# Patient Record
Sex: FEMALE | Race: BLACK OR AFRICAN AMERICAN | Employment: OTHER | URBAN - METROPOLITAN AREA
[De-identification: names, ages, dates, MRNs, and addresses within clinical notes are randomized per-mention and may not be internally consistent; named-entity substitution may affect disease eponyms.]

---

## 2017-01-12 ENCOUNTER — OFFICE VISIT (OUTPATIENT)
Dept: ORTHOPEDIC SURGERY | Age: 72
End: 2017-01-12

## 2017-01-12 VITALS
HEIGHT: 68 IN | RESPIRATION RATE: 16 BRPM | DIASTOLIC BLOOD PRESSURE: 71 MMHG | HEART RATE: 65 BPM | WEIGHT: 203 LBS | SYSTOLIC BLOOD PRESSURE: 121 MMHG | BODY MASS INDEX: 30.77 KG/M2

## 2017-01-12 DIAGNOSIS — M65.352 TRIGGER LITTLE FINGER OF LEFT HAND: ICD-10-CM

## 2017-01-12 DIAGNOSIS — Z01.818 PREOP EXAMINATION: Primary | ICD-10-CM

## 2017-01-12 DIAGNOSIS — M65.332 TRIGGER MIDDLE FINGER OF LEFT HAND: ICD-10-CM

## 2017-01-12 PROCEDURE — 99213 OFFICE O/P EST LOW 20 MIN: CPT | Performed by: ORTHOPAEDIC SURGERY

## 2017-01-12 RX ORDER — FERROUS SULFATE 325(65) MG
TABLET ORAL
Refills: 0 | COMMUNITY
Start: 2016-11-23 | End: 2021-01-20 | Stop reason: SDUPTHER

## 2017-01-12 RX ORDER — HYDROCODONE BITARTRATE AND ACETAMINOPHEN 5; 325 MG/1; MG/1
1 TABLET ORAL EVERY 6 HOURS PRN
Qty: 15 TABLET | Refills: 0 | Status: SHIPPED | OUTPATIENT
Start: 2017-01-12 | End: 2017-05-16 | Stop reason: ALTCHOICE

## 2017-01-18 ENCOUNTER — HOSPITAL ENCOUNTER (OUTPATIENT)
Dept: PREADMISSION TESTING | Age: 72
Discharge: HOME | End: 2017-01-18
Attending: ORTHOPAEDIC SURGERY | Admitting: ORTHOPAEDIC SURGERY

## 2017-01-18 VITALS
WEIGHT: 203 LBS | DIASTOLIC BLOOD PRESSURE: 62 MMHG | SYSTOLIC BLOOD PRESSURE: 129 MMHG | HEIGHT: 68 IN | TEMPERATURE: 98.4 F | BODY MASS INDEX: 30.77 KG/M2 | OXYGEN SATURATION: 97 % | HEART RATE: 64 BPM | RESPIRATION RATE: 16 BRPM

## 2017-01-18 LAB
ANION GAP SERPL CALCULATED.3IONS-SCNC: 12 MMOL/L (ref 4–16)
BUN BLDV-MCNC: 31 MG/DL (ref 6–23)
CALCIUM SERPL-MCNC: 9.5 MG/DL (ref 8.3–10.6)
CHLORIDE BLD-SCNC: 103 MMOL/L (ref 99–110)
CO2: 25 MMOL/L (ref 21–32)
CREAT SERPL-MCNC: 1.4 MG/DL (ref 0.6–1.1)
GFR AFRICAN AMERICAN: 45 ML/MIN/1.73M2
GFR NON-AFRICAN AMERICAN: 37 ML/MIN/1.73M2
GLUCOSE BLD-MCNC: 91 MG/DL (ref 70–140)
HCT VFR BLD CALC: 39 % (ref 37–47)
HEMOGLOBIN: 11.6 GM/DL (ref 12.5–16)
MCH RBC QN AUTO: 25.1 PG (ref 27–31)
MCHC RBC AUTO-ENTMCNC: 29.7 % (ref 32–36)
MCV RBC AUTO: 84.2 FL (ref 78–100)
PDW BLD-RTO: 15.5 % (ref 11.7–14.9)
PLATELET # BLD: 296 K/CU MM (ref 140–440)
PMV BLD AUTO: 10.2 FL (ref 7.5–11.1)
POTASSIUM SERPL-SCNC: 5.2 MMOL/L (ref 3.5–5.1)
RBC # BLD: 4.63 M/CU MM (ref 4.2–5.4)
SODIUM BLD-SCNC: 140 MMOL/L (ref 135–145)
WBC # BLD: 8.6 K/CU MM (ref 4–10.5)

## 2017-01-19 ENCOUNTER — TELEPHONE (OUTPATIENT)
Dept: CARDIOLOGY CLINIC | Age: 72
End: 2017-01-19

## 2017-01-19 LAB
EKG ATRIAL RATE: 59 BPM
EKG DIAGNOSIS: NORMAL
EKG P AXIS: 47 DEGREES
EKG P-R INTERVAL: 186 MS
EKG Q-T INTERVAL: 434 MS
EKG QRS DURATION: 90 MS
EKG QTC CALCULATION (BAZETT): 429 MS
EKG R AXIS: 1 DEGREES
EKG T AXIS: 7 DEGREES
EKG VENTRICULAR RATE: 59 BPM

## 2017-01-23 ENCOUNTER — HOSPITAL ENCOUNTER (OUTPATIENT)
Dept: SURGERY | Age: 72
Discharge: HOME | End: 2017-01-23
Attending: ORTHOPAEDIC SURGERY | Admitting: ORTHOPAEDIC SURGERY

## 2017-01-23 VITALS
DIASTOLIC BLOOD PRESSURE: 75 MMHG | OXYGEN SATURATION: 96 % | TEMPERATURE: 97.6 F | RESPIRATION RATE: 16 BRPM | HEIGHT: 68 IN | WEIGHT: 203 LBS | BODY MASS INDEX: 30.77 KG/M2 | SYSTOLIC BLOOD PRESSURE: 119 MMHG | HEART RATE: 79 BPM

## 2017-01-23 LAB
GLUCOSE BLD-MCNC: 104 MG/DL (ref 70–99)
GLUCOSE BLD-MCNC: 105 MG/DL (ref 70–99)
HCT VFR BLD CALC: 34 % (ref 37–47)
HEMOGLOBIN: 11.6 GM/DL (ref 12.5–16)
POC CALCIUM: 1.1 MMOL/L (ref 1.12–1.32)
POC CHLORIDE: 109 MMOL/L (ref 98–109)
POC CREATININE: 1.6 MG/DL (ref 0.6–1.1)
POTASSIUM SERPL-SCNC: 5.9 MMOL/L (ref 3.5–4.5)
SODIUM BLD-SCNC: 142 MMOL/L (ref 138–146)
SOURCE, BLOOD GAS: ABNORMAL

## 2017-01-23 RX ORDER — SODIUM CHLORIDE 0.9 % (FLUSH) 0.9 %
10 SYRINGE (ML) INJECTION PRN
Status: DISCONTINUED | OUTPATIENT
Start: 2017-01-23 | End: 2017-01-24 | Stop reason: HOSPADM

## 2017-01-23 RX ORDER — SODIUM CHLORIDE, SODIUM LACTATE, POTASSIUM CHLORIDE, CALCIUM CHLORIDE 600; 310; 30; 20 MG/100ML; MG/100ML; MG/100ML; MG/100ML
INJECTION, SOLUTION INTRAVENOUS CONTINUOUS
Status: DISCONTINUED | OUTPATIENT
Start: 2017-01-23 | End: 2017-01-24 | Stop reason: HOSPADM

## 2017-01-23 RX ORDER — SODIUM CHLORIDE 0.9 % (FLUSH) 0.9 %
10 SYRINGE (ML) INJECTION EVERY 12 HOURS SCHEDULED
Status: DISCONTINUED | OUTPATIENT
Start: 2017-01-23 | End: 2017-01-24 | Stop reason: HOSPADM

## 2017-01-23 RX ADMIN — SODIUM CHLORIDE, SODIUM LACTATE, POTASSIUM CHLORIDE, CALCIUM CHLORIDE: 600; 310; 30; 20 INJECTION, SOLUTION INTRAVENOUS at 07:52

## 2017-01-23 ASSESSMENT — PAIN - FUNCTIONAL ASSESSMENT: PAIN_FUNCTIONAL_ASSESSMENT: 0-10

## 2017-01-25 ENCOUNTER — OFFICE VISIT (OUTPATIENT)
Dept: INTERNAL MEDICINE CLINIC | Age: 72
End: 2017-01-25

## 2017-01-25 VITALS
SYSTOLIC BLOOD PRESSURE: 142 MMHG | RESPIRATION RATE: 16 BRPM | HEIGHT: 68 IN | BODY MASS INDEX: 31.07 KG/M2 | DIASTOLIC BLOOD PRESSURE: 70 MMHG | HEART RATE: 76 BPM | WEIGHT: 205 LBS

## 2017-01-25 DIAGNOSIS — E87.5 HYPERKALEMIA: Primary | ICD-10-CM

## 2017-01-25 LAB
ANION GAP SERPL CALCULATED.3IONS-SCNC: 17 MMOL/L (ref 3–16)
BUN BLDV-MCNC: 23 MG/DL (ref 7–20)
CALCIUM SERPL-MCNC: 9.2 MG/DL (ref 8.3–10.6)
CHLORIDE BLD-SCNC: 108 MMOL/L (ref 99–110)
CO2: 22 MMOL/L (ref 21–32)
CREAT SERPL-MCNC: 1.1 MG/DL (ref 0.6–1.2)
GFR AFRICAN AMERICAN: 59
GFR NON-AFRICAN AMERICAN: 49
GLUCOSE BLD-MCNC: 105 MG/DL (ref 70–99)
POTASSIUM SERPL-SCNC: 4.5 MMOL/L (ref 3.5–5.1)
SODIUM BLD-SCNC: 147 MMOL/L (ref 136–145)

## 2017-01-25 PROCEDURE — 3288F FALL RISK ASSESSMENT DOCD: CPT | Performed by: INTERNAL MEDICINE

## 2017-01-25 PROCEDURE — 99213 OFFICE O/P EST LOW 20 MIN: CPT | Performed by: INTERNAL MEDICINE

## 2017-01-25 PROCEDURE — G8510 SCR DEP NEG, NO PLAN REQD: HCPCS | Performed by: INTERNAL MEDICINE

## 2017-01-25 ASSESSMENT — PATIENT HEALTH QUESTIONNAIRE - PHQ9
1. LITTLE INTEREST OR PLEASURE IN DOING THINGS: 0
SUM OF ALL RESPONSES TO PHQ9 QUESTIONS 1 & 2: 0
2. FEELING DOWN, DEPRESSED OR HOPELESS: 0
SUM OF ALL RESPONSES TO PHQ QUESTIONS 1-9: 0

## 2017-01-26 ENCOUNTER — TELEPHONE (OUTPATIENT)
Dept: ORTHOPEDIC SURGERY | Age: 72
End: 2017-01-26

## 2017-02-03 RX ORDER — SODIUM CHLORIDE, SODIUM LACTATE, POTASSIUM CHLORIDE, CALCIUM CHLORIDE 600; 310; 30; 20 MG/100ML; MG/100ML; MG/100ML; MG/100ML
INJECTION, SOLUTION INTRAVENOUS CONTINUOUS
Status: CANCELLED | OUTPATIENT
Start: 2017-02-03

## 2017-02-03 RX ORDER — SODIUM CHLORIDE 0.9 % (FLUSH) 0.9 %
10 SYRINGE (ML) INJECTION EVERY 12 HOURS SCHEDULED
Status: CANCELLED | OUTPATIENT
Start: 2017-02-03

## 2017-02-03 RX ORDER — SODIUM CHLORIDE 0.9 % (FLUSH) 0.9 %
10 SYRINGE (ML) INJECTION PRN
Status: CANCELLED | OUTPATIENT
Start: 2017-02-03

## 2017-02-06 ENCOUNTER — TELEPHONE (OUTPATIENT)
Dept: ORTHOPEDIC SURGERY | Age: 72
End: 2017-02-06

## 2017-02-06 ENCOUNTER — HOSPITAL ENCOUNTER (OUTPATIENT)
Dept: SURGERY | Age: 72
Discharge: HOME | End: 2017-03-07
Attending: ORTHOPAEDIC SURGERY | Admitting: ORTHOPAEDIC SURGERY

## 2017-02-10 RX ORDER — PROPAFENONE HYDROCHLORIDE 150 MG/1
150 TABLET, FILM COATED ORAL EVERY 8 HOURS
Qty: 90 TABLET | Refills: 6 | Status: SHIPPED | OUTPATIENT
Start: 2017-02-10 | End: 2018-01-09 | Stop reason: SDUPTHER

## 2017-02-14 ENCOUNTER — OFFICE VISIT (OUTPATIENT)
Dept: INTERNAL MEDICINE CLINIC | Age: 72
End: 2017-02-14

## 2017-02-14 ENCOUNTER — OFFICE VISIT (OUTPATIENT)
Dept: PHYSICAL MEDICINE AND REHAB | Age: 72
End: 2017-02-14

## 2017-02-14 VITALS — RESPIRATION RATE: 14 BRPM | HEART RATE: 76 BPM | SYSTOLIC BLOOD PRESSURE: 140 MMHG | DIASTOLIC BLOOD PRESSURE: 72 MMHG

## 2017-02-14 DIAGNOSIS — G56.03 BILATERAL CARPAL TUNNEL SYNDROME: Primary | ICD-10-CM

## 2017-02-14 DIAGNOSIS — E11.21 TYPE 2 DIABETES MELLITUS WITH NEPHROPATHY (HCC): ICD-10-CM

## 2017-02-14 DIAGNOSIS — G56.21 ULNAR NEUROPATHY AT WRIST, RIGHT: ICD-10-CM

## 2017-02-14 DIAGNOSIS — M79.602 PARESTHESIA AND PAIN OF BOTH UPPER EXTREMITIES: ICD-10-CM

## 2017-02-14 DIAGNOSIS — I48.0 PAROXYSMAL ATRIAL FIBRILLATION (HCC): Primary | ICD-10-CM

## 2017-02-14 DIAGNOSIS — H61.22 IMPACTED CERUMEN OF LEFT EAR: ICD-10-CM

## 2017-02-14 DIAGNOSIS — R20.2 PARESTHESIA AND PAIN OF BOTH UPPER EXTREMITIES: ICD-10-CM

## 2017-02-14 DIAGNOSIS — M79.601 PARESTHESIA AND PAIN OF BOTH UPPER EXTREMITIES: ICD-10-CM

## 2017-02-14 DIAGNOSIS — C50.919 MALIGNANT NEOPLASM OF FEMALE BREAST, UNSPECIFIED LATERALITY, UNSPECIFIED SITE OF BREAST: ICD-10-CM

## 2017-02-14 LAB
A/G RATIO: 1.6 (ref 1.1–2.2)
ALBUMIN SERPL-MCNC: 4.5 G/DL (ref 3.4–5)
ALP BLD-CCNC: 93 U/L (ref 40–129)
ALT SERPL-CCNC: 28 U/L (ref 10–40)
ANION GAP SERPL CALCULATED.3IONS-SCNC: 18 MMOL/L (ref 3–16)
AST SERPL-CCNC: 16 U/L (ref 15–37)
BILIRUB SERPL-MCNC: 0.3 MG/DL (ref 0–1)
BUN BLDV-MCNC: 31 MG/DL (ref 7–20)
CALCIUM SERPL-MCNC: 9.8 MG/DL (ref 8.3–10.6)
CHLORIDE BLD-SCNC: 106 MMOL/L (ref 99–110)
CO2: 23 MMOL/L (ref 21–32)
CREAT SERPL-MCNC: 1.3 MG/DL (ref 0.6–1.2)
GFR AFRICAN AMERICAN: 49
GFR NON-AFRICAN AMERICAN: 40
GLOBULIN: 2.9 G/DL
GLUCOSE BLD-MCNC: 112 MG/DL (ref 70–99)
POTASSIUM SERPL-SCNC: 4.6 MMOL/L (ref 3.5–5.1)
SODIUM BLD-SCNC: 147 MMOL/L (ref 136–145)
TOTAL PROTEIN: 7.4 G/DL (ref 6.4–8.2)

## 2017-02-14 PROCEDURE — G0008 ADMIN INFLUENZA VIRUS VAC: HCPCS | Performed by: INTERNAL MEDICINE

## 2017-02-14 PROCEDURE — 90662 IIV NO PRSV INCREASED AG IM: CPT | Performed by: INTERNAL MEDICINE

## 2017-02-14 PROCEDURE — 95886 MUSC TEST DONE W/N TEST COMP: CPT | Performed by: PHYSICAL MEDICINE & REHABILITATION

## 2017-02-14 PROCEDURE — 95911 NRV CNDJ TEST 9-10 STUDIES: CPT | Performed by: PHYSICAL MEDICINE & REHABILITATION

## 2017-02-14 PROCEDURE — 99214 OFFICE O/P EST MOD 30 MIN: CPT | Performed by: INTERNAL MEDICINE

## 2017-02-15 LAB
ESTIMATED AVERAGE GLUCOSE: 137 MG/DL
HBA1C MFR BLD: 6.4 %

## 2017-02-22 ENCOUNTER — TELEPHONE (OUTPATIENT)
Dept: INTERNAL MEDICINE CLINIC | Age: 72
End: 2017-02-22

## 2017-02-22 DIAGNOSIS — G56.03 CARPAL TUNNEL SYNDROME ON BOTH SIDES: Primary | ICD-10-CM

## 2017-02-24 ENCOUNTER — TELEPHONE (OUTPATIENT)
Dept: INTERNAL MEDICINE CLINIC | Age: 72
End: 2017-02-24

## 2017-02-28 ENCOUNTER — OFFICE VISIT (OUTPATIENT)
Dept: CARDIOLOGY CLINIC | Age: 72
End: 2017-02-28

## 2017-02-28 VITALS
WEIGHT: 204 LBS | BODY MASS INDEX: 30.92 KG/M2 | DIASTOLIC BLOOD PRESSURE: 70 MMHG | HEIGHT: 68 IN | SYSTOLIC BLOOD PRESSURE: 128 MMHG

## 2017-02-28 DIAGNOSIS — I10 ESSENTIAL HYPERTENSION: ICD-10-CM

## 2017-02-28 DIAGNOSIS — G47.33 OSA (OBSTRUCTIVE SLEEP APNEA): ICD-10-CM

## 2017-02-28 DIAGNOSIS — E78.2 MIXED HYPERLIPIDEMIA: ICD-10-CM

## 2017-02-28 DIAGNOSIS — I48.0 PAROXYSMAL ATRIAL FIBRILLATION (HCC): Primary | ICD-10-CM

## 2017-02-28 DIAGNOSIS — E11.21 TYPE 2 DIABETES MELLITUS WITH NEPHROPATHY (HCC): ICD-10-CM

## 2017-02-28 PROCEDURE — 99214 OFFICE O/P EST MOD 30 MIN: CPT | Performed by: INTERNAL MEDICINE

## 2017-03-03 RX ORDER — ATORVASTATIN CALCIUM 80 MG/1
80 TABLET, FILM COATED ORAL DAILY
Qty: 90 TABLET | Refills: 3 | Status: SHIPPED | OUTPATIENT
Start: 2017-03-03 | End: 2018-02-03 | Stop reason: SDUPTHER

## 2017-03-16 DIAGNOSIS — I10 ESSENTIAL HYPERTENSION: ICD-10-CM

## 2017-03-16 RX ORDER — METOPROLOL TARTRATE 50 MG/1
50 TABLET, FILM COATED ORAL 2 TIMES DAILY
Qty: 180 TABLET | Refills: 3 | Status: SHIPPED | OUTPATIENT
Start: 2017-03-16 | End: 2018-03-24 | Stop reason: SDUPTHER

## 2017-03-28 ENCOUNTER — TELEPHONE (OUTPATIENT)
Dept: CARDIOLOGY CLINIC | Age: 72
End: 2017-03-28

## 2017-03-28 DIAGNOSIS — I48.0 PAROXYSMAL ATRIAL FIBRILLATION (HCC): Primary | ICD-10-CM

## 2017-03-28 DIAGNOSIS — N39.41 URGE INCONTINENCE: ICD-10-CM

## 2017-03-28 RX ORDER — OXYBUTYNIN CHLORIDE 5 MG/1
5 TABLET ORAL 2 TIMES DAILY
Qty: 180 TABLET | Refills: 3 | Status: SHIPPED | OUTPATIENT
Start: 2017-03-28 | End: 2018-04-07 | Stop reason: SDUPTHER

## 2017-05-16 ENCOUNTER — OFFICE VISIT (OUTPATIENT)
Dept: INTERNAL MEDICINE CLINIC | Age: 72
End: 2017-05-16

## 2017-05-16 ENCOUNTER — OFFICE VISIT (OUTPATIENT)
Dept: ORTHOPEDIC SURGERY | Age: 72
End: 2017-05-16

## 2017-05-16 VITALS
BODY MASS INDEX: 31.02 KG/M2 | DIASTOLIC BLOOD PRESSURE: 70 MMHG | RESPIRATION RATE: 16 BRPM | WEIGHT: 204 LBS | SYSTOLIC BLOOD PRESSURE: 150 MMHG | HEART RATE: 64 BPM

## 2017-05-16 VITALS — BODY MASS INDEX: 30.92 KG/M2 | WEIGHT: 204 LBS | RESPIRATION RATE: 16 BRPM | HEIGHT: 68 IN

## 2017-05-16 DIAGNOSIS — K21.9 GASTROESOPHAGEAL REFLUX DISEASE WITHOUT ESOPHAGITIS: Primary | ICD-10-CM

## 2017-05-16 DIAGNOSIS — I48.0 PAROXYSMAL ATRIAL FIBRILLATION (HCC): ICD-10-CM

## 2017-05-16 DIAGNOSIS — I10 ESSENTIAL HYPERTENSION: ICD-10-CM

## 2017-05-16 DIAGNOSIS — M65.332 TRIGGER MIDDLE FINGER OF LEFT HAND: Primary | ICD-10-CM

## 2017-05-16 DIAGNOSIS — E11.21 TYPE 2 DIABETES MELLITUS WITH NEPHROPATHY (HCC): ICD-10-CM

## 2017-05-16 DIAGNOSIS — N39.41 URGE INCONTINENCE: ICD-10-CM

## 2017-05-16 DIAGNOSIS — E78.2 MIXED HYPERLIPIDEMIA: ICD-10-CM

## 2017-05-16 LAB
A/G RATIO: 1.6 (ref 1.1–2.2)
ALBUMIN SERPL-MCNC: 4.4 G/DL (ref 3.4–5)
ALP BLD-CCNC: 81 U/L (ref 40–129)
ALT SERPL-CCNC: 26 U/L (ref 10–40)
ANION GAP SERPL CALCULATED.3IONS-SCNC: 25 MMOL/L (ref 3–16)
AST SERPL-CCNC: 26 U/L (ref 15–37)
BASOPHILS ABSOLUTE: 0.1 K/UL (ref 0–0.2)
BASOPHILS RELATIVE PERCENT: 1.1 %
BILIRUB SERPL-MCNC: 0.3 MG/DL (ref 0–1)
BUN BLDV-MCNC: 29 MG/DL (ref 7–20)
CALCIUM SERPL-MCNC: 9.2 MG/DL (ref 8.3–10.6)
CHLORIDE BLD-SCNC: 102 MMOL/L (ref 99–110)
CHOLESTEROL, TOTAL: 204 MG/DL (ref 0–199)
CO2: 15 MMOL/L (ref 21–32)
CREAT SERPL-MCNC: 1.3 MG/DL (ref 0.6–1.2)
EOSINOPHILS ABSOLUTE: 0.2 K/UL (ref 0–0.6)
EOSINOPHILS RELATIVE PERCENT: 2.9 %
GFR AFRICAN AMERICAN: 49
GFR NON-AFRICAN AMERICAN: 40
GLOBULIN: 2.8 G/DL
GLUCOSE BLD-MCNC: 52 MG/DL (ref 70–99)
HBA1C MFR BLD: 6.1 %
HCT VFR BLD CALC: 36.6 % (ref 36–48)
HDLC SERPL-MCNC: 56 MG/DL (ref 40–60)
HEMOGLOBIN: 11.4 G/DL (ref 12–16)
LDL CHOLESTEROL CALCULATED: 120 MG/DL
LYMPHOCYTES ABSOLUTE: 2 K/UL (ref 1–5.1)
LYMPHOCYTES RELATIVE PERCENT: 28 %
MCH RBC QN AUTO: 24.6 PG (ref 26–34)
MCHC RBC AUTO-ENTMCNC: 31 G/DL (ref 31–36)
MCV RBC AUTO: 79.1 FL (ref 80–100)
MONOCYTES ABSOLUTE: 0.5 K/UL (ref 0–1.3)
MONOCYTES RELATIVE PERCENT: 6.6 %
NEUTROPHILS ABSOLUTE: 4.3 K/UL (ref 1.7–7.7)
NEUTROPHILS RELATIVE PERCENT: 61.4 %
PDW BLD-RTO: 17.1 % (ref 12.4–15.4)
PLATELET # BLD: 258 K/UL (ref 135–450)
PMV BLD AUTO: 9.3 FL (ref 5–10.5)
RBC # BLD: 4.62 M/UL (ref 4–5.2)
SODIUM BLD-SCNC: 142 MMOL/L (ref 136–145)
TOTAL PROTEIN: 7.2 G/DL (ref 6.4–8.2)
TRIGL SERPL-MCNC: 141 MG/DL (ref 0–150)
VLDLC SERPL CALC-MCNC: 28 MG/DL
WBC # BLD: 7.1 K/UL (ref 4–11)

## 2017-05-16 PROCEDURE — 99213 OFFICE O/P EST LOW 20 MIN: CPT | Performed by: ORTHOPAEDIC SURGERY

## 2017-05-16 PROCEDURE — 99214 OFFICE O/P EST MOD 30 MIN: CPT | Performed by: INTERNAL MEDICINE

## 2017-05-16 PROCEDURE — 83036 HEMOGLOBIN GLYCOSYLATED A1C: CPT | Performed by: INTERNAL MEDICINE

## 2017-05-16 PROCEDURE — 20600 DRAIN/INJ JOINT/BURSA W/O US: CPT | Performed by: ORTHOPAEDIC SURGERY

## 2017-05-16 RX ORDER — APIXABAN 5 MG/1
TABLET, FILM COATED ORAL
COMMUNITY
Start: 2017-03-28 | End: 2017-08-16 | Stop reason: CLARIF

## 2017-05-17 DIAGNOSIS — R60.9 EDEMA, UNSPECIFIED TYPE: ICD-10-CM

## 2017-05-17 RX ORDER — FUROSEMIDE 20 MG/1
20 TABLET ORAL DAILY
Qty: 90 TABLET | Refills: 3 | Status: SHIPPED | OUTPATIENT
Start: 2017-05-17 | End: 2018-05-24 | Stop reason: SDUPTHER

## 2017-05-22 DIAGNOSIS — I10 ESSENTIAL HYPERTENSION: ICD-10-CM

## 2017-05-22 DIAGNOSIS — E11.21 TYPE 2 DIABETES MELLITUS WITH NEPHROPATHY (HCC): ICD-10-CM

## 2017-05-22 LAB
A/G RATIO: 1.7 (ref 1.1–2.2)
ALBUMIN SERPL-MCNC: 4.2 G/DL (ref 3.4–5)
ALP BLD-CCNC: 79 U/L (ref 40–129)
ALT SERPL-CCNC: 36 U/L (ref 10–40)
ANION GAP SERPL CALCULATED.3IONS-SCNC: 18 MMOL/L (ref 3–16)
AST SERPL-CCNC: 22 U/L (ref 15–37)
BASOPHILS ABSOLUTE: 0.1 K/UL (ref 0–0.2)
BASOPHILS RELATIVE PERCENT: 1 %
BILIRUB SERPL-MCNC: <0.2 MG/DL (ref 0–1)
BUN BLDV-MCNC: 27 MG/DL (ref 7–20)
CALCIUM SERPL-MCNC: 9.2 MG/DL (ref 8.3–10.6)
CHLORIDE BLD-SCNC: 100 MMOL/L (ref 99–110)
CHOLESTEROL, TOTAL: 191 MG/DL (ref 0–199)
CO2: 21 MMOL/L (ref 21–32)
CREAT SERPL-MCNC: 1.1 MG/DL (ref 0.6–1.2)
EOSINOPHILS ABSOLUTE: 0.2 K/UL (ref 0–0.6)
EOSINOPHILS RELATIVE PERCENT: 2.8 %
GFR AFRICAN AMERICAN: 59
GFR NON-AFRICAN AMERICAN: 49
GLOBULIN: 2.5 G/DL
GLUCOSE BLD-MCNC: 95 MG/DL (ref 70–99)
HCT VFR BLD CALC: 36.5 % (ref 36–48)
HDLC SERPL-MCNC: 63 MG/DL (ref 40–60)
HEMOGLOBIN: 11.5 G/DL (ref 12–16)
LDL CHOLESTEROL CALCULATED: 108 MG/DL
LYMPHOCYTES ABSOLUTE: 2.1 K/UL (ref 1–5.1)
LYMPHOCYTES RELATIVE PERCENT: 26.1 %
MCH RBC QN AUTO: 24.7 PG (ref 26–34)
MCHC RBC AUTO-ENTMCNC: 31.5 G/DL (ref 31–36)
MCV RBC AUTO: 78.3 FL (ref 80–100)
MONOCYTES ABSOLUTE: 0.5 K/UL (ref 0–1.3)
MONOCYTES RELATIVE PERCENT: 6.3 %
NEUTROPHILS ABSOLUTE: 5.1 K/UL (ref 1.7–7.7)
NEUTROPHILS RELATIVE PERCENT: 63.8 %
PDW BLD-RTO: 16.9 % (ref 12.4–15.4)
PLATELET # BLD: 290 K/UL (ref 135–450)
PMV BLD AUTO: 8.5 FL (ref 5–10.5)
POTASSIUM SERPL-SCNC: 4.4 MMOL/L (ref 3.5–5.1)
RBC # BLD: 4.66 M/UL (ref 4–5.2)
SODIUM BLD-SCNC: 139 MMOL/L (ref 136–145)
TOTAL PROTEIN: 6.7 G/DL (ref 6.4–8.2)
TRIGL SERPL-MCNC: 99 MG/DL (ref 0–150)
VLDLC SERPL CALC-MCNC: 20 MG/DL
WBC # BLD: 7.9 K/UL (ref 4–11)

## 2017-05-25 ENCOUNTER — TELEPHONE (OUTPATIENT)
Dept: CARDIOLOGY CLINIC | Age: 72
End: 2017-05-25

## 2017-05-25 NOTE — TELEPHONE ENCOUNTER
Pt needs Eliquis samples. The insurance company will not pay for them.    Told pt to check  tomorrow afternoon

## 2017-06-06 ENCOUNTER — HOSPITAL ENCOUNTER (OUTPATIENT)
Dept: OTHER | Age: 72
Discharge: HOME | End: 2017-06-06
Attending: INTERNAL MEDICINE | Admitting: INTERNAL MEDICINE

## 2017-06-06 LAB
ALBUMIN SERPL-MCNC: 4.1 GM/DL (ref 3.4–5)
ALP BLD-CCNC: 73 IU/L (ref 40–128)
ALT SERPL-CCNC: 40 U/L (ref 10–40)
ANION GAP SERPL CALCULATED.3IONS-SCNC: 14 MMOL/L (ref 4–16)
AST SERPL-CCNC: 25 IU/L (ref 15–37)
BILIRUB SERPL-MCNC: 0.2 MG/DL (ref 0–1)
BUN BLDV-MCNC: 26 MG/DL (ref 6–23)
CALCIUM SERPL-MCNC: 9.5 MG/DL (ref 8.3–10.6)
CHLORIDE BLD-SCNC: 102 MMOL/L (ref 99–110)
CO2: 25 MMOL/L (ref 21–32)
CREAT SERPL-MCNC: 1.4 MG/DL (ref 0.6–1.1)
GFR AFRICAN AMERICAN: 45 ML/MIN/1.73M2
GFR NON-AFRICAN AMERICAN: 37 ML/MIN/1.73M2
GLUCOSE BLD-MCNC: 103 MG/DL (ref 70–140)
POTASSIUM SERPL-SCNC: 4.7 MMOL/L (ref 3.5–5.1)
SODIUM BLD-SCNC: 141 MMOL/L (ref 135–145)
TOTAL PROTEIN: 6.6 GM/DL (ref 6.4–8.2)

## 2017-06-08 LAB — CA 27.29: 43 U/ML

## 2017-08-16 ENCOUNTER — OFFICE VISIT (OUTPATIENT)
Dept: INTERNAL MEDICINE CLINIC | Age: 72
End: 2017-08-16

## 2017-08-16 VITALS
WEIGHT: 205 LBS | RESPIRATION RATE: 16 BRPM | SYSTOLIC BLOOD PRESSURE: 132 MMHG | BODY MASS INDEX: 31.17 KG/M2 | HEART RATE: 76 BPM | DIASTOLIC BLOOD PRESSURE: 86 MMHG

## 2017-08-16 DIAGNOSIS — C50.919 MALIGNANT NEOPLASM OF FEMALE BREAST, UNSPECIFIED LATERALITY, UNSPECIFIED SITE OF BREAST: ICD-10-CM

## 2017-08-16 DIAGNOSIS — E11.21 TYPE 2 DIABETES MELLITUS WITH NEPHROPATHY (HCC): ICD-10-CM

## 2017-08-16 DIAGNOSIS — I10 ESSENTIAL HYPERTENSION: ICD-10-CM

## 2017-08-16 DIAGNOSIS — R60.0 BILATERAL LEG EDEMA: Primary | ICD-10-CM

## 2017-08-16 DIAGNOSIS — I48.0 PAROXYSMAL ATRIAL FIBRILLATION (HCC): ICD-10-CM

## 2017-08-16 LAB
A/G RATIO: 1.6 (ref 1.1–2.2)
ALBUMIN SERPL-MCNC: 4.1 G/DL (ref 3.4–5)
ALP BLD-CCNC: 79 U/L (ref 40–129)
ALT SERPL-CCNC: 39 U/L (ref 10–40)
ANION GAP SERPL CALCULATED.3IONS-SCNC: 14 MMOL/L (ref 3–16)
AST SERPL-CCNC: 26 U/L (ref 15–37)
BILIRUB SERPL-MCNC: <0.2 MG/DL (ref 0–1)
BUN BLDV-MCNC: 23 MG/DL (ref 7–20)
CALCIUM SERPL-MCNC: 9.4 MG/DL (ref 8.3–10.6)
CHLORIDE BLD-SCNC: 102 MMOL/L (ref 99–110)
CO2: 24 MMOL/L (ref 21–32)
CREAT SERPL-MCNC: 1.2 MG/DL (ref 0.6–1.2)
GFR AFRICAN AMERICAN: 53
GFR NON-AFRICAN AMERICAN: 44
GLOBULIN: 2.6 G/DL
GLUCOSE BLD-MCNC: 113 MG/DL (ref 70–99)
POTASSIUM SERPL-SCNC: 4.9 MMOL/L (ref 3.5–5.1)
SODIUM BLD-SCNC: 140 MMOL/L (ref 136–145)
TOTAL PROTEIN: 6.7 G/DL (ref 6.4–8.2)

## 2017-08-16 PROCEDURE — 99214 OFFICE O/P EST MOD 30 MIN: CPT | Performed by: INTERNAL MEDICINE

## 2017-08-16 RX ORDER — HYDROCHLOROTHIAZIDE 25 MG/1
12.5 TABLET ORAL DAILY
Qty: 45 TABLET | Refills: 3 | Status: SHIPPED | OUTPATIENT
Start: 2017-08-16 | End: 2018-08-07 | Stop reason: SDUPTHER

## 2017-08-16 RX ORDER — AMLODIPINE BESYLATE 5 MG/1
5 TABLET ORAL DAILY
Qty: 90 TABLET | Refills: 3 | Status: SHIPPED | OUTPATIENT
Start: 2017-08-16 | End: 2018-08-07 | Stop reason: SDUPTHER

## 2017-08-17 LAB
ESTIMATED AVERAGE GLUCOSE: 142.7 MG/DL
HBA1C MFR BLD: 6.6 %

## 2017-08-28 ENCOUNTER — OFFICE VISIT (OUTPATIENT)
Dept: CARDIOLOGY CLINIC | Age: 72
End: 2017-08-28

## 2017-08-28 VITALS
DIASTOLIC BLOOD PRESSURE: 76 MMHG | WEIGHT: 201 LBS | BODY MASS INDEX: 29.77 KG/M2 | HEIGHT: 69 IN | SYSTOLIC BLOOD PRESSURE: 132 MMHG | HEART RATE: 61 BPM

## 2017-08-28 DIAGNOSIS — I10 ESSENTIAL HYPERTENSION: ICD-10-CM

## 2017-08-28 DIAGNOSIS — R53.82 CHRONIC FATIGUE: ICD-10-CM

## 2017-08-28 DIAGNOSIS — I48.0 PAROXYSMAL ATRIAL FIBRILLATION (HCC): Primary | ICD-10-CM

## 2017-08-28 DIAGNOSIS — E78.2 MIXED HYPERLIPIDEMIA: ICD-10-CM

## 2017-08-28 DIAGNOSIS — G47.33 OSA (OBSTRUCTIVE SLEEP APNEA): ICD-10-CM

## 2017-08-28 DIAGNOSIS — E11.21 TYPE 2 DIABETES MELLITUS WITH NEPHROPATHY (HCC): ICD-10-CM

## 2017-08-28 PROCEDURE — 93000 ELECTROCARDIOGRAM COMPLETE: CPT | Performed by: INTERNAL MEDICINE

## 2017-08-28 PROCEDURE — 99214 OFFICE O/P EST MOD 30 MIN: CPT | Performed by: INTERNAL MEDICINE

## 2017-11-16 ENCOUNTER — OFFICE VISIT (OUTPATIENT)
Dept: INTERNAL MEDICINE CLINIC | Age: 72
End: 2017-11-16

## 2017-11-16 VITALS
HEART RATE: 76 BPM | BODY MASS INDEX: 29.24 KG/M2 | DIASTOLIC BLOOD PRESSURE: 70 MMHG | WEIGHT: 198 LBS | SYSTOLIC BLOOD PRESSURE: 140 MMHG | RESPIRATION RATE: 16 BRPM | OXYGEN SATURATION: 98 %

## 2017-11-16 DIAGNOSIS — E11.21 TYPE 2 DIABETES MELLITUS WITH NEPHROPATHY (HCC): ICD-10-CM

## 2017-11-16 DIAGNOSIS — I10 ESSENTIAL HYPERTENSION: ICD-10-CM

## 2017-11-16 DIAGNOSIS — E78.2 MIXED HYPERLIPIDEMIA: ICD-10-CM

## 2017-11-16 DIAGNOSIS — R80.9 PROTEINURIA, UNSPECIFIED TYPE: ICD-10-CM

## 2017-11-16 DIAGNOSIS — R20.2 PARESTHESIAS: Primary | ICD-10-CM

## 2017-11-16 DIAGNOSIS — I48.0 PAROXYSMAL ATRIAL FIBRILLATION (HCC): ICD-10-CM

## 2017-11-16 PROCEDURE — 99214 OFFICE O/P EST MOD 30 MIN: CPT | Performed by: INTERNAL MEDICINE

## 2017-11-16 RX ORDER — LISINOPRIL 20 MG/1
20 TABLET ORAL DAILY
Qty: 90 TABLET | Refills: 3 | Status: SHIPPED | OUTPATIENT
Start: 2017-11-16 | End: 2018-08-07 | Stop reason: SDUPTHER

## 2017-11-29 DIAGNOSIS — E11.21 TYPE 2 DIABETES MELLITUS WITH NEPHROPATHY (HCC): ICD-10-CM

## 2017-11-30 ENCOUNTER — OFFICE VISIT (OUTPATIENT)
Dept: INTERNAL MEDICINE CLINIC | Age: 72
End: 2017-11-30

## 2017-11-30 VITALS — SYSTOLIC BLOOD PRESSURE: 134 MMHG | RESPIRATION RATE: 16 BRPM | DIASTOLIC BLOOD PRESSURE: 62 MMHG | HEART RATE: 84 BPM

## 2017-11-30 DIAGNOSIS — E11.21 TYPE 2 DIABETES MELLITUS WITH NEPHROPATHY (HCC): Primary | ICD-10-CM

## 2017-11-30 LAB
ALBUMIN SERPL-MCNC: 4.3 G/DL
ALP BLD-CCNC: 77 U/L
ALT SERPL-CCNC: 30 U/L
ANA TITER: NEGATIVE
ANION GAP SERPL CALCULATED.3IONS-SCNC: 1.4 MMOL/L
AST SERPL-CCNC: 20 U/L
AVERAGE GLUCOSE: NORMAL
BASOPHILS ABSOLUTE: 0 /ΜL
BASOPHILS RELATIVE PERCENT: 0 %
BILIRUB SERPL-MCNC: 0.3 MG/DL (ref 0.1–1.4)
BUN BLDV-MCNC: 26 MG/DL
C-REACTIVE PROTEIN: 197
CALCIUM SERPL-MCNC: 9.7 MG/DL
CHLORIDE BLD-SCNC: 99 MMOL/L
CHOLESTEROL, TOTAL: 185 MG/DL
CHOLESTEROL/HDL RATIO: NORMAL
CO2: 23 MMOL/L
CREAT SERPL-MCNC: 1.25 MG/DL
EOSINOPHILS ABSOLUTE: 0.1 /ΜL
EOSINOPHILS RELATIVE PERCENT: 1 %
GFR CALCULATED: 50
GLUCOSE BLD-MCNC: 101 MG/DL
GLUCOSE BLD-MCNC: 104 MG/DL
HBA1C MFR BLD: 6.4 %
HCT VFR BLD CALC: 36.9 % (ref 36–46)
HDLC SERPL-MCNC: 53 MG/DL (ref 35–70)
HEMOGLOBIN: 12.1 G/DL (ref 12–16)
LDL CHOLESTEROL CALCULATED: 103 MG/DL (ref 0–160)
LYMPHOCYTES ABSOLUTE: 1.6 /ΜL
LYMPHOCYTES RELATIVE PERCENT: 15 %
MCH RBC QN AUTO: 25.1 PG
MCHC RBC AUTO-ENTMCNC: 32.8 G/DL
MCV RBC AUTO: 76 FL
MONOCYTES ABSOLUTE: 0.7 /ΜL
MONOCYTES RELATIVE PERCENT: 7 %
NEUTROPHILS ABSOLUTE: 7.8 /ΜL
NEUTROPHILS RELATIVE PERCENT: 77 %
PDW BLD-RTO: 15.7 %
PLATELET # BLD: 277 K/ΜL
PMV BLD AUTO: NORMAL FL
POTASSIUM SERPL-SCNC: 4.4 MMOL/L
RBC # BLD: 4.83 10^6/ΜL
RHEUMATOID FACTOR: <10
SODIUM BLD-SCNC: 140 MMOL/L
TOTAL PROTEIN: 7.3
TRIGL SERPL-MCNC: 144 MG/DL
TSH SERPL DL<=0.05 MIU/L-ACNC: 5.71 UIU/ML
VITAMIN B-12: 574
VLDLC SERPL CALC-MCNC: 29 MG/DL
WBC # BLD: 10.2 10^3/ML

## 2017-11-30 PROCEDURE — 82962 GLUCOSE BLOOD TEST: CPT | Performed by: INTERNAL MEDICINE

## 2017-11-30 PROCEDURE — 99213 OFFICE O/P EST LOW 20 MIN: CPT | Performed by: INTERNAL MEDICINE

## 2017-12-01 RX ORDER — LANCETS 30 GAUGE
1 EACH MISCELLANEOUS 2 TIMES DAILY
Qty: 100 EACH | Refills: 3 | Status: SHIPPED | OUTPATIENT
Start: 2017-12-01

## 2017-12-01 RX ORDER — GLUCOSAMINE HCL/CHONDROITIN SU 500-400 MG
1 CAPSULE ORAL 2 TIMES DAILY
Qty: 100 STRIP | Refills: 3 | Status: SHIPPED | OUTPATIENT
Start: 2017-12-01

## 2017-12-04 DIAGNOSIS — E78.2 MIXED HYPERLIPIDEMIA: ICD-10-CM

## 2017-12-04 DIAGNOSIS — E11.21 TYPE 2 DIABETES MELLITUS WITH NEPHROPATHY (HCC): ICD-10-CM

## 2017-12-04 DIAGNOSIS — R20.2 PARESTHESIAS: ICD-10-CM

## 2017-12-04 DIAGNOSIS — I10 ESSENTIAL HYPERTENSION: ICD-10-CM

## 2017-12-06 ENCOUNTER — HOSPITAL ENCOUNTER (OUTPATIENT)
Dept: OTHER | Age: 72
Discharge: HOME | End: 2017-12-06
Attending: INTERNAL MEDICINE | Admitting: INTERNAL MEDICINE

## 2017-12-06 LAB
ALBUMIN SERPL-MCNC: 3.9 GM/DL (ref 3.4–5)
ALP BLD-CCNC: 88 IU/L (ref 40–128)
ALT SERPL-CCNC: 68 U/L (ref 10–40)
ANION GAP SERPL CALCULATED.3IONS-SCNC: 14 MMOL/L (ref 4–16)
AST SERPL-CCNC: 36 IU/L (ref 15–37)
BILIRUB SERPL-MCNC: 0.2 MG/DL (ref 0–1)
BUN BLDV-MCNC: 38 MG/DL (ref 6–23)
CALCIUM SERPL-MCNC: 9.2 MG/DL (ref 8.3–10.6)
CHLORIDE BLD-SCNC: 102 MMOL/L (ref 99–110)
CO2: 27 MMOL/L (ref 21–32)
CREAT SERPL-MCNC: 1.4 MG/DL (ref 0.6–1.1)
GFR AFRICAN AMERICAN: 45 ML/MIN/1.73M2
GFR NON-AFRICAN AMERICAN: 37 ML/MIN/1.73M2
GLUCOSE BLD-MCNC: 98 MG/DL (ref 70–99)
POTASSIUM SERPL-SCNC: 4.7 MMOL/L (ref 3.5–5.1)
SODIUM BLD-SCNC: 143 MMOL/L (ref 135–145)
TOTAL PROTEIN: 6.6 GM/DL (ref 6.4–8.2)

## 2017-12-08 LAB — CA 27.29: 37 U/ML

## 2018-01-02 ENCOUNTER — HOSPITAL ENCOUNTER (OUTPATIENT)
Dept: WOMENS IMAGING | Age: 73
Discharge: HOME | End: 2018-01-02
Attending: INTERNAL MEDICINE | Admitting: INTERNAL MEDICINE

## 2018-01-02 DIAGNOSIS — C50.411 MALIGNANT NEOPLASM OF UPPER-OUTER QUADRANT OF RIGHT FEMALE BREAST (HCC): ICD-10-CM

## 2018-01-02 DIAGNOSIS — C50.919 MALIGNANT NEOPLASM OF FEMALE BREAST, UNSPECIFIED ESTROGEN RECEPTOR STATUS, UNSPECIFIED LATERALITY, UNSPECIFIED SITE OF BREAST (HCC): ICD-10-CM

## 2018-01-10 RX ORDER — PROPAFENONE HYDROCHLORIDE 150 MG/1
150 TABLET, FILM COATED ORAL EVERY 8 HOURS
Qty: 90 TABLET | Refills: 3 | Status: SHIPPED | OUTPATIENT
Start: 2018-01-10 | End: 2018-05-13 | Stop reason: SDUPTHER

## 2018-01-30 ENCOUNTER — NURSE ONLY (OUTPATIENT)
Dept: CARDIOLOGY CLINIC | Age: 73
End: 2018-01-30

## 2018-01-30 ENCOUNTER — TELEPHONE (OUTPATIENT)
Dept: INTERNAL MEDICINE CLINIC | Age: 73
End: 2018-01-30

## 2018-01-30 ENCOUNTER — OFFICE VISIT (OUTPATIENT)
Dept: CARDIOLOGY CLINIC | Age: 73
End: 2018-01-30

## 2018-01-30 VITALS
HEIGHT: 69 IN | HEART RATE: 80 BPM | BODY MASS INDEX: 29.33 KG/M2 | SYSTOLIC BLOOD PRESSURE: 102 MMHG | WEIGHT: 198 LBS | DIASTOLIC BLOOD PRESSURE: 62 MMHG

## 2018-01-30 DIAGNOSIS — I48.0 PAROXYSMAL ATRIAL FIBRILLATION (HCC): ICD-10-CM

## 2018-01-30 DIAGNOSIS — E78.2 MIXED HYPERLIPIDEMIA: ICD-10-CM

## 2018-01-30 DIAGNOSIS — E11.21 TYPE 2 DIABETES MELLITUS WITH NEPHROPATHY (HCC): ICD-10-CM

## 2018-01-30 DIAGNOSIS — G47.33 OSA (OBSTRUCTIVE SLEEP APNEA): ICD-10-CM

## 2018-01-30 DIAGNOSIS — N18.2 CHRONIC RENAL FAILURE, STAGE 2 (MILD): ICD-10-CM

## 2018-01-30 DIAGNOSIS — R42 DIZZY SPELLS: Primary | ICD-10-CM

## 2018-01-30 DIAGNOSIS — I10 ESSENTIAL HYPERTENSION: ICD-10-CM

## 2018-01-30 DIAGNOSIS — R00.2 PALPITATIONS: Primary | ICD-10-CM

## 2018-01-30 PROCEDURE — 93000 ELECTROCARDIOGRAM COMPLETE: CPT | Performed by: INTERNAL MEDICINE

## 2018-01-30 PROCEDURE — 99214 OFFICE O/P EST MOD 30 MIN: CPT | Performed by: INTERNAL MEDICINE

## 2018-01-30 RX ORDER — OMEPRAZOLE 40 MG/1
40 CAPSULE, DELAYED RELEASE ORAL DAILY
COMMUNITY
End: 2019-10-24

## 2018-01-30 NOTE — PROGRESS NOTES
echocardiogram     4/23/12-[facility]- Norm chamber sizes. LVH with norm LV systolic but abn diastolic fxn, mild MR and TR, minimal pulm HTN. 11/10 -EF>55%; 3/05, 9/23/03    History of cardiac catheterization     11/15/2013-EF 55%, No signif CAD -Dr Tran Neighbor;;    History of cardiovascular stress test     11/14/2013-EF 70%. Normal Lexiscan Cardiolite, Uniform wall motion;    History of echocardiogram     60/60/1571-[id number] global systolic  function. No wall motion abnormalities. EF 55%. Mild MR/TR;    History of Holter monitoring 11/17/14 11/14-48 hour Holter: Predominant rhythm was sinus, no ventricular ectopy noted, supraventricular ectopics were noted in single beat forms.  Hx of 24 hour EKG monitoring     12/17/13 48 hr holter monitor. Sinus rhythm with intermittent sinus arrhythmia.  Hyperlipidemia     Hypertension     11/13 Cath WNL, EF 55%; 11/13 Stress WNL : 11/13 TTE mild MR and TR, EF 55%; 4/12 Stress WNL; 8/9 - Cath WNL    Iron deficiency anemia 7/9/2013 9/13 EGD WNL    Lumbar radiculopathy 11/25/2013    Menopause     ADOLFO (obstructive sleep apnea)     sleep study 10/3 CPAP 6cm     Osteoarthritis     both knees    Osteopenia     9/12 DEXA T-score -1.5    Other activity(E029.9)     48 hr holter, the 48 hr holter monitor reveals the patient in the sinus rhythm with occasional supraventricular ectopic beats. There is a rare short atrial run.     Paroxysmal atrial fibrillation (HCC)     4/12 Holter [facility]    Prolonged emergence from general anesthesia     Proteinuria     Right Breast Cancer Dx 10-12    Supraventricular tachycardia (HCC)     Type 2 diabetes mellitus (HCC) Dx 2000's    Urge incontinence     Wears partial dentures     upper partial       MEDICATIONS    Current Outpatient Rx   Medication Sig Dispense Refill    omeprazole (PRILOSEC) 40 MG delayed release capsule Take 40 mg by mouth daily      Cholecalciferol (VITAMIN D3 PO) Take 630 mg by mouth daily      rivaroxaban (XARELTO) 15 MG TABS tablet Take 1 tablet by mouth every 24 hours 30 tablet 6    propafenone (RYTHMOL) 150 MG tablet take 1 tablet by mouth every 8 hours 90 tablet 3    Lancets MISC 1 each by In Vitro route 2 times daily 100 each 3    Glucose Blood (BLOOD GLUCOSE TEST STRIPS) STRP 1 each by In Vitro route 2 times daily 100 strip 3    Blood Glucose Monitoring Suppl AV 1 kit by Does not apply route daily 1 Device 0    metFORMIN (GLUCOPHAGE) 500 MG tablet Take 1 tablet by mouth 2 times daily (with meals) (Patient taking differently: Take 1,000 mg by mouth 2 times daily (with meals) ) 180 tablet 3    lisinopril (PRINIVIL;ZESTRIL) 20 MG tablet Take 1 tablet by mouth daily 90 tablet 3    amLODIPine (NORVASC) 5 MG tablet Take 1 tablet by mouth daily 90 tablet 3    hydrochlorothiazide (HYDRODIURIL) 25 MG tablet Take 0.5 tablets by mouth daily 45 tablet 3    furosemide (LASIX) 20 MG tablet Take 1 tablet by mouth daily 90 tablet 3    oxybutynin (DITROPAN) 5 MG tablet Take 1 tablet by mouth 2 times daily 180 tablet 3    metoprolol tartrate (LOPRESSOR) 50 MG tablet Take 1 tablet by mouth 2 times daily 180 tablet 3    atorvastatin (LIPITOR) 80 MG tablet Take 1 tablet by mouth daily (Patient taking differently: Take 40 mg by mouth daily ) 90 tablet 3    ferrous sulfate 325 (65 FE) MG tablet take 1 tablet by mouth three times a day  0    Ascorbic Acid (VITAMIN C) 100 MG tablet Take 100 mg by mouth daily.  letrozole (FEMARA) 2.5 MG tablet Take 2.5 mg by mouth nightly       aspirin 81 MG tablet Take 81 mg by mouth every morning Over The Counter      Calcium Carbonate (CALTRATE 600 PO) Take  by mouth 2 times daily.          ALLERGIES    Allergies   Allergen Reactions    Latex      \"Blisters\"    Tape Soheila Love Tape]      \"Turn Red\"       FAMILY HISTORY    Family History   Problem Relation Age of Onset    Diabetes Mother     Early Death Mother 61    Cancer Father      \"Lung Cancer\"    Heart Disease Father tenderness to palpation or major deformities noted. Back- No tenderness. Neurologic:  Alert & oriented x 3, normal motor function, normal sensory function, no focal deficits noted. Psychiatric:  Affect normal, judgment normal, mood normal.     AssessmenT    1. Dizzy spells    2. Paroxysmal atrial fibrillation (HCC)    3. Mixed hyperlipidemia    4. ADOLFO (obstructive sleep apnea)    5. Essential hypertension    6. Type 2 diabetes mellitus with nephropathy ([facility] [address]    7.  Chronic renal failure, stage 2 (mild)        Plan  2 Week event monitor  Office visit for results    Electronically signed by: La Chowdhury, 1/30/2018 9:59 AM

## 2018-01-31 ENCOUNTER — TELEPHONE (OUTPATIENT)
Dept: CARDIOLOGY CLINIC | Age: 73
End: 2018-01-31

## 2018-02-01 DIAGNOSIS — I48.0 PAROXYSMAL ATRIAL FIBRILLATION (HCC): ICD-10-CM

## 2018-02-05 ENCOUNTER — OFFICE VISIT (OUTPATIENT)
Dept: INTERNAL MEDICINE CLINIC | Age: 73
End: 2018-02-05

## 2018-02-05 VITALS
WEIGHT: 195.2 LBS | OXYGEN SATURATION: 93 % | BODY MASS INDEX: 28.83 KG/M2 | HEART RATE: 64 BPM | RESPIRATION RATE: 16 BRPM | DIASTOLIC BLOOD PRESSURE: 64 MMHG | SYSTOLIC BLOOD PRESSURE: 118 MMHG

## 2018-02-05 DIAGNOSIS — N18.2 CHRONIC RENAL FAILURE, STAGE 2 (MILD): ICD-10-CM

## 2018-02-05 DIAGNOSIS — E11.21 TYPE 2 DIABETES MELLITUS WITH NEPHROPATHY (HCC): ICD-10-CM

## 2018-02-05 DIAGNOSIS — I10 ESSENTIAL HYPERTENSION: ICD-10-CM

## 2018-02-05 DIAGNOSIS — Z17.0 MALIGNANT NEOPLASM OF BREAST IN FEMALE, ESTROGEN RECEPTOR POSITIVE, UNSPECIFIED LATERALITY, UNSPECIFIED SITE OF BREAST (HCC): ICD-10-CM

## 2018-02-05 DIAGNOSIS — C50.919 MALIGNANT NEOPLASM OF BREAST IN FEMALE, ESTROGEN RECEPTOR POSITIVE, UNSPECIFIED LATERALITY, UNSPECIFIED SITE OF BREAST (HCC): ICD-10-CM

## 2018-02-05 DIAGNOSIS — R42 DIZZY SPELLS: Primary | ICD-10-CM

## 2018-02-05 DIAGNOSIS — I48.0 PAROXYSMAL ATRIAL FIBRILLATION (HCC): ICD-10-CM

## 2018-02-05 PROCEDURE — 3288F FALL RISK ASSESSMENT DOCD: CPT | Performed by: INTERNAL MEDICINE

## 2018-02-05 PROCEDURE — 99214 OFFICE O/P EST MOD 30 MIN: CPT | Performed by: INTERNAL MEDICINE

## 2018-02-05 PROCEDURE — G8510 SCR DEP NEG, NO PLAN REQD: HCPCS | Performed by: INTERNAL MEDICINE

## 2018-02-05 RX ORDER — ATORVASTATIN CALCIUM 80 MG/1
TABLET, FILM COATED ORAL
Qty: 90 TABLET | Refills: 3 | Status: SHIPPED | OUTPATIENT
Start: 2018-02-05 | End: 2019-02-06 | Stop reason: SDUPTHER

## 2018-02-05 ASSESSMENT — PATIENT HEALTH QUESTIONNAIRE - PHQ9
1. LITTLE INTEREST OR PLEASURE IN DOING THINGS: 0
SUM OF ALL RESPONSES TO PHQ QUESTIONS 1-9: 0
SUM OF ALL RESPONSES TO PHQ9 QUESTIONS 1 & 2: 0
2. FEELING DOWN, DEPRESSED OR HOPELESS: 0

## 2018-02-05 NOTE — PROGRESS NOTES
with nephropathy ([address]    4. Essential hypertension    5. Malignant neoplasm of breast in female, estrogen receptor positive, unspecified laterality, unspecified site of breast ([address]    6. Paroxysmal atrial fibrillation ([address]        PLAN:    Georganne Aase was seen today for 3 month follow-up and discuss medications. Diagnoses and all orders for this visit:    Dizzy spells - I suspect from the ditropan. Symptoms mild, so ryne watch for now. Will check labs, and she does have a monitor on now    Chronic renal failure, stage 2 (mild) - follow renal fxn    Type 2 diabetes mellitus with nephropathy (HCC) - checkl abs, well controled  -     Hemoglobin A1C; Future  -     Comprehensive Metabolic Panel;  Future    Essential hypertension - check labs,no change   -     CBC Auto Differential; Future    Malignant neoplasm of breast in female, estrogen receptor positive, unspecified laterality, unspecified site of breast ([facility] [address] - femara for one more month    Paroxysmal atrial fibrillation ([facility] [address] - on xarelto, rhythmol

## 2018-02-12 ENCOUNTER — OFFICE VISIT (OUTPATIENT)
Dept: CARDIOLOGY CLINIC | Age: 73
End: 2018-02-12

## 2018-02-12 VITALS
WEIGHT: 198.2 LBS | HEART RATE: 66 BPM | SYSTOLIC BLOOD PRESSURE: 122 MMHG | BODY MASS INDEX: 29.36 KG/M2 | DIASTOLIC BLOOD PRESSURE: 80 MMHG | HEIGHT: 69 IN

## 2018-02-12 DIAGNOSIS — E78.2 MIXED HYPERLIPIDEMIA: ICD-10-CM

## 2018-02-12 DIAGNOSIS — I10 ESSENTIAL HYPERTENSION: ICD-10-CM

## 2018-02-12 DIAGNOSIS — R53.82 CHRONIC FATIGUE: ICD-10-CM

## 2018-02-12 DIAGNOSIS — I48.0 PAROXYSMAL ATRIAL FIBRILLATION (HCC): ICD-10-CM

## 2018-02-12 DIAGNOSIS — R42 DIZZY SPELLS: Primary | ICD-10-CM

## 2018-02-12 DIAGNOSIS — R94.31 ABNORMAL EKG: ICD-10-CM

## 2018-02-12 PROCEDURE — 99214 OFFICE O/P EST MOD 30 MIN: CPT | Performed by: INTERNAL MEDICINE

## 2018-02-12 NOTE — ASSESSMENT & PLAN NOTE
Continue to wear event monitor. She feels that the troponin is making her feel like she is having dizzy spells. Describes them as lightheadedness. Denies work angle. Advised to hold the troponin for a few days to see if symptoms improve. She is not orthostatic in office to.   Blood pressure was 452 systolic on sitting  and standing, revaluate after event monitor and holding her meds

## 2018-02-12 NOTE — PROGRESS NOTES
 metoprolol tartrate (LOPRESSOR) 50 MG tablet Take 1 tablet by mouth 2 times daily 180 tablet 3    ferrous sulfate 325 (65 FE) MG tablet take 1 tablet by mouth three times a day  0    Ascorbic Acid (VITAMIN C) 100 MG tablet Take 100 mg by mouth daily.  letrozole (FEMARA) 2.5 MG tablet Take 2.5 mg by mouth nightly       aspirin 81 MG tablet Take 81 mg by mouth every morning Over The Counter      Calcium Carbonate (CALTRATE 600 PO) Take  by mouth 2 times daily. No current facility-administered medications for this visit. Allergies:   Latex and Tape [adhesive tape]    Patient History:  Past Medical History:   Diagnosis Date    Atrial fibrillation with RVR ([address] 11/25/2013    Edema     2/98 TTE diastolic dysfxn, EF 16%; 92/77 - TTE diastolic dysfxn, EF 57%; Stress myoview  WNl, EF 70%    Family history of cardiovascular disease     GERD (gastroesophageal reflux disease)     H/O 24 hour EKG monitoring 4/23/12    [facility]    H/O cardiovascular stress test     4/23/12-[facility], Probably norm perfusion Lexiscan cardiolite study except for diaphramatic attenuation artifact, otherwise perfusion is normal, norm LV fxn by gated scan. 11/10-EF70%, 9/15/08-EF70%, 1/15/07-EF70%, 9/03    H/O echocardiogram     4/23/12-[facility]- Norm chamber sizes. LVH with norm LV systolic but abn diastolic fxn, mild MR and TR, minimal pulm HTN. 11/10 -EF>55%; 3/05, 9/23/03    History of cardiac catheterization     11/15/2013-EF 55%, No signif CAD -Dr Coello Lot;;    History of cardiovascular stress test     11/14/2013-EF 70%. Normal Lexiscan Cardiolite, Uniform wall motion;    [facility]. No [facility] motion abnormalities. EF 55%. Mild MR/TR;    History of Holter monitoring 11/17/14 11/14-48 hour Holter: Predominant rhythm was sinus, no ventricular ectopy noted, supraventricular ectopics were noted in single beat forms.      Hx of 24 hour EKG monitoring orthostatic in office to. Blood pressure was 942 systolic on sitting  and standing, revaluate after event monitor and holding her meds       Essential hypertension  Continue current meds with out any changes , she is not orthostatic    Paroxysmal atrial fibrillation (HCC)  Denies any palpitations, she is on xarelto 15 mg , calculated creatinine clearance is 50 mg/dl     Dyslipidemia :  Bee Stallworth had lab work recently,  Lipid profile was reviewed with patient. Counseled extensively and medication compliance urged. We discussed that for the  prevention of ASCVD our  goal is aggressive risk modification. Patient is encouraged to exercise even a brisk walk for 30 minutes  at least 3 to 4 times a week   Various goals were discussed and questions answered. Continue current medications. Appropriate prescriptions are addressed and refills ordered. Questions answered and patient verbalizes understanding. Call for any problems, questions, or concerns. Continue all other medications of all above medical condition listed as is. Return in about 1 month (around 3/12/2018). Please note this report has been partially produced using speech recognition software and may contain errors related to that system including errors in grammar, punctuation, and spelling, as well as words and phrases that may be inappropriate.  If there are any questions or concerns please feel free to contact the dictating provider for clarification.

## 2018-02-20 PROCEDURE — 93268 ECG RECORD/REVIEW: CPT | Performed by: INTERNAL MEDICINE

## 2018-02-26 DIAGNOSIS — I10 ESSENTIAL HYPERTENSION: ICD-10-CM

## 2018-02-26 DIAGNOSIS — E11.21 TYPE 2 DIABETES MELLITUS WITH NEPHROPATHY (HCC): ICD-10-CM

## 2018-02-26 LAB
A/G RATIO: 1.3 (ref 1.1–2.2)
ALBUMIN SERPL-MCNC: 4.3 G/DL (ref 3.4–5)
ALP BLD-CCNC: 64 U/L (ref 40–129)
ALT SERPL-CCNC: 29 U/L (ref 10–40)
ANION GAP SERPL CALCULATED.3IONS-SCNC: 17 MMOL/L (ref 3–16)
AST SERPL-CCNC: 29 U/L (ref 15–37)
BASOPHILS ABSOLUTE: 0.1 K/UL (ref 0–0.2)
BASOPHILS RELATIVE PERCENT: 0.9 %
BILIRUB SERPL-MCNC: <0.2 MG/DL (ref 0–1)
BUN BLDV-MCNC: 21 MG/DL (ref 7–20)
CALCIUM SERPL-MCNC: 9.3 MG/DL (ref 8.3–10.6)
CHLORIDE BLD-SCNC: 101 MMOL/L (ref 99–110)
CO2: 24 MMOL/L (ref 21–32)
CREAT SERPL-MCNC: 1.2 MG/DL (ref 0.6–1.2)
EOSINOPHILS ABSOLUTE: 0.2 K/UL (ref 0–0.6)
EOSINOPHILS RELATIVE PERCENT: 3.2 %
GFR AFRICAN AMERICAN: 53
GFR NON-AFRICAN AMERICAN: 44
GLOBULIN: 3.2 G/DL
GLUCOSE BLD-MCNC: 81 MG/DL (ref 70–99)
HCT VFR BLD CALC: 36.4 % (ref 36–48)
HEMOGLOBIN: 11.9 G/DL (ref 12–16)
LYMPHOCYTES ABSOLUTE: 1.4 K/UL (ref 1–5.1)
LYMPHOCYTES RELATIVE PERCENT: 24.9 %
MCH RBC QN AUTO: 25.4 PG (ref 26–34)
MCHC RBC AUTO-ENTMCNC: 32.6 G/DL (ref 31–36)
MCV RBC AUTO: 77.8 FL (ref 80–100)
MONOCYTES ABSOLUTE: 0.4 K/UL (ref 0–1.3)
MONOCYTES RELATIVE PERCENT: 6.9 %
NEUTROPHILS ABSOLUTE: 3.6 K/UL (ref 1.7–7.7)
NEUTROPHILS RELATIVE PERCENT: 64.1 %
PDW BLD-RTO: 16.5 % (ref 12.4–15.4)
PLATELET # BLD: 295 K/UL (ref 135–450)
PMV BLD AUTO: 8.6 FL (ref 5–10.5)
POTASSIUM SERPL-SCNC: 4.7 MMOL/L (ref 3.5–5.1)
RBC # BLD: 4.67 M/UL (ref 4–5.2)
SODIUM BLD-SCNC: 142 MMOL/L (ref 136–145)
TOTAL PROTEIN: 7.5 G/DL (ref 6.4–8.2)
WBC # BLD: 5.5 K/UL (ref 4–11)

## 2018-02-27 LAB
ESTIMATED AVERAGE GLUCOSE: 131.2 MG/DL
HBA1C MFR BLD: 6.2 %

## 2018-03-24 DIAGNOSIS — I10 ESSENTIAL HYPERTENSION: ICD-10-CM

## 2018-03-26 RX ORDER — METOPROLOL TARTRATE 50 MG/1
TABLET, FILM COATED ORAL
Qty: 180 TABLET | Refills: 3 | Status: SHIPPED | OUTPATIENT
Start: 2018-03-26 | End: 2018-10-05 | Stop reason: SDUPTHER

## 2018-04-07 DIAGNOSIS — N39.41 URGE INCONTINENCE: ICD-10-CM

## 2018-04-09 RX ORDER — OXYBUTYNIN CHLORIDE 5 MG/1
TABLET ORAL
Qty: 180 TABLET | Refills: 3 | Status: SHIPPED | OUTPATIENT
Start: 2018-04-09 | End: 2018-05-01 | Stop reason: SDUPTHER

## 2018-04-10 ENCOUNTER — OFFICE VISIT (OUTPATIENT)
Dept: CARDIOLOGY CLINIC | Age: 73
End: 2018-04-10

## 2018-04-10 VITALS
SYSTOLIC BLOOD PRESSURE: 120 MMHG | WEIGHT: 198 LBS | BODY MASS INDEX: 30.01 KG/M2 | RESPIRATION RATE: 18 BRPM | HEIGHT: 68 IN | DIASTOLIC BLOOD PRESSURE: 64 MMHG

## 2018-04-10 DIAGNOSIS — E78.2 MIXED HYPERLIPIDEMIA: ICD-10-CM

## 2018-04-10 DIAGNOSIS — G47.33 OSA (OBSTRUCTIVE SLEEP APNEA): ICD-10-CM

## 2018-04-10 DIAGNOSIS — E11.21 TYPE 2 DIABETES MELLITUS WITH NEPHROPATHY (HCC): ICD-10-CM

## 2018-04-10 DIAGNOSIS — I48.0 PAROXYSMAL ATRIAL FIBRILLATION (HCC): Primary | ICD-10-CM

## 2018-04-10 DIAGNOSIS — I10 ESSENTIAL HYPERTENSION: ICD-10-CM

## 2018-04-10 DIAGNOSIS — I48.0 PAROXYSMAL ATRIAL FIBRILLATION (HCC): ICD-10-CM

## 2018-04-10 PROCEDURE — 99214 OFFICE O/P EST MOD 30 MIN: CPT | Performed by: INTERNAL MEDICINE

## 2018-05-01 DIAGNOSIS — N39.41 URGE INCONTINENCE: ICD-10-CM

## 2018-05-01 RX ORDER — OXYBUTYNIN CHLORIDE 5 MG/1
TABLET ORAL
Qty: 180 TABLET | Refills: 3 | Status: SHIPPED | OUTPATIENT
Start: 2018-05-01 | End: 2019-04-22 | Stop reason: SDUPTHER

## 2018-05-07 ENCOUNTER — OFFICE VISIT (OUTPATIENT)
Dept: INTERNAL MEDICINE CLINIC | Age: 73
End: 2018-05-07

## 2018-05-07 VITALS
WEIGHT: 194 LBS | RESPIRATION RATE: 18 BRPM | HEART RATE: 70 BPM | OXYGEN SATURATION: 98 % | BODY MASS INDEX: 29.5 KG/M2 | DIASTOLIC BLOOD PRESSURE: 70 MMHG | SYSTOLIC BLOOD PRESSURE: 126 MMHG

## 2018-05-07 DIAGNOSIS — I10 ESSENTIAL HYPERTENSION: ICD-10-CM

## 2018-05-07 DIAGNOSIS — E78.2 MIXED HYPERLIPIDEMIA: ICD-10-CM

## 2018-05-07 DIAGNOSIS — L98.9 SKIN LESION OF LEFT LEG: ICD-10-CM

## 2018-05-07 DIAGNOSIS — E11.21 TYPE 2 DIABETES MELLITUS WITH NEPHROPATHY (HCC): ICD-10-CM

## 2018-05-07 DIAGNOSIS — G56.03 BILATERAL CARPAL TUNNEL SYNDROME: Primary | ICD-10-CM

## 2018-05-07 LAB
A/G RATIO: 1.8 (ref 1.1–2.2)
ALBUMIN SERPL-MCNC: 4.6 G/DL (ref 3.4–5)
ALP BLD-CCNC: 71 U/L (ref 40–129)
ALT SERPL-CCNC: 24 U/L (ref 10–40)
ANION GAP SERPL CALCULATED.3IONS-SCNC: 18 MMOL/L (ref 3–16)
AST SERPL-CCNC: 18 U/L (ref 15–37)
BASOPHILS ABSOLUTE: 0.1 K/UL (ref 0–0.2)
BASOPHILS RELATIVE PERCENT: 1.1 %
BILIRUB SERPL-MCNC: <0.2 MG/DL (ref 0–1)
BUN BLDV-MCNC: 30 MG/DL (ref 7–20)
CALCIUM SERPL-MCNC: 9.6 MG/DL (ref 8.3–10.6)
CHLORIDE BLD-SCNC: 100 MMOL/L (ref 99–110)
CHOLESTEROL, TOTAL: 198 MG/DL (ref 0–199)
CO2: 26 MMOL/L (ref 21–32)
CREAT SERPL-MCNC: 1.4 MG/DL (ref 0.6–1.2)
EOSINOPHILS ABSOLUTE: 0.2 K/UL (ref 0–0.6)
EOSINOPHILS RELATIVE PERCENT: 2.5 %
GFR AFRICAN AMERICAN: 45
GFR NON-AFRICAN AMERICAN: 37
GLOBULIN: 2.6 G/DL
GLUCOSE BLD-MCNC: 115 MG/DL (ref 70–99)
HCT VFR BLD CALC: 36.5 % (ref 36–48)
HDLC SERPL-MCNC: 59 MG/DL (ref 40–60)
HEMOGLOBIN: 12.2 G/DL (ref 12–16)
LDL CHOLESTEROL CALCULATED: 106 MG/DL
LYMPHOCYTES ABSOLUTE: 1.8 K/UL (ref 1–5.1)
LYMPHOCYTES RELATIVE PERCENT: 29.3 %
MCH RBC QN AUTO: 25.5 PG (ref 26–34)
MCHC RBC AUTO-ENTMCNC: 33.3 G/DL (ref 31–36)
MCV RBC AUTO: 76.6 FL (ref 80–100)
MONOCYTES ABSOLUTE: 0.4 K/UL (ref 0–1.3)
MONOCYTES RELATIVE PERCENT: 6.1 %
NEUTROPHILS ABSOLUTE: 3.8 K/UL (ref 1.7–7.7)
NEUTROPHILS RELATIVE PERCENT: 61 %
PDW BLD-RTO: 15.7 % (ref 12.4–15.4)
PLATELET # BLD: 273 K/UL (ref 135–450)
PMV BLD AUTO: 9 FL (ref 5–10.5)
POTASSIUM SERPL-SCNC: 3.9 MMOL/L (ref 3.5–5.1)
RBC # BLD: 4.76 M/UL (ref 4–5.2)
SODIUM BLD-SCNC: 144 MMOL/L (ref 136–145)
TOTAL PROTEIN: 7.2 G/DL (ref 6.4–8.2)
TRIGL SERPL-MCNC: 167 MG/DL (ref 0–150)
VLDLC SERPL CALC-MCNC: 33 MG/DL
WBC # BLD: 6.3 K/UL (ref 4–11)

## 2018-05-07 PROCEDURE — 99214 OFFICE O/P EST MOD 30 MIN: CPT | Performed by: INTERNAL MEDICINE

## 2018-05-08 LAB
ESTIMATED AVERAGE GLUCOSE: 134.1 MG/DL
HBA1C MFR BLD: 6.3 %

## 2018-05-14 RX ORDER — PROPAFENONE HYDROCHLORIDE 150 MG/1
150 TABLET, FILM COATED ORAL EVERY 8 HOURS
Qty: 90 TABLET | Refills: 3 | Status: SHIPPED | OUTPATIENT
Start: 2018-05-14 | End: 2018-09-01 | Stop reason: SDUPTHER

## 2018-05-22 ENCOUNTER — OFFICE VISIT (OUTPATIENT)
Dept: ORTHOPEDIC SURGERY | Age: 73
End: 2018-05-22

## 2018-05-22 ENCOUNTER — TELEPHONE (OUTPATIENT)
Dept: ORTHOPEDIC SURGERY | Age: 73
End: 2018-05-22

## 2018-05-22 VITALS — RESPIRATION RATE: 16 BRPM | HEIGHT: 68 IN | WEIGHT: 198 LBS | BODY MASS INDEX: 30.01 KG/M2

## 2018-05-22 DIAGNOSIS — G56.02 CARPAL TUNNEL SYNDROME ON LEFT: Primary | ICD-10-CM

## 2018-05-22 DIAGNOSIS — M67.442 DIGITAL MUCINOUS CYST OF FINGER OF LEFT HAND: ICD-10-CM

## 2018-05-22 DIAGNOSIS — R52 PAIN: ICD-10-CM

## 2018-05-22 DIAGNOSIS — M65.351 TRIGGER LITTLE FINGER OF RIGHT HAND: ICD-10-CM

## 2018-05-22 DIAGNOSIS — M65.332 TRIGGER MIDDLE FINGER OF LEFT HAND: ICD-10-CM

## 2018-05-22 DIAGNOSIS — M65.352 TRIGGER LITTLE FINGER OF LEFT HAND: ICD-10-CM

## 2018-05-22 DIAGNOSIS — M65.331 TRIGGER FINGER, RIGHT MIDDLE FINGER: ICD-10-CM

## 2018-05-22 DIAGNOSIS — G56.01 CARPAL TUNNEL SYNDROME ON RIGHT: ICD-10-CM

## 2018-05-22 PROCEDURE — 99214 OFFICE O/P EST MOD 30 MIN: CPT | Performed by: ORTHOPAEDIC SURGERY

## 2018-05-22 ASSESSMENT — ENCOUNTER SYMPTOMS
EYES NEGATIVE: 1
RESPIRATORY NEGATIVE: 1
GASTROINTESTINAL NEGATIVE: 1

## 2018-05-24 DIAGNOSIS — R60.9 EDEMA, UNSPECIFIED TYPE: ICD-10-CM

## 2018-05-24 RX ORDER — FUROSEMIDE 20 MG/1
TABLET ORAL
Qty: 90 TABLET | Refills: 3 | Status: SHIPPED | OUTPATIENT
Start: 2018-05-24 | End: 2019-05-13 | Stop reason: SDUPTHER

## 2018-06-12 DIAGNOSIS — I48.0 PAROXYSMAL ATRIAL FIBRILLATION (HCC): ICD-10-CM

## 2018-07-09 ENCOUNTER — OFFICE VISIT (OUTPATIENT)
Dept: INTERNAL MEDICINE CLINIC | Age: 73
End: 2018-07-09

## 2018-07-09 VITALS
OXYGEN SATURATION: 96 % | SYSTOLIC BLOOD PRESSURE: 138 MMHG | RESPIRATION RATE: 16 BRPM | WEIGHT: 197.6 LBS | BODY MASS INDEX: 30.04 KG/M2 | DIASTOLIC BLOOD PRESSURE: 70 MMHG | HEART RATE: 72 BPM

## 2018-07-09 DIAGNOSIS — J40 BRONCHITIS: Primary | ICD-10-CM

## 2018-07-09 PROCEDURE — 99213 OFFICE O/P EST LOW 20 MIN: CPT | Performed by: INTERNAL MEDICINE

## 2018-07-09 RX ORDER — AZITHROMYCIN 250 MG/1
TABLET, FILM COATED ORAL
Qty: 1 PACKET | Refills: 0 | Status: SHIPPED | OUTPATIENT
Start: 2018-07-09 | End: 2018-08-07

## 2018-07-09 RX ORDER — BENZONATATE 100 MG/1
100 CAPSULE ORAL 3 TIMES DAILY PRN
Qty: 30 CAPSULE | Refills: 0 | Status: SHIPPED | OUTPATIENT
Start: 2018-07-09 | End: 2018-07-13 | Stop reason: SDUPTHER

## 2018-07-09 RX ORDER — FLUTICASONE PROPIONATE 50 MCG
2 SPRAY, SUSPENSION (ML) NASAL DAILY
Qty: 1 BOTTLE | Refills: 3 | Status: SHIPPED | OUTPATIENT
Start: 2018-07-09 | End: 2019-05-13 | Stop reason: SDUPTHER

## 2018-07-09 RX ORDER — PREDNISONE 10 MG/1
TABLET ORAL
Qty: 20 TABLET | Refills: 0 | Status: SHIPPED | OUTPATIENT
Start: 2018-07-09 | End: 2018-08-07

## 2018-07-09 RX ORDER — ERYTHROMYCIN 5 MG/G
OINTMENT OPHTHALMIC 3 TIMES DAILY
Qty: 5 G | Refills: 0 | Status: SHIPPED | OUTPATIENT
Start: 2018-07-09 | End: 2018-07-19

## 2018-07-09 NOTE — PROGRESS NOTES
Avani Garcia  1945 07/09/18    SUBJECTIVE:    Pt with cough productive of white phlegm, matting eyes, tearing, rhinorrhea, nasal congestion, right ear pain, SOB, wheezing. She denies F/C. This started 1 week ago. She has used cough syrup with short term benefit. OBJECTIVE:    /70   Pulse 72   Resp 16   Wt 197 lb 9.6 oz (89.6 kg)   LMP  (LMP Unknown)   SpO2 96%   Breastfeeding? No   BMI 30.04 kg/m²     Physical Exam   Constitutional: She appears well-developed. HENT:   Right Ear: External ear normal.   Left Ear: External ear normal.   Nose: Nose normal.   Mouth/Throat: No oropharyngeal exudate. Eyes: Conjunctivae are normal.   Cardiovascular: Normal rate, regular rhythm and normal heart sounds. Pulmonary/Chest: Effort normal and breath sounds normal.   Lymphadenopathy:     She has no cervical adenopathy. Neurological: She is alert. ASSESSMENT:    1. Bronchitis        PLAN:    [facility] was seen today for cough and congestion. Diagnoses and all orders for this visit:    Bronchitis - could be viral, but pt scheduled for surgery so will Tx more aggressively with sinus rinse, azith, prednisone (pt will watch sugars), flonase, and tessalon  -     fluticasone (FLONASE) 50 MCG/ACT nasal spray; 2 sprays by Nasal route daily  -     benzonatate (TESSALON PERLES) 100 MG capsule; Take 1 capsule by mouth 3 times daily as needed for Cough  -     azithromycin (ZITHROMAX Z-YADIEL) 250 MG tablet; Take 2 tablets (500 mg) on Day 1, and then take 1 tablet (250 mg) on days 2 through 5.  -     predniSONE (DELTASONE) 10 MG tablet; Take 4 tablets daily for 2 days, then 3 tablets daily for 2 days, then two tablets daily for 2 days, then one tablet daily for 2 days    Other orders  -     erythromycin (ROMYCIN) 5 MG/GM ophthalmic ointment; Place into both eyes 3 times daily for 10 days Nightly.

## 2018-07-10 ENCOUNTER — TELEPHONE (OUTPATIENT)
Dept: ORTHOPEDIC SURGERY | Age: 73
End: 2018-07-10

## 2018-07-10 ENCOUNTER — OFFICE VISIT (OUTPATIENT)
Dept: ORTHOPEDIC SURGERY | Age: 73
End: 2018-07-10

## 2018-07-10 VITALS
HEART RATE: 68 BPM | RESPIRATION RATE: 16 BRPM | SYSTOLIC BLOOD PRESSURE: 137 MMHG | BODY MASS INDEX: 30.04 KG/M2 | HEIGHT: 68 IN | DIASTOLIC BLOOD PRESSURE: 77 MMHG | TEMPERATURE: 98.1 F

## 2018-07-10 DIAGNOSIS — M65.342 TRIGGER FINGER, LEFT RING FINGER: ICD-10-CM

## 2018-07-10 DIAGNOSIS — G56.02 CARPAL TUNNEL SYNDROME ON LEFT: Primary | ICD-10-CM

## 2018-07-10 DIAGNOSIS — M65.352 TRIGGER LITTLE FINGER OF LEFT HAND: ICD-10-CM

## 2018-07-10 DIAGNOSIS — M65.332 TRIGGER MIDDLE FINGER OF LEFT HAND: ICD-10-CM

## 2018-07-10 PROCEDURE — 99213 OFFICE O/P EST LOW 20 MIN: CPT | Performed by: ORTHOPAEDIC SURGERY

## 2018-07-10 ASSESSMENT — ENCOUNTER SYMPTOMS
EYES NEGATIVE: 1
GASTROINTESTINAL NEGATIVE: 1
RESPIRATORY NEGATIVE: 1

## 2018-07-10 NOTE — PROGRESS NOTES
had to cancel due to a family emergency. She does not want to schedule surgery and would like a injection in her left middle finger today, she is still having triggering that she can actively reduce.      HPI:  Nehal Anderson is a 70y.o. year old female who is here for a follow up on left small finger and left middle finger trigger release pre op exam. We have obtained medical clearance from Guido Day MD, reviewed, see media.     Review of history:  HPI:  Nehal Anderson is a 70y.o. year old female who is here for a follow up on left small finger and for consult per Guido Day MD   for eval of left middle finger pain and locking. She was treated with an injection on 2/12/16 in the small finger which helped moderately until recently.   Over the last 2 months or so has had triggering in both the [facility] and now with middle finger.     HPI:  Nehal Anderson is a 70y.o. year old female who complains of left hand pain, locking of small finger and a small lump on peacock side at base of 4th digit.     The Bilateral hand pain assessment is:              Intensity:          0/10              Location:        Small finger              Description:    locking     The symptoms started 1 month ago.     Cause of injury was  No specific cause.       Previous treatment for this episode has included none.     The progression of symptoms, overall course: well controlled.      Prior to this episode, the history is: negative for prior surgery, trauma, arthritis or disorders       Past Medical History:   Diagnosis Date    Atrial fibrillation with RVR ([address] 11/25/2013    Edema     9/60 TTE diastolic dysfxn, EF 96%; 52/78 - TTE diastolic dysfxn, EF 04%; Stress myoview  WNl, EF 70%    Family history of cardiovascular disease     GERD (gastroesophageal reflux disease)     H/O 24 hour [facility]12    [facility]    H/O cardiovascular stress test     4/23/12-[facility], Probably norm perfusion Lexiscan cardiolite study except mouth daily.  aspirin 81 MG tablet Take 81 mg by mouth every morning Over The Counter      Calcium Carbonate (CALTRATE 600 PO) Take  by mouth 2 times daily. No current facility-administered medications for this visit. Allergies   Allergen Reactions    Latex      \"Blisters\"    Tape [Adhesive Tape]      \"Turn Red\"       Review of Systems:  See above      Physical Exam:   LMP  (LMP Unknown)        Gait is Normal.   Gen/Psych: Examination reveals a pleasant individual in no acute distress. The patient is oriented to time, place and person. The patient's mood and affect are appropriate.     Lymph: The lymphatic examination bilaterally reveals all areas to be without enlargement or induration.      Skin intact without lymphadenopathy, discoloration, or abnormal temperature.      Vascular: There is intact, symmetric circulation in both upper extremities. left Arm exam:  Sensation is subjectively and objectively decreased in the extremity due to tingling in radial fingers  Muscular strength is clinically appropriate bilaterally. wrist exam:  -effusion is absent   -mild tenderness to palpation of  Her A1 pulley of small, ring and middle fingers  -mild TTP over ring finger A1 pulley area where there is a well circumscribed, mobile, tender, cystic lesion, iglesia sized with no overlying skin changes  -ROM is full range of motion with active triggering of small and middle fingers and also ring finger (change from previous)    Outside record review: office notes    Imaging studies:  reviewed  2 views of the bilateral wrist show no fracture, dislocation, swelling or degenerative changes noted      EMG 02/14/18  IMPRESSION:                  1. Abnormal EMG. 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                2. 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  No convincing evidence of a generalized neuropathy.                 3. No evidence of any spinal nerve root injury (radiculopathy), plexopathy, generalized peripheral neuropathy or primary muscle disease.          Impression:  left carpal tunnel syndrome  Trigger finger left small, ring and middle fingers  Right carpal tunnel syndrome  Trigger finger right small and middle fingers      Plan:    Postpone surgery until infection cleared  Cardiac clearance (need permission to stop xarelto and aspirin preop)  Call next week to check on resolution of recent sickness and to schedule surgery  (left carpal tunnel release, trigger releases of left small, ring and middle fingers)  Return to the office for pre op once clearance received  Will cc Dr. Karthik Neves so he is aware

## 2018-07-12 ENCOUNTER — HOSPITAL ENCOUNTER (OUTPATIENT)
Dept: PREADMISSION TESTING | Age: 73
Discharge: HOME | End: 2018-08-10
Attending: ORTHOPAEDIC SURGERY | Admitting: ORTHOPAEDIC SURGERY

## 2018-07-12 ENCOUNTER — TELEPHONE (OUTPATIENT)
Dept: CARDIOLOGY CLINIC | Age: 73
End: 2018-07-12

## 2018-07-12 DIAGNOSIS — I48.0 PAROXYSMAL ATRIAL FIBRILLATION (HCC): ICD-10-CM

## 2018-07-12 NOTE — TELEPHONE ENCOUNTER
Former [facility] pt, changing to Dr. Warren Durand w/future appt on 10/11/18. Pt called requesting refill and samples of Xarelto 15 mg. Pt advised samples will be available for  this afternoon. Pt voiced understanding.

## 2018-07-13 DIAGNOSIS — J40 BRONCHITIS: ICD-10-CM

## 2018-07-13 RX ORDER — BENZONATATE 100 MG/1
100 CAPSULE ORAL 3 TIMES DAILY PRN
Qty: 30 CAPSULE | Refills: 0 | Status: SHIPPED | OUTPATIENT
Start: 2018-07-13 | End: 2018-07-20

## 2018-07-17 ENCOUNTER — OFFICE VISIT (OUTPATIENT)
Dept: CARDIOLOGY CLINIC | Age: 73
End: 2018-07-17

## 2018-07-17 VITALS
HEIGHT: 69 IN | DIASTOLIC BLOOD PRESSURE: 64 MMHG | HEART RATE: 64 BPM | BODY MASS INDEX: 29.18 KG/M2 | SYSTOLIC BLOOD PRESSURE: 120 MMHG | WEIGHT: 197 LBS

## 2018-07-17 DIAGNOSIS — I10 ESSENTIAL HYPERTENSION: ICD-10-CM

## 2018-07-17 DIAGNOSIS — Z01.818 PRE-OP EXAM: Primary | ICD-10-CM

## 2018-07-17 DIAGNOSIS — I48.0 PAROXYSMAL ATRIAL FIBRILLATION (HCC): ICD-10-CM

## 2018-07-17 PROCEDURE — 93000 ELECTROCARDIOGRAM COMPLETE: CPT | Performed by: NURSE PRACTITIONER

## 2018-07-17 PROCEDURE — 99213 OFFICE O/P EST LOW 20 MIN: CPT | Performed by: NURSE PRACTITIONER

## 2018-07-17 NOTE — PROGRESS NOTES
Office visit for surgical clearance       Gianna Sanchez is a 68 y.o. female with PAF HTN  hyperlipidemia  and  being seen today for cardiac clearance for carpel tunnel release . HPI : Patient does not have history of CAD   Last cardiac cath/ placement was 2013. The patient's functional status is good. She reports they can walk up a flight if stairs/walk at least a block. There is no history of Systolic / Diastolic CHF, last known EF is  55%. There is not a history of VHD. There is a history of a-fib and the patient is  on anticoagulation. The patient is not on antiplatelet therapy. Joanne Borges has the following history:     Patient Active Problem List    Diagnosis Date Noted    Dizzy spells 01/30/2018    Chronic renal failure, stage 2 (mild) 01/30/2018    Chronic fatigue 08/28/2017    Type 2 diabetes mellitus with nephropathy (HCC) 08/10/2016    Essential hypertension 12/17/2015    Abnormal EKG 12/17/2015    Mixed hyperlipidemia 11/13/2015    Iron deficiency anemia 11/13/2015    Gastroesophageal reflux disease without esophagitis 11/13/2015    Edema of both legs 06/15/2015    Breast cancer (HCC) 04/22/2015    Urge incontinence     Bilateral leg edema 10/21/2013    Colon polyps 08/22/2013    Osteopenia     Osteoarthritis     ADOLFO (obstructive sleep apnea)     Menopause     Paroxysmal atrial fibrillation (HCC)        Current Outpatient Prescriptions   Medication Sig Dispense Refill    benzonatate (TESSALON PERLES) 100 MG capsule Take 1 capsule by mouth 3 times daily as needed for Cough 30 capsule 0    rivaroxaban (XARELTO) 15 MG TABS tablet Take 1 tablet by mouth every 24 hours 35 tablet 0    fluticasone (FLONASE) 50 MCG/ACT nasal spray 2 sprays by Nasal route daily 1 Bottle 3    erythromycin (ROMYCIN) 5 MG/GM ophthalmic ointment Place into both eyes 3 times daily for 10 days Nightly.  5 g 0    furosemide (LASIX) 20 MG tablet take 1 tablet by mouth once daily 90 tablet 3    propafenone (RYTHMOL) 150 MG tablet take 1 tablet by mouth every 8 hours 90 tablet 3    oxybutynin (DITROPAN) 5 MG tablet take 1 tablet by mouth twice a day 180 tablet 3    metoprolol tartrate (LOPRESSOR) 50 MG tablet take 1 tablet by mouth twice a day 180 tablet 3    atorvastatin (LIPITOR) 80 MG tablet take 1 tablet by mouth once daily 90 tablet 3    omeprazole (PRILOSEC) 40 MG delayed release capsule Take 40 mg by mouth daily      metFORMIN (GLUCOPHAGE) 500 MG tablet Take 1 tablet by mouth 2 times daily (with meals) (Patient taking differently: Take 1,000 mg by mouth 2 times daily (with meals) ) 180 tablet 3    lisinopril (PRINIVIL;ZESTRIL) 20 MG tablet Take 1 tablet by mouth daily 90 tablet 3    amLODIPine (NORVASC) 5 MG tablet Take 1 tablet by mouth daily 90 tablet 3    hydrochlorothiazide (HYDRODIURIL) 25 MG tablet Take 0.5 tablets by mouth daily 45 tablet 3    ferrous sulfate 325 (65 FE) MG tablet take 1 tablet by mouth three times a day  0    Ascorbic Acid (VITAMIN C) 100 MG tablet Take 100 mg by mouth daily.  aspirin 81 MG tablet Take 81 mg by mouth every morning Over The Counter      Calcium Carbonate (CALTRATE 600 PO) Take  by mouth 2 times daily.       rivaroxaban (XARELTO) 15 MG TABS tablet Take 1 tablet by mouth every 24 hours 30 tablet 3    azithromycin (ZITHROMAX Z-YADIEL) 250 MG tablet Take 2 tablets (500 mg) on Day 1, and then take 1 tablet (250 mg) on days 2 through 5. 1 packet 0    predniSONE (DELTASONE) 10 MG tablet Take 4 tablets daily for 2 days, then 3 tablets daily for 2 days, then two tablets daily for 2 days, then one tablet daily for 2 days 20 tablet 0    UNABLE TO FIND Please administer two SHINGRIX vaccines 2-6 months apart 2 Units 0    Lancets MISC 1 each by In Vitro route 2 times daily 100 each 3    Glucose Blood (BLOOD GLUCOSE TEST STRIPS) STRP 1 each by In Vitro route 2 times daily 100 strip 3    Blood Glucose Monitoring Suppl AV 1 kit by Does not apply route daily 1 Device 0     No current facility-administered medications for this visit. Allergies   Allergen Reactions    Latex      \"Blisters\"    Tape José Luis Galas Tape]      \"Turn Red\"       Past Medical History:   Diagnosis Date    Atrial fibrillation with RVR ([address] 11/25/2013    Edema     8/27 TTE diastolic dysfxn, EF 97%; 49/66 - TTE diastolic dysfxn, EF 12%; Stress myoview  WNl, EF 70%    Family history of cardiovascular disease     GERD (gastroesophageal reflux disease)     H/O 24 hour EKG monitoring 4/23/12    [facility]    H/O cardiovascular stress test     4/23/12-[facility], Probably norm perfusion Lexiscan cardiolite study except for diaphramatic attenuation artifact, otherwise perfusion is normal, norm LV fxn by gated scan. 11/10-EF70%, 9/15/08-EF70%, 1/15/07-EF70%, 9/03    H/O echocardiogram     4/23/12-[facility]- Norm chamber sizes. LVH with norm LV systolic but abn diastolic fxn, mild MR and TR, minimal pulm HTN. 11/10 -EF>55%; 3/05, 9/23/03    History of cardiac catheterization     11/15/2013-EF 55%, No signif CAD -Dr Kristina Edmond;;    History of cardiovascular stress test     11/14/2013-EF 70%. Normal Lexiscan Cardiolite, Uniform wall motion;    History of echocardiogram     97/23/3579-[id number] global systolic  function. No wall motion abnormalities. EF 55%. Mild MR/TR;    History of Holter monitoring 11/17/14 11/14-48 hour Holter: Predominant rhythm was sinus, no ventricular ectopy noted, supraventricular ectopics were noted in single beat forms.  Hx of 24 hour EKG monitoring     12/17/13 48 hr holter monitor. Sinus rhythm with intermittent sinus arrhythmia.     Hyperlipidemia     Hypertension     11/13 Cath WNL, EF 55%; 11/13 Stress WNL : 11/13 TTE mild MR and TR, EF 55%; 4/12 Stress WNL; 8/9 - Cath WNL    Iron deficiency anemia 7/9/2013 9/13 EGD WNL    Lumbar radiculopathy 11/25/2013    Menopause     ADOLFO (obstructive sleep apnea)     sleep study 10/3 CPAP 6cm shortness of breath  Cardiovascular: Negative for chest pain, dyspnea on exertion, claudication, edema, irregular heartbeat, murmur, palpitations or shortness of breath  Gastrointestinal: Negative for abdominal pain or heartburn  Genito-Urinary: Negative for urinary frequency/urgency  Musculoskeletal: Negative for muscle pain, muscular weakness, negative for pain in arm and leg or swelling in foot and leg  Neurological: Negative for dizziness, headaches, memory loss, numbness/tingling, visual changes, syncope  Dermatological: Negative for rash    Objective:  /64   Pulse 64   Ht 5' 9\" (1.753 m)   Wt 197 lb (89.4 kg)   LMP  (LMP Unknown)   BMI 29.09 kg/m²      Wt Readings from Last 3 Encounters:   07/17/18 197 lb (89.4 kg)   07/09/18 197 lb 9.6 oz (89.6 kg)   05/22/18 198 lb (89.8 kg)       GENERAL - Alert, oriented, pleasant, in no apparent distress. EYES: No jaundice, no conjunctival pallor. SKIN: It is warm & dry. No rashes. No Echhymosis    HEENT  No clinically significant abnormalities seen. Neck - Supple. No jugular venous distention noted. No carotid bruits. Cardiovascular  Normal S1 and S2 without obvious murmur or gallop. Extremities - No cyanosis, clubbing, or significant edema. Pulmonary  No respiratory distress. No wheezes or rales. Abdomen  No masses, tenderness, or organomegaly. Musculoskeletal  No significant edema. No joint deformities. No muscle wasting. Neurologic  Cranial nerves II through XII are grossly intact. There were no gross focal neurologic abnormalities. Impression:    1. Pre-op exam    2. Paroxysmal atrial fibrillation (HCC)    3.  Essential hypertension         Assessment & Plan:    CAD: no known CAD- continue with betablocker - continue with ASA   HTN : Controlled continue with antihypertensives   CHF:  No history   VHD  N/A   Echo 55%  Atrial fib yes  Rate controlled yes    Anticoagulation: Bridging needed no Xarelto should be  48 hours before

## 2018-08-07 ENCOUNTER — OFFICE VISIT (OUTPATIENT)
Dept: INTERNAL MEDICINE CLINIC | Age: 73
End: 2018-08-07

## 2018-08-07 VITALS
DIASTOLIC BLOOD PRESSURE: 78 MMHG | BODY MASS INDEX: 29.21 KG/M2 | RESPIRATION RATE: 16 BRPM | HEART RATE: 77 BPM | OXYGEN SATURATION: 96 % | WEIGHT: 197.8 LBS | SYSTOLIC BLOOD PRESSURE: 128 MMHG

## 2018-08-07 DIAGNOSIS — E11.21 TYPE 2 DIABETES MELLITUS WITH NEPHROPATHY (HCC): Primary | ICD-10-CM

## 2018-08-07 DIAGNOSIS — E11.21 TYPE 2 DIABETES MELLITUS WITH NEPHROPATHY (HCC): ICD-10-CM

## 2018-08-07 DIAGNOSIS — E78.2 MIXED HYPERLIPIDEMIA: ICD-10-CM

## 2018-08-07 DIAGNOSIS — I10 ESSENTIAL HYPERTENSION: ICD-10-CM

## 2018-08-07 DIAGNOSIS — R80.9 PROTEINURIA, UNSPECIFIED TYPE: ICD-10-CM

## 2018-08-07 LAB
A/G RATIO: 1.7 (ref 1.1–2.2)
ALBUMIN SERPL-MCNC: 4.6 G/DL (ref 3.4–5)
ALP BLD-CCNC: 73 U/L (ref 40–129)
ALT SERPL-CCNC: 25 U/L (ref 10–40)
ANION GAP SERPL CALCULATED.3IONS-SCNC: 18 MMOL/L (ref 3–16)
AST SERPL-CCNC: 21 U/L (ref 15–37)
BILIRUB SERPL-MCNC: <0.2 MG/DL (ref 0–1)
BUN BLDV-MCNC: 25 MG/DL (ref 7–20)
CALCIUM SERPL-MCNC: 10 MG/DL (ref 8.3–10.6)
CHLORIDE BLD-SCNC: 104 MMOL/L (ref 99–110)
CHOLESTEROL, TOTAL: 198 MG/DL (ref 0–199)
CO2: 23 MMOL/L (ref 21–32)
CREAT SERPL-MCNC: 1.6 MG/DL (ref 0.6–1.2)
GFR AFRICAN AMERICAN: 38
GFR NON-AFRICAN AMERICAN: 32
GLOBULIN: 2.7 G/DL
GLUCOSE BLD-MCNC: 100 MG/DL (ref 70–99)
HDLC SERPL-MCNC: 55 MG/DL (ref 40–60)
LDL CHOLESTEROL CALCULATED: 105 MG/DL
POTASSIUM SERPL-SCNC: 4.2 MMOL/L (ref 3.5–5.1)
SODIUM BLD-SCNC: 145 MMOL/L (ref 136–145)
TOTAL PROTEIN: 7.3 G/DL (ref 6.4–8.2)
TRIGL SERPL-MCNC: 190 MG/DL (ref 0–150)
VLDLC SERPL CALC-MCNC: 38 MG/DL

## 2018-08-07 PROCEDURE — 99214 OFFICE O/P EST MOD 30 MIN: CPT | Performed by: INTERNAL MEDICINE

## 2018-08-07 RX ORDER — HYDROCHLOROTHIAZIDE 25 MG/1
12.5 TABLET ORAL DAILY
Qty: 45 TABLET | Refills: 3 | Status: ON HOLD | OUTPATIENT
Start: 2018-08-07 | End: 2018-09-07 | Stop reason: HOSPADM

## 2018-08-07 RX ORDER — AMLODIPINE BESYLATE 5 MG/1
5 TABLET ORAL DAILY
Qty: 90 TABLET | Refills: 3 | Status: ON HOLD | OUTPATIENT
Start: 2018-08-07 | End: 2018-09-07 | Stop reason: HOSPADM

## 2018-08-07 RX ORDER — LISINOPRIL 20 MG/1
20 TABLET ORAL DAILY
Qty: 90 TABLET | Refills: 3 | Status: SHIPPED | OUTPATIENT
Start: 2018-08-07 | End: 2019-05-13 | Stop reason: SDUPTHER

## 2018-08-07 NOTE — PROGRESS NOTES
Domitila Johnson  1945 08/07/18    SUBJECTIVE:    Patient compliant with medications for diabetes. Patient has had no episodes of significant hypoglycemia. The patient is taking hypertensive medications compliantly without side effects. Denies chest pain, dyspnea, edema, or TIA's. Blood pressure has been 124/76. Pt continues to follow with Dr Devora Borjas for the breast cancer - next appt in December    She is not getting any exercise. OBJECTIVE:    /78   Pulse 77   Resp 16   Wt 197 lb 12.8 oz (89.7 kg)   LMP  (LMP Unknown)   SpO2 96%   Breastfeeding? No   BMI 29.21 kg/m²     Physical Exam   Constitutional: She is oriented to person, place, and time. She appears well-developed and well-nourished. Eyes: Conjunctivae are normal. No scleral icterus. Neck: Neck supple. No tracheal deviation present. No thyromegaly present. Cardiovascular: Normal rate, regular rhythm and intact distal pulses. Exam reveals no gallop and no friction rub. No murmur heard. Pulmonary/Chest: No respiratory distress. She has no wheezes. She has no rales. Abdominal: Soft. Bowel sounds are normal. She exhibits no distension and no mass. There is no hepatomegaly. There is no tenderness. There is no rebound and no guarding. Lymphadenopathy:     She has no cervical adenopathy. Neurological: She is alert and oriented to person, place, and time. Skin: No cyanosis. Nails show no clubbing. Psychiatric: She has a normal mood and affect. Her behavior is normal. Judgment normal.       ASSESSMENT:    1. Type 2 diabetes mellitus with nephropathy ([address]    2. Mixed hyperlipidemia    3. Essential hypertension    4. Proteinuria, unspecified type        PLAN:    Judith Ennis was seen today for hypertension. Diagnoses and all orders for this visit:    Type 2 diabetes mellitus with nephropathy ([address] - check labs; well controleed  -     COMPREHENSIVE METABOLIC PANEL;  Future  -     HEMOGLOBIN A1C; Future  -     LIPID PANEL;

## 2018-08-08 LAB
ESTIMATED AVERAGE GLUCOSE: 139.9 MG/DL
HBA1C MFR BLD: 6.5 %

## 2018-08-09 DIAGNOSIS — R79.89 ELEVATED SERUM CREATININE: Primary | ICD-10-CM

## 2018-08-13 ENCOUNTER — OFFICE VISIT (OUTPATIENT)
Dept: INTERNAL MEDICINE CLINIC | Age: 73
End: 2018-08-13

## 2018-08-13 VITALS
OXYGEN SATURATION: 95 % | DIASTOLIC BLOOD PRESSURE: 72 MMHG | HEART RATE: 85 BPM | RESPIRATION RATE: 18 BRPM | SYSTOLIC BLOOD PRESSURE: 122 MMHG

## 2018-08-13 DIAGNOSIS — E11.21 TYPE 2 DIABETES MELLITUS WITH NEPHROPATHY (HCC): ICD-10-CM

## 2018-08-13 DIAGNOSIS — E11.21 TYPE 2 DIABETES MELLITUS WITH NEPHROPATHY (HCC): Primary | ICD-10-CM

## 2018-08-13 PROCEDURE — 99213 OFFICE O/P EST LOW 20 MIN: CPT | Performed by: INTERNAL MEDICINE

## 2018-08-13 RX ORDER — HYDROCHLOROTHIAZIDE 12.5 MG/1
TABLET ORAL
Qty: 90 TABLET | Refills: 2 | Status: ON HOLD | OUTPATIENT
Start: 2018-08-13 | End: 2018-09-07 | Stop reason: HOSPADM

## 2018-08-14 ENCOUNTER — OFFICE VISIT (OUTPATIENT)
Dept: ORTHOPEDIC SURGERY | Age: 73
End: 2018-08-14

## 2018-08-14 ENCOUNTER — TELEPHONE (OUTPATIENT)
Dept: ORTHOPEDIC SURGERY | Age: 73
End: 2018-08-14

## 2018-08-14 VITALS — WEIGHT: 197 LBS | HEIGHT: 68 IN | RESPIRATION RATE: 16 BRPM | BODY MASS INDEX: 29.86 KG/M2

## 2018-08-14 DIAGNOSIS — M65.342 TRIGGER FINGER, LEFT RING FINGER: ICD-10-CM

## 2018-08-14 DIAGNOSIS — G56.02 CARPAL TUNNEL SYNDROME ON LEFT: Primary | ICD-10-CM

## 2018-08-14 DIAGNOSIS — M65.332 TRIGGER MIDDLE FINGER OF LEFT HAND: ICD-10-CM

## 2018-08-14 LAB
ANION GAP SERPL CALCULATED.3IONS-SCNC: 17 MMOL/L (ref 3–16)
BUN BLDV-MCNC: 41 MG/DL (ref 7–20)
CALCIUM SERPL-MCNC: 9.8 MG/DL (ref 8.3–10.6)
CHLORIDE BLD-SCNC: 99 MMOL/L (ref 99–110)
CO2: 25 MMOL/L (ref 21–32)
CREAT SERPL-MCNC: 1.7 MG/DL (ref 0.6–1.2)
CREATININE URINE: 39 MG/DL (ref 28–259)
GFR AFRICAN AMERICAN: 36
GFR NON-AFRICAN AMERICAN: 29
GLUCOSE BLD-MCNC: 102 MG/DL (ref 70–99)
MICROALBUMIN UR-MCNC: <1.2 MG/DL
MICROALBUMIN/CREAT UR-RTO: NORMAL MG/G (ref 0–30)
POTASSIUM SERPL-SCNC: 4.2 MMOL/L (ref 3.5–5.1)
SODIUM BLD-SCNC: 141 MMOL/L (ref 136–145)

## 2018-08-14 PROCEDURE — 99213 OFFICE O/P EST LOW 20 MIN: CPT | Performed by: ORTHOPAEDIC SURGERY

## 2018-08-14 ASSESSMENT — ENCOUNTER SYMPTOMS
EYES NEGATIVE: 1
GASTROINTESTINAL NEGATIVE: 1
RESPIRATORY NEGATIVE: 1

## 2018-08-14 NOTE — PROGRESS NOTES
tablet Take 1 tablet by mouth every 24 hours 30 tablet 3    fluticasone (FLONASE) 50 MCG/ACT nasal spray 2 sprays by Nasal route daily 1 Bottle 3    furosemide (LASIX) 20 MG tablet take 1 tablet by mouth once daily 90 tablet 3    propafenone (RYTHMOL) 150 MG tablet take 1 tablet by mouth every 8 hours 90 tablet 3    UNABLE TO FIND Please administer two SHINGRIX vaccines 2-6 months apart 2 Units 0    oxybutynin (DITROPAN) 5 MG tablet take 1 tablet by mouth twice a day 180 tablet 3    metoprolol tartrate (LOPRESSOR) 50 MG tablet take 1 tablet by mouth twice a day 180 tablet 3    atorvastatin (LIPITOR) 80 MG tablet take 1 tablet by mouth once daily 90 tablet 3    omeprazole (PRILOSEC) 40 MG delayed release capsule Take 40 mg by mouth daily      Lancets MISC 1 each by In Vitro route 2 times daily 100 each 3    Glucose Blood (BLOOD GLUCOSE TEST STRIPS) STRP 1 each by In Vitro route 2 times daily 100 strip 3    Blood Glucose Monitoring Suppl AV 1 kit by Does not apply route daily 1 Device 0    metFORMIN (GLUCOPHAGE) 500 MG tablet Take 1 tablet by mouth 2 times daily (with meals) (Patient taking differently: Take 1,000 mg by mouth 2 times daily (with meals) ) 180 tablet 3    ferrous sulfate 325 (65 FE) MG tablet take 1 tablet by mouth three times a day  0    Ascorbic Acid (VITAMIN C) 100 MG tablet Take 100 mg by mouth daily.  aspirin 81 MG tablet Take 81 mg by mouth every morning Over The Counter      Calcium Carbonate (CALTRATE 600 PO) Take  by mouth 2 times daily. No current facility-administered medications for this visit. Allergies   Allergen Reactions    Latex      \"Blisters\"    Tape [Adhesive Tape]      \"Turn Red\"       Review of Systems:  See above      Physical Exam:   LMP  (LMP Unknown)        Gait is Normal.   Gen/Psych: Examination reveals a pleasant individual in no acute distress. The patient is oriented to time, place and person.   The patient's mood and affect are

## 2018-08-14 NOTE — TELEPHONE ENCOUNTER
Will proceed with left CTR and left middle and ring finger trigger releases   Plan for surgery 19 September   Last dose of aspirin 14 September    Last dose of xarelto 16 September   Full PAT   Follow up week of 10 September for pre op exam

## 2018-08-15 DIAGNOSIS — R79.89 CREATININE ELEVATION: Primary | ICD-10-CM

## 2018-08-16 DIAGNOSIS — R79.89 CREATININE ELEVATION: ICD-10-CM

## 2018-08-16 LAB
CHLORIDE URINE RANDOM: 38 MMOL/L
POTASSIUM, UR: 27.9 MMOL/L
SODIUM URINE: 49 MMOL/L

## 2018-08-17 ENCOUNTER — HOSPITAL ENCOUNTER (OUTPATIENT)
Dept: ULTRASOUND IMAGING | Age: 73
Discharge: HOME | End: 2018-08-17
Attending: INTERNAL MEDICINE | Admitting: INTERNAL MEDICINE

## 2018-08-17 DIAGNOSIS — R79.89 ELEVATED SERUM CREATININE: ICD-10-CM

## 2018-08-17 DIAGNOSIS — R79.89 OTHER SPECIFIED ABNORMAL FINDINGS OF BLOOD CHEMISTRY: ICD-10-CM

## 2018-08-30 ENCOUNTER — TELEPHONE (OUTPATIENT)
Dept: INTERNAL MEDICINE CLINIC | Age: 73
End: 2018-08-30

## 2018-09-04 RX ORDER — PROPAFENONE HYDROCHLORIDE 150 MG/1
150 TABLET, FILM COATED ORAL EVERY 8 HOURS
Qty: 90 TABLET | Refills: 3 | Status: SHIPPED | OUTPATIENT
Start: 2018-09-04 | End: 2019-05-13 | Stop reason: SDUPTHER

## 2018-09-06 PROBLEM — R07.9 CHEST PAIN: Status: ACTIVE | Noted: 2018-09-06

## 2018-09-10 ENCOUNTER — TELEPHONE (OUTPATIENT)
Dept: ORTHOPEDIC SURGERY | Age: 73
End: 2018-09-10

## 2018-09-10 NOTE — TELEPHONE ENCOUNTER
Pt contacted the office inquiring if she needs to have a new cardiac   clearance and blood work prior to surgery on her left hand 9/18/18  Due to being in the ED on 9/6/18 for   Hospital Diagnoses:    Active Problems:    Atrial fibrillation with RVR (HCC)    Chest pain  Pt was discharged on 9/7/18

## 2018-09-10 NOTE — TELEPHONE ENCOUNTER
Need to postpone surgery for at least 3-4 weeks    Patient needs to f/u with Dr. Sherman Cain, once cleared by him call back to reschedule surgery

## 2018-09-11 ENCOUNTER — TELEPHONE (OUTPATIENT)
Dept: INTERNAL MEDICINE CLINIC | Age: 73
End: 2018-09-11

## 2018-09-11 ENCOUNTER — HOSPITAL ENCOUNTER (OUTPATIENT)
Dept: PREADMISSION TESTING | Age: 73
Discharge: HOME | End: 2018-09-11
Attending: ORTHOPAEDIC SURGERY | Admitting: ORTHOPAEDIC SURGERY

## 2018-09-11 NOTE — TELEPHONE ENCOUNTER
----- Message from Eloisa Phillips MD sent at 9/10/2018  7:42 AM EDT -----  Transition appt please  ----- Message -----  From: Eloisa Phillips MD  Sent: 9/7/2018   8:03 PM  To: Eloisa Phillips MD

## 2018-09-18 ENCOUNTER — HOSPITAL ENCOUNTER (OUTPATIENT)
Dept: ULTRASOUND IMAGING | Age: 73
Discharge: HOME | End: 2018-09-18
Attending: INTERNAL MEDICINE | Admitting: INTERNAL MEDICINE

## 2018-09-18 ENCOUNTER — OFFICE VISIT (OUTPATIENT)
Dept: INTERNAL MEDICINE CLINIC | Age: 73
End: 2018-09-18

## 2018-09-18 VITALS
HEART RATE: 61 BPM | WEIGHT: 197 LBS | OXYGEN SATURATION: 97 % | SYSTOLIC BLOOD PRESSURE: 134 MMHG | BODY MASS INDEX: 29.52 KG/M2 | DIASTOLIC BLOOD PRESSURE: 80 MMHG | RESPIRATION RATE: 18 BRPM

## 2018-09-18 DIAGNOSIS — I48.91 ATRIAL FIBRILLATION WITH RVR (HCC): Primary | ICD-10-CM

## 2018-09-18 DIAGNOSIS — I15.9 SECONDARY HYPERTENSION: ICD-10-CM

## 2018-09-18 DIAGNOSIS — N18.30 CHRONIC KIDNEY DISEASE, STAGE III (MODERATE) (HCC): ICD-10-CM

## 2018-09-18 DIAGNOSIS — G56.02 CARPAL TUNNEL SYNDROME ON LEFT: ICD-10-CM

## 2018-09-18 DIAGNOSIS — R80.8 NEPHROGENOUS PROTEINURIA: ICD-10-CM

## 2018-09-18 LAB
ALBUMIN SERPL-MCNC: 4.2 GM/DL (ref 3.4–5)
ALP BLD-CCNC: 68 IU/L (ref 40–129)
ALT SERPL-CCNC: 16 U/L (ref 10–40)
ANION GAP SERPL CALCULATED.3IONS-SCNC: 14 MMOL/L (ref 4–16)
AST SERPL-CCNC: 16 IU/L (ref 15–37)
BACTERIA: NEGATIVE /HPF
BILIRUB SERPL-MCNC: 0.3 MG/DL (ref 0–1)
BILIRUBIN URINE: NEGATIVE MG/DL
BLOOD, URINE: NEGATIVE
BUN BLDV-MCNC: 27 MG/DL (ref 6–23)
CALCIUM SERPL-MCNC: 9.9 MG/DL (ref 8.3–10.6)
CHLORIDE BLD-SCNC: 106 MMOL/L (ref 99–110)
CLARITY: CLEAR
CO2: 23 MMOL/L (ref 21–32)
COLOR: ABNORMAL
CREAT SERPL-MCNC: 1.6 MG/DL (ref 0.6–1.1)
GFR AFRICAN AMERICAN: 38 ML/MIN/1.73M2
GFR NON-AFRICAN AMERICAN: 32 ML/MIN/1.73M2
GLUCOSE BLD-MCNC: 122 MG/DL (ref 70–99)
GLUCOSE, URINE: NEGATIVE MG/DL
KETONES, URINE: NEGATIVE MG/DL
LEUKOCYTE ESTERASE, URINE: ABNORMAL
MUCUS: ABNORMAL HPF
NITRITE URINE, QUANTITATIVE: NEGATIVE
PH, URINE: 5 (ref 5–8)
POTASSIUM SERPL-SCNC: 4.6 MMOL/L (ref 3.5–5.1)
PROTEIN UA: NEGATIVE MG/DL
RBC URINE: 1 /HPF (ref 0–6)
SODIUM BLD-SCNC: 143 MMOL/L (ref 135–145)
SPECIFIC GRAVITY UA: 1.01 (ref 1–1.03)
SQUAMOUS EPITHELIAL: <1 /HPF
TOTAL PROTEIN: 6.6 GM/DL (ref 6.4–8.2)
TRANSITIONAL EPITHELIAL: <1 /HPF
TRICHOMONAS: ABNORMAL /HPF
UROBILINOGEN, URINE: NORMAL MG/DL (ref 0.2–1)
WBC UA: 5 /HPF (ref 0–5)

## 2018-09-18 PROCEDURE — 99213 OFFICE O/P EST LOW 20 MIN: CPT | Performed by: INTERNAL MEDICINE

## 2018-10-05 DIAGNOSIS — I10 ESSENTIAL HYPERTENSION: ICD-10-CM

## 2018-10-05 RX ORDER — METOPROLOL TARTRATE 50 MG/1
TABLET, FILM COATED ORAL
Qty: 180 TABLET | Refills: 3 | Status: SHIPPED | OUTPATIENT
Start: 2018-10-05 | End: 2019-05-13 | Stop reason: SDUPTHER

## 2018-10-05 RX ORDER — METOPROLOL TARTRATE 50 MG/1
TABLET, FILM COATED ORAL
Qty: 180 TABLET | Refills: 3 | Status: CANCELLED | OUTPATIENT
Start: 2018-10-05

## 2018-10-11 ENCOUNTER — OFFICE VISIT (OUTPATIENT)
Dept: CARDIOLOGY CLINIC | Age: 73
End: 2018-10-11
Payer: COMMERCIAL

## 2018-10-11 VITALS
DIASTOLIC BLOOD PRESSURE: 76 MMHG | HEART RATE: 80 BPM | WEIGHT: 195.8 LBS | BODY MASS INDEX: 29 KG/M2 | SYSTOLIC BLOOD PRESSURE: 140 MMHG | HEIGHT: 69 IN

## 2018-10-11 DIAGNOSIS — E78.2 MIXED HYPERLIPIDEMIA: Primary | ICD-10-CM

## 2018-10-11 DIAGNOSIS — I48.0 PAROXYSMAL ATRIAL FIBRILLATION (HCC): ICD-10-CM

## 2018-10-11 DIAGNOSIS — Z01.818 PRE-OP EXAM: ICD-10-CM

## 2018-10-11 DIAGNOSIS — I10 ESSENTIAL HYPERTENSION: ICD-10-CM

## 2018-10-11 PROCEDURE — 99214 OFFICE O/P EST MOD 30 MIN: CPT | Performed by: NURSE PRACTITIONER

## 2018-10-11 PROCEDURE — 93000 ELECTROCARDIOGRAM COMPLETE: CPT | Performed by: NURSE PRACTITIONER

## 2018-10-30 ENCOUNTER — OFFICE VISIT (OUTPATIENT)
Dept: ORTHOPEDIC SURGERY | Age: 73
End: 2018-10-30
Payer: COMMERCIAL

## 2018-10-30 VITALS
HEIGHT: 68 IN | HEART RATE: 80 BPM | BODY MASS INDEX: 29.55 KG/M2 | OXYGEN SATURATION: 95 % | WEIGHT: 195 LBS | RESPIRATION RATE: 14 BRPM

## 2018-10-30 DIAGNOSIS — M65.352 TRIGGER LITTLE FINGER OF LEFT HAND: ICD-10-CM

## 2018-10-30 DIAGNOSIS — G56.02 CARPAL TUNNEL SYNDROME ON LEFT: ICD-10-CM

## 2018-10-30 DIAGNOSIS — M65.332 TRIGGER MIDDLE FINGER OF LEFT HAND: ICD-10-CM

## 2018-10-30 DIAGNOSIS — M72.0 DUPUYTREN'S CONTRACTURE OF LEFT HAND: Primary | ICD-10-CM

## 2018-10-30 DIAGNOSIS — M65.342 TRIGGER FINGER, LEFT RING FINGER: ICD-10-CM

## 2018-10-30 PROCEDURE — 99213 OFFICE O/P EST LOW 20 MIN: CPT | Performed by: ORTHOPAEDIC SURGERY

## 2018-10-30 ASSESSMENT — ENCOUNTER SYMPTOMS
ORTHOPNEA: 1
SHORTNESS OF BREATH: 1
EYES NEGATIVE: 1
GASTROINTESTINAL NEGATIVE: 1

## 2018-10-30 NOTE — PROGRESS NOTES
deficiency anemia 7/9/2013 9/13 EGD WNL    Lumbar radiculopathy 11/25/2013    Menopause     ADOLFO (obstructive sleep apnea)     sleep study 10/3 CPAP 6cm     Osteoarthritis     both knees    Osteopenia     9/12 DEXA T-score -1.5    Other activity(E029.9)     48 hr holter, the 48 hr holter monitor reveals the patient in the sinus rhythm with occasional supraventricular ectopic beats. There is a rare short atrial run.  Paroxysmal atrial fibrillation (HCC)     4/12 Holter [facility]    Prolonged emergence from general anesthesia     Proteinuria     Right Breast Cancer Dx 10-12    Supraventricular tachycardia (HCC)     Type 2 diabetes mellitus (HCC) Dx 2000's    Urge incontinence     Wears partial dentures     upper partial       Past Surgical History:   Procedure Laterality Date    BREAST BIOPSY  10-12    Right Breast Biopsy, Cancer    BREAST BIOPSY  1970's    Right Breast Biopsy, Benign    BREAST LUMPECTOMY  11/6/12    Breast cancer - Right with sentinal node    BUNIONECTOMY  1990's    Right Kristopher Michelle    8/10/09- The patient has no significant CAD. The elevated troponin is probably secondary to SVT. , 10/03- no significant CAD    COLONOSCOPY  \"X 2 Last One 2008 \"    Polpy Removed During Last Colonoscopy    DENTAL SURGERY      Teeth Extracted In Past    ENDOSCOPY, COLON, DIAGNOSTIC  07/18/2016    savary dilatation to 17 mm    HYSTERECTOMY, TOTAL ABDOMINAL  1980's    JOINT REPLACEMENT  2009    Total Left Knee    JOINT REPLACEMENT  2010    Total Right Knee       Family History   Problem Relation Age of Onset    Diabetes Mother     Early Death Mother 61    Cancer Father         \"Lung Cancer\"    Heart Disease Father     Diabetes Father     Stroke Sister     Diabetes Sister     Diabetes Brother     Cancer Brother         \"Prostate Cancer\"    Cancer Brother         \"Lung Cancer\"    Thyroid Disease Daughter        Social History     Social History    Marital status:       Spouse name: N/A    Number of children: N/A    Years of education: N/A     Social History Main Topics    Smoking status: Former Smoker     Packs/day: 0.50     Years: 10.00     Quit date: 1/5/1987    Smokeless tobacco: Never Used      Comment: reviewed     Alcohol use No      Comment: 3 cups decaf coffee daily    Drug use: No    Sexual activity: No     Other Topics Concern    None     Social History Narrative    Wears glasses       Current Outpatient Prescriptions   Medication Sig Dispense Refill    Cholecalciferol (VITAMIN D3 PO) Take 630 mg by mouth daily      rivaroxaban (XARELTO) 15 MG TABS tablet Take 1 tablet by mouth every 24 hours 56 tablet 3    metoprolol tartrate (LOPRESSOR) 50 MG tablet take 1 tablet by mouth twice a day 180 tablet 3    diltiazem (CARDIZEM CD) 120 MG extended release capsule Take 1 capsule by mouth daily 30 capsule 3    propafenone (RYTHMOL) 150 MG tablet take 1 tablet by mouth every 8 hours 90 tablet 3    lisinopril (PRINIVIL;ZESTRIL) 20 MG tablet Take 1 tablet by mouth daily 90 tablet 3    fluticasone (FLONASE) 50 MCG/ACT nasal spray 2 sprays by Nasal route daily 1 Bottle 3    furosemide (LASIX) 20 MG tablet take 1 tablet by mouth once daily 90 tablet 3    UNABLE TO FIND Please administer two SHINGRIX vaccines 2-6 months apart 2 Units 0    oxybutynin (DITROPAN) 5 MG tablet take 1 tablet by mouth twice a day 180 tablet 3    atorvastatin (LIPITOR) 80 MG tablet take 1 tablet by mouth once daily 90 tablet 3    omeprazole (PRILOSEC) 40 MG delayed release capsule Take 40 mg by mouth daily      Lancets MISC 1 each by In Vitro route 2 times daily 100 each 3    Glucose Blood (BLOOD GLUCOSE TEST STRIPS) STRP 1 each by In Vitro route 2 times daily 100 strip 3    Blood Glucose Monitoring Suppl AV 1 kit by Does not apply route daily 1 Device 0    metFORMIN (GLUCOPHAGE) 500 MG tablet Take 1 tablet by mouth 2 times daily (with meals) (Patient taking differently: Take 1,000 mg by mouth 2 times daily (with meals) ) 180 tablet 3    ferrous sulfate 325 (65 FE) MG tablet take 1 tablet by mouth three times a day  0    Ascorbic Acid (VITAMIN C) 100 MG tablet Take 100 mg by mouth daily.  aspirin 81 MG tablet Take 81 mg by mouth every morning Over The Counter      Calcium Carbonate (CALTRATE 600 PO) Take  by mouth 2 times daily. No current facility-administered medications for this visit. Allergies   Allergen Reactions    Latex      \"Blisters\"    Tape Ozie Jason Tape]      \"Turn Red\"       Review of Systems:  See above      Physical Exam:   Pulse 80   Resp 14   Ht 5' 8\" (1.727 m)   Wt 195 lb (88.5 kg)   LMP  (LMP Unknown)   SpO2 95%   BMI 29.65 kg/m²    Wt: 195lb  Ht: 5' 8\"  Resp 14  SpO2 95%  Pulse 80    Gait is trace unsteady. Gen/Psych: Examination reveals a pleasant individual in no acute distress. The patient is oriented to time, place and person. The patient's mood and affect are appropriate.     Lymph: The lymphatic examination bilaterally reveals all areas to be without enlargement or induration.      Skin intact without lymphadenopathy, discoloration, or abnormal temperature.      Vascular: There is intact, symmetric circulation in both upper extremities. left Arm exam:  Sensation is subjectively and objectively decreased in the extremity due to tingling in radial fingers  Muscular strength is clinically appropriate bilaterally. wrist exam:  -effusion is absent   -mild tenderness to palpation of  Her A1 pulley of ring and middle and small fingers  -there is a small nodule in her palm  -ROM is full range of motion with active triggering of ring and middle and small fingers      Imaging studies:  reviewed  2 views of the bilateral wrist show no fracture, dislocation, swelling or degenerative changes noted      EMG 02/14/18  IMPRESSION:                  1. Abnormal EMG.  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                2. 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  No convincing evidence of a generalized neuropathy.                 3. No evidence of any spinal nerve root injury (radiculopathy), plexopathy, generalized peripheral neuropathy or primary muscle disease.          Impression:  left carpal tunnel syndrome  Trigger finger left  ring and middle  And small fingers  Dupuytren's contracture of left hand       Plan:    Due to possible Dupuytren's will refer to Dr Carisa Ham, will fax this note with the referral   As above, she has been cleared by her cardiologist to stop Xarelto for hand surgery  Call our office in 1-2 weeks if not heard from specialist office

## 2018-11-05 ENCOUNTER — OFFICE VISIT (OUTPATIENT)
Dept: CARDIOLOGY CLINIC | Age: 73
End: 2018-11-05
Payer: COMMERCIAL

## 2018-11-05 ENCOUNTER — NURSE ONLY (OUTPATIENT)
Dept: CARDIOLOGY CLINIC | Age: 73
End: 2018-11-05
Payer: COMMERCIAL

## 2018-11-05 VITALS
WEIGHT: 193.2 LBS | HEART RATE: 74 BPM | SYSTOLIC BLOOD PRESSURE: 104 MMHG | BODY MASS INDEX: 29.28 KG/M2 | DIASTOLIC BLOOD PRESSURE: 60 MMHG | HEIGHT: 68 IN

## 2018-11-05 DIAGNOSIS — I48.0 PAROXYSMAL ATRIAL FIBRILLATION (HCC): Primary | ICD-10-CM

## 2018-11-05 DIAGNOSIS — I10 ESSENTIAL HYPERTENSION: ICD-10-CM

## 2018-11-05 DIAGNOSIS — I48.0 PAROXYSMAL ATRIAL FIBRILLATION (HCC): ICD-10-CM

## 2018-11-05 DIAGNOSIS — Z01.818 PRE-OP EXAM: ICD-10-CM

## 2018-11-05 PROCEDURE — 93000 ELECTROCARDIOGRAM COMPLETE: CPT | Performed by: INTERNAL MEDICINE

## 2018-11-05 PROCEDURE — 99214 OFFICE O/P EST MOD 30 MIN: CPT | Performed by: INTERNAL MEDICINE

## 2018-11-05 NOTE — PROGRESS NOTES
14 days e-cardio monitor placed.  # [id number]. Instructed patient on how to record the event and to call monitoring center at [phone] if any problems arise. Instructed patient to disconnect the lead wires from the electrodes before bathing or showering and reattach them afterwards. Instructed patient that the electrodes should be changed every 3 days or if they no longer adhere to the skin. Patient to mail package after the monitor has ended. Patient verbalized understanding.

## 2018-11-05 NOTE — PROGRESS NOTES
Calcium Carbonate (CALTRATE 600 PO) Take  by mouth 2 times daily. No current facility-administered medications for this visit. Allergies: Latex and Tape [adhesive tape]  Past Medical History:   Diagnosis Date    Atrial fibrillation with RVR ([address] 11/25/2013    Edema     1/39 TTE diastolic dysfxn, EF 74%; 63/37 - TTE diastolic dysfxn, EF 51%; Stress myoview  WNl, EF 70%    Family history of cardiovascular disease     GERD (gastroesophageal reflux disease)     H/O 24 hour [facility] monitoring [facility]23/12    [facility]    H/O cardiovascular stress test     4/23/12-[facility], Probably norm perfusion Lexiscan cardiolite study except for diaphramatic attenuation artifact, otherwise perfusion is normal, norm LV fxn by gated scan. 11/10-EF70%, 9/15/08-EF70%, 1/15/07-EF70%, 9/03    H/O echocardiogram     4/23/12-[facility]- Norm chamber sizes. LVH with norm LV systolic but abn diastolic fxn, mild MR and TR, minimal pulm HTN. 11/10 -EF>55%; 3/05, 9/23/03    History of cardiac catheterization     11/15/2013-EF 55%, No signif CAD -Dr Berna Simon;;    History of cardiovascular stress test     11/14/2013-EF 70%. Normal Lexiscan Cardiolite, Uniform wall motion;    History of echocardiogram     78/85/1565-[id number] global systolic  function. No wall motion abnormalities. EF 55%. Mild MR/TR;    History of Holter monitoring 11/17/14 11/14-48 hour Holter: Predominant rhythm was sinus, no ventricular ectopy noted, supraventricular ectopics were noted in single beat forms.  Hx of 24 hour EKG monitoring     12/17/13 48 hr holter monitor. Sinus rhythm with intermittent sinus arrhythmia.     Hyperlipidemia     Hypertension     11/13 Cath WNL, EF 55%; 11/13 Stress WNL : 11/13 TTE mild MR and TR, EF 55%; 4/12 Stress WNL; 8/9 - Cath WNL    Iron deficiency anemia 7/9/2013 9/13 EGD WNL    Lumbar radiculopathy 11/25/2013    Menopause     ADOLFO (obstructive sleep apnea)     sleep study 10/3 CPAP 6cm     Osteoarthritis     both knees   

## 2018-11-06 ENCOUNTER — OFFICE VISIT (OUTPATIENT)
Dept: INTERNAL MEDICINE CLINIC | Age: 73
End: 2018-11-06
Payer: COMMERCIAL

## 2018-11-06 VITALS
HEART RATE: 96 BPM | SYSTOLIC BLOOD PRESSURE: 116 MMHG | BODY MASS INDEX: 29.32 KG/M2 | DIASTOLIC BLOOD PRESSURE: 68 MMHG | WEIGHT: 192.8 LBS | RESPIRATION RATE: 18 BRPM | OXYGEN SATURATION: 95 %

## 2018-11-06 DIAGNOSIS — N18.30 CHRONIC RENAL INSUFFICIENCY, STAGE 3 (MODERATE) (HCC): Primary | ICD-10-CM

## 2018-11-06 DIAGNOSIS — R53.83 FATIGUE, UNSPECIFIED TYPE: ICD-10-CM

## 2018-11-06 DIAGNOSIS — G47.33 OSA (OBSTRUCTIVE SLEEP APNEA): ICD-10-CM

## 2018-11-06 DIAGNOSIS — I10 ESSENTIAL HYPERTENSION: ICD-10-CM

## 2018-11-06 DIAGNOSIS — E11.21 TYPE 2 DIABETES MELLITUS WITH NEPHROPATHY (HCC): ICD-10-CM

## 2018-11-06 DIAGNOSIS — N18.30 CHRONIC RENAL INSUFFICIENCY, STAGE 3 (MODERATE) (HCC): ICD-10-CM

## 2018-11-06 PROCEDURE — 99214 OFFICE O/P EST MOD 30 MIN: CPT | Performed by: INTERNAL MEDICINE

## 2018-11-07 ENCOUNTER — HOSPITAL ENCOUNTER (INPATIENT)
Age: 73
LOS: 2 days | Discharge: HOME | DRG: 378 | End: 2018-11-09
Attending: EMERGENCY MEDICINE | Admitting: INTERNAL MEDICINE
Payer: COMMERCIAL

## 2018-11-07 ENCOUNTER — APPOINTMENT (OUTPATIENT)
Dept: GENERAL RADIOLOGY | Age: 73
DRG: 378 | End: 2018-11-07
Payer: COMMERCIAL

## 2018-11-07 DIAGNOSIS — D64.9 ANEMIA, UNSPECIFIED TYPE: Primary | ICD-10-CM

## 2018-11-07 DIAGNOSIS — K92.2 GASTROINTESTINAL HEMORRHAGE, UNSPECIFIED GASTROINTESTINAL HEMORRHAGE TYPE: ICD-10-CM

## 2018-11-07 DIAGNOSIS — R42 DIZZINESS: ICD-10-CM

## 2018-11-07 DIAGNOSIS — N17.9 AKI (ACUTE KIDNEY INJURY) (HCC): ICD-10-CM

## 2018-11-07 DIAGNOSIS — R07.9 CHEST PAIN, UNSPECIFIED TYPE: ICD-10-CM

## 2018-11-07 LAB
ALBUMIN SERPL-MCNC: 3.7 GM/DL (ref 3.4–5)
ALP BLD-CCNC: 58 IU/L (ref 40–129)
ALT SERPL-CCNC: 16 U/L (ref 10–40)
ANION GAP SERPL CALCULATED.3IONS-SCNC: 10 MMOL/L (ref 4–16)
APTT: 52.6 SECONDS (ref 21.2–33)
AST SERPL-CCNC: 18 IU/L (ref 15–37)
BASOPHILS ABSOLUTE: 0 K/CU MM
BASOPHILS RELATIVE PERCENT: 0.3 % (ref 0–1)
BILIRUB SERPL-MCNC: 0.1 MG/DL (ref 0–1)
BUN BLDV-MCNC: 60 MG/DL (ref 6–23)
CALCIUM SERPL-MCNC: 8.9 MG/DL (ref 8.3–10.6)
CHLORIDE BLD-SCNC: 104 MMOL/L (ref 99–110)
CO2: 23 MMOL/L (ref 21–32)
CREAT SERPL-MCNC: 2.4 MG/DL (ref 0.6–1.1)
DIFFERENTIAL TYPE: ABNORMAL
EOSINOPHILS ABSOLUTE: 0.1 K/CU MM
EOSINOPHILS RELATIVE PERCENT: 0.6 % (ref 0–3)
GFR AFRICAN AMERICAN: 24 ML/MIN/1.73M2
GFR NON-AFRICAN AMERICAN: 20 ML/MIN/1.73M2
GLUCOSE BLD-MCNC: 120 MG/DL (ref 70–99)
GLUCOSE BLD-MCNC: 129 MG/DL (ref 70–99)
HCT VFR BLD CALC: 19.1 % (ref 37–47)
HEMOGLOBIN: 5.7 GM/DL (ref 12.5–16)
IMMATURE NEUTROPHIL %: 0.5 % (ref 0–0.43)
INR BLD: 1.93 INDEX
LYMPHOCYTES ABSOLUTE: 1.6 K/CU MM
LYMPHOCYTES RELATIVE PERCENT: 13.8 % (ref 24–44)
MCH RBC QN AUTO: 25.2 PG (ref 27–31)
MCHC RBC AUTO-ENTMCNC: 29.8 % (ref 32–36)
MCV RBC AUTO: 84.5 FL (ref 78–100)
MONOCYTES ABSOLUTE: 0.7 K/CU MM
MONOCYTES RELATIVE PERCENT: 6.1 % (ref 0–4)
NUCLEATED RBC %: 0.3 %
PDW BLD-RTO: 17.4 % (ref 11.7–14.9)
PLATELET # BLD: 337 K/CU MM (ref 140–440)
PMV BLD AUTO: 9.8 FL (ref 7.5–11.1)
POTASSIUM SERPL-SCNC: 4.2 MMOL/L (ref 3.5–5.1)
PRO-BNP: 338.8 PG/ML
PROTHROMBIN TIME: 21.9 SECONDS (ref 9.12–12.5)
RBC # BLD: 2.26 M/CU MM (ref 4.2–5.4)
SEGMENTED NEUTROPHILS ABSOLUTE COUNT: 9.4 K/CU MM
SEGMENTED NEUTROPHILS RELATIVE PERCENT: 78.7 % (ref 36–66)
SODIUM BLD-SCNC: 137 MMOL/L (ref 135–145)
TOTAL IMMATURE NEUTOROPHIL: 0.06 K/CU MM
TOTAL NUCLEATED RBC: 0 K/CU MM
TOTAL PROTEIN: 6.5 GM/DL (ref 6.4–8.2)
TROPONIN T: 0.01 NG/ML
WBC # BLD: 11.9 K/CU MM (ref 4–10.5)

## 2018-11-07 PROCEDURE — 6370000000 HC RX 637 (ALT 250 FOR IP): Performed by: INTERNAL MEDICINE

## 2018-11-07 PROCEDURE — 93010 ELECTROCARDIOGRAM REPORT: CPT | Performed by: INTERNAL MEDICINE

## 2018-11-07 PROCEDURE — P9016 RBC LEUKOCYTES REDUCED: HCPCS

## 2018-11-07 PROCEDURE — 80053 COMPREHEN METABOLIC PANEL: CPT

## 2018-11-07 PROCEDURE — C9113 INJ PANTOPRAZOLE SODIUM, VIA: HCPCS | Performed by: PHYSICIAN ASSISTANT

## 2018-11-07 PROCEDURE — 82962 GLUCOSE BLOOD TEST: CPT

## 2018-11-07 PROCEDURE — 86901 BLOOD TYPING SEROLOGIC RH(D): CPT

## 2018-11-07 PROCEDURE — 85730 THROMBOPLASTIN TIME PARTIAL: CPT

## 2018-11-07 PROCEDURE — 86900 BLOOD TYPING SEROLOGIC ABO: CPT

## 2018-11-07 PROCEDURE — 6360000002 HC RX W HCPCS: Performed by: PHYSICIAN ASSISTANT

## 2018-11-07 PROCEDURE — 71045 X-RAY EXAM CHEST 1 VIEW: CPT

## 2018-11-07 PROCEDURE — 84484 ASSAY OF TROPONIN QUANT: CPT

## 2018-11-07 PROCEDURE — 2580000003 HC RX 258: Performed by: PHYSICIAN ASSISTANT

## 2018-11-07 PROCEDURE — 2140000000 HC CCU INTERMEDIATE R&B

## 2018-11-07 PROCEDURE — 2580000003 HC RX 258: Performed by: INTERNAL MEDICINE

## 2018-11-07 PROCEDURE — 86922 COMPATIBILITY TEST ANTIGLOB: CPT

## 2018-11-07 PROCEDURE — 36430 TRANSFUSION BLD/BLD COMPNT: CPT

## 2018-11-07 PROCEDURE — 86850 RBC ANTIBODY SCREEN: CPT

## 2018-11-07 PROCEDURE — 85610 PROTHROMBIN TIME: CPT

## 2018-11-07 PROCEDURE — 96361 HYDRATE IV INFUSION ADD-ON: CPT

## 2018-11-07 PROCEDURE — 83880 ASSAY OF NATRIURETIC PEPTIDE: CPT

## 2018-11-07 PROCEDURE — 6360000002 HC RX W HCPCS: Performed by: INTERNAL MEDICINE

## 2018-11-07 PROCEDURE — 96374 THER/PROPH/DIAG INJ IV PUSH: CPT

## 2018-11-07 PROCEDURE — 85025 COMPLETE CBC W/AUTO DIFF WBC: CPT

## 2018-11-07 PROCEDURE — 6370000000 HC RX 637 (ALT 250 FOR IP): Performed by: PHYSICIAN ASSISTANT

## 2018-11-07 PROCEDURE — 93005 ELECTROCARDIOGRAM TRACING: CPT | Performed by: EMERGENCY MEDICINE

## 2018-11-07 PROCEDURE — 99285 EMERGENCY DEPT VISIT HI MDM: CPT

## 2018-11-07 PROCEDURE — C9113 INJ PANTOPRAZOLE SODIUM, VIA: HCPCS | Performed by: INTERNAL MEDICINE

## 2018-11-07 RX ORDER — SODIUM CHLORIDE 0.9 % (FLUSH) 0.9 %
10 SYRINGE (ML) INJECTION PRN
Status: DISCONTINUED | OUTPATIENT
Start: 2018-11-07 | End: 2018-11-09 | Stop reason: HOSPADM

## 2018-11-07 RX ORDER — 0.9 % SODIUM CHLORIDE 0.9 %
250 INTRAVENOUS SOLUTION INTRAVENOUS ONCE
Status: DISCONTINUED | OUTPATIENT
Start: 2018-11-07 | End: 2018-11-09 | Stop reason: HOSPADM

## 2018-11-07 RX ORDER — ATORVASTATIN CALCIUM 40 MG/1
80 TABLET, FILM COATED ORAL NIGHTLY
Status: DISCONTINUED | OUTPATIENT
Start: 2018-11-07 | End: 2018-11-09 | Stop reason: HOSPADM

## 2018-11-07 RX ORDER — OXYBUTYNIN CHLORIDE 5 MG/1
5 TABLET ORAL 2 TIMES DAILY
Status: DISCONTINUED | OUTPATIENT
Start: 2018-11-07 | End: 2018-11-09 | Stop reason: HOSPADM

## 2018-11-07 RX ORDER — ACETAMINOPHEN 325 MG/1
650 TABLET ORAL EVERY 4 HOURS PRN
Status: DISCONTINUED | OUTPATIENT
Start: 2018-11-07 | End: 2018-11-09 | Stop reason: HOSPADM

## 2018-11-07 RX ORDER — DEXTROSE MONOHYDRATE 50 MG/ML
100 INJECTION, SOLUTION INTRAVENOUS PRN
Status: DISCONTINUED | OUTPATIENT
Start: 2018-11-07 | End: 2018-11-09 | Stop reason: HOSPADM

## 2018-11-07 RX ORDER — 0.9 % SODIUM CHLORIDE 0.9 %
10 VIAL (ML) INJECTION 2 TIMES DAILY
Status: DISCONTINUED | OUTPATIENT
Start: 2018-11-07 | End: 2018-11-09 | Stop reason: HOSPADM

## 2018-11-07 RX ORDER — NICOTINE POLACRILEX 4 MG
15 LOZENGE BUCCAL PRN
Status: DISCONTINUED | OUTPATIENT
Start: 2018-11-07 | End: 2018-11-09 | Stop reason: HOSPADM

## 2018-11-07 RX ORDER — SODIUM CHLORIDE 0.9 % (FLUSH) 0.9 %
10 SYRINGE (ML) INJECTION EVERY 12 HOURS SCHEDULED
Status: DISCONTINUED | OUTPATIENT
Start: 2018-11-07 | End: 2018-11-09 | Stop reason: HOSPADM

## 2018-11-07 RX ORDER — PANTOPRAZOLE SODIUM 40 MG/10ML
40 INJECTION, POWDER, LYOPHILIZED, FOR SOLUTION INTRAVENOUS ONCE
Status: COMPLETED | OUTPATIENT
Start: 2018-11-07 | End: 2018-11-07

## 2018-11-07 RX ORDER — SODIUM CHLORIDE 9 MG/ML
INJECTION, SOLUTION INTRAVENOUS
Status: DISPENSED
Start: 2018-11-07 | End: 2018-11-08

## 2018-11-07 RX ORDER — DEXTROSE MONOHYDRATE 25 G/50ML
12.5 INJECTION, SOLUTION INTRAVENOUS PRN
Status: DISCONTINUED | OUTPATIENT
Start: 2018-11-07 | End: 2018-11-09 | Stop reason: HOSPADM

## 2018-11-07 RX ORDER — ONDANSETRON 4 MG/1
4 TABLET, ORALLY DISINTEGRATING ORAL ONCE
Status: COMPLETED | OUTPATIENT
Start: 2018-11-07 | End: 2018-11-07

## 2018-11-07 RX ORDER — ASPIRIN 325 MG
325 TABLET ORAL ONCE
Status: COMPLETED | OUTPATIENT
Start: 2018-11-07 | End: 2018-11-07

## 2018-11-07 RX ORDER — SODIUM CHLORIDE 9 MG/ML
INJECTION, SOLUTION INTRAVENOUS CONTINUOUS
Status: DISCONTINUED | OUTPATIENT
Start: 2018-11-07 | End: 2018-11-09 | Stop reason: HOSPADM

## 2018-11-07 RX ORDER — ONDANSETRON 2 MG/ML
4 INJECTION INTRAMUSCULAR; INTRAVENOUS EVERY 6 HOURS PRN
Status: DISCONTINUED | OUTPATIENT
Start: 2018-11-07 | End: 2018-11-09 | Stop reason: HOSPADM

## 2018-11-07 RX ORDER — PANTOPRAZOLE SODIUM 40 MG/10ML
40 INJECTION, POWDER, LYOPHILIZED, FOR SOLUTION INTRAVENOUS 2 TIMES DAILY
Status: DISCONTINUED | OUTPATIENT
Start: 2018-11-07 | End: 2018-11-09 | Stop reason: HOSPADM

## 2018-11-07 RX ORDER — DILTIAZEM HYDROCHLORIDE 120 MG/1
120 CAPSULE, COATED, EXTENDED RELEASE ORAL DAILY
Status: DISCONTINUED | OUTPATIENT
Start: 2018-11-08 | End: 2018-11-09 | Stop reason: HOSPADM

## 2018-11-07 RX ORDER — 0.9 % SODIUM CHLORIDE 0.9 %
500 INTRAVENOUS SOLUTION INTRAVENOUS ONCE
Status: COMPLETED | OUTPATIENT
Start: 2018-11-07 | End: 2018-11-07

## 2018-11-07 RX ORDER — PROPAFENONE HYDROCHLORIDE 150 MG/1
150 TABLET, FILM COATED ORAL EVERY 8 HOURS
Status: DISCONTINUED | OUTPATIENT
Start: 2018-11-07 | End: 2018-11-09 | Stop reason: HOSPADM

## 2018-11-07 RX ADMIN — ASPIRIN 325 MG ORAL TABLET 325 MG: 325 PILL ORAL at 17:14

## 2018-11-07 RX ADMIN — OXYBUTYNIN CHLORIDE 5 MG: 5 TABLET ORAL at 23:17

## 2018-11-07 RX ADMIN — SODIUM CHLORIDE, PRESERVATIVE FREE 10 ML: 5 INJECTION INTRAVENOUS at 23:19

## 2018-11-07 RX ADMIN — SODIUM CHLORIDE, PRESERVATIVE FREE 10 ML: 5 INJECTION INTRAVENOUS at 23:18

## 2018-11-07 RX ADMIN — SODIUM CHLORIDE: 9 INJECTION, SOLUTION INTRAVENOUS at 23:18

## 2018-11-07 RX ADMIN — PANTOPRAZOLE SODIUM 40 MG: 40 INJECTION, POWDER, FOR SOLUTION INTRAVENOUS at 23:16

## 2018-11-07 RX ADMIN — ONDANSETRON 4 MG: 4 TABLET, ORALLY DISINTEGRATING ORAL at 17:14

## 2018-11-07 RX ADMIN — PANTOPRAZOLE SODIUM 40 MG: 40 INJECTION, POWDER, FOR SOLUTION INTRAVENOUS at 19:05

## 2018-11-07 RX ADMIN — SODIUM CHLORIDE 500 ML: 9 INJECTION, SOLUTION INTRAVENOUS at 19:05

## 2018-11-07 RX ADMIN — PROPAFENONE HYDROCHLORIDE 150 MG: 150 TABLET, FILM COATED ORAL at 23:17

## 2018-11-07 RX ADMIN — ATORVASTATIN CALCIUM 80 MG: 40 TABLET, FILM COATED ORAL at 23:17

## 2018-11-07 ASSESSMENT — PAIN DESCRIPTION - LOCATION: LOCATION: CHEST

## 2018-11-07 ASSESSMENT — PAIN DESCRIPTION - ORIENTATION: ORIENTATION: LEFT

## 2018-11-07 ASSESSMENT — PAIN SCALES - GENERAL: PAINLEVEL_OUTOF10: 8

## 2018-11-07 ASSESSMENT — PAIN DESCRIPTION - PAIN TYPE: TYPE: ACUTE PAIN

## 2018-11-07 NOTE — ED PROVIDER NOTES
MD Oma   furosemide (LASIX) 20 MG tablet take 1 tablet by mouth once daily 5/24/18   Ermelinda Jiménez MD   UNABLE TO FIND Please administer two [address] vaccines 2-6 months apart 5/7/18   Ermelinda Jiménez MD   oxybutynin [facility]) 5 MG tablet take 1 tablet by mouth twice a day 5/1/18   Ermelinda Jiménez MD   atorvastatin (LIPITOR) 80 MG tablet take 1 tablet by mouth once daily 2/5/18   Ermelinda Jiménez MD   omeprazole (PRILOSEC) 40 MG delayed release capsule Take 40 mg by mouth daily    Historical Provider, MD   Lancets MISC 1 each by In Vitro route 2 times daily 12/1/17   Ermelinda Jiménez MD   Glucose Blood (BLOOD GLUCOSE TEST STRIPS) STRP 1 each by In Vitro route 2 times daily 12/1/17   Ermelinda Jiménez MD   Blood Glucose Monitoring Suppl AV 1 kit by Does not apply route daily 12/1/17   Ermelinda Jiménez MD   ferrous sulfate 325 (65 FE) MG tablet take 1 tablet by mouth three times a day 11/23/16   Historical Provider, MD   Ascorbic Acid (VITAMIN C) 100 MG tablet Take 100 mg by mouth daily. Historical Provider, MD   oxybutynin (DITROPAN) 5 MG tablet Take 1 tablet by mouth 2 times daily. 1/7/13 1/21/14  Ermelinda Jiménez MD   aspirin 81 MG tablet Take 81 mg by mouth every morning Over The Counter    Historical Provider, MD   Calcium Carbonate (CALTRATE 600 PO) Take  by mouth 2 times daily. Historical Provider, MD       Social history:  reports that she quit smoking about 31 years ago. She has a 5.00 pack-year smoking history. She has never used smokeless tobacco. She reports that she does not drink alcohol or use drugs.     Family history:    Family History   Problem Relation Age of Onset    Diabetes Mother     Early Death Mother 61    Cancer Father         \"Lung Cancer\"   Wendi Moll Heart Disease Father     Diabetes Father     Stroke Sister     Diabetes Sister     Diabetes Brother     Cancer Brother         \"Prostate Cancer\"    Cancer Brother abnormality  Abnormal ECG  When compared with ECG of 07-SEP-2018 07:00,  Vent. rate has increased BY  45 BPM  Nonspecific T wave abnormality no longer evident in Inferior leads  Nonspecific T wave abnormality now evident in Lateral leads           MDM  Patient presents for chest pain and lightheadedness. Found to have a new onset of anemia with hemoglobin of 5.7, Rectal exam + for occult blood, on xarelto. Patient also has elevation of BUN and creat. NS bolus and RBCs ordered. Given protonix. Plan is to admit, discussed with Dr. Ke Mariscal who will place orders. Dr. Leslie Lockwood spoke with Dr. Sariah Arora who will consult. This patient was also seen and evaluated by Dr. Leslie Lockwood. Final Impression  1. Anemia, unspecified type    2. Gastrointestinal hemorrhage, unspecified gastrointestinal hemorrhage type    3. Chest pain, unspecified type    4. Dizziness    5. JESSI (acute kidney injury) ([facility] [address]        Blood pressure (!) 150/72, pulse 102, temperature 97.9 °F (36.6 °C), temperature source Oral, resp. rate 21, height 5' 8\" (1.727 m), weight 190 lb (86.2 kg), SpO2 100 %, not currently breastfeeding. Disposition:  Admit to telemetry in stable condition. Patient was given scripts for the following medications. I counseled patient how to take these medications. New Prescriptions    No medications on file       This chart was generated using the [address] [place] [address] [employer] system. I created this record but it may contain dictation errors given the limitations of this technology.         [employer], BELLA  11/07/18 4365

## 2018-11-08 ENCOUNTER — ANESTHESIA (OUTPATIENT)
Dept: ENDOSCOPY | Age: 73
DRG: 378 | End: 2018-11-08
Payer: COMMERCIAL

## 2018-11-08 ENCOUNTER — ANESTHESIA EVENT (OUTPATIENT)
Dept: ENDOSCOPY | Age: 73
DRG: 378 | End: 2018-11-08
Payer: COMMERCIAL

## 2018-11-08 VITALS
OXYGEN SATURATION: 98 % | SYSTOLIC BLOOD PRESSURE: 125 MMHG | DIASTOLIC BLOOD PRESSURE: 59 MMHG | RESPIRATION RATE: 16 BRPM

## 2018-11-08 PROBLEM — D62 ACUTE BLOOD LOSS ANEMIA: Status: ACTIVE | Noted: 2018-11-08

## 2018-11-08 PROBLEM — N17.9 ACUTE KIDNEY INJURY (HCC): Status: ACTIVE | Noted: 2018-11-08

## 2018-11-08 LAB
ALBUMIN SERPL-MCNC: 3.6 GM/DL (ref 3.4–5)
ALP BLD-CCNC: 52 IU/L (ref 40–128)
ALT SERPL-CCNC: 13 U/L (ref 10–40)
ANION GAP SERPL CALCULATED.3IONS-SCNC: 13 MMOL/L (ref 4–16)
APTT: 35.1 SECONDS (ref 21.2–33)
AST SERPL-CCNC: 14 IU/L (ref 15–37)
BASOPHILS ABSOLUTE: 0 K/CU MM
BASOPHILS RELATIVE PERCENT: 0.4 % (ref 0–1)
BILIRUB SERPL-MCNC: 0.4 MG/DL (ref 0–1)
BUN BLDV-MCNC: 51 MG/DL (ref 6–23)
CALCIUM SERPL-MCNC: 8.4 MG/DL (ref 8.3–10.6)
CHLORIDE BLD-SCNC: 103 MMOL/L (ref 99–110)
CO2: 22 MMOL/L (ref 21–32)
CREAT SERPL-MCNC: 2.1 MG/DL (ref 0.6–1.1)
DIFFERENTIAL TYPE: ABNORMAL
EOSINOPHILS ABSOLUTE: 0.2 K/CU MM
EOSINOPHILS RELATIVE PERCENT: 2 % (ref 0–3)
ESTIMATED AVERAGE GLUCOSE: 117 MG/DL
GFR AFRICAN AMERICAN: 28 ML/MIN/1.73M2
GFR NON-AFRICAN AMERICAN: 23 ML/MIN/1.73M2
GLUCOSE BLD-MCNC: 106 MG/DL (ref 70–99)
GLUCOSE BLD-MCNC: 113 MG/DL (ref 70–99)
GLUCOSE BLD-MCNC: 115 MG/DL (ref 70–99)
GLUCOSE BLD-MCNC: 124 MG/DL (ref 70–99)
GLUCOSE BLD-MCNC: 125 MG/DL (ref 70–99)
HBA1C MFR BLD: 5.7 % (ref 4.2–6.3)
HCT VFR BLD CALC: 25.8 % (ref 37–47)
HEMOGLOBIN: 7.8 GM/DL (ref 12.5–16)
HEMOGLOBIN: 7.8 GM/DL (ref 12.5–16)
HEMOGLOBIN: 8.2 GM/DL (ref 12.5–16)
IMMATURE NEUTROPHIL %: 0.4 % (ref 0–0.43)
INR BLD: 1.17 INDEX
LYMPHOCYTES ABSOLUTE: 1.9 K/CU MM
LYMPHOCYTES RELATIVE PERCENT: 20 % (ref 24–44)
MCH RBC QN AUTO: 26.5 PG (ref 27–31)
MCHC RBC AUTO-ENTMCNC: 30.2 % (ref 32–36)
MCV RBC AUTO: 87.8 FL (ref 78–100)
MONOCYTES ABSOLUTE: 0.7 K/CU MM
MONOCYTES RELATIVE PERCENT: 6.9 % (ref 0–4)
NUCLEATED RBC %: 0 %
PDW BLD-RTO: 16.7 % (ref 11.7–14.9)
PLATELET # BLD: 279 K/CU MM (ref 140–440)
PMV BLD AUTO: 10.1 FL (ref 7.5–11.1)
POTASSIUM SERPL-SCNC: 4.3 MMOL/L (ref 3.5–5.1)
PROTHROMBIN TIME: 13.6 SECONDS (ref 9.12–12.5)
RBC # BLD: 2.94 M/CU MM (ref 4.2–5.4)
SEGMENTED NEUTROPHILS ABSOLUTE COUNT: 6.8 K/CU MM
SEGMENTED NEUTROPHILS RELATIVE PERCENT: 70.3 % (ref 36–66)
SODIUM BLD-SCNC: 138 MMOL/L (ref 135–145)
TOTAL IMMATURE NEUTOROPHIL: 0.04 K/CU MM
TOTAL NUCLEATED RBC: 0 K/CU MM
TOTAL PROTEIN: 5.4 GM/DL (ref 6.4–8.2)
TROPONIN T: <0.01 NG/ML
TROPONIN T: <0.01 NG/ML
WBC # BLD: 9.7 K/CU MM (ref 4–10.5)

## 2018-11-08 PROCEDURE — 2140000000 HC CCU INTERMEDIATE R&B

## 2018-11-08 PROCEDURE — 82962 GLUCOSE BLOOD TEST: CPT

## 2018-11-08 PROCEDURE — 99223 1ST HOSP IP/OBS HIGH 75: CPT | Performed by: INTERNAL MEDICINE

## 2018-11-08 PROCEDURE — 84484 ASSAY OF TROPONIN QUANT: CPT

## 2018-11-08 PROCEDURE — 3700000001 HC ADD 15 MINUTES (ANESTHESIA): Performed by: SPECIALIST

## 2018-11-08 PROCEDURE — 36415 COLL VENOUS BLD VENIPUNCTURE: CPT

## 2018-11-08 PROCEDURE — 3700000000 HC ANESTHESIA ATTENDED CARE: Performed by: SPECIALIST

## 2018-11-08 PROCEDURE — C9113 INJ PANTOPRAZOLE SODIUM, VIA: HCPCS | Performed by: INTERNAL MEDICINE

## 2018-11-08 PROCEDURE — 85025 COMPLETE CBC W/AUTO DIFF WBC: CPT

## 2018-11-08 PROCEDURE — 85610 PROTHROMBIN TIME: CPT

## 2018-11-08 PROCEDURE — 2500000003 HC RX 250 WO HCPCS: Performed by: NURSE ANESTHETIST, CERTIFIED REGISTERED

## 2018-11-08 PROCEDURE — 83036 HEMOGLOBIN GLYCOSYLATED A1C: CPT

## 2018-11-08 PROCEDURE — 2580000003 HC RX 258: Performed by: INTERNAL MEDICINE

## 2018-11-08 PROCEDURE — 85018 HEMOGLOBIN: CPT

## 2018-11-08 PROCEDURE — 94761 N-INVAS EAR/PLS OXIMETRY MLT: CPT

## 2018-11-08 PROCEDURE — 6370000000 HC RX 637 (ALT 250 FOR IP): Performed by: INTERNAL MEDICINE

## 2018-11-08 PROCEDURE — 85730 THROMBOPLASTIN TIME PARTIAL: CPT

## 2018-11-08 PROCEDURE — 3609012800 HC EGD DIAGNOSTIC ONLY: Performed by: SPECIALIST

## 2018-11-08 PROCEDURE — 0DJ08ZZ INSPECTION OF UPPER INTESTINAL TRACT, VIA NATURAL OR ARTIFICIAL OPENING ENDOSCOPIC: ICD-10-PCS | Performed by: SPECIALIST

## 2018-11-08 PROCEDURE — 80053 COMPREHEN METABOLIC PANEL: CPT

## 2018-11-08 PROCEDURE — 2709999900 HC NON-CHARGEABLE SUPPLY: Performed by: SPECIALIST

## 2018-11-08 PROCEDURE — 99221 1ST HOSP IP/OBS SF/LOW 40: CPT | Performed by: INTERNAL MEDICINE

## 2018-11-08 PROCEDURE — 6360000002 HC RX W HCPCS: Performed by: NURSE ANESTHETIST, CERTIFIED REGISTERED

## 2018-11-08 PROCEDURE — 6360000002 HC RX W HCPCS: Performed by: INTERNAL MEDICINE

## 2018-11-08 RX ORDER — PROPOFOL 10 MG/ML
INJECTION, EMULSION INTRAVENOUS PRN
Status: DISCONTINUED | OUTPATIENT
Start: 2018-11-08 | End: 2018-11-08 | Stop reason: SDUPTHER

## 2018-11-08 RX ORDER — LIDOCAINE HYDROCHLORIDE 20 MG/ML
INJECTION, SOLUTION INFILTRATION; PERINEURAL PRN
Status: DISCONTINUED | OUTPATIENT
Start: 2018-11-08 | End: 2018-11-08 | Stop reason: SDUPTHER

## 2018-11-08 RX ADMIN — PANTOPRAZOLE SODIUM 40 MG: 40 INJECTION, POWDER, FOR SOLUTION INTRAVENOUS at 14:56

## 2018-11-08 RX ADMIN — PROPOFOL 50 MG: 10 INJECTION, EMULSION INTRAVENOUS at 13:05

## 2018-11-08 RX ADMIN — OXYBUTYNIN CHLORIDE 5 MG: 5 TABLET ORAL at 22:21

## 2018-11-08 RX ADMIN — ATORVASTATIN CALCIUM 80 MG: 40 TABLET, FILM COATED ORAL at 22:23

## 2018-11-08 RX ADMIN — DILTIAZEM HYDROCHLORIDE 120 MG: 120 CAPSULE, COATED, EXTENDED RELEASE ORAL at 15:09

## 2018-11-08 RX ADMIN — PROPOFOL 50 MG: 10 INJECTION, EMULSION INTRAVENOUS at 13:06

## 2018-11-08 RX ADMIN — SODIUM CHLORIDE, PRESERVATIVE FREE 10 ML: 5 INJECTION INTRAVENOUS at 22:24

## 2018-11-08 RX ADMIN — PANTOPRAZOLE SODIUM 40 MG: 40 INJECTION, POWDER, FOR SOLUTION INTRAVENOUS at 22:24

## 2018-11-08 RX ADMIN — ACETAMINOPHEN 650 MG: 325 TABLET ORAL at 13:47

## 2018-11-08 RX ADMIN — PROPOFOL 50 MG: 10 INJECTION, EMULSION INTRAVENOUS at 13:11

## 2018-11-08 RX ADMIN — SODIUM CHLORIDE, PRESERVATIVE FREE 10 ML: 5 INJECTION INTRAVENOUS at 14:56

## 2018-11-08 RX ADMIN — PROPOFOL 50 MG: 10 INJECTION, EMULSION INTRAVENOUS at 13:08

## 2018-11-08 RX ADMIN — OXYBUTYNIN CHLORIDE 5 MG: 5 TABLET ORAL at 15:09

## 2018-11-08 RX ADMIN — PROPOFOL 50 MG: 10 INJECTION, EMULSION INTRAVENOUS at 13:15

## 2018-11-08 RX ADMIN — PROPAFENONE HYDROCHLORIDE 150 MG: 150 TABLET, FILM COATED ORAL at 22:23

## 2018-11-08 RX ADMIN — LIDOCAINE HYDROCHLORIDE 100 MG: 20 INJECTION, SOLUTION INFILTRATION; PERINEURAL at 13:05

## 2018-11-08 RX ADMIN — PROPAFENONE HYDROCHLORIDE 150 MG: 150 TABLET, FILM COATED ORAL at 15:09

## 2018-11-08 RX ADMIN — PROPAFENONE HYDROCHLORIDE 150 MG: 150 TABLET, FILM COATED ORAL at 06:37

## 2018-11-08 ASSESSMENT — PAIN DESCRIPTION - ONSET: ONSET: ON-GOING

## 2018-11-08 ASSESSMENT — PAIN DESCRIPTION - LOCATION: LOCATION: HEAD

## 2018-11-08 ASSESSMENT — LIFESTYLE VARIABLES: SMOKING_STATUS: 0

## 2018-11-08 ASSESSMENT — PAIN DESCRIPTION - FREQUENCY: FREQUENCY: CONTINUOUS

## 2018-11-08 ASSESSMENT — PAIN DESCRIPTION - PAIN TYPE: TYPE: ACUTE PAIN

## 2018-11-08 ASSESSMENT — PAIN SCALES - GENERAL
PAINLEVEL_OUTOF10: 4
PAINLEVEL_OUTOF10: 0

## 2018-11-08 ASSESSMENT — PAIN DESCRIPTION - DESCRIPTORS: DESCRIPTORS: ACHING

## 2018-11-08 NOTE — CONSULTS
exertion, palpitations or loss of consciousness  · Respiratory: No cough or wheezing    · Gastrointestinal: No abdominal pain, appetite loss, blood in stools, constipation, diarrhea or heartburn  · Genitourinary: No dysuria, trouble voiding, or hematuria  · Musculoskeletal:  No gait disturbance, weakness or joint complaints  · Integumentary: No rash or pruritis  · Neurological: No TIA or stroke symptoms  · Psychiatric: No anxiety or depression  · Endocrine: No malaise, fatigue or temperature intolerance  · Hematologic/Lymphatic: No bleeding problems, blood clots or swollen lymph nodes  · Allergic/Immunologic: No nasal congestion or hives    Physical Examination:    /65   Pulse 85   Temp 97.8 °F (36.6 °C) (Oral)   Resp 18   Ht 5' 8\" (1.727 m)   Wt 196 lb 4.8 oz (89 kg)   LMP  (LMP Unknown)   SpO2 100%   BMI 29.85 kg/m²      General Appearance:  obese/Well Nourished  1. Skin: It is warm & dry. No rashes noted. 2. Eyes: No conjunctival Pallor seen. No jaundice noted. 3. HEENT: atraumatic / normocephalic. EOMI. Neck is supple there is no elevation of JVD. No thyromegaly  4. Respiratory:  · Resp Assessment: Normal  · Resp Auscultation: Normal breath sounds without dullness  5. Cardiovascular:  · Auscultation: Normal  · Carotid Arteries: Normal  · Pedal Pulses: 2+ and equal   6. Abdomen:  · No masses or tenderness  · Liver/Spleen: No Abnormalities Noted, no organomegaly. 7. Musculoskeletal: No joint deformities. No muscle wasting  8. Extremities:  ·  No Cyanosis or Clubbing. No significant edema   9. Rectal / genital: deferred.   10.  Neurological/Psychiatric:  · Oriented to time, place, and person  · Non-anxious      Lab Review   Recent Labs      11/08/18   0543  11/08/18   1153   WBC  9.7   --    HGB  7.8*  7.8*   HCT  25.8*   --    PLT  279   --       Recent Labs      11/08/18   0543   NA  138   K  4.3   CL  103   CO2  22   BUN  51*   CREATININE  2.1*     Recent Labs      11/08/18   0543   AST  14*

## 2018-11-08 NOTE — PROGRESS NOTES
Preprocedrue checklist questions verified with pt including procedure, blood thinner xarelto last dose, BB and npo status. Dr Tanya Wright at bedside and questions answered. States daughter is in waiting room.

## 2018-11-08 NOTE — ED NOTES
Denny Shankar on [address] [facility] notified patient has 2 units of prbc ready     Shoaib Patterson RN  11/07/18 [id number]

## 2018-11-08 NOTE — CONSULTS
Deferred. CENTRAL NERVOUS SYSTEM:  Shows the patient to be awake, alert and  oriented. There are no focal sensory motor signs. MUSCULOSKELETAL SYSTEM:  Shows evidence of degenerative joint disease  changes. LABORATORY DATA:  The labs drawn during the present hospitalization  comprised a chem profile, which was remarkable for a BUN of 60,  creatinine 2.4. LFTs were within normal limits. CBC showed a WBC count  of 11.9, hemoglobin 5.7, platelet count 615,389 and after 2 units of  packed RBCs, the patient's hemoglobin is up to 7.8 and the patient's INR  was 1.93. IMPRESSION:  A 35-year-old [facility] American female with multiple  comorbidities, admitted with constitutional symptoms of chest pain,  generalized weakness, fatigue and is noted to be severely anemic with a  hemoglobin of 5.7 with dark-color stools and elevated BUN of 60 and  creatinine 2.4; rule out GI bleeding, source most likely upper  gastrointestinal tract; however, lower GI bleeding lesion cannot  entirely be ruled out. RECOMMENDATIONS:  1. Agree with present management with Protonix. 2.  We will monitor the patient's H and H and transfuse on a p.r.n.  basis to keep hemoglobin above 7 g%. 3.  We will proceed with EGD today. 4.  The patient has been instructed to call the office to have an  outpatient followup colonoscopy also since her last colonoscopy was five  years ago. 5.  We will hold Xarelto for now. 6.  The patient also has been instructed to avoid taking NSAIDs.         Sarai Haider MD    D: 11/08/2018 13:27:03       T: 11/08/2018 17:04:08     [email or web address]  Job#: [id number]     Doc#: [id number]    CC:  Michelle Bui MD

## 2018-11-08 NOTE — ANESTHESIA PRE PROCEDURE
hr holter monitor reveals the patient in the sinus rhythm with occasional supraventricular ectopic beats. There is a rare short atrial run.  Paroxysmal atrial fibrillation (HCC)     4/12 Holter [facility]    Prolonged emergence from general anesthesia     Proteinuria     Right Breast Cancer Dx 10-12    Supraventricular tachycardia (HCC)     Type 2 diabetes mellitus (HCC) Dx 2000's    Urge incontinence     Wears partial dentures     upper partial       Past Surgical History:        Procedure Laterality Date    BREAST BIOPSY  10-12    Right Breast Biopsy, Cancer    BREAST BIOPSY  1970's    Right Breast Biopsy, Benign    BREAST LUMPECTOMY  11/6/12    Breast cancer - Right with sentinal node    BUNIONECTOMY  1990's    Right Erwin Pacheco    8/10/09- The patient has no significant CAD. The elevated troponin is probably secondary to SVT. , 10/03- no significant CAD    COLONOSCOPY  \"X 2 Last One 2008 \"    Polpy Removed During Last Colonoscopy    DENTAL SURGERY      Teeth Extracted In Past    ENDOSCOPY, COLON, DIAGNOSTIC  07/18/2016    savary dilatation to 17 mm    HYSTERECTOMY, TOTAL ABDOMINAL  1980's    JOINT REPLACEMENT  2009    Total Left Knee    JOINT REPLACEMENT  2010    Total Right Knee       Social History:    Social History   Substance Use Topics    Smoking status: Former Smoker     Packs/day: 0.50     Years: 10.00     Quit date: 1/5/1987    Smokeless tobacco: Never Used    Alcohol use No      Comment: 3 cups decaf coffee daily                                Counseling given: Not Answered      Vital Signs (Current):   Vitals:    11/08/18 0024 11/08/18 0047 11/08/18 0200 11/08/18 0405   BP: (!) 110/54 (!) 104/54  113/61   Pulse: 82 89  93   Resp: 13 13  16   Temp: 98.5 °F (36.9 °C) 98.5 °F (36.9 °C) 98.3 °F (36.8 °C) 98.1 °F (36.7 °C)   TempSrc: Oral Oral Oral Oral   SpO2:  98%  100%   Weight:       Height:                                                  BP Readings from Last 3 Encounters:   11/08/18 113/61   11/06/18 116/68   11/05/18 104/60       NPO Status:                                                                                 BMI:   Wt Readings from Last 3 Encounters:   11/07/18 196 lb 4.8 oz (89 kg)   11/06/18 192 lb 12.8 oz (87.5 kg)   11/05/18 193 lb 3.2 oz (87.6 kg)     Body mass index is 29.85 kg/m².     CBC:   Lab Results   Component Value Date    WBC 9.7 11/08/2018    RBC 2.94 11/08/2018    HGB 7.8 11/08/2018    HCT 25.8 11/08/2018    MCV 87.8 11/08/2018    RDW 16.7 11/08/2018     11/08/2018       CMP:   Lab Results   Component Value Date     11/08/2018    K 4.3 11/08/2018     11/08/2018    CO2 22 11/08/2018    BUN 51 11/08/2018    CREATININE 2.1 11/08/2018    GFRAA 28 11/08/2018    AGRATIO 1.7 08/07/2018    LABGLOM 23 11/08/2018    LABGLOM 52 07/09/2013    GLUCOSE 113 11/08/2018    PROT 5.4 11/08/2018    PROT 7.0 10/15/2012    CALCIUM 8.4 11/08/2018    BILITOT 0.4 11/08/2018    ALKPHOS 52 11/08/2018    AST 14 11/08/2018    ALT 13 11/08/2018       POC Tests:   Recent Labs      11/07/18   2153   POCGLU  129*       Coags:   Lab Results   Component Value Date    PROTIME 21.9 11/07/2018    PROTIME 10.8 04/22/2012    INR 1.93 11/07/2018    APTT 52.6 11/07/2018       HCG (If Applicable): No results found for: PREGTESTUR, PREGSERUM, HCG, HCGQUANT     ABGs: No results found for: PHART, PO2ART, YOG1HOK, HJD5ABI, BEART, A3COVWRA     Type & Screen (If Applicable):  No results found for: LABABO, [address]    Anesthesia Evaluation  Patient summary reviewed and Nursing notes reviewed  Airway: Mallampati: III  TM distance: >3 FB   Neck ROM: full  Mouth opening: > = 3 FB Dental: normal exam         Pulmonary:normal exam  breath sounds clear to auscultation  (+) sleep apnea:      (-) not a current smoker                           Cardiovascular:  Exercise tolerance: poor (<4 METS),   (+) hypertension:, dysrhythmias: atrial fibrillation and SVT, CHF: diastolic,

## 2018-11-09 VITALS
HEIGHT: 68 IN | BODY MASS INDEX: 29.36 KG/M2 | HEART RATE: 80 BPM | WEIGHT: 193.7 LBS | RESPIRATION RATE: 13 BRPM | TEMPERATURE: 98 F | OXYGEN SATURATION: 100 % | DIASTOLIC BLOOD PRESSURE: 60 MMHG | SYSTOLIC BLOOD PRESSURE: 123 MMHG

## 2018-11-09 PROBLEM — K29.01 GASTROINTESTINAL HEMORRHAGE ASSOCIATED WITH ACUTE GASTRITIS: Status: ACTIVE | Noted: 2018-11-09

## 2018-11-09 LAB
ALBUMIN SERPL-MCNC: 3.4 GM/DL (ref 3.4–5)
ALP BLD-CCNC: 50 IU/L (ref 40–129)
ALT SERPL-CCNC: 14 U/L (ref 10–40)
ANION GAP SERPL CALCULATED.3IONS-SCNC: 9 MMOL/L (ref 4–16)
AST SERPL-CCNC: 15 IU/L (ref 15–37)
BASOPHILS ABSOLUTE: 0 K/CU MM
BASOPHILS RELATIVE PERCENT: 0.3 % (ref 0–1)
BILIRUB SERPL-MCNC: 0.1 MG/DL (ref 0–1)
BUN BLDV-MCNC: 35 MG/DL (ref 6–23)
CALCIUM SERPL-MCNC: 8 MG/DL (ref 8.3–10.6)
CHLORIDE BLD-SCNC: 111 MMOL/L (ref 99–110)
CO2: 22 MMOL/L (ref 21–32)
CREAT SERPL-MCNC: 1.7 MG/DL (ref 0.6–1.1)
DIFFERENTIAL TYPE: ABNORMAL
EOSINOPHILS ABSOLUTE: 0.4 K/CU MM
EOSINOPHILS RELATIVE PERCENT: 4.4 % (ref 0–3)
GFR AFRICAN AMERICAN: 36 ML/MIN/1.73M2
GFR NON-AFRICAN AMERICAN: 29 ML/MIN/1.73M2
GLUCOSE BLD-MCNC: 112 MG/DL (ref 70–99)
GLUCOSE BLD-MCNC: 119 MG/DL (ref 70–99)
GLUCOSE BLD-MCNC: 123 MG/DL (ref 70–99)
HCT VFR BLD CALC: 24.7 % (ref 37–47)
HCT VFR BLD CALC: 26.3 % (ref 37–47)
HEMOGLOBIN: 7.3 GM/DL (ref 12.5–16)
HEMOGLOBIN: 8.1 GM/DL (ref 12.5–16)
IMMATURE NEUTROPHIL %: 0.6 % (ref 0–0.43)
LV EF: 53 %
LVEF MODALITY: NORMAL
LYMPHOCYTES ABSOLUTE: 2 K/CU MM
LYMPHOCYTES RELATIVE PERCENT: 22.7 % (ref 24–44)
MCH RBC QN AUTO: 26 PG (ref 27–31)
MCHC RBC AUTO-ENTMCNC: 29.6 % (ref 32–36)
MCV RBC AUTO: 87.9 FL (ref 78–100)
MONOCYTES ABSOLUTE: 0.7 K/CU MM
MONOCYTES RELATIVE PERCENT: 7.9 % (ref 0–4)
NUCLEATED RBC %: 0 %
PDW BLD-RTO: 17.4 % (ref 11.7–14.9)
PLATELET # BLD: 272 K/CU MM (ref 140–440)
PMV BLD AUTO: 10.1 FL (ref 7.5–11.1)
POTASSIUM SERPL-SCNC: 4.8 MMOL/L (ref 3.5–5.1)
RBC # BLD: 2.81 M/CU MM (ref 4.2–5.4)
SEGMENTED NEUTROPHILS ABSOLUTE COUNT: 5.6 K/CU MM
SEGMENTED NEUTROPHILS RELATIVE PERCENT: 64.1 % (ref 36–66)
SODIUM BLD-SCNC: 142 MMOL/L (ref 135–145)
TOTAL IMMATURE NEUTOROPHIL: 0.05 K/CU MM
TOTAL NUCLEATED RBC: 0 K/CU MM
TOTAL PROTEIN: 5.1 GM/DL (ref 6.4–8.2)
WBC # BLD: 8.7 K/CU MM (ref 4–10.5)

## 2018-11-09 PROCEDURE — 93306 TTE W/DOPPLER COMPLETE: CPT

## 2018-11-09 PROCEDURE — 6370000000 HC RX 637 (ALT 250 FOR IP): Performed by: INTERNAL MEDICINE

## 2018-11-09 PROCEDURE — 85018 HEMOGLOBIN: CPT

## 2018-11-09 PROCEDURE — 85014 HEMATOCRIT: CPT

## 2018-11-09 PROCEDURE — 85025 COMPLETE CBC W/AUTO DIFF WBC: CPT

## 2018-11-09 PROCEDURE — 80053 COMPREHEN METABOLIC PANEL: CPT

## 2018-11-09 PROCEDURE — 82962 GLUCOSE BLOOD TEST: CPT

## 2018-11-09 PROCEDURE — 6360000002 HC RX W HCPCS: Performed by: INTERNAL MEDICINE

## 2018-11-09 PROCEDURE — 2580000003 HC RX 258: Performed by: INTERNAL MEDICINE

## 2018-11-09 PROCEDURE — 94761 N-INVAS EAR/PLS OXIMETRY MLT: CPT

## 2018-11-09 PROCEDURE — 36415 COLL VENOUS BLD VENIPUNCTURE: CPT

## 2018-11-09 PROCEDURE — 99239 HOSP IP/OBS DSCHRG MGMT >30: CPT | Performed by: INTERNAL MEDICINE

## 2018-11-09 PROCEDURE — C9113 INJ PANTOPRAZOLE SODIUM, VIA: HCPCS | Performed by: INTERNAL MEDICINE

## 2018-11-09 PROCEDURE — 99232 SBSQ HOSP IP/OBS MODERATE 35: CPT | Performed by: INTERNAL MEDICINE

## 2018-11-09 RX ORDER — BENZONATATE 100 MG/1
100 CAPSULE ORAL 3 TIMES DAILY PRN
Status: DISCONTINUED | OUTPATIENT
Start: 2018-11-09 | End: 2018-11-09 | Stop reason: HOSPADM

## 2018-11-09 RX ORDER — GUAIFENESIN 100 MG/5ML
200 SOLUTION ORAL EVERY 4 HOURS PRN
Status: DISCONTINUED | OUTPATIENT
Start: 2018-11-09 | End: 2018-11-09 | Stop reason: HOSPADM

## 2018-11-09 RX ADMIN — PROPAFENONE HYDROCHLORIDE 150 MG: 150 TABLET, FILM COATED ORAL at 14:21

## 2018-11-09 RX ADMIN — GUAIFENESIN 200 MG: 200 SOLUTION ORAL at 14:21

## 2018-11-09 RX ADMIN — SODIUM CHLORIDE, PRESERVATIVE FREE 10 ML: 5 INJECTION INTRAVENOUS at 09:09

## 2018-11-09 RX ADMIN — PANTOPRAZOLE SODIUM 40 MG: 40 INJECTION, POWDER, FOR SOLUTION INTRAVENOUS at 09:23

## 2018-11-09 RX ADMIN — DILTIAZEM HYDROCHLORIDE 120 MG: 120 CAPSULE, COATED, EXTENDED RELEASE ORAL at 09:09

## 2018-11-09 RX ADMIN — OXYBUTYNIN CHLORIDE 5 MG: 5 TABLET ORAL at 09:09

## 2018-11-09 RX ADMIN — PROPAFENONE HYDROCHLORIDE 150 MG: 150 TABLET, FILM COATED ORAL at 05:55

## 2018-11-09 RX ADMIN — SODIUM CHLORIDE: 9 INJECTION, SOLUTION INTRAVENOUS at 08:10

## 2018-11-09 ASSESSMENT — PAIN SCALES - GENERAL
PAINLEVEL_OUTOF10: 0
PAINLEVEL_OUTOF10: 0

## 2018-11-09 NOTE — PROGRESS NOTES
Respiratory:  Normal breath sounds, No respiratory distress, No wheezing, No chest tenderness. Cardiovascular:  Irregular rate and rhythm, no murmurs appreciated, No rubs appreciated, No gallops appreciated, JVP not elevated  Abdomen/GI:  Bowel sounds normal, Soft, No tenderness, No masses,  Musculoskeletal:  Intact distal pulses, no edema, No tenderness, No cyanosis,  Integument:  Warm, Dry, No erythema, No rash. Lymphatic:  No lymphadenopathy noted. Neurologic:  Alert & oriented x 3, Normal motor function, Normal sensory function, No focal deficits noted.    Psychiatric:  Affect and Mood: pleasant     Medications:    sodium chloride  250 mL Intravenous Once    atorvastatin  80 mg Oral Nightly    diltiazem  120 mg Oral Daily    oxybutynin  5 mg Oral BID    propafenone  150 mg Oral Q8H    sodium chloride flush  10 mL Intravenous 2 times per day    insulin lispro  0-6 Units Subcutaneous TID WC    insulin lispro  0-3 Units Subcutaneous Nightly    pantoprazole  40 mg Intravenous BID    And    sodium chloride (PF)  10 mL Intravenous BID      sodium chloride 100 mL/hr at 11/09/18 0810    dextrose       benzonatate, sodium chloride flush, acetaminophen, ondansetron, glucose, dextrose, glucagon (rDNA), dextrose    Lab Data:  CBC: Recent Labs      11/07/18   1705  11/08/18   0543  11/08/18   1153  11/08/18   1741  11/09/18   0400   WBC  11.9*  9.7   --    --   8.7   HGB  5.7*  7.8*  7.8*  8.2*  7.3*   HCT  19.1*  25.8*   --    --   24.7*   MCV  84.5  87.8   --    --   87.9   PLT  337  279   --    --   272     BMP: Recent Labs      11/07/18   1705  11/08/18   0543  11/09/18   0400   NA  137  138  142   K  4.2  4.3  4.8   CL  104  103  111*   CO2  23  22  22   BUN  60*  51*  35*   CREATININE  2.4*  2.1*  1.7*     PT/INR:   Recent Labs      11/07/18   1705  11/08/18   0944   PROTIME  21.9*  13.6*   INR  1.93  1.17     BNP:    Recent Labs      11/07/18   1705   PROBNP  338.8*     TROPONIN:   Recent Labs 11/07/18   1705  11/08/18   0543  11/08/18   1153   TROPONINT  0.012*  <0.010  <0.010        ECHO :   Echocardiogram  Pending     NM Myocardial Spect 09/07/2018  Normal Lexiscan nuclear scintigraphic study suggestive of normal myocardial    perfusion.    Gated images demonstrate normal left ventricular systolic function with EF    of 60 %. EKG 11/07/2018  Normal sinus rhythm   Minimal voltage criteria for LVH, may be normal variant   Nonspecific T wave abnormality   Abnormal ECG   When compared with ECG of 07-SEP-2018 07:00,   Vent. rate has increased BY  45 BPM   Nonspecific T wave abnormality now evident in Lateral leads       Impression:  Active Problems:    Paroxysmal atrial fibrillation (HCC)    Type 2 diabetes mellitus with nephropathy (HCC)    Chronic renal insufficiency, stage 3 (moderate) (HCC)    GI bleed    Acute blood loss anemia    Acute kidney injury ([facility] [address]    Anemia  Resolved Problems:    * No resolved hospital problems. *       All labs, medications and tests reviewed by myself, continue all other medications of all above medical condition listed as is except for changes mentioned above. Thank you   Please call with questions. I have seen ,spoken to  and examined this patient personally, independently of the nurse practitioner. I have reviewed the hospital care given to date and reviewed all pertinent labs and imaging. The plan was developed mutually at the time of the visit with the patient, Clinical Nurse Practitioner  and myself. I have spoken with patient, nursing staff and provided written and verbal instructions . The above note has been reviewed and I agree with the assessment, diagnosis, and treatment plan with changes made by me as follows     CARDIOLOGY ATTENDING ADDENDUM    HPI:  I have reviewed the above HPI  And agree with above   Tish Durand is a 68 y. o.year old who and presents with had concerns including Chest Pain.   Chief Complaint   Patient presents with    Chest Pain     Interval

## 2018-11-10 ENCOUNTER — CARE COORDINATION (OUTPATIENT)
Dept: CASE MANAGEMENT | Age: 73
End: 2018-11-10

## 2018-11-12 ENCOUNTER — TELEPHONE (OUTPATIENT)
Dept: INTERNAL MEDICINE CLINIC | Age: 73
End: 2018-11-12

## 2018-11-12 LAB
EKG ATRIAL RATE: 96 BPM
EKG DIAGNOSIS: NORMAL
EKG P AXIS: 62 DEGREES
EKG P-R INTERVAL: 158 MS
EKG Q-T INTERVAL: 366 MS
EKG QRS DURATION: 90 MS
EKG QTC CALCULATION (BAZETT): 462 MS
EKG R AXIS: -6 DEGREES
EKG T AXIS: 70 DEGREES
EKG VENTRICULAR RATE: 96 BPM

## 2018-11-13 ENCOUNTER — OFFICE VISIT (OUTPATIENT)
Dept: INTERNAL MEDICINE CLINIC | Age: 73
End: 2018-11-13
Payer: COMMERCIAL

## 2018-11-13 ENCOUNTER — CARE COORDINATION (OUTPATIENT)
Dept: CASE MANAGEMENT | Age: 73
End: 2018-11-13

## 2018-11-13 VITALS
OXYGEN SATURATION: 98 % | DIASTOLIC BLOOD PRESSURE: 64 MMHG | RESPIRATION RATE: 16 BRPM | HEART RATE: 53 BPM | SYSTOLIC BLOOD PRESSURE: 134 MMHG

## 2018-11-13 DIAGNOSIS — M79.605 PAIN OF LEFT LOWER EXTREMITY: ICD-10-CM

## 2018-11-13 DIAGNOSIS — K29.01 GASTROINTESTINAL HEMORRHAGE ASSOCIATED WITH ACUTE GASTRITIS: ICD-10-CM

## 2018-11-13 DIAGNOSIS — D62 ACUTE BLOOD LOSS ANEMIA: ICD-10-CM

## 2018-11-13 DIAGNOSIS — N17.9 ACUTE KIDNEY INJURY (HCC): Primary | ICD-10-CM

## 2018-11-13 DIAGNOSIS — N17.9 ACUTE KIDNEY INJURY (HCC): ICD-10-CM

## 2018-11-13 PROCEDURE — 1111F DSCHRG MED/CURRENT MED MERGE: CPT | Performed by: INTERNAL MEDICINE

## 2018-11-13 PROCEDURE — 99496 TRANSJ CARE MGMT HIGH F2F 7D: CPT | Performed by: INTERNAL MEDICINE

## 2018-11-13 RX ORDER — ASPIRIN 81 MG/1
81 TABLET ORAL DAILY
Qty: 30 TABLET | COMMUNITY
Start: 2018-11-13 | End: 2022-09-16

## 2018-11-15 ENCOUNTER — PROCEDURE VISIT (OUTPATIENT)
Dept: CARDIOLOGY CLINIC | Age: 73
End: 2018-11-15

## 2018-11-15 DIAGNOSIS — I48.0 PAROXYSMAL ATRIAL FIBRILLATION (HCC): Primary | ICD-10-CM

## 2018-11-15 DIAGNOSIS — Z01.818 PRE-OP EXAM: ICD-10-CM

## 2018-11-15 DIAGNOSIS — I10 ESSENTIAL HYPERTENSION: ICD-10-CM

## 2018-11-20 ENCOUNTER — CARE COORDINATION (OUTPATIENT)
Dept: CASE MANAGEMENT | Age: 73
End: 2018-11-20

## 2018-11-30 DIAGNOSIS — Z01.818 PRE-OP EXAM: ICD-10-CM

## 2018-11-30 DIAGNOSIS — I48.0 PAROXYSMAL ATRIAL FIBRILLATION (HCC): ICD-10-CM

## 2018-11-30 DIAGNOSIS — I10 ESSENTIAL HYPERTENSION: ICD-10-CM

## 2018-11-30 PROCEDURE — 93228 REMOTE 30 DAY ECG REV/REPORT: CPT | Performed by: INTERNAL MEDICINE

## 2018-12-17 ENCOUNTER — HOSPITAL ENCOUNTER (OUTPATIENT)
Age: 73
Setting detail: SPECIMEN
Discharge: HOME | End: 2018-12-17
Payer: COMMERCIAL

## 2018-12-17 LAB
ALBUMIN SERPL-MCNC: 4.3 GM/DL (ref 3.4–5)
ALP BLD-CCNC: 75 IU/L (ref 40–129)
ALT SERPL-CCNC: 30 U/L (ref 10–40)
ANION GAP SERPL CALCULATED.3IONS-SCNC: 16 MMOL/L (ref 4–16)
AST SERPL-CCNC: 23 IU/L (ref 15–37)
BILIRUB SERPL-MCNC: 0.1 MG/DL (ref 0–1)
BUN BLDV-MCNC: 26 MG/DL (ref 6–23)
CALCIUM SERPL-MCNC: 9.1 MG/DL (ref 8.3–10.6)
CHLORIDE BLD-SCNC: 99 MMOL/L (ref 99–110)
CO2: 23 MMOL/L (ref 21–32)
CREAT SERPL-MCNC: 1.3 MG/DL (ref 0.6–1.1)
GFR AFRICAN AMERICAN: 49 ML/MIN/1.73M2
GFR NON-AFRICAN AMERICAN: 40 ML/MIN/1.73M2
GLUCOSE BLD-MCNC: 101 MG/DL (ref 70–99)
POTASSIUM SERPL-SCNC: 4.2 MMOL/L (ref 3.5–5.1)
SODIUM BLD-SCNC: 138 MMOL/L (ref 135–145)
TOTAL PROTEIN: 6.7 GM/DL (ref 6.4–8.2)

## 2018-12-17 PROCEDURE — 80053 COMPREHEN METABOLIC PANEL: CPT

## 2018-12-17 PROCEDURE — 86300 IMMUNOASSAY TUMOR CA 15-3: CPT

## 2018-12-19 LAB — CA 27.29: 36.9 U/ML

## 2019-01-07 ENCOUNTER — HOSPITAL ENCOUNTER (OUTPATIENT)
Dept: WOMENS IMAGING | Age: 74
Discharge: HOME | End: 2019-01-07
Payer: COMMERCIAL

## 2019-01-07 DIAGNOSIS — Z12.31 VISIT FOR SCREENING MAMMOGRAM: ICD-10-CM

## 2019-01-07 PROCEDURE — 77067 SCR MAMMO BI INCL CAD: CPT

## 2019-01-07 RX ORDER — DILTIAZEM HYDROCHLORIDE 120 MG/1
CAPSULE, EXTENDED RELEASE ORAL
Qty: 30 CAPSULE | Refills: 3 | Status: SHIPPED | OUTPATIENT
Start: 2019-01-07 | End: 2019-05-07 | Stop reason: SDUPTHER

## 2019-01-14 ENCOUNTER — HOSPITAL ENCOUNTER (OUTPATIENT)
Dept: GENERAL RADIOLOGY | Age: 74
Discharge: HOME | End: 2019-01-14
Payer: COMMERCIAL

## 2019-01-14 ENCOUNTER — TELEPHONE (OUTPATIENT)
Dept: CARDIOLOGY CLINIC | Age: 74
End: 2019-01-14

## 2019-01-14 DIAGNOSIS — D64.9 ANEMIA, UNSPECIFIED TYPE: ICD-10-CM

## 2019-01-14 PROCEDURE — 74250 X-RAY XM SM INT 1CNTRST STD: CPT

## 2019-01-21 ENCOUNTER — HOSPITAL ENCOUNTER (OUTPATIENT)
Dept: OCCUPATIONAL THERAPY | Age: 74
Setting detail: THERAPIES SERIES
Discharge: HOME | End: 2019-01-21
Payer: COMMERCIAL

## 2019-01-21 PROCEDURE — 97166 OT EVAL MOD COMPLEX 45 MIN: CPT

## 2019-01-21 PROCEDURE — 97530 THERAPEUTIC ACTIVITIES: CPT

## 2019-01-23 ENCOUNTER — HOSPITAL ENCOUNTER (OUTPATIENT)
Dept: OCCUPATIONAL THERAPY | Age: 74
Setting detail: THERAPIES SERIES
Discharge: HOME | End: 2019-01-23
Payer: COMMERCIAL

## 2019-01-23 PROCEDURE — 97110 THERAPEUTIC EXERCISES: CPT

## 2019-01-23 PROCEDURE — 97530 THERAPEUTIC ACTIVITIES: CPT

## 2019-01-23 PROCEDURE — 97140 MANUAL THERAPY 1/> REGIONS: CPT

## 2019-01-30 ENCOUNTER — HOSPITAL ENCOUNTER (OUTPATIENT)
Dept: OCCUPATIONAL THERAPY | Age: 74
Setting detail: THERAPIES SERIES
Discharge: HOME | End: 2019-01-30
Payer: COMMERCIAL

## 2019-01-30 PROCEDURE — 97530 THERAPEUTIC ACTIVITIES: CPT

## 2019-01-30 PROCEDURE — 97110 THERAPEUTIC EXERCISES: CPT

## 2019-01-30 PROCEDURE — 97140 MANUAL THERAPY 1/> REGIONS: CPT

## 2019-01-31 ENCOUNTER — OFFICE VISIT (OUTPATIENT)
Dept: CARDIOLOGY CLINIC | Age: 74
End: 2019-01-31
Payer: COMMERCIAL

## 2019-01-31 VITALS
SYSTOLIC BLOOD PRESSURE: 130 MMHG | WEIGHT: 196.8 LBS | DIASTOLIC BLOOD PRESSURE: 86 MMHG | HEART RATE: 80 BPM | HEIGHT: 68 IN | BODY MASS INDEX: 29.83 KG/M2

## 2019-01-31 DIAGNOSIS — I48.0 PAROXYSMAL ATRIAL FIBRILLATION (HCC): Primary | ICD-10-CM

## 2019-01-31 PROCEDURE — 99214 OFFICE O/P EST MOD 30 MIN: CPT | Performed by: INTERNAL MEDICINE

## 2019-02-04 ENCOUNTER — HOSPITAL ENCOUNTER (OUTPATIENT)
Dept: OCCUPATIONAL THERAPY | Age: 74
Setting detail: THERAPIES SERIES
Discharge: HOME | End: 2019-02-04
Payer: COMMERCIAL

## 2019-02-04 PROCEDURE — 97022 WHIRLPOOL THERAPY: CPT

## 2019-02-04 PROCEDURE — 97140 MANUAL THERAPY 1/> REGIONS: CPT

## 2019-02-04 PROCEDURE — 97110 THERAPEUTIC EXERCISES: CPT

## 2019-02-06 ENCOUNTER — OFFICE VISIT (OUTPATIENT)
Dept: INTERNAL MEDICINE CLINIC | Age: 74
End: 2019-02-06
Payer: COMMERCIAL

## 2019-02-06 VITALS
HEART RATE: 67 BPM | DIASTOLIC BLOOD PRESSURE: 76 MMHG | OXYGEN SATURATION: 95 % | SYSTOLIC BLOOD PRESSURE: 140 MMHG | BODY MASS INDEX: 29.95 KG/M2 | RESPIRATION RATE: 18 BRPM | WEIGHT: 197 LBS

## 2019-02-06 DIAGNOSIS — Z17.0 MALIGNANT NEOPLASM OF BREAST IN FEMALE, ESTROGEN RECEPTOR POSITIVE, UNSPECIFIED LATERALITY, UNSPECIFIED SITE OF BREAST (HCC): ICD-10-CM

## 2019-02-06 DIAGNOSIS — E78.2 MIXED HYPERLIPIDEMIA: ICD-10-CM

## 2019-02-06 DIAGNOSIS — E11.21 TYPE 2 DIABETES MELLITUS WITH NEPHROPATHY (HCC): ICD-10-CM

## 2019-02-06 DIAGNOSIS — C50.919 MALIGNANT NEOPLASM OF BREAST IN FEMALE, ESTROGEN RECEPTOR POSITIVE, UNSPECIFIED LATERALITY, UNSPECIFIED SITE OF BREAST (HCC): ICD-10-CM

## 2019-02-06 DIAGNOSIS — I10 ESSENTIAL HYPERTENSION: ICD-10-CM

## 2019-02-06 DIAGNOSIS — N18.30 CHRONIC RENAL INSUFFICIENCY, STAGE 3 (MODERATE) (HCC): ICD-10-CM

## 2019-02-06 DIAGNOSIS — I48.0 PAROXYSMAL ATRIAL FIBRILLATION (HCC): Primary | ICD-10-CM

## 2019-02-06 PROBLEM — N17.9 ACUTE KIDNEY INJURY (HCC): Status: RESOLVED | Noted: 2018-11-08 | Resolved: 2019-02-06

## 2019-02-06 PROCEDURE — 99214 OFFICE O/P EST MOD 30 MIN: CPT | Performed by: INTERNAL MEDICINE

## 2019-02-06 PROCEDURE — G8510 SCR DEP NEG, NO PLAN REQD: HCPCS | Performed by: INTERNAL MEDICINE

## 2019-02-06 PROCEDURE — 3288F FALL RISK ASSESSMENT DOCD: CPT | Performed by: INTERNAL MEDICINE

## 2019-02-06 RX ORDER — ATORVASTATIN CALCIUM 80 MG/1
TABLET, FILM COATED ORAL
Qty: 90 TABLET | Refills: 3 | Status: SHIPPED | OUTPATIENT
Start: 2019-02-06 | End: 2020-02-12 | Stop reason: SDUPTHER

## 2019-02-06 ASSESSMENT — PATIENT HEALTH QUESTIONNAIRE - PHQ9
2. FEELING DOWN, DEPRESSED OR HOPELESS: 0
SUM OF ALL RESPONSES TO PHQ9 QUESTIONS 1 & 2: 0
SUM OF ALL RESPONSES TO PHQ QUESTIONS 1-9: 0
1. LITTLE INTEREST OR PLEASURE IN DOING THINGS: 0
SUM OF ALL RESPONSES TO PHQ QUESTIONS 1-9: 0

## 2019-02-07 ENCOUNTER — HOSPITAL ENCOUNTER (OUTPATIENT)
Dept: OCCUPATIONAL THERAPY | Age: 74
Setting detail: THERAPIES SERIES
Discharge: HOME | End: 2019-02-07
Payer: COMMERCIAL

## 2019-02-07 PROCEDURE — 97022 WHIRLPOOL THERAPY: CPT

## 2019-02-07 PROCEDURE — 97110 THERAPEUTIC EXERCISES: CPT

## 2019-02-07 PROCEDURE — 97140 MANUAL THERAPY 1/> REGIONS: CPT

## 2019-02-08 ENCOUNTER — OFFICE VISIT (OUTPATIENT)
Dept: PHYSICAL MEDICINE AND REHAB | Age: 74
End: 2019-02-08
Payer: COMMERCIAL

## 2019-02-08 DIAGNOSIS — M25.552 LEFT HIP PAIN: ICD-10-CM

## 2019-02-08 DIAGNOSIS — R20.2 PARESTHESIA OF BOTH FEET: ICD-10-CM

## 2019-02-08 DIAGNOSIS — M54.17 LUMBOSACRAL RADICULOPATHY AT L5: Primary | ICD-10-CM

## 2019-02-08 DIAGNOSIS — E11.42 DIABETIC SENSORIMOTOR POLYNEUROPATHY (HCC): ICD-10-CM

## 2019-02-08 PROCEDURE — 95886 MUSC TEST DONE W/N TEST COMP: CPT | Performed by: PHYSICAL MEDICINE & REHABILITATION

## 2019-02-08 PROCEDURE — 95909 NRV CNDJ TST 5-6 STUDIES: CPT | Performed by: PHYSICAL MEDICINE & REHABILITATION

## 2019-02-12 DIAGNOSIS — M54.17 LUMBOSACRAL RADICULOPATHY AT L5: Primary | ICD-10-CM

## 2019-02-13 ENCOUNTER — HOSPITAL ENCOUNTER (OUTPATIENT)
Dept: OCCUPATIONAL THERAPY | Age: 74
Setting detail: THERAPIES SERIES
Discharge: HOME | End: 2019-02-13
Payer: COMMERCIAL

## 2019-02-13 PROCEDURE — 97110 THERAPEUTIC EXERCISES: CPT

## 2019-02-13 PROCEDURE — 97022 WHIRLPOOL THERAPY: CPT

## 2019-02-13 PROCEDURE — 97140 MANUAL THERAPY 1/> REGIONS: CPT

## 2019-02-15 ENCOUNTER — HOSPITAL ENCOUNTER (OUTPATIENT)
Dept: OCCUPATIONAL THERAPY | Age: 74
Setting detail: THERAPIES SERIES
Discharge: HOME | End: 2019-02-15
Payer: COMMERCIAL

## 2019-02-15 PROCEDURE — 97140 MANUAL THERAPY 1/> REGIONS: CPT

## 2019-02-15 PROCEDURE — 97110 THERAPEUTIC EXERCISES: CPT

## 2019-02-15 PROCEDURE — 97530 THERAPEUTIC ACTIVITIES: CPT

## 2019-02-15 PROCEDURE — 97022 WHIRLPOOL THERAPY: CPT

## 2019-02-18 ENCOUNTER — HOSPITAL ENCOUNTER (OUTPATIENT)
Dept: OCCUPATIONAL THERAPY | Age: 74
Setting detail: THERAPIES SERIES
Discharge: HOME | End: 2019-02-18
Payer: COMMERCIAL

## 2019-02-18 PROCEDURE — 97022 WHIRLPOOL THERAPY: CPT

## 2019-02-18 PROCEDURE — 97110 THERAPEUTIC EXERCISES: CPT

## 2019-02-18 PROCEDURE — 97140 MANUAL THERAPY 1/> REGIONS: CPT

## 2019-02-22 ENCOUNTER — HOSPITAL ENCOUNTER (OUTPATIENT)
Dept: OCCUPATIONAL THERAPY | Age: 74
Setting detail: THERAPIES SERIES
Discharge: HOME | End: 2019-02-22
Payer: COMMERCIAL

## 2019-02-22 PROCEDURE — 97140 MANUAL THERAPY 1/> REGIONS: CPT

## 2019-02-22 PROCEDURE — 97110 THERAPEUTIC EXERCISES: CPT

## 2019-02-22 PROCEDURE — 97022 WHIRLPOOL THERAPY: CPT

## 2019-02-22 PROCEDURE — 97530 THERAPEUTIC ACTIVITIES: CPT

## 2019-02-25 ENCOUNTER — HOSPITAL ENCOUNTER (OUTPATIENT)
Dept: OCCUPATIONAL THERAPY | Age: 74
Setting detail: THERAPIES SERIES
Discharge: HOME | End: 2019-02-25
Payer: COMMERCIAL

## 2019-02-25 PROCEDURE — 97110 THERAPEUTIC EXERCISES: CPT

## 2019-02-25 PROCEDURE — 97022 WHIRLPOOL THERAPY: CPT

## 2019-02-25 PROCEDURE — 97140 MANUAL THERAPY 1/> REGIONS: CPT

## 2019-03-01 ENCOUNTER — HOSPITAL ENCOUNTER (OUTPATIENT)
Dept: OCCUPATIONAL THERAPY | Age: 74
Setting detail: THERAPIES SERIES
Discharge: HOME | End: 2019-03-01
Payer: COMMERCIAL

## 2019-03-01 PROCEDURE — 97110 THERAPEUTIC EXERCISES: CPT

## 2019-03-01 PROCEDURE — 97140 MANUAL THERAPY 1/> REGIONS: CPT

## 2019-03-01 PROCEDURE — 97022 WHIRLPOOL THERAPY: CPT

## 2019-03-04 ENCOUNTER — HOSPITAL ENCOUNTER (OUTPATIENT)
Dept: OCCUPATIONAL THERAPY | Age: 74
Setting detail: THERAPIES SERIES
Discharge: HOME | End: 2019-03-04
Payer: COMMERCIAL

## 2019-03-04 PROCEDURE — 95832 HC OT HAND EVALUATION: CPT

## 2019-03-04 PROCEDURE — 97110 THERAPEUTIC EXERCISES: CPT

## 2019-03-04 PROCEDURE — 97140 MANUAL THERAPY 1/> REGIONS: CPT

## 2019-03-04 PROCEDURE — 97022 WHIRLPOOL THERAPY: CPT

## 2019-03-04 PROCEDURE — 97530 THERAPEUTIC ACTIVITIES: CPT

## 2019-03-04 PROCEDURE — 97760 ORTHOTIC MGMT&TRAING 1ST ENC: CPT

## 2019-03-12 ENCOUNTER — TELEPHONE (OUTPATIENT)
Dept: CARDIOLOGY CLINIC | Age: 74
End: 2019-03-12

## 2019-04-22 ENCOUNTER — TELEPHONE (OUTPATIENT)
Dept: INTERNAL MEDICINE CLINIC | Age: 74
End: 2019-04-22

## 2019-04-22 DIAGNOSIS — N39.41 URGE INCONTINENCE: ICD-10-CM

## 2019-04-22 RX ORDER — OXYBUTYNIN CHLORIDE 5 MG/1
TABLET ORAL
Qty: 180 TABLET | Refills: 3 | Status: SHIPPED | OUTPATIENT
Start: 2019-04-22 | End: 2020-06-11 | Stop reason: SDUPTHER

## 2019-05-07 RX ORDER — DILTIAZEM HYDROCHLORIDE 120 MG/1
120 CAPSULE, COATED, EXTENDED RELEASE ORAL DAILY
Qty: 30 CAPSULE | Refills: 11 | Status: SHIPPED | OUTPATIENT
Start: 2019-05-07 | End: 2020-04-17 | Stop reason: SDUPTHER

## 2019-05-13 ENCOUNTER — OFFICE VISIT (OUTPATIENT)
Dept: INTERNAL MEDICINE CLINIC | Age: 74
End: 2019-05-13
Payer: COMMERCIAL

## 2019-05-13 VITALS
OXYGEN SATURATION: 99 % | RESPIRATION RATE: 18 BRPM | WEIGHT: 201 LBS | DIASTOLIC BLOOD PRESSURE: 78 MMHG | BODY MASS INDEX: 30.56 KG/M2 | HEART RATE: 82 BPM | SYSTOLIC BLOOD PRESSURE: 146 MMHG

## 2019-05-13 DIAGNOSIS — I10 ESSENTIAL HYPERTENSION: ICD-10-CM

## 2019-05-13 DIAGNOSIS — R80.9 PROTEINURIA, UNSPECIFIED TYPE: ICD-10-CM

## 2019-05-13 DIAGNOSIS — G47.33 OSA (OBSTRUCTIVE SLEEP APNEA): Primary | ICD-10-CM

## 2019-05-13 DIAGNOSIS — E11.21 TYPE 2 DIABETES MELLITUS WITH NEPHROPATHY (HCC): ICD-10-CM

## 2019-05-13 PROCEDURE — 99214 OFFICE O/P EST MOD 30 MIN: CPT | Performed by: INTERNAL MEDICINE

## 2019-05-13 RX ORDER — FUROSEMIDE 20 MG/1
TABLET ORAL
Qty: 90 TABLET | Refills: 3 | Status: SHIPPED | OUTPATIENT
Start: 2019-05-13 | End: 2020-02-12 | Stop reason: DRUGHIGH

## 2019-05-13 RX ORDER — LISINOPRIL 20 MG/1
20 TABLET ORAL DAILY
Qty: 90 TABLET | Refills: 3 | Status: SHIPPED | OUTPATIENT
Start: 2019-05-13 | End: 2020-06-11 | Stop reason: SDUPTHER

## 2019-05-13 RX ORDER — FLUTICASONE PROPIONATE 50 MCG
2 SPRAY, SUSPENSION (ML) NASAL DAILY
Qty: 1 BOTTLE | Refills: 3 | Status: SHIPPED | OUTPATIENT
Start: 2019-05-13 | End: 2019-08-12 | Stop reason: SDUPTHER

## 2019-05-13 RX ORDER — PROPAFENONE HYDROCHLORIDE 150 MG/1
150 TABLET, FILM COATED ORAL EVERY 8 HOURS
Qty: 90 TABLET | Refills: 3 | Status: SHIPPED | OUTPATIENT
Start: 2019-05-13 | End: 2020-01-27 | Stop reason: SDUPTHER

## 2019-05-13 RX ORDER — METOPROLOL TARTRATE 50 MG/1
TABLET, FILM COATED ORAL
Qty: 180 TABLET | Refills: 3 | Status: SHIPPED | OUTPATIENT
Start: 2019-05-13 | End: 2020-04-24 | Stop reason: SDUPTHER

## 2019-05-13 NOTE — PROGRESS NOTES
Lucy Langston  1945 05/13/19    SUBJECTIVE:    Patient compliant with medications for diabetes. Patient has had no episodes of significant hypoglycemia. The patient is taking hypertensive medications compliantly without side effects. Denies chest pain, dyspnea, edema, or TIA's. Blood pressure has been 140/70. She continues on rhythmol and xarelto - she denies palpitations, bleeding. Patient's reflux well controlled on current medications. Patient denies any blood in stool black stool, heartburn, reflux. Pt with known ADOLFO, willneed a new machine. OBJECTIVE:    BP (!) 146/78   Pulse 82   Resp 18   Wt 201 lb (91.2 kg)   LMP  (LMP Unknown)   SpO2 99%   BMI 30.56 kg/m²     Physical Exam   Constitutional: She is oriented to person, place, and time. She appears well-developed and well-nourished. Eyes: Conjunctivae are normal. No scleral icterus. Neck: Neck supple. No tracheal deviation present. No thyromegaly present. Cardiovascular: Normal rate, regular rhythm and intact distal pulses. Exam reveals no gallop and no friction rub. No murmur heard. Pulmonary/Chest: No respiratory distress. She has no wheezes. She has no rales. Abdominal: Soft. Bowel sounds are normal. She exhibits no distension and no mass. There is no hepatomegaly. There is no tenderness. There is no rebound and no guarding. Lymphadenopathy:     She has no cervical adenopathy. Neurological: She is alert and oriented to person, place, and time. Skin: No cyanosis. Nails show no clubbing. Psychiatric: She has a normal mood and affect. Her behavior is normal. Judgment normal.       ASSESSMENT:    1. ADOLFO (obstructive sleep apnea)    2. Type 2 diabetes mellitus with nephropathy ([facility] [address]    3. Essential hypertension    4. Proteinuria, unspecified type        PLAN:    Delaney Britt was seen today for medication refill.     Diagnoses and all orders for this visit:    ADOLFO (obstructive sleep apnea) - will refer to the sleep lab  -     [facility]    Type 2 diabetes mellitus with nephropathy ([facility] [address] - check labs; seems to be doing well  -     metFORMIN (GLUCOPHAGE) 500 MG tablet; Take 1 tablet by mouth 2 times daily (with meals)  -     Hemoglobin A1C; Future  -     Comprehensive Metabolic Panel; Future    Essential hypertension - slightly elevated today, but otherwise doing well; no change  -     metoprolol tartrate (LOPRESSOR) 50 MG tablet; take 1 tablet by mouth twice a day    Proteinuria, unspecified type - cont lisinopril  -     lisinopril (PRINIVIL;ZESTRIL) 20 MG tablet; Take 1 tablet by mouth daily    Bronchitis  -     fluticasone (FLONASE) 50 MCG/ACT nasal spray; 2 sprays by Nasal route daily    Edema, unspecified type  -     furosemide (LASIX) 20 MG tablet; take 1 tablet by mouth once daily    Other orders  -     propafenone (RYTHMOL) 150 MG tablet;  Take 1 tablet by mouth every 8 hours

## 2019-05-16 ENCOUNTER — TELEPHONE (OUTPATIENT)
Dept: CARDIOLOGY CLINIC | Age: 74
End: 2019-05-16

## 2019-06-20 ENCOUNTER — HOSPITAL ENCOUNTER (OUTPATIENT)
Dept: SLEEP CENTER | Age: 74
Discharge: HOME | End: 2019-06-20
Payer: COMMERCIAL

## 2019-06-20 VITALS
HEIGHT: 68 IN | OXYGEN SATURATION: 98 % | DIASTOLIC BLOOD PRESSURE: 65 MMHG | SYSTOLIC BLOOD PRESSURE: 143 MMHG | BODY MASS INDEX: 29.67 KG/M2 | HEART RATE: 63 BPM | WEIGHT: 195.8 LBS

## 2019-06-20 DIAGNOSIS — E66.9 OBESITY (BMI 30-39.9): ICD-10-CM

## 2019-06-20 DIAGNOSIS — Z87.891 EX-CIGARETTE SMOKER: ICD-10-CM

## 2019-06-20 DIAGNOSIS — G47.19 EXCESSIVE DAYTIME SLEEPINESS: ICD-10-CM

## 2019-06-20 DIAGNOSIS — G47.33 OSA (OBSTRUCTIVE SLEEP APNEA): ICD-10-CM

## 2019-06-20 PROCEDURE — 99211 OFF/OP EST MAY X REQ PHY/QHP: CPT | Performed by: INTERNAL MEDICINE

## 2019-06-20 PROCEDURE — 99204 OFFICE O/P NEW MOD 45 MIN: CPT | Performed by: INTERNAL MEDICINE

## 2019-06-20 ASSESSMENT — SLEEP AND FATIGUE QUESTIONNAIRES
HOW LIKELY ARE YOU TO NOD OFF OR FALL ASLEEP WHILE SITTING AND TALKING TO SOMEONE: 0
HOW LIKELY ARE YOU TO NOD OFF OR FALL ASLEEP WHILE WATCHING TV: 2
HOW LIKELY ARE YOU TO NOD OFF OR FALL ASLEEP WHEN YOU ARE A PASSENGER IN A CAR FOR AN HOUR WITHOUT A BREAK: 3
HOW LIKELY ARE YOU TO NOD OFF OR FALL ASLEEP IN A CAR, WHILE STOPPED FOR A FEW MINUTES IN TRAFFIC: 0
HOW LIKELY ARE YOU TO NOD OFF OR FALL ASLEEP WHILE SITTING AND READING: 0
HOW LIKELY ARE YOU TO NOD OFF OR FALL ASLEEP WHILE SITTING INACTIVE IN A PUBLIC PLACE: 0
HOW LIKELY ARE YOU TO NOD OFF OR FALL ASLEEP WHILE SITTING QUIETLY AFTER LUNCH WITHOUT ALCOHOL: 1
ESS TOTAL SCORE: 9
HOW LIKELY ARE YOU TO NOD OFF OR FALL ASLEEP WHILE LYING DOWN TO REST IN THE AFTERNOON WHEN CIRCUMSTANCES PERMIT: 3

## 2019-06-20 NOTE — CONSULTS
Ema Babcock MD, Ivet Mcintosh MD, Sen Mayorga MD, Mary Lebron MD, [facility]      [address], [address] [facility]   PH: [phone]  F: [phone]     Subjective:     Patient ID: Yolis Albert is a 76 y.o. female, referred to the sleep center for   Chief Complaint   Patient presents with    Sleep Apnea   . Referring physician:  Dr. Luz Roblero    History:  Ms. Siria Hobbs has been referred for ADOLFO. She has been diagnosed with ADOLFO in early . She is on a CPAP which she is not using it for about 1 month. She has gained weight since  about 40 lbs. She is sleepy and tired during the day time. She goes to bed at 11:30 PM and it takes few mins to fall asleep. She snores and not sure if she stops breathing. She has no RLS. She wakes up about 1 times in the night to go to the bathroom. She never woke up choking or gasping for air, woke up with dry mouth and palpitations. She gets up at 9:30 am and she is tired. She has no morning headaches. She has no sleep paralysis, no cataplexy, no hypnogogic or hypnopompic hallucinations. She has no depression or anxiety. She has h/o smoking 1 pk per week which she quit in 40 years ago. She is not a shift worker. Social History     Socioeconomic History    Marital status:       Spouse name: Not on file    Number of children: Not on file    Years of education: Not on file    Highest education level: Not on file   Occupational History    Not on file   Social Needs    Financial resource strain: Not on file    Food insecurity:     Worry: Not on file     Inability: Not on file    Transportation needs:     Medical: Not on file     Non-medical: Not on file   Tobacco Use    Smoking status: Former Smoker     Packs/day: 0.50     Years: 10.00     Pack years: 5.00     Last attempt to quit: 1987     Years since quittin.4    Smokeless tobacco: Never Used   Substance and Sexual Activity    Alcohol use: No     Alcohol/week: 0.0 oz     Comment: 3 cups decaf coffee daily    Drug use: No    Sexual activity: Never   Lifestyle    Physical activity:     Days per week: Not on file     Minutes per session: Not on file    Stress: Not on file   Relationships    Social connections:     Talks on phone: Not on file     Gets together: Not on file     Attends Baptism service: Not on file     Active member of club or organization: Not on file     Attends meetings of clubs or organizations: Not on file     Relationship status: Not on file    Intimate partner violence:     Fear of current or ex partner: Not on file     Emotionally abused: Not on file     Physically abused: Not on file     Forced sexual activity: Not on file   Other Topics Concern    Not on file   Social History Narrative    Wears glasses       Prior to Admission medications    Medication Sig Start Date End Date Taking?  Authorizing Provider   rivaroxaban (XARELTO) 15 MG TABS tablet Take 1 tablet by mouth every 24 hours 5/16/19  Yes Nicole Blackmon MD   metFORMIN (GLUCOPHAGE) 500 MG tablet Take 1 tablet by mouth 2 times daily (with meals) 5/13/19  Yes Mila Amado MD   metoprolol tartrate (LOPRESSOR) 50 MG tablet take 1 tablet by mouth twice a day 5/13/19  Yes Mila Amado MD   propafenone (RYTHMOL) 150 MG tablet Take 1 tablet by mouth every 8 hours 5/13/19  Yes Mila Amado MD   lisinopril (PRINIVIL;ZESTRIL) 20 MG tablet Take 1 tablet by mouth daily 5/13/19  Yes Mila Amado MD   furosemide (LASIX) 20 MG tablet take 1 tablet by mouth once daily 5/13/19  Yes Mila Amado MD   diltiazem (CARTIA XT) 120 MG extended release capsule Take 1 capsule by mouth daily 5/7/19  Yes Mila Amado MD   oxybutynin (DITROPAN) 5 MG tablet take 1 tablet by mouth twice a day 4/22/19  Yes Mila Amado MD   atorvastatin (LIPITOR) 80 MG tablet take 1 tablet by mouth once daily 2/6/19  Yes Rayo Morgan MD   Multiple Vitamins-Minerals (MULTIVITAMIN ADULT PO) Take by mouth   Yes Historical Provider, MD   aspirin 81 MG EC tablet Take 1 tablet by mouth daily 11/13/18  Yes Rayo Morgan MD   Cholecalciferol (VITAMIN D3 PO) Take 630 mg by mouth daily   Yes Historical Provider, MD Acharya Jonathonsa Santanara De [facility] [address] Please administer two SHINGRIX vaccines 2-6 months apart 5/7/18  Yes Rayo Morgan MD   omeprazole (PRILOSEC) 40 MG delayed release capsule Take 40 mg by mouth daily   Yes Historical Provider, MD   Blood Glucose Monitoring Suppl AV 1 kit by Does not apply route daily 12/1/17  Yes Rayo Morgan MD   ferrous sulfate 325 (65 FE) MG tablet take 1 tablet by mouth three times a day 11/23/16  Yes Historical Provider, MD   Calcium Carbonate (CALTRATE 600 PO) Take  by mouth 2 times daily. Yes Historical Provider, MD   fluticasone [facility]) 50 MCG/ACT nasal spray 2 sprays by Nasal route daily 5/13/19   Rayo Morgan MD   Lancets MISC 1 each by In Vitro route 2 times daily 12/1/17   Rayo Morgan MD   Glucose Blood (BLOOD GLUCOSE TEST STRIPS) STRP 1 each by In Vitro route 2 times daily 12/1/17   Rayo Morgan MD   oxybutynin (DITROPAN) 5 MG tablet Take 1 tablet by mouth 2 times daily.  1/7/13 1/21/14  Rayo Morgan MD       Allergies as of 06/20/2019 - Review Complete 06/20/2019   Allergen Reaction Noted    Latex  09/30/2012    Tape [adhesive tape]  11/05/2012       Patient Active Problem List   Diagnosis    Osteoarthritis    ADOLFO (obstructive sleep apnea)    Menopause    Paroxysmal atrial fibrillation (HCC)    Osteopenia    Colon polyps    Bilateral leg edema    Urge incontinence    Breast cancer (HCC)    Edema of both legs    Mixed hyperlipidemia    Iron deficiency anemia    Gastroesophageal reflux disease without esophagitis    Essential hypertension    Abnormal EKG    Type 2 diabetes mellitus with nephropathy (HCC)    Chronic fatigue    Dizzy spells    Carpal tunnel syndrome on left    Trigger finger, left ring finger    Chest pain    Chronic renal insufficiency, stage 3 (moderate) (HCC)    Acute blood loss anemia    Obesity (BMI 30-39. 9)    Excessive daytime sleepiness    Ex-cigarette smoker       Past Medical History:   Diagnosis Date    14 day event monitor 11/05/2018    Sinus rhythm    Atrial fibrillation with RVR ([address] 11/25/2013    Edema     2/38 TTE diastolic dysfxn, EF 03%; 91/30 - TTE diastolic dysfxn, EF 17%; Stress myoview  WNl, EF 70%    Family history of cardiovascular disease     GERD (gastroesophageal reflux disease)     H/O 24 hour EKG monitoring 4/23/12    [facility]    H/O cardiovascular stress test     [facility]/23/12-[facility], Probably norm perfusion Lexiscan cardiolite study except for diaphramatic attenuation artifact, otherwise perfusion is normal, norm LV fxn by gated scan. 11/10-EF70%, 9/15/08-EF70%, 1/15/07-EF70%, 9/03    H/O echocardiogram     4/23/12-[facility]- Norm chamber sizes. LVH with norm LV systolic but abn diastolic fxn, mild MR and TR, minimal pulm HTN. 11/10 -EF>55%; 3/05, 9/23/03    History of cardiac catheterization     11/15/2013-EF 55%, No signif CAD -Dr Abdifatah Marvin;;    History of cardiovascular stress test     11/14/2013-EF 70%. Normal Lexiscan Cardiolite, Uniform wall motion;    History of echocardiogram     96/19/3288-[id number] global systolic  function. No wall motion abnormalities. EF 55%. Mild MR/TR;    History of Holter monitoring 11/17/14 11/14-48 hour Holter: Predominant rhythm was sinus, no ventricular ectopy noted, supraventricular ectopics were noted in single beat forms.  Hx of 24 hour EKG monitoring     12/17/13 48 hr holter monitor. Sinus rhythm with intermittent sinus arrhythmia.     Hyperlipidemia     Hypertension     11/13 Cath WNL, EF 55%; 11/13 Stress WNL : 11/13 TTE mild MR and TR, EF 55%; 4/12 Stress WNL; 8/9 - Cath WNL    Iron deficiency anemia 7/9/2013 9/13 EGD WNL    Lumbar radiculopathy 11/25/2013    Menopause     ADOLFO (obstructive sleep apnea)     sleep study 10/3 CPAP 6cm     Osteoarthritis     both knees    Osteopenia     9/12 DEXA T-score -1.5    Other activity(E029.9)     48 hr holter, the 48 hr holter monitor reveals the patient in the sinus rhythm with occasional supraventricular ectopic beats. There is a rare short atrial run.  Paroxysmal atrial fibrillation (HCC)     4/12 Holter [facility]    Prolonged emergence from general anesthesia     Proteinuria     Right Breast Cancer Dx 10-12    Supraventricular tachycardia (HCC)     Type 2 diabetes mellitus (HCC) Dx 2000's    Urge incontinence     Wears partial dentures     upper partial       Past Surgical History:   Procedure Laterality Date    BREAST BIOPSY  10-12    Right Breast Biopsy, Cancer    BREAST BIOPSY  1970's    Right Breast Biopsy, Benign    BREAST LUMPECTOMY  11/6/12    Breast cancer - Right with sentinal node    BUNIONECTOMY  1990's    Right Stan Michele Record    8/10/09- The patient has no significant CAD. The elevated troponin is probably secondary to SVT. , 10/03- no significant CAD    CARPAL TUNNEL RELEASE Left 01/2019    COLONOSCOPY  \"X 2 Last One 2008 \"    Polpy Removed During Last Colonoscopy    DENTAL SURGERY      Teeth Extracted In Past    ENDOSCOPY, COLON, DIAGNOSTIC  07/18/2016    savary dilatation to 17 mm    HYSTERECTOMY, TOTAL ABDOMINAL  1980's    JOINT REPLACEMENT  2009    Total Left Knee    JOINT REPLACEMENT  2010    Total Right Knee    UPPER GASTROINTESTINAL ENDOSCOPY N/A 11/8/2018    EGD DIAGNOSTIC ONLY performed by Den Husain MD at [facility] ENDOSCOPY       Family History   Problem Relation Age of Onset    Diabetes Mother     Early Death Mother 61    Cancer Father         \"Lung Cancer\"    Heart Disease Father     Diabetes Father     Stroke Sister     Diabetes Sister     Diabetes Brother  Cancer Brother         \"Prostate Cancer\"    Cancer Brother         \"Lung Cancer\"    Thyroid Disease Daughter          Objective:   BP (!) 143/65   Pulse 63   Ht 5' 8\" (1.727 m)   Wt 195 lb 12.8 oz (88.8 kg)   LMP  (LMP Unknown)   SpO2 98%   BMI 29.77 kg/m²   Body mass index is 29.77 kg/m². Sleep Medicine 6/20/2019   Sitting and reading 0   Watching TV 2   Sitting, inactive in a public place (e.g. a theatre or a meeting) 0   As a passenger in a car for an hour without a break 3   Lying down to rest in the afternoon when circumstances permit 3   Sitting and talking to someone 0   Sitting quietly after a lunch without alcohol 1   In a car, while stopped for a few minutes in traffic 0   Total score 9   Neck circumference 16.5     {MALLAMPATI:3    Vitals:    06/20/19 1025   BP: (!) 143/65   Pulse: 63   SpO2: 98%   Weight: 195 lb 12.8 oz (88.8 kg)   Height: 5' 8\" (1.727 m)     Neck circumference: 16.5  Inches  [place] - Total score: 9    Gen: No distress. Eyes: PERRL. No sclera icterus. No conjunctival injection. ENT: No discharge. Pharynx clear. External appearance of ears and nose normal.OVERJET  Neck: Trachea midline. No obvious mass. Resp: No accessory muscle use. No crackles. No wheezes. No rhonchi. No dullness on percussion. CV: Regular rate. Regular rhythm. No murmur or rub. No edema. GI: Non-tender. Non-distended. No hernia. Skin: Warm, dry, normal texture and turgor. No nodule on exposed extremities. Lymph: No cervical LAD. No supraclavicular LAD. M/S: No cyanosis. No clubbing. No joint deformity. Psych: Oriented x 3. No anxiety. Awake. Alert. Intact judgement and insight. Mallampati Airway Classification:   []1 []2 [x]3 []4        Assessment and Plan     Diagnosis:    Problem List     ADOLFO (obstructive sleep apnea)     She has ADOLFO diagnosed about 30 years ago.  She is non compliant with the CPAP  Gained 40 lbs  Advised to go for the split night study  Loose weight

## 2019-06-20 NOTE — ASSESSMENT & PLAN NOTE
She has ADOLFO diagnosed about 30 years ago.  She is non compliant with the CPAP  Gained 40 lbs  Advised to go for the split night study  Loose weight

## 2019-07-08 ENCOUNTER — HOSPITAL ENCOUNTER (OUTPATIENT)
Dept: SLEEP CENTER | Age: 74
Discharge: HOME | End: 2019-07-08
Payer: COMMERCIAL

## 2019-07-08 PROCEDURE — 95811 POLYSOM 6/>YRS CPAP 4/> PARM: CPT

## 2019-07-08 PROCEDURE — 95811 POLYSOM 6/>YRS CPAP 4/> PARM: CPT | Performed by: INTERNAL MEDICINE

## 2019-07-09 ENCOUNTER — TELEPHONE (OUTPATIENT)
Dept: CARDIOLOGY CLINIC | Age: 74
End: 2019-07-09

## 2019-07-09 NOTE — PROGRESS NOTES
7/9/2019  sleep study  for Camilo Alcantara  1945 is complete. Results are pending physician review.     Electronically signed by Sebastian Galo on 7/9/2019 at 5:04 AM

## 2019-08-09 ENCOUNTER — HOSPITAL ENCOUNTER (OUTPATIENT)
Age: 74
Discharge: HOME | End: 2019-08-09
Payer: COMMERCIAL

## 2019-08-09 LAB
ALBUMIN SERPL-MCNC: 4.1 GM/DL (ref 3.4–5)
ANION GAP SERPL CALCULATED.3IONS-SCNC: 10 MMOL/L (ref 4–16)
BUN BLDV-MCNC: 38 MG/DL (ref 6–23)
CALCIUM SERPL-MCNC: 9.5 MG/DL (ref 8.3–10.6)
CHLORIDE BLD-SCNC: 104 MMOL/L (ref 99–110)
CO2: 26 MMOL/L (ref 21–32)
CREAT SERPL-MCNC: 2 MG/DL (ref 0.6–1.1)
GFR AFRICAN AMERICAN: 29 ML/MIN/1.73M2
GFR NON-AFRICAN AMERICAN: 24 ML/MIN/1.73M2
GLUCOSE BLD-MCNC: 101 MG/DL (ref 70–99)
PHOSPHORUS: 4.1 MG/DL (ref 2.5–4.9)
POTASSIUM SERPL-SCNC: 4.9 MMOL/L (ref 3.5–5.1)
SODIUM BLD-SCNC: 140 MMOL/L (ref 135–145)

## 2019-08-09 PROCEDURE — 36415 COLL VENOUS BLD VENIPUNCTURE: CPT

## 2019-08-09 PROCEDURE — 80069 RENAL FUNCTION PANEL: CPT

## 2019-08-12 ENCOUNTER — OFFICE VISIT (OUTPATIENT)
Dept: INTERNAL MEDICINE CLINIC | Age: 74
End: 2019-08-12
Payer: COMMERCIAL

## 2019-08-12 VITALS
RESPIRATION RATE: 18 BRPM | DIASTOLIC BLOOD PRESSURE: 74 MMHG | HEART RATE: 68 BPM | OXYGEN SATURATION: 97 % | BODY MASS INDEX: 29.86 KG/M2 | SYSTOLIC BLOOD PRESSURE: 138 MMHG | WEIGHT: 196.4 LBS

## 2019-08-12 DIAGNOSIS — N18.30 CHRONIC RENAL INSUFFICIENCY, STAGE 3 (MODERATE) (HCC): ICD-10-CM

## 2019-08-12 DIAGNOSIS — E11.21 TYPE 2 DIABETES MELLITUS WITH NEPHROPATHY (HCC): ICD-10-CM

## 2019-08-12 DIAGNOSIS — I10 ESSENTIAL HYPERTENSION: ICD-10-CM

## 2019-08-12 DIAGNOSIS — N39.41 URGE INCONTINENCE: ICD-10-CM

## 2019-08-12 DIAGNOSIS — E78.2 MIXED HYPERLIPIDEMIA: ICD-10-CM

## 2019-08-12 DIAGNOSIS — J30.89 ALLERGIC RHINITIS DUE TO OTHER ALLERGIC TRIGGER, UNSPECIFIED SEASONALITY: Primary | ICD-10-CM

## 2019-08-12 DIAGNOSIS — I48.0 PAROXYSMAL ATRIAL FIBRILLATION (HCC): ICD-10-CM

## 2019-08-12 PROCEDURE — 99214 OFFICE O/P EST MOD 30 MIN: CPT | Performed by: INTERNAL MEDICINE

## 2019-08-12 RX ORDER — FLUTICASONE PROPIONATE 50 MCG
2 SPRAY, SUSPENSION (ML) NASAL DAILY
Qty: 1 BOTTLE | Refills: 3 | Status: SHIPPED | OUTPATIENT
Start: 2019-08-12 | End: 2020-08-17 | Stop reason: ALTCHOICE

## 2019-08-12 NOTE — PROGRESS NOTES
Jatinder [facility]  1945 08/12/19    SUBJECTIVE:      Pt has been having more allergy symptoms - cough, eye tearing, rhinorrhea. Patient compliant with medications for diabetes. Patient has had no episodes of significant hypoglycemia. The patient is taking hypertensive medications compliantly without side effects. Denies chest pain, dyspnea, edema, or TIA's. Patient denies any chest pain, shortness of breath, myalgias, Patient is tolerating cholesterol medications without difficulty. She continues on Xarelto and rhythmol - she rarely has very brief palpitations. She denies any blood in the stool,black stools. Urinary urgency doing well with ditropan. Patient's reflux well controlled on current medications. Patient denies any blood in stool black stool, heartburn, reflux. OBJECTIVE:    /74   Pulse 68   Resp 18   Wt 196 lb 6.4 oz (89.1 kg)   LMP  (LMP Unknown)   SpO2 97%   BMI 29.86 kg/m²     Physical Exam   Constitutional: She is oriented to person, place, and time. She appears well-developed and well-nourished. Eyes: Conjunctivae are normal. No scleral icterus. Neck: Neck supple. No tracheal deviation present. No thyromegaly present. Cardiovascular: Normal rate, regular rhythm and intact distal pulses. Exam reveals no gallop and no friction rub. No murmur heard. Pulmonary/Chest: No respiratory distress. She has no wheezes. She has no rales. Abdominal: Soft. Bowel sounds are normal. She exhibits no distension and no mass. There is no hepatomegaly. There is no tenderness. There is no rebound and no guarding. Lymphadenopathy:     She has no cervical adenopathy. Neurological: She is alert and oriented to person, place, and time. Skin: No cyanosis. Nails show no clubbing. Psychiatric: She has a normal mood and affect. Her behavior is normal. Judgment normal.       ASSESSMENT:    1. Allergic rhinitis due to other allergic trigger, unspecified seasonality    2.

## 2019-08-14 ENCOUNTER — HOSPITAL ENCOUNTER (OUTPATIENT)
Age: 74
Discharge: HOME | End: 2019-08-14
Payer: COMMERCIAL

## 2019-08-14 LAB
ALBUMIN SERPL-MCNC: 4.1 GM/DL (ref 3.4–5)
ALBUMIN SERPL-MCNC: 4.2 GM/DL (ref 3.4–5)
ALP BLD-CCNC: 71 IU/L (ref 40–128)
ALT SERPL-CCNC: 16 U/L (ref 10–40)
ANION GAP SERPL CALCULATED.3IONS-SCNC: 10 MMOL/L (ref 4–16)
ANION GAP SERPL CALCULATED.3IONS-SCNC: 12 MMOL/L (ref 4–16)
AST SERPL-CCNC: 15 IU/L (ref 15–37)
BASOPHILS ABSOLUTE: 0 K/CU MM
BASOPHILS RELATIVE PERCENT: 0.6 % (ref 0–1)
BILIRUB SERPL-MCNC: 0.2 MG/DL (ref 0–1)
BUN BLDV-MCNC: 24 MG/DL (ref 6–23)
BUN BLDV-MCNC: 24 MG/DL (ref 6–23)
CALCIUM SERPL-MCNC: 9.2 MG/DL (ref 8.3–10.6)
CALCIUM SERPL-MCNC: 9.3 MG/DL (ref 8.3–10.6)
CHLORIDE BLD-SCNC: 104 MMOL/L (ref 99–110)
CHLORIDE BLD-SCNC: 104 MMOL/L (ref 99–110)
CO2: 26 MMOL/L (ref 21–32)
CO2: 27 MMOL/L (ref 21–32)
CREAT SERPL-MCNC: 1.9 MG/DL (ref 0.6–1.1)
CREAT SERPL-MCNC: 1.9 MG/DL (ref 0.6–1.1)
DIFFERENTIAL TYPE: ABNORMAL
EOSINOPHILS ABSOLUTE: 0.2 K/CU MM
EOSINOPHILS RELATIVE PERCENT: 3 % (ref 0–3)
GFR AFRICAN AMERICAN: 31 ML/MIN/1.73M2
GFR AFRICAN AMERICAN: 31 ML/MIN/1.73M2
GFR NON-AFRICAN AMERICAN: 26 ML/MIN/1.73M2
GFR NON-AFRICAN AMERICAN: 26 ML/MIN/1.73M2
GLUCOSE BLD-MCNC: 103 MG/DL (ref 70–99)
GLUCOSE BLD-MCNC: 105 MG/DL (ref 70–99)
HCT VFR BLD CALC: 34.2 % (ref 37–47)
HEMOGLOBIN: 10.2 GM/DL (ref 12.5–16)
IMMATURE NEUTROPHIL %: 0.3 % (ref 0–0.43)
LYMPHOCYTES ABSOLUTE: 1.6 K/CU MM
LYMPHOCYTES RELATIVE PERCENT: 24.8 % (ref 24–44)
MCH RBC QN AUTO: 24.5 PG (ref 27–31)
MCHC RBC AUTO-ENTMCNC: 29.8 % (ref 32–36)
MCV RBC AUTO: 82 FL (ref 78–100)
MONOCYTES ABSOLUTE: 0.5 K/CU MM
MONOCYTES RELATIVE PERCENT: 7.6 % (ref 0–4)
NUCLEATED RBC %: 0 %
PDW BLD-RTO: 16.4 % (ref 11.7–14.9)
PHOSPHORUS: 3.4 MG/DL (ref 2.5–4.9)
PLATELET # BLD: 296 K/CU MM (ref 140–440)
PMV BLD AUTO: 10.7 FL (ref 7.5–11.1)
POTASSIUM SERPL-SCNC: 4.5 MMOL/L (ref 3.5–5.1)
POTASSIUM SERPL-SCNC: 4.7 MMOL/L (ref 3.5–5.1)
RBC # BLD: 4.17 M/CU MM (ref 4.2–5.4)
SEGMENTED NEUTROPHILS ABSOLUTE COUNT: 4 K/CU MM
SEGMENTED NEUTROPHILS RELATIVE PERCENT: 63.7 % (ref 36–66)
SODIUM BLD-SCNC: 141 MMOL/L (ref 135–145)
SODIUM BLD-SCNC: 142 MMOL/L (ref 135–145)
TOTAL IMMATURE NEUTOROPHIL: 0.02 K/CU MM
TOTAL NUCLEATED RBC: 0 K/CU MM
TOTAL PROTEIN: 6.4 GM/DL (ref 6.4–8.2)
WBC # BLD: 6.3 K/CU MM (ref 4–10.5)

## 2019-08-14 PROCEDURE — 36415 COLL VENOUS BLD VENIPUNCTURE: CPT

## 2019-08-14 PROCEDURE — 80069 RENAL FUNCTION PANEL: CPT

## 2019-08-14 PROCEDURE — 85025 COMPLETE CBC W/AUTO DIFF WBC: CPT

## 2019-08-14 PROCEDURE — 80053 COMPREHEN METABOLIC PANEL: CPT

## 2019-09-04 ENCOUNTER — HOSPITAL ENCOUNTER (OUTPATIENT)
Dept: SLEEP CENTER | Age: 74
Discharge: HOME | End: 2019-09-04
Payer: COMMERCIAL

## 2019-09-04 DIAGNOSIS — G47.19 EXCESSIVE DAYTIME SLEEPINESS: ICD-10-CM

## 2019-09-04 DIAGNOSIS — Z87.891 EX-CIGARETTE SMOKER: ICD-10-CM

## 2019-09-04 DIAGNOSIS — G47.33 OSA (OBSTRUCTIVE SLEEP APNEA): ICD-10-CM

## 2019-09-04 DIAGNOSIS — E66.9 OBESITY (BMI 30-39.9): ICD-10-CM

## 2019-09-04 PROCEDURE — 99214 OFFICE O/P EST MOD 30 MIN: CPT | Performed by: INTERNAL MEDICINE

## 2019-09-04 PROCEDURE — 9990000010 HC NO CHARGE VISIT: Performed by: INTERNAL MEDICINE

## 2019-09-04 ASSESSMENT — SLEEP AND FATIGUE QUESTIONNAIRES
HOW LIKELY ARE YOU TO NOD OFF OR FALL ASLEEP IN A CAR, WHILE STOPPED FOR A FEW MINUTES IN TRAFFIC: 0
HOW LIKELY ARE YOU TO NOD OFF OR FALL ASLEEP WHILE SITTING QUIETLY AFTER LUNCH WITHOUT ALCOHOL: 3
HOW LIKELY ARE YOU TO NOD OFF OR FALL ASLEEP WHILE SITTING AND READING: 3
HOW LIKELY ARE YOU TO NOD OFF OR FALL ASLEEP WHILE SITTING AND TALKING TO SOMEONE: 0
HOW LIKELY ARE YOU TO NOD OFF OR FALL ASLEEP WHILE SITTING INACTIVE IN A PUBLIC PLACE: 0
ESS TOTAL SCORE: 15
HOW LIKELY ARE YOU TO NOD OFF OR FALL ASLEEP WHEN YOU ARE A PASSENGER IN A CAR FOR AN HOUR WITHOUT A BREAK: 3
HOW LIKELY ARE YOU TO NOD OFF OR FALL ASLEEP WHILE WATCHING TV: 3
HOW LIKELY ARE YOU TO NOD OFF OR FALL ASLEEP WHILE LYING DOWN TO REST IN THE AFTERNOON WHEN CIRCUMSTANCES PERMIT: 3

## 2019-09-04 ASSESSMENT — ENCOUNTER SYMPTOMS
EYE DISCHARGE: 0
COUGH: 0
BACK PAIN: 0
ABDOMINAL DISTENTION: 0
SHORTNESS OF BREATH: 0
ABDOMINAL PAIN: 0
EYE ITCHING: 0

## 2019-09-04 NOTE — PROGRESS NOTES
daily 100 each 3    Glucose Blood (BLOOD GLUCOSE TEST STRIPS) STRP 1 each by In Vitro route 2 times daily 100 strip 3    Blood Glucose Monitoring Suppl AV 1 kit by Does not apply route daily 1 Device 0    ferrous sulfate 325 (65 FE) MG tablet take 1 tablet by mouth three times a day  0    Calcium Carbonate (CALTRATE 600 PO) Take  by mouth 2 times daily.  UNABLE TO FIND Please administer two SHINGRIX vaccines 2-6 months apart 2 Units 0     No current facility-administered medications for this encounter. Allergies   Allergen Reactions    Latex      \"Blisters\"    Tape Krystal Mend Tape]      \"Turn Red\"       Past Medical History:   Diagnosis Date    14 day event monitor 11/05/2018    Sinus rhythm    Atrial fibrillation with RVR ([address] 11/25/2013    Edema     5/71 TTE diastolic dysfxn, EF 42%; 50/66 - TTE diastolic dysfxn, EF 47%; Stress myoview  WNl, EF 70%    Family history of cardiovascular disease     GERD (gastroesophageal reflux disease)     H[facility]O [facility] hour [facility]    H/O cardiovascular stress test     4/23/12-[facility], Probably norm perfusion Lexiscan cardiolite study except for diaphramatic attenuation artifact, otherwise perfusion is normal, norm LV fxn by gated scan. 11/10-EF70%, 9/15/08-EF70%, 1/15/07-EF70%, 9/03    H/O echocardiogram     4/23/12-[facility]- Norm chamber sizes. LVH with norm LV systolic but abn diastolic fxn, mild MR and TR, minimal pulm HTN. 11/10 -EF>55%; 3/05, 9/23/03    History of cardiac catheterization     11/15/2013-EF 55%, No signif CAD -Dr Silvestre Cardona;;    History of cardiovascular stress test     11/14/2013-EF 70%. Normal Lexiscan Cardiolite, Uniform wall motion;    History of echocardiogram     39/66/2926-SHANNA global systolic  function. No wall motion abnormalities. EF 55%.  Mild MR/TR;    History of Holter monitoring 11/17/14 11/14-48 hour Holter: Predominant rhythm was sinus, no ventricular ectopy noted, supraventricular ectopics were noted

## 2019-10-15 ENCOUNTER — TELEPHONE (OUTPATIENT)
Dept: CARDIOLOGY CLINIC | Age: 74
End: 2019-10-15

## 2019-10-24 ENCOUNTER — OFFICE VISIT (OUTPATIENT)
Dept: CARDIOLOGY CLINIC | Age: 74
End: 2019-10-24
Payer: COMMERCIAL

## 2019-10-24 VITALS
WEIGHT: 192 LBS | BODY MASS INDEX: 28.44 KG/M2 | HEART RATE: 60 BPM | HEIGHT: 69 IN | SYSTOLIC BLOOD PRESSURE: 133 MMHG | DIASTOLIC BLOOD PRESSURE: 66 MMHG

## 2019-10-24 DIAGNOSIS — R00.2 PALPITATIONS: Primary | ICD-10-CM

## 2019-10-24 PROCEDURE — 99214 OFFICE O/P EST MOD 30 MIN: CPT | Performed by: INTERNAL MEDICINE

## 2019-10-24 PROCEDURE — 93000 ELECTROCARDIOGRAM COMPLETE: CPT | Performed by: INTERNAL MEDICINE

## 2019-11-12 ENCOUNTER — OFFICE VISIT (OUTPATIENT)
Dept: INTERNAL MEDICINE CLINIC | Age: 74
End: 2019-11-12
Payer: COMMERCIAL

## 2019-11-12 ENCOUNTER — HOSPITAL ENCOUNTER (OUTPATIENT)
Age: 74
Discharge: HOME | End: 2019-11-12
Payer: COMMERCIAL

## 2019-11-12 VITALS
HEIGHT: 69 IN | WEIGHT: 196.6 LBS | OXYGEN SATURATION: 98 % | BODY MASS INDEX: 29.12 KG/M2 | DIASTOLIC BLOOD PRESSURE: 78 MMHG | HEART RATE: 69 BPM | SYSTOLIC BLOOD PRESSURE: 136 MMHG

## 2019-11-12 DIAGNOSIS — R20.2 PARESTHESIA: ICD-10-CM

## 2019-11-12 DIAGNOSIS — N18.30 CHRONIC RENAL INSUFFICIENCY, STAGE 3 (MODERATE) (HCC): ICD-10-CM

## 2019-11-12 DIAGNOSIS — I10 ESSENTIAL HYPERTENSION: ICD-10-CM

## 2019-11-12 DIAGNOSIS — E78.2 MIXED HYPERLIPIDEMIA: ICD-10-CM

## 2019-11-12 DIAGNOSIS — E11.21 TYPE 2 DIABETES MELLITUS WITH NEPHROPATHY (HCC): Primary | ICD-10-CM

## 2019-11-12 DIAGNOSIS — I48.0 PAROXYSMAL ATRIAL FIBRILLATION (HCC): ICD-10-CM

## 2019-11-12 LAB
ALBUMIN SERPL-MCNC: 4.2 GM/DL (ref 3.4–5)
ALP BLD-CCNC: 80 IU/L (ref 40–128)
ALT SERPL-CCNC: 21 U/L (ref 10–40)
ANION GAP SERPL CALCULATED.3IONS-SCNC: 13 MMOL/L (ref 4–16)
AST SERPL-CCNC: 17 IU/L (ref 15–37)
BASOPHILS ABSOLUTE: 0 K/CU MM
BASOPHILS RELATIVE PERCENT: 0.5 % (ref 0–1)
BILIRUB SERPL-MCNC: 0.2 MG/DL (ref 0–1)
BUN BLDV-MCNC: 30 MG/DL (ref 6–23)
CALCIUM SERPL-MCNC: 9.2 MG/DL (ref 8.3–10.6)
CHLORIDE BLD-SCNC: 103 MMOL/L (ref 99–110)
CHOLESTEROL: 191 MG/DL
CO2: 28 MMOL/L (ref 21–32)
CREAT SERPL-MCNC: 1.9 MG/DL (ref 0.6–1.1)
DIFFERENTIAL TYPE: ABNORMAL
EOSINOPHILS ABSOLUTE: 0.3 K/CU MM
EOSINOPHILS RELATIVE PERCENT: 4.3 % (ref 0–3)
ERYTHROCYTE SEDIMENTATION RATE: 70 MM/HR (ref 0–30)
ESTIMATED AVERAGE GLUCOSE: 128 MG/DL
GFR AFRICAN AMERICAN: 31 ML/MIN/1.73M2
GFR NON-AFRICAN AMERICAN: 26 ML/MIN/1.73M2
GLUCOSE BLD-MCNC: 96 MG/DL (ref 70–99)
HBA1C MFR BLD: 6.1 % (ref 4.2–6.3)
HCT VFR BLD CALC: 35.7 % (ref 37–47)
HDLC SERPL-MCNC: 57 MG/DL
HEMOGLOBIN: 10.6 GM/DL (ref 12.5–16)
IMMATURE NEUTROPHIL %: 0.2 % (ref 0–0.43)
LDL CHOLESTEROL DIRECT: 121 MG/DL
LYMPHOCYTES ABSOLUTE: 1.5 K/CU MM
LYMPHOCYTES RELATIVE PERCENT: 23.8 % (ref 24–44)
MCH RBC QN AUTO: 25.1 PG (ref 27–31)
MCHC RBC AUTO-ENTMCNC: 29.7 % (ref 32–36)
MCV RBC AUTO: 84.4 FL (ref 78–100)
MONOCYTES ABSOLUTE: 0.5 K/CU MM
MONOCYTES RELATIVE PERCENT: 7.1 % (ref 0–4)
NUCLEATED RBC %: 0 %
PDW BLD-RTO: 14.9 % (ref 11.7–14.9)
PLATELET # BLD: 313 K/CU MM (ref 140–440)
PMV BLD AUTO: 10.8 FL (ref 7.5–11.1)
POTASSIUM SERPL-SCNC: 4.4 MMOL/L (ref 3.5–5.1)
RBC # BLD: 4.23 M/CU MM (ref 4.2–5.4)
SEGMENTED NEUTROPHILS ABSOLUTE COUNT: 4.1 K/CU MM
SEGMENTED NEUTROPHILS RELATIVE PERCENT: 64.1 % (ref 36–66)
SODIUM BLD-SCNC: 144 MMOL/L (ref 135–145)
TOTAL IMMATURE NEUTOROPHIL: 0.01 K/CU MM
TOTAL NUCLEATED RBC: 0 K/CU MM
TOTAL PROTEIN: 6.5 GM/DL (ref 6.4–8.2)
TRIGL SERPL-MCNC: 121 MG/DL
TSH HIGH SENSITIVITY: 1.24 UIU/ML (ref 0.27–4.2)
VITAMIN B-12: 580.2 PG/ML (ref 211–911)
WBC # BLD: 6.3 K/CU MM (ref 4–10.5)

## 2019-11-12 PROCEDURE — 99214 OFFICE O/P EST MOD 30 MIN: CPT | Performed by: INTERNAL MEDICINE

## 2019-11-12 PROCEDURE — 80061 LIPID PANEL: CPT

## 2019-11-12 PROCEDURE — 80053 COMPREHEN METABOLIC PANEL: CPT

## 2019-11-12 PROCEDURE — 84443 ASSAY THYROID STIM HORMONE: CPT

## 2019-11-12 PROCEDURE — 82607 VITAMIN B-12: CPT

## 2019-11-12 PROCEDURE — 85025 COMPLETE CBC W/AUTO DIFF WBC: CPT

## 2019-11-12 PROCEDURE — 85652 RBC SED RATE AUTOMATED: CPT

## 2019-11-12 PROCEDURE — 83721 ASSAY OF BLOOD LIPOPROTEIN: CPT

## 2019-11-12 PROCEDURE — 36415 COLL VENOUS BLD VENIPUNCTURE: CPT

## 2019-11-12 PROCEDURE — 83036 HEMOGLOBIN GLYCOSYLATED A1C: CPT

## 2019-11-28 ENCOUNTER — APPOINTMENT (OUTPATIENT)
Dept: ULTRASOUND IMAGING | Age: 74
End: 2019-11-28
Payer: COMMERCIAL

## 2019-11-28 PROCEDURE — 93971 EXTREMITY STUDY: CPT

## 2019-11-28 ASSESSMENT — PAIN DESCRIPTION - PAIN TYPE: TYPE: ACUTE PAIN

## 2019-11-28 ASSESSMENT — PAIN SCALES - GENERAL: PAINLEVEL_OUTOF10: 10

## 2019-11-28 ASSESSMENT — PAIN DESCRIPTION - ORIENTATION: ORIENTATION: LEFT

## 2019-11-28 ASSESSMENT — PAIN DESCRIPTION - LOCATION: LOCATION: LEG

## 2019-11-29 ENCOUNTER — HOSPITAL ENCOUNTER (EMERGENCY)
Age: 74
Discharge: HOME | End: 2019-11-29
Attending: EMERGENCY MEDICINE
Payer: COMMERCIAL

## 2019-11-29 VITALS
TEMPERATURE: 98.2 F | DIASTOLIC BLOOD PRESSURE: 76 MMHG | OXYGEN SATURATION: 97 % | HEART RATE: 69 BPM | SYSTOLIC BLOOD PRESSURE: 158 MMHG | RESPIRATION RATE: 17 BRPM | WEIGHT: 194 LBS | BODY MASS INDEX: 28.73 KG/M2 | HEIGHT: 69 IN

## 2019-11-29 DIAGNOSIS — M79.672 LEFT FOOT PAIN: Primary | ICD-10-CM

## 2019-11-29 PROCEDURE — 6370000000 HC RX 637 (ALT 250 FOR IP): Performed by: EMERGENCY MEDICINE

## 2019-11-29 PROCEDURE — 99283 EMERGENCY DEPT VISIT LOW MDM: CPT

## 2019-11-29 RX ORDER — ACETAMINOPHEN 325 MG/1
650 TABLET ORAL ONCE
Status: COMPLETED | OUTPATIENT
Start: 2019-11-29 | End: 2019-11-29

## 2019-11-29 RX ORDER — CEPHALEXIN 500 MG/1
500 CAPSULE ORAL 3 TIMES DAILY
Qty: 15 CAPSULE | Refills: 0 | Status: SHIPPED | OUTPATIENT
Start: 2019-11-29 | End: 2019-12-02

## 2019-11-29 RX ORDER — CEPHALEXIN 250 MG/1
500 CAPSULE ORAL ONCE
Status: COMPLETED | OUTPATIENT
Start: 2019-11-29 | End: 2019-11-29

## 2019-11-29 RX ORDER — ACETAMINOPHEN 325 MG/1
650 TABLET ORAL EVERY 6 HOURS PRN
Qty: 120 TABLET | Refills: 3 | Status: SHIPPED | OUTPATIENT
Start: 2019-11-29 | End: 2019-12-02

## 2019-11-29 RX ADMIN — CEPHALEXIN 500 MG: 250 CAPSULE ORAL at 00:47

## 2019-11-29 RX ADMIN — ACETAMINOPHEN 650 MG: 325 TABLET ORAL at 00:47

## 2019-11-29 ASSESSMENT — PAIN SCALES - GENERAL: PAINLEVEL_OUTOF10: 10

## 2019-12-02 ENCOUNTER — OFFICE VISIT (OUTPATIENT)
Dept: INTERNAL MEDICINE CLINIC | Age: 74
End: 2019-12-02
Payer: COMMERCIAL

## 2019-12-02 VITALS
SYSTOLIC BLOOD PRESSURE: 150 MMHG | RESPIRATION RATE: 18 BRPM | OXYGEN SATURATION: 97 % | HEART RATE: 73 BPM | DIASTOLIC BLOOD PRESSURE: 76 MMHG

## 2019-12-02 DIAGNOSIS — M10.072 ACUTE IDIOPATHIC GOUT INVOLVING TOE OF LEFT FOOT: Primary | ICD-10-CM

## 2019-12-02 PROCEDURE — 99213 OFFICE O/P EST LOW 20 MIN: CPT | Performed by: INTERNAL MEDICINE

## 2019-12-02 RX ORDER — COLCHICINE 0.6 MG/1
0.6 TABLET ORAL 2 TIMES DAILY
Qty: 30 TABLET | Refills: 0 | Status: SHIPPED | OUTPATIENT
Start: 2019-12-02 | End: 2021-03-10 | Stop reason: SDUPTHER

## 2019-12-03 ENCOUNTER — TELEPHONE (OUTPATIENT)
Dept: INTERNAL MEDICINE CLINIC | Age: 74
End: 2019-12-03

## 2019-12-04 ENCOUNTER — TELEPHONE (OUTPATIENT)
Dept: INTERNAL MEDICINE CLINIC | Age: 74
End: 2019-12-04

## 2019-12-09 ENCOUNTER — OFFICE VISIT (OUTPATIENT)
Dept: PULMONOLOGY | Age: 74
End: 2019-12-09
Payer: COMMERCIAL

## 2019-12-09 VITALS
HEART RATE: 55 BPM | OXYGEN SATURATION: 98 % | DIASTOLIC BLOOD PRESSURE: 78 MMHG | WEIGHT: 192.2 LBS | SYSTOLIC BLOOD PRESSURE: 144 MMHG | HEIGHT: 69 IN | BODY MASS INDEX: 28.47 KG/M2

## 2019-12-09 DIAGNOSIS — G47.19 EXCESSIVE DAYTIME SLEEPINESS: ICD-10-CM

## 2019-12-09 DIAGNOSIS — E66.9 OBESITY (BMI 30-39.9): ICD-10-CM

## 2019-12-09 DIAGNOSIS — Z87.891 EX-CIGARETTE SMOKER: ICD-10-CM

## 2019-12-09 DIAGNOSIS — G47.33 OSA (OBSTRUCTIVE SLEEP APNEA): ICD-10-CM

## 2019-12-09 PROCEDURE — 99214 OFFICE O/P EST MOD 30 MIN: CPT | Performed by: INTERNAL MEDICINE

## 2019-12-09 ASSESSMENT — ENCOUNTER SYMPTOMS
COUGH: 0
SHORTNESS OF BREATH: 0
EYE DISCHARGE: 0
BACK PAIN: 0
EYE ITCHING: 0
ABDOMINAL DISTENTION: 0
ABDOMINAL PAIN: 0

## 2020-01-15 ENCOUNTER — HOSPITAL ENCOUNTER (OUTPATIENT)
Dept: INFUSION THERAPY | Age: 75
Discharge: HOME | End: 2020-01-15
Payer: COMMERCIAL

## 2020-01-15 LAB
ALBUMIN SERPL-MCNC: 3.9 GM/DL (ref 3.4–5)
ALP BLD-CCNC: 82 IU/L (ref 40–129)
ALT SERPL-CCNC: 21 U/L (ref 10–40)
ANION GAP SERPL CALCULATED.3IONS-SCNC: 13 MMOL/L (ref 4–16)
AST SERPL-CCNC: 18 IU/L (ref 15–37)
BILIRUB SERPL-MCNC: 0.2 MG/DL (ref 0–1)
BUN BLDV-MCNC: 31 MG/DL (ref 6–23)
CALCIUM SERPL-MCNC: 8.8 MG/DL (ref 8.3–10.6)
CHLORIDE BLD-SCNC: 104 MMOL/L (ref 99–110)
CO2: 25 MMOL/L (ref 21–32)
CREAT SERPL-MCNC: 2 MG/DL (ref 0.6–1.1)
DIFFERENTIAL TYPE: ABNORMAL
EOSINOPHILS ABSOLUTE: 0.2 K/CU MM
EOSINOPHILS RELATIVE PERCENT: 3 % (ref 0–3)
FERRITIN: 47 NG/ML (ref 15–150)
GFR AFRICAN AMERICAN: 29 ML/MIN/1.73M2
GFR NON-AFRICAN AMERICAN: 24 ML/MIN/1.73M2
GLUCOSE BLD-MCNC: 100 MG/DL (ref 70–99)
HCT VFR BLD CALC: 35.6 % (ref 37–47)
HEMOGLOBIN: 11.2 GM/DL (ref 12.5–16)
IRON: 61 UG/DL (ref 37–145)
LYMPHOCYTES ABSOLUTE: 1.7 K/CU MM
LYMPHOCYTES RELATIVE PERCENT: 26 % (ref 24–44)
MCH RBC QN AUTO: 25.3 PG (ref 27–31)
MCHC RBC AUTO-ENTMCNC: 31.5 % (ref 32–36)
MCV RBC AUTO: 80.4 FL (ref 78–100)
MONOCYTES ABSOLUTE: 0.4 K/CU MM
MONOCYTES RELATIVE PERCENT: 7 % (ref 0–4)
PCT TRANSFERRIN: 23 % (ref 10–44)
PDW BLD-RTO: 16.1 % (ref 11.7–14.9)
PLATELET # BLD: 277 K/CU MM (ref 140–440)
PMV BLD AUTO: 10.3 FL (ref 7.5–11.1)
POTASSIUM SERPL-SCNC: 4.5 MMOL/L (ref 3.5–5.1)
RBC # BLD: 4.43 M/CU MM (ref 4.2–5.4)
SEGMENTED NEUTROPHILS ABSOLUTE COUNT: 4.1 K/CU MM
SEGMENTED NEUTROPHILS RELATIVE PERCENT: 64 % (ref 36–66)
SODIUM BLD-SCNC: 142 MMOL/L (ref 135–145)
TOTAL IRON BINDING CAPACITY: 270 UG/DL (ref 250–450)
TOTAL PROTEIN: 6.5 GM/DL (ref 6.4–8.2)
UNSATURATED IRON BINDING CAPACITY: 209 UG/DL (ref 110–370)
WBC # BLD: 6.4 K/CU MM (ref 4–10.5)

## 2020-01-15 PROCEDURE — 85025 COMPLETE CBC W/AUTO DIFF WBC: CPT

## 2020-01-15 PROCEDURE — 82728 ASSAY OF FERRITIN: CPT

## 2020-01-15 PROCEDURE — 36415 COLL VENOUS BLD VENIPUNCTURE: CPT

## 2020-01-15 PROCEDURE — 83550 IRON BINDING TEST: CPT

## 2020-01-15 PROCEDURE — 80053 COMPREHEN METABOLIC PANEL: CPT

## 2020-01-15 PROCEDURE — 83540 ASSAY OF IRON: CPT

## 2020-01-27 RX ORDER — PROPAFENONE HYDROCHLORIDE 150 MG/1
150 TABLET, FILM COATED ORAL EVERY 8 HOURS
Qty: 90 TABLET | Refills: 3 | Status: SHIPPED | OUTPATIENT
Start: 2020-01-27 | End: 2020-08-03 | Stop reason: SDUPTHER

## 2020-01-28 ENCOUNTER — HOSPITAL ENCOUNTER (OUTPATIENT)
Age: 75
Discharge: HOME | End: 2020-01-28
Payer: COMMERCIAL

## 2020-01-28 LAB
ALBUMIN SERPL-MCNC: 4 GM/DL (ref 3.4–5)
ANION GAP SERPL CALCULATED.3IONS-SCNC: 12 MMOL/L (ref 4–16)
BUN BLDV-MCNC: 35 MG/DL (ref 6–23)
CALCIUM SERPL-MCNC: 9.1 MG/DL (ref 8.3–10.6)
CHLORIDE BLD-SCNC: 103 MMOL/L (ref 99–110)
CO2: 26 MMOL/L (ref 21–32)
CREAT SERPL-MCNC: 2.3 MG/DL (ref 0.6–1.1)
GFR AFRICAN AMERICAN: 25 ML/MIN/1.73M2
GFR NON-AFRICAN AMERICAN: 21 ML/MIN/1.73M2
GLUCOSE BLD-MCNC: 107 MG/DL (ref 70–99)
PHOSPHORUS: 3.6 MG/DL (ref 2.5–4.9)
POTASSIUM SERPL-SCNC: 4.4 MMOL/L (ref 3.5–5.1)
SODIUM BLD-SCNC: 141 MMOL/L (ref 135–145)

## 2020-01-28 PROCEDURE — 80069 RENAL FUNCTION PANEL: CPT

## 2020-01-28 PROCEDURE — 36415 COLL VENOUS BLD VENIPUNCTURE: CPT

## 2020-02-03 ENCOUNTER — HOSPITAL ENCOUNTER (OUTPATIENT)
Dept: WOMENS IMAGING | Age: 75
Discharge: HOME | End: 2020-02-03
Payer: COMMERCIAL

## 2020-02-03 PROCEDURE — 77063 BREAST TOMOSYNTHESIS BI: CPT

## 2020-02-12 ENCOUNTER — OFFICE VISIT (OUTPATIENT)
Dept: INTERNAL MEDICINE CLINIC | Age: 75
End: 2020-02-12
Payer: COMMERCIAL

## 2020-02-12 VITALS
SYSTOLIC BLOOD PRESSURE: 160 MMHG | RESPIRATION RATE: 18 BRPM | WEIGHT: 192 LBS | HEART RATE: 65 BPM | DIASTOLIC BLOOD PRESSURE: 82 MMHG | BODY MASS INDEX: 28.77 KG/M2 | OXYGEN SATURATION: 97 %

## 2020-02-12 LAB
A/G RATIO: 1.7 (ref 1.1–2.2)
ALBUMIN SERPL-MCNC: 4.3 G/DL (ref 3.4–5)
ALP BLD-CCNC: 82 U/L (ref 40–129)
ALT SERPL-CCNC: 29 U/L (ref 10–40)
ANION GAP SERPL CALCULATED.3IONS-SCNC: 15 MMOL/L (ref 3–16)
AST SERPL-CCNC: 22 U/L (ref 15–37)
BASOPHILS ABSOLUTE: 0 K/UL (ref 0–0.2)
BASOPHILS RELATIVE PERCENT: 0.6 %
BILIRUB SERPL-MCNC: <0.2 MG/DL (ref 0–1)
BUN BLDV-MCNC: 38 MG/DL (ref 7–20)
CALCIUM SERPL-MCNC: 9.8 MG/DL (ref 8.3–10.6)
CHLORIDE BLD-SCNC: 101 MMOL/L (ref 99–110)
CHOLESTEROL, TOTAL: 195 MG/DL (ref 0–199)
CO2: 25 MMOL/L (ref 21–32)
CREAT SERPL-MCNC: 2.1 MG/DL (ref 0.6–1.2)
EOSINOPHILS ABSOLUTE: 0.2 K/UL (ref 0–0.6)
EOSINOPHILS RELATIVE PERCENT: 3 %
GFR AFRICAN AMERICAN: 28
GFR NON-AFRICAN AMERICAN: 23
GLOBULIN: 2.6 G/DL
GLUCOSE BLD-MCNC: 112 MG/DL (ref 70–99)
HCT VFR BLD CALC: 35 % (ref 36–48)
HDLC SERPL-MCNC: 60 MG/DL (ref 40–60)
HEMOGLOBIN: 11.3 G/DL (ref 12–16)
LDL CHOLESTEROL CALCULATED: 108 MG/DL
LYMPHOCYTES ABSOLUTE: 1.5 K/UL (ref 1–5.1)
LYMPHOCYTES RELATIVE PERCENT: 23.9 %
MCH RBC QN AUTO: 25.1 PG (ref 26–34)
MCHC RBC AUTO-ENTMCNC: 32.2 G/DL (ref 31–36)
MCV RBC AUTO: 78 FL (ref 80–100)
MONOCYTES ABSOLUTE: 0.4 K/UL (ref 0–1.3)
MONOCYTES RELATIVE PERCENT: 6.7 %
NEUTROPHILS ABSOLUTE: 4.1 K/UL (ref 1.7–7.7)
NEUTROPHILS RELATIVE PERCENT: 65.8 %
PDW BLD-RTO: 16.2 % (ref 12.4–15.4)
PLATELET # BLD: 260 K/UL (ref 135–450)
PMV BLD AUTO: 9 FL (ref 5–10.5)
POTASSIUM SERPL-SCNC: 4.5 MMOL/L (ref 3.5–5.1)
RBC # BLD: 4.49 M/UL (ref 4–5.2)
SODIUM BLD-SCNC: 141 MMOL/L (ref 136–145)
TOTAL PROTEIN: 6.9 G/DL (ref 6.4–8.2)
TRIGL SERPL-MCNC: 134 MG/DL (ref 0–150)
VLDLC SERPL CALC-MCNC: 27 MG/DL
WBC # BLD: 6.3 K/UL (ref 4–11)

## 2020-02-12 PROCEDURE — 69210 REMOVE IMPACTED EAR WAX UNI: CPT | Performed by: INTERNAL MEDICINE

## 2020-02-12 PROCEDURE — 36415 COLL VENOUS BLD VENIPUNCTURE: CPT | Performed by: INTERNAL MEDICINE

## 2020-02-12 PROCEDURE — 99214 OFFICE O/P EST MOD 30 MIN: CPT | Performed by: INTERNAL MEDICINE

## 2020-02-12 PROCEDURE — 3288F FALL RISK ASSESSMENT DOCD: CPT | Performed by: INTERNAL MEDICINE

## 2020-02-12 PROCEDURE — G8510 SCR DEP NEG, NO PLAN REQD: HCPCS | Performed by: INTERNAL MEDICINE

## 2020-02-12 RX ORDER — DOXAZOSIN 2 MG/1
2 TABLET ORAL DAILY
Qty: 90 TABLET | Refills: 3 | Status: SHIPPED | OUTPATIENT
Start: 2020-02-12 | End: 2020-02-25

## 2020-02-12 RX ORDER — ATORVASTATIN CALCIUM 80 MG/1
TABLET, FILM COATED ORAL
Qty: 90 TABLET | Refills: 3 | Status: SHIPPED | OUTPATIENT
Start: 2020-02-12 | End: 2021-02-02

## 2020-02-12 RX ORDER — FUROSEMIDE 20 MG/1
TABLET ORAL
Qty: 90 TABLET | Refills: 3 | Status: SHIPPED
Start: 2020-02-12 | End: 2020-07-13 | Stop reason: SDUPTHER

## 2020-02-12 ASSESSMENT — PATIENT HEALTH QUESTIONNAIRE - PHQ9
SUM OF ALL RESPONSES TO PHQ QUESTIONS 1-9: 0
2. FEELING DOWN, DEPRESSED OR HOPELESS: 0
SUM OF ALL RESPONSES TO PHQ QUESTIONS 1-9: 0
1. LITTLE INTEREST OR PLEASURE IN DOING THINGS: 0
SUM OF ALL RESPONSES TO PHQ9 QUESTIONS 1 & 2: 0

## 2020-02-13 LAB
ESTIMATED AVERAGE GLUCOSE: 139.9 MG/DL
HBA1C MFR BLD: 6.5 %

## 2020-02-24 ENCOUNTER — TELEPHONE (OUTPATIENT)
Dept: INTERNAL MEDICINE CLINIC | Age: 75
End: 2020-02-24

## 2020-02-24 NOTE — TELEPHONE ENCOUNTER
Patient paged Dr Ken Riojas. Called patient states this am around 8am patient woke up with a headache. BP-182/98 laid down until 10am rechecked BP-160/79, BP at 12pm-164/78, 4:20pm /88. Spoke with Dr Ken Riojas he advises patient to take one Cardura now and call us in the morning with BP. Verbal understanding per patient.

## 2020-02-25 ENCOUNTER — TELEPHONE (OUTPATIENT)
Dept: INTERNAL MEDICINE CLINIC | Age: 75
End: 2020-02-25

## 2020-02-25 RX ORDER — DOXAZOSIN 8 MG/1
8 TABLET ORAL DAILY
Qty: 90 TABLET | Refills: 3 | Status: SHIPPED | OUTPATIENT
Start: 2020-02-25 | End: 2021-02-02

## 2020-04-08 ENCOUNTER — HOSPITAL ENCOUNTER (OUTPATIENT)
Age: 75
Discharge: HOME | End: 2020-04-08
Payer: COMMERCIAL

## 2020-04-08 LAB
ALBUMIN SERPL-MCNC: 3.9 GM/DL (ref 3.4–5)
ANION GAP SERPL CALCULATED.3IONS-SCNC: 13 MMOL/L (ref 4–16)
BACTERIA: NEGATIVE /HPF
BILIRUBIN URINE: NEGATIVE MG/DL
BLOOD, URINE: NEGATIVE
BUN BLDV-MCNC: 40 MG/DL (ref 6–23)
CALCIUM SERPL-MCNC: 9.1 MG/DL (ref 8.3–10.6)
CHLORIDE BLD-SCNC: 107 MMOL/L (ref 99–110)
CLARITY: CLEAR
CO2: 23 MMOL/L (ref 21–32)
COLOR: YELLOW
CREAT SERPL-MCNC: 2.3 MG/DL (ref 0.6–1.1)
GFR AFRICAN AMERICAN: 25 ML/MIN/1.73M2
GFR NON-AFRICAN AMERICAN: 21 ML/MIN/1.73M2
GLUCOSE BLD-MCNC: 106 MG/DL (ref 70–99)
GLUCOSE, URINE: NEGATIVE MG/DL
KETONES, URINE: NEGATIVE MG/DL
LEUKOCYTE ESTERASE, URINE: ABNORMAL
NITRITE URINE, QUANTITATIVE: NEGATIVE
PH, URINE: 5 (ref 5–8)
PHOSPHORUS: 3.8 MG/DL (ref 2.5–4.9)
POTASSIUM SERPL-SCNC: 4.9 MMOL/L (ref 3.5–5.1)
PROTEIN UA: 30 MG/DL
RBC URINE: 3 /HPF (ref 0–6)
SODIUM BLD-SCNC: 143 MMOL/L (ref 135–145)
SPECIFIC GRAVITY UA: 1.01 (ref 1–1.03)
TRICHOMONAS: ABNORMAL /HPF
UROBILINOGEN, URINE: NORMAL MG/DL (ref 0.2–1)
WBC UA: 7 /HPF (ref 0–5)

## 2020-04-08 PROCEDURE — 80069 RENAL FUNCTION PANEL: CPT

## 2020-04-08 PROCEDURE — 81001 URINALYSIS AUTO W/SCOPE: CPT

## 2020-04-08 PROCEDURE — 36415 COLL VENOUS BLD VENIPUNCTURE: CPT

## 2020-04-17 RX ORDER — DILTIAZEM HYDROCHLORIDE 120 MG/1
120 CAPSULE, COATED, EXTENDED RELEASE ORAL DAILY
Qty: 30 CAPSULE | Refills: 11 | Status: SHIPPED | OUTPATIENT
Start: 2020-04-17 | End: 2021-05-17

## 2020-04-24 RX ORDER — METOPROLOL TARTRATE 50 MG/1
TABLET, FILM COATED ORAL
Qty: 180 TABLET | Refills: 3 | Status: SHIPPED | OUTPATIENT
Start: 2020-04-24 | End: 2021-05-24 | Stop reason: SDUPTHER

## 2020-05-19 ENCOUNTER — OFFICE VISIT (OUTPATIENT)
Dept: INTERNAL MEDICINE CLINIC | Age: 75
End: 2020-05-19
Payer: COMMERCIAL

## 2020-05-19 ENCOUNTER — TELEPHONE (OUTPATIENT)
Dept: INTERNAL MEDICINE CLINIC | Age: 75
End: 2020-05-19

## 2020-05-19 VITALS
SYSTOLIC BLOOD PRESSURE: 140 MMHG | WEIGHT: 199 LBS | HEART RATE: 87 BPM | DIASTOLIC BLOOD PRESSURE: 72 MMHG | BODY MASS INDEX: 29.82 KG/M2 | OXYGEN SATURATION: 94 % | RESPIRATION RATE: 18 BRPM

## 2020-05-19 PROCEDURE — 83036 HEMOGLOBIN GLYCOSYLATED A1C: CPT | Performed by: INTERNAL MEDICINE

## 2020-05-19 PROCEDURE — 82962 GLUCOSE BLOOD TEST: CPT | Performed by: INTERNAL MEDICINE

## 2020-05-19 PROCEDURE — 99214 OFFICE O/P EST MOD 30 MIN: CPT | Performed by: INTERNAL MEDICINE

## 2020-06-11 RX ORDER — OXYBUTYNIN CHLORIDE 5 MG/1
TABLET ORAL
Qty: 180 TABLET | Refills: 3 | Status: SHIPPED | OUTPATIENT
Start: 2020-06-11 | End: 2021-07-09 | Stop reason: SDUPTHER

## 2020-06-11 RX ORDER — LISINOPRIL 20 MG/1
20 TABLET ORAL DAILY
Qty: 90 TABLET | Refills: 3 | Status: SHIPPED | OUTPATIENT
Start: 2020-06-11 | End: 2020-10-13 | Stop reason: SINTOL

## 2020-06-23 ENCOUNTER — HOSPITAL ENCOUNTER (OUTPATIENT)
Age: 75
Discharge: HOME | End: 2020-06-23
Payer: COMMERCIAL

## 2020-06-23 LAB
ALBUMIN SERPL-MCNC: 4.4 GM/DL (ref 3.4–5)
ANION GAP SERPL CALCULATED.3IONS-SCNC: 13 MMOL/L (ref 4–16)
BUN BLDV-MCNC: 40 MG/DL (ref 6–23)
CALCIUM SERPL-MCNC: 9.6 MG/DL (ref 8.3–10.6)
CHLORIDE BLD-SCNC: 107 MMOL/L (ref 99–110)
CO2: 22 MMOL/L (ref 21–32)
CREAT SERPL-MCNC: 2.4 MG/DL (ref 0.6–1.1)
GFR AFRICAN AMERICAN: 24 ML/MIN/1.73M2
GFR NON-AFRICAN AMERICAN: 20 ML/MIN/1.73M2
GLUCOSE BLD-MCNC: 98 MG/DL (ref 70–99)
PHOSPHORUS: 3.9 MG/DL (ref 2.5–4.9)
POTASSIUM SERPL-SCNC: 4.8 MMOL/L (ref 3.5–5.1)
SODIUM BLD-SCNC: 142 MMOL/L (ref 135–145)

## 2020-06-23 PROCEDURE — 80069 RENAL FUNCTION PANEL: CPT

## 2020-06-23 PROCEDURE — 36415 COLL VENOUS BLD VENIPUNCTURE: CPT

## 2020-07-13 RX ORDER — FUROSEMIDE 20 MG/1
TABLET ORAL
Qty: 90 TABLET | Refills: 3 | Status: SHIPPED | OUTPATIENT
Start: 2020-07-13 | End: 2021-08-02

## 2020-07-31 RX ORDER — PROPAFENONE HYDROCHLORIDE 150 MG/1
150 TABLET, FILM COATED ORAL EVERY 8 HOURS
Qty: 90 TABLET | Refills: 3 | OUTPATIENT
Start: 2020-07-31

## 2020-08-03 RX ORDER — PROPAFENONE HYDROCHLORIDE 150 MG/1
150 TABLET, FILM COATED ORAL EVERY 8 HOURS
Qty: 90 TABLET | Refills: 3 | Status: SHIPPED | OUTPATIENT
Start: 2020-08-03 | End: 2021-02-11 | Stop reason: SDUPTHER

## 2020-08-17 ENCOUNTER — OFFICE VISIT (OUTPATIENT)
Dept: INTERNAL MEDICINE CLINIC | Age: 75
End: 2020-08-17
Payer: COMMERCIAL

## 2020-08-17 VITALS
BODY MASS INDEX: 29.49 KG/M2 | DIASTOLIC BLOOD PRESSURE: 68 MMHG | WEIGHT: 196.8 LBS | OXYGEN SATURATION: 98 % | RESPIRATION RATE: 16 BRPM | HEART RATE: 56 BPM | SYSTOLIC BLOOD PRESSURE: 168 MMHG

## 2020-08-17 LAB
BASOPHILS ABSOLUTE: 0 K/UL (ref 0–0.2)
BASOPHILS RELATIVE PERCENT: 0.8 %
EOSINOPHILS ABSOLUTE: 0.2 K/UL (ref 0–0.6)
EOSINOPHILS RELATIVE PERCENT: 3.9 %
HCT VFR BLD CALC: 32.7 % (ref 36–48)
HEMOGLOBIN: 10.6 G/DL (ref 12–16)
LYMPHOCYTES ABSOLUTE: 1.2 K/UL (ref 1–5.1)
LYMPHOCYTES RELATIVE PERCENT: 22.8 %
MCH RBC QN AUTO: 25.7 PG (ref 26–34)
MCHC RBC AUTO-ENTMCNC: 32.4 G/DL (ref 31–36)
MCV RBC AUTO: 79.3 FL (ref 80–100)
MONOCYTES ABSOLUTE: 0.3 K/UL (ref 0–1.3)
MONOCYTES RELATIVE PERCENT: 6.1 %
NEUTROPHILS ABSOLUTE: 3.5 K/UL (ref 1.7–7.7)
NEUTROPHILS RELATIVE PERCENT: 66.4 %
PDW BLD-RTO: 15.9 % (ref 12.4–15.4)
PLATELET # BLD: 236 K/UL (ref 135–450)
PMV BLD AUTO: 8.9 FL (ref 5–10.5)
RBC # BLD: 4.12 M/UL (ref 4–5.2)
WBC # BLD: 5.2 K/UL (ref 4–11)

## 2020-08-17 PROCEDURE — 99214 OFFICE O/P EST MOD 30 MIN: CPT | Performed by: INTERNAL MEDICINE

## 2020-08-17 PROCEDURE — 36415 COLL VENOUS BLD VENIPUNCTURE: CPT | Performed by: INTERNAL MEDICINE

## 2020-08-17 RX ORDER — CLONIDINE 0.1 MG/24H
1 PATCH, EXTENDED RELEASE TRANSDERMAL
Qty: 4 PATCH | Refills: 11 | Status: SHIPPED | OUTPATIENT
Start: 2020-08-17 | End: 2021-02-24

## 2020-08-17 NOTE — PROGRESS NOTES
Barbara Isbell  1945 08/17/20    SUBJECTIVE:    Pt complains that she has no energy. this has been resent for the last few months. She denies F/C, change in weight, CP, SOB, blood in the stool, black stools. She rarely has palpitations. She has noticed mild swelling in her legs. The patient is taking hypertensive medications compliantly without side effects. Denies chest pain, dyspnea, or TIA's. Blood pressure has been 148/77. Patient denies any chest pain, shortness of breath, myalgias, Patient is tolerating cholesterol medications without difficulty. Patient compliant with medications for diabetes. Pt is not checking blood sugar. Patient has had no episodes of significant hypoglycemia. OBJECTIVE:    BP (!) 168/68   Pulse 56   Resp 16   Wt 196 lb 12.8 oz (89.3 kg)   LMP  (LMP Unknown)   SpO2 98%   BMI 29.49 kg/m²     Physical Exam  Constitutional:       Appearance: She is well-developed. Eyes:      General: No scleral icterus. Conjunctiva/sclera: Conjunctivae normal.   Neck:      Musculoskeletal: Neck supple. Thyroid: No thyromegaly. Trachea: No tracheal deviation. Cardiovascular:      Rate and Rhythm: Normal rate and regular rhythm. Heart sounds: No murmur. No friction rub. No gallop. Pulmonary:      Effort: No respiratory distress. Breath sounds: No wheezing or rales. Abdominal:      General: Bowel sounds are normal. There is no distension. Palpations: Abdomen is soft. There is no hepatomegaly or mass. Tenderness: There is no abdominal tenderness. There is no guarding or rebound. Lymphadenopathy:      Cervical: No cervical adenopathy. Skin:     Nails: There is no clubbing. Neurological:      Mental Status: She is alert and oriented to person, place, and time. Psychiatric:         Behavior: Behavior normal.         Judgment: Judgment normal.         ASSESSMENT:    1. Type 2 diabetes mellitus with nephropathy ([facility] [address]    2.  Chronic renal insufficiency, stage 3 (moderate) (HCC)    3. Essential hypertension    4. Mixed hyperlipidemia    5. Paroxysmal atrial fibrillation (HCC)    6. Fatigue, unspecified type        PLAN:    Kade Dave was seen today for 3 month follow-up and fatigue. Diagnoses and all orders for this visit:    Type 2 diabetes mellitus with nephropathy ([facility] [address] - check labs  -     Hemoglobin A1C; Future    Chronic renal insufficiency, stage 3 (moderate) (HCC) - check labs - worsening renal fxn could explain fatigue  -     Comprehensive Metabolic Panel; Future  -     CBC Auto Differential; Future    Essential hypertension - not at goal; will add clonidine, f/u in 2 weeks  -     Lipid Panel; Future  -     Comprehensive Metabolic Panel; Future  -     CBC Auto Differential; Future  -     cloNIDine (CATAPRES-TTS-1) 0.1 MG/24HR PTWK; Place 1 patch onto the skin every 7 days    Mixed hyperlipidemia - check labs, cont statin  -     Lipid Panel; Future  -     Comprehensive Metabolic Panel; Future    Paroxysmal atrial fibrillation (HCC) - check echo - CHF (eg from HTN) could cause the fatigue and edema  -     Cancel: ECHO Complete 2D W Doppler W Color; Future  -     ECHO Complete 2D W Doppler W Color; Future    Fatigue, unspecified type - check labs, echo  -     TSH without Reflex; Future  -     Vitamin B12; Future  -     Cancel: ECHO Complete 2D W Doppler W Color;  Future

## 2020-08-18 ENCOUNTER — HOSPITAL ENCOUNTER (OUTPATIENT)
Dept: NON INVASIVE DIAGNOSTICS | Age: 75
Discharge: HOME | End: 2020-08-18
Payer: COMMERCIAL

## 2020-08-18 LAB
A/G RATIO: 1.6 (ref 1.1–2.2)
ALBUMIN SERPL-MCNC: 4.2 G/DL (ref 3.4–5)
ALP BLD-CCNC: 68 U/L (ref 40–129)
ALT SERPL-CCNC: 23 U/L (ref 10–40)
ANION GAP SERPL CALCULATED.3IONS-SCNC: 16 MMOL/L (ref 3–16)
AST SERPL-CCNC: 18 U/L (ref 15–37)
BILIRUB SERPL-MCNC: <0.2 MG/DL (ref 0–1)
BUN BLDV-MCNC: 40 MG/DL (ref 7–20)
CALCIUM SERPL-MCNC: 9.5 MG/DL (ref 8.3–10.6)
CHLORIDE BLD-SCNC: 108 MMOL/L (ref 99–110)
CHOLESTEROL, TOTAL: 176 MG/DL (ref 0–199)
CO2: 22 MMOL/L (ref 21–32)
CREAT SERPL-MCNC: 2.1 MG/DL (ref 0.6–1.2)
ESTIMATED AVERAGE GLUCOSE: 134.1 MG/DL
GFR AFRICAN AMERICAN: 28
GFR NON-AFRICAN AMERICAN: 23
GLOBULIN: 2.7 G/DL
GLUCOSE BLD-MCNC: 108 MG/DL (ref 70–99)
HBA1C MFR BLD: 6.3 %
HDLC SERPL-MCNC: 57 MG/DL (ref 40–60)
LDL CHOLESTEROL CALCULATED: 97 MG/DL
LV EF: 53 %
LVEF MODALITY: NORMAL
POTASSIUM SERPL-SCNC: 4.4 MMOL/L (ref 3.5–5.1)
SODIUM BLD-SCNC: 146 MMOL/L (ref 136–145)
TOTAL PROTEIN: 6.9 G/DL (ref 6.4–8.2)
TRIGL SERPL-MCNC: 109 MG/DL (ref 0–150)
TSH SERPL DL<=0.05 MIU/L-ACNC: 0.86 UIU/ML (ref 0.27–4.2)
VLDLC SERPL CALC-MCNC: 22 MG/DL

## 2020-08-18 PROCEDURE — 93306 TTE W/DOPPLER COMPLETE: CPT

## 2020-08-19 LAB — VITAMIN B-12: 816 PG/ML (ref 211–911)

## 2020-08-24 ENCOUNTER — OFFICE VISIT (OUTPATIENT)
Dept: INTERNAL MEDICINE CLINIC | Age: 75
End: 2020-08-24
Payer: COMMERCIAL

## 2020-08-24 VITALS
RESPIRATION RATE: 18 BRPM | HEART RATE: 74 BPM | BODY MASS INDEX: 29.07 KG/M2 | WEIGHT: 194 LBS | DIASTOLIC BLOOD PRESSURE: 78 MMHG | SYSTOLIC BLOOD PRESSURE: 146 MMHG | OXYGEN SATURATION: 97 %

## 2020-08-24 PROCEDURE — 99213 OFFICE O/P EST LOW 20 MIN: CPT | Performed by: INTERNAL MEDICINE

## 2020-08-24 NOTE — PROGRESS NOTES
Jett Williamson  1945 08/24/20    SUBJECTIVE:    Pt started clonidine for the HTN - BP has been 103-147/6-70. The is tolerating the clonidine well. She is not getting any activity. She has been feeling fatigued. She is not getting many social interactions. OBJECTIVE:    BP (!) 146/78   Pulse 74   Resp 18   Wt 194 lb (88 kg)   LMP  (LMP Unknown)   SpO2 97%   BMI 29.07 kg/m²     Physical Exam  Constitutional:       Appearance: Normal appearance. Pulmonary:      Effort: Pulmonary effort is normal. No respiratory distress. Neurological:      Mental Status: She is alert. Psychiatric:         Mood and Affect: Mood normal.         Behavior: Behavior normal.         Thought Content: Thought content normal.         Judgment: Judgment normal.         ASSESSMENT:    1. Essential hypertension    2. Fatigue, unspecified type        PLAN:    [facility] was seen today for other. Diagnoses and all orders for this visit:    Essential hypertension - improved. Elevated today but doing well at home; no change    Fatigue, unspecified type - I wonder if this is from the lack of activity and social interactions. Encourage daily exercise.  She denies any depression

## 2020-10-09 ENCOUNTER — HOSPITAL ENCOUNTER (OUTPATIENT)
Age: 75
Discharge: HOME | End: 2020-10-09
Payer: COMMERCIAL

## 2020-10-09 LAB
ALBUMIN SERPL-MCNC: 4.3 GM/DL (ref 3.4–5)
ANION GAP SERPL CALCULATED.3IONS-SCNC: 13 MMOL/L (ref 4–16)
BACTERIA: NEGATIVE /HPF
BILIRUBIN URINE: NEGATIVE MG/DL
BLOOD, URINE: NEGATIVE
BUN BLDV-MCNC: 36 MG/DL (ref 6–23)
CALCIUM SERPL-MCNC: 9.2 MG/DL (ref 8.3–10.6)
CHLORIDE BLD-SCNC: 107 MMOL/L (ref 99–110)
CHLORIDE URINE RANDOM: 77 MMOL/L (ref 43–210)
CLARITY: CLEAR
CO2: 22 MMOL/L (ref 21–32)
COLOR: YELLOW
CREAT SERPL-MCNC: 2.2 MG/DL (ref 0.6–1.1)
EOSINOPHIL, URINE: NORMAL /HPF
GFR AFRICAN AMERICAN: 26 ML/MIN/1.73M2
GFR NON-AFRICAN AMERICAN: 22 ML/MIN/1.73M2
GLUCOSE BLD-MCNC: 103 MG/DL (ref 70–99)
GLUCOSE, URINE: NEGATIVE MG/DL
KETONES, URINE: NEGATIVE MG/DL
LEUKOCYTE ESTERASE, URINE: ABNORMAL
NITRITE URINE, QUANTITATIVE: NEGATIVE
PH, URINE: 6 (ref 5–8)
PHOSPHORUS: 3.5 MG/DL (ref 2.5–4.9)
POTASSIUM SERPL-SCNC: 5.8 MMOL/L (ref 3.5–5.1)
POTASSIUM, UR: 56.8 MMOL/L (ref 22–119)
PROTEIN UA: 30 MG/DL
RBC URINE: <1 /HPF (ref 0–6)
SODIUM BLD-SCNC: 142 MMOL/L (ref 135–145)
SODIUM URINE: 81 MMOL/L (ref 35–167)
SPECIFIC GRAVITY UA: 1.01 (ref 1–1.03)
SQUAMOUS EPITHELIAL: <1 /HPF
TRICHOMONAS: ABNORMAL /HPF
UROBILINOGEN, URINE: NORMAL MG/DL (ref 0.2–1)
WBC UA: 2 /HPF (ref 0–5)

## 2020-10-09 PROCEDURE — 81001 URINALYSIS AUTO W/SCOPE: CPT

## 2020-10-09 PROCEDURE — 87205 SMEAR GRAM STAIN: CPT

## 2020-10-09 PROCEDURE — 84133 ASSAY OF URINE POTASSIUM: CPT

## 2020-10-09 PROCEDURE — 36415 COLL VENOUS BLD VENIPUNCTURE: CPT

## 2020-10-09 PROCEDURE — 84300 ASSAY OF URINE SODIUM: CPT

## 2020-10-09 PROCEDURE — 82436 ASSAY OF URINE CHLORIDE: CPT

## 2020-10-09 PROCEDURE — 80069 RENAL FUNCTION PANEL: CPT

## 2020-10-22 ENCOUNTER — OFFICE VISIT (OUTPATIENT)
Dept: CARDIOLOGY CLINIC | Age: 75
End: 2020-10-22
Payer: COMMERCIAL

## 2020-10-22 VITALS
HEIGHT: 69 IN | DIASTOLIC BLOOD PRESSURE: 72 MMHG | BODY MASS INDEX: 28.85 KG/M2 | SYSTOLIC BLOOD PRESSURE: 128 MMHG | WEIGHT: 194.8 LBS | HEART RATE: 60 BPM

## 2020-10-22 PROCEDURE — 99214 OFFICE O/P EST MOD 30 MIN: CPT | Performed by: NURSE PRACTITIONER

## 2020-10-22 PROCEDURE — 93000 ELECTROCARDIOGRAM COMPLETE: CPT | Performed by: NURSE PRACTITIONER

## 2020-10-22 ASSESSMENT — ENCOUNTER SYMPTOMS
VOMITING: 0
COUGH: 0
PHOTOPHOBIA: 0
SHORTNESS OF BREATH: 0
COLOR CHANGE: 0
NAUSEA: 0
ABDOMINAL PAIN: 0
CONSTIPATION: 0
DIARRHEA: 0
BLOOD IN STOOL: 0
SINUS PAIN: 0

## 2020-10-22 NOTE — ASSESSMENT & PLAN NOTE
 Patient has a CPAP machine   Patient does wear it most every night   Patient does sleep well.   [facility] manages

## 2020-10-22 NOTE — ASSESSMENT & PLAN NOTE
 Controlled   To continue Beta blocker, Calcium channel blocker   advised low salt diet   Last echo 2019 Summary   Left ventricular systolic function is normal.   Ejection fraction is visually estimated at 50-55%. Mild left ventricular hypertrophy. Sclerotic aortic valve. Mild mitral regurgitation. Physiological pericardial effusion.

## 2020-10-22 NOTE — PROGRESS NOTES
[facility] [address] [facility],Third Floor  Phone: [phone]    Fax [phone]                  Renu Ward MD, Travis Caceres MD, [address] [facility], MD, Eulas Hodgkin, MD Mabel Runner, MD Jacquelyn Armor, MD Almond Pin, APRXIN Suarez, APRXIN Gagnon, APRXIN Gee, APRN    CARDIOLOGY  NOTE      10/22/2020    RE: Jeremy Ma  (1945)                               TO:  Dr. Cristhian Reynolds MD  The primary cardiologist is Dr. Marlene Delgadillo    CC:   1. Paroxysmal atrial fibrillation (HCC)    2. Essential hypertension    3. Mixed hyperlipidemia    4. ADOLFO (obstructive sleep apnea)      Patient denies all of the following:  Chest Pain  Palpitations  Shortness of Breath  Edema  Dizziness  Syncope      HPI: Thank you for involving me in taking care of your patient Jeremy Ma, who is a  76y.o. year old female with a history as listed above. Patient is  active and does not exercise regularly. Patient is  compliant with her medications. Patient denies any cardiac complaints or needs.     Vitals:    10/22/20 0941   BP: 128/72   Pulse: 60       Current Outpatient Medications   Medication Sig Dispense Refill    rivaroxaban (XARELTO) 15 MG TABS tablet Take 1 tablet by mouth every 24 hours 30 tablet 5    Omega-3 Fatty Acids (FISH OIL PO) Take 1,200 mg by mouth      FOLIC ACID PO Take 202 mg by mouth daily      cloNIDine (CATAPRES-TTS-1) 0.1 MG/24HR PTWK Place 1 patch onto the skin every 7 days 4 patch 11    propafenone (RYTHMOL) 150 MG tablet Take 1 tablet by mouth every 8 hours 90 tablet 3    furosemide (LASIX) 20 MG tablet take 1 tablet by mouth every other day 90 tablet 3    metFORMIN (GLUCOPHAGE) 500 MG tablet Take 1 tablet by mouth 2 times daily (with meals) 180 tablet 1    oxybutynin (DITROPAN) 5 MG tablet take 1 tablet by mouth twice a day 180 tablet 3    metoprolol tartrate (LOPRESSOR) 50 MG tablet No signif CAD -Dr Honey Boone;;    History of cardiovascular stress test     11/14/2013-EF 70%. Normal Lexiscan Cardiolite, Uniform wall motion;    History of echocardiogram     13/58/3079-[id number] global systolic  function. No wall motion abnormalities. EF 55%. Mild MR/TR;    History of Holter monitoring 11/17/14 11/14-48 hour Holter: Predominant rhythm was sinus, no ventricular ectopy noted, supraventricular ectopics were noted in single beat forms.  Hx of 24 hour EKG monitoring     12/17/13 48 hr holter monitor. Sinus rhythm with intermittent sinus arrhythmia.  Hyperlipidemia     Hypertension     11/13 Cath WNL, EF 55%; 11/13 Stress WNL : 11/13 TTE mild MR and TR, EF 55%; 4/12 Stress WNL; 8/9 - Cath WNL    Iron deficiency anemia 7/9/2013 9/13 EGD WNL    Lumbar radiculopathy 11/25/2013    Menopause     ADOLFO (obstructive sleep apnea)     sleep study 10/3 CPAP 6cm     Osteoarthritis     both knees    Osteopenia     9/12 DEXA T-score -1.5    Other activity(E029.9)     48 hr holter, the 48 hr holter monitor reveals the patient in the sinus rhythm with occasional supraventricular ectopic beats. There is a rare short atrial run.  Paroxysmal atrial fibrillation (HCC)     4/12 Holter [facility]    Prolonged emergence from general anesthesia     Proteinuria     Right Breast Cancer Dx 10-12    Supraventricular tachycardia (HCC)     Type 2 diabetes mellitus (HCC) Dx 2000's    Urge incontinence     Wears partial dentures     upper partial     Past Surgical History:   Procedure Laterality Date    BREAST BIOPSY  10-12    Right Breast Biopsy, Cancer    BREAST BIOPSY  1970's    Right Breast Biopsy, Benign    BREAST LUMPECTOMY  11/6/12    Breast cancer - Right with sentinal node    BUNIONECTOMY  1990's    Right Cristian Durham    8/10/09- The patient has no significant CAD. The elevated troponin is probably secondary to SVT. , 10/03- no significant CAD    CARPAL TUNNEL RELEASE Left 2019    CATARACT REMOVAL Bilateral 2020,     CATARACT REMOVAL Left 2020    COLONOSCOPY  \"X 2 Last One  \"    Polpy Removed During Last Colonoscopy    DENTAL SURGERY      Teeth Extracted In Past    ENDOSCOPY, COLON, DIAGNOSTIC  2016    savary dilatation to 17 mm    HYSTERECTOMY, TOTAL ABDOMINAL  1980's    JOINT REPLACEMENT      Total Left Knee    JOINT REPLACEMENT      Total Right Knee    UPPER GASTROINTESTINAL ENDOSCOPY N/A 2018    EGD DIAGNOSTIC ONLY performed by Mirtha Joseph MD at [address]. \Hospital Has a New Name and Outlook.\"" ENDOSCOPY     Family History   Problem Relation Age of Onset    Diabetes Mother     Early Death Mother 61    Cancer Father         \"Lung Cancer\"    Heart Disease Father     Diabetes Father     Stroke Sister     Diabetes Sister     Diabetes Brother     Cancer Brother         \"Prostate Cancer\"    Cancer Brother         \"Lung Cancer\"    Thyroid Disease Daughter      Social History     Tobacco Use    Smoking status: Former Smoker     Packs/day: 0.50     Years: 10.00     Pack years: 5.00     Last attempt to quit: 1987     Years since quittin.8    Smokeless tobacco: Never Used   Substance Use Topics    Alcohol use: No     Alcohol/week: 0.0 standard drinks     Comment: 3 cups coffee daily        Review of Systems   Constitutional: Negative for fatigue and fever. HENT: Negative for ear pain and sinus pain. Eyes: Negative for photophobia and visual disturbance. Respiratory: Negative for cough and shortness of breath. Cardiovascular: Negative for chest pain, palpitations and leg swelling. Gastrointestinal: Negative for abdominal pain, blood in stool, constipation, diarrhea, nausea and vomiting. Endocrine: Negative for polyphagia and polyuria. Genitourinary: Negative for decreased urine volume and difficulty urinating. Musculoskeletal: Negative for arthralgias and gait problem. Skin: Negative for color change and wound.    Neurological: Negative for dizziness, syncope, weakness, light-headedness and headaches. Psychiatric/Behavioral: Negative for agitation. The patient is not hyperactive. Objective:      Physical Exam:  /72   Pulse 60   Ht 5' 8.5\" (1.74 m)   Wt 194 lb 12.8 oz (88.4 kg)   LMP  (LMP Unknown)   BMI 29.19 kg/m²   Wt Readings from Last 3 Encounters:   10/22/20 194 lb 12.8 oz (88.4 kg)   10/13/20 198 lb (89.8 kg)   08/24/20 194 lb (88 kg)     Body mass index is 29.19 kg/m². Physical Exam  Vitals signs reviewed. Constitutional:       General: She is not in acute distress. Appearance: Normal appearance. She is not ill-appearing. Eyes:      Conjunctiva/sclera: Conjunctivae normal.      Pupils: Pupils are equal, round, and reactive to light. Neck:      Musculoskeletal: Neck supple. No muscular tenderness. Vascular: No carotid bruit. Cardiovascular:      Rate and Rhythm: Normal rate and regular rhythm. Pulses: Normal pulses. Heart sounds: Normal heart sounds. No murmur. Pulmonary:      Effort: Pulmonary effort is normal. No respiratory distress. Breath sounds: Normal breath sounds. Musculoskeletal:         General: No swelling or deformity. Skin:     General: Skin is warm and dry. Capillary Refill: Capillary refill takes less than 2 seconds. Neurological:      Mental Status: She is alert and oriented to person, place, and time. Psychiatric:         Mood and Affect: Mood normal.         Behavior: Behavior normal.         Thought Content:  Thought content normal.         Judgment: Judgment normal.         DATA:  Lab Results   Component Value Date    CKTOTAL 197 11/30/2017    CKMB 1.7 04/22/2012    TROPONINI 0.006 11/14/2013    TROPONINI <0.006 04/22/2012     BNP:    Lab Results   Component Value Date     11/13/2013     PT/INR:  No results found for: License Acquisitions  Lab Results   Component Value Date    LABA1C 6.3 08/17/2020    LABA1C 6.5 02/12/2020     Lab Results   Component Value Date    CHOL 176 08/17/2020    TRIG 109 08/17/2020    HDL 57 08/17/2020    LDLCALC 97 08/17/2020    LDLDIRECT 121 (H) 11/12/2019     Lab Results   Component Value Date    ALT 23 08/17/2020    AST 18 08/17/2020     TSH:    Lab Results   Component Value Date    TSH 0.86 08/17/2020         Assessment/ Plan:     ADOLFO (obstructive sleep apnea)   Patient has a CPAP machine   Patient does wear it most every night   Patient does sleep well.  Pulmonology manages    Essential hypertension   Controlled   To continue Beta blocker, Calcium channel blocker   advised low salt diet   Last echo 2019 Summary   Left ventricular systolic function is normal.   Ejection fraction is visually estimated at 50-55%. Mild left ventricular hypertrophy. Sclerotic aortic valve. Mild mitral regurgitation. Physiological pericardial effusion. Mixed hyperlipidemia   Lipid panel reviewed   Patient LDL is at goal, HDL is  at goal   Patient is on a Statin   Discussed with the patient the need for exercise, low cholesterol diet, and compliance with medications. Results for Ebenezer Oliva (MRN [id number]) as of 10/22/2020 10:09   Ref. Range 8/17/2020 11:38   Cholesterol, Total Latest Ref Range: 0 - 199 mg/dL 176   HDL Cholesterol Latest Ref Range: 40 - 60 mg/dL 57   LDL Calculated Latest Ref Range: <100 mg/dL 97   Triglycerides Latest Ref Range: 0 - 150 mg/dL 109   VLDL Cholesterol Calculated Latest Ref Range: Not Established mg/dL 22       Paroxysmal atrial fibrillation (HCC)   Rate controlled yes.  Chadsvasc score is 5   She is  on anticoagulation.  Xarelto 15 mg due to renal dysfunction per Dr. Brittni Nieves anti rhythmic / rate control medications    EKG today: sinus braycardia    [id number] Score for Atrial Fibrillation Stroke Risk   Risk   Factors  Component Value   C CHF No 0   H HTN Yes 1   A2 Age >= 76 Yes,  (69 y.o.) 2   D DM Yes 1   S2 Prior Stroke/TIA No 0   V Vascular Disease No 0   A Age 74-69 No,  (69 y.o.) 0   Sc Sex female 1    [id number]  Score  5   Score last updated 10/22/20 83:58 PM EDT    Click here for a link to the UpToDate guideline \"Atrial Fibrillation: Anticoagulation therapy to prevent embolization    Disclaimer: Risk Score calculation is dependent on accuracy of patient problem list and past encounter diagnosis. Patient seen, interviewed and examined. Testing was reviewed. Patient is encouraged to exercise even a brisk walk for 30 minutes at least 3 to 4 times a week. Lifestyle and risk factor modificatons discussed. Various goals are discussed and questions answered. Continue current medications. Appropriate prescriptions are addressed. Questions answered and patient verbalizes understanding. Call for any problems, questions, or concerns.     Pt is to follow up in 12 months for Cardiac management    Electronically signed by FAITH Garcia CNP on 10/22/2020 at 10:14 AM

## 2020-10-23 NOTE — ASSESSMENT & PLAN NOTE
 Rate controlled yes.  Chadsvasc score is 5   She is  on anticoagulation. Xarelto 15 mg due to renal dysfunction per Dr. Donaldo Diaz anti rhythmic / rate control medications    EKG today: sinus braycardia    [id number] Score for Atrial Fibrillation Stroke Risk   Risk   Factors  Component Value   C CHF No 0   H HTN Yes 1   A2 Age >= 76 Yes,  (69 y.o.) 2   D DM Yes 1   S2 Prior Stroke/TIA No 0   V Vascular Disease No 0   A Age 74-69 No,  (69 y.o.) 0   Sc Sex female 1    [id number]  Score  5   Score last updated 10/22/20 88:41 PM EDT    Click here for a link to the UpToDate guideline \"Atrial Fibrillation: Anticoagulation therapy to prevent embolization    Disclaimer: Risk Score calculation is dependent on accuracy of patient problem list and past encounter diagnosis.

## 2020-11-10 ENCOUNTER — HOSPITAL ENCOUNTER (OUTPATIENT)
Age: 75
Discharge: HOME | End: 2020-11-10
Payer: COMMERCIAL

## 2020-11-10 LAB
ALBUMIN SERPL-MCNC: 4.4 GM/DL (ref 3.4–5)
ANION GAP SERPL CALCULATED.3IONS-SCNC: 13 MMOL/L (ref 4–16)
BUN BLDV-MCNC: 35 MG/DL (ref 6–23)
CALCIUM SERPL-MCNC: 9.6 MG/DL (ref 8.3–10.6)
CHLORIDE BLD-SCNC: 104 MMOL/L (ref 99–110)
CO2: 25 MMOL/L (ref 21–32)
CREAT SERPL-MCNC: 2 MG/DL (ref 0.6–1.1)
GFR AFRICAN AMERICAN: 29 ML/MIN/1.73M2
GFR NON-AFRICAN AMERICAN: 24 ML/MIN/1.73M2
GLUCOSE BLD-MCNC: 90 MG/DL (ref 70–99)
PHOSPHORUS: 4 MG/DL (ref 2.5–4.9)
POTASSIUM SERPL-SCNC: 5.2 MMOL/L (ref 3.5–5.1)
SODIUM BLD-SCNC: 142 MMOL/L (ref 135–145)

## 2020-11-10 PROCEDURE — 36415 COLL VENOUS BLD VENIPUNCTURE: CPT

## 2020-11-10 PROCEDURE — 80069 RENAL FUNCTION PANEL: CPT

## 2020-11-17 PROBLEM — K90.9 INTESTINAL MALABSORPTION: Status: ACTIVE | Noted: 2020-11-17

## 2020-11-17 PROBLEM — C50.411 MALIGNANT NEOPLASM OF UPPER-OUTER QUADRANT OF RIGHT FEMALE BREAST (HCC): Status: ACTIVE | Noted: 2020-11-17

## 2020-11-24 ENCOUNTER — OFFICE VISIT (OUTPATIENT)
Dept: INTERNAL MEDICINE CLINIC | Age: 75
End: 2020-11-24
Payer: COMMERCIAL

## 2020-11-24 VITALS
WEIGHT: 196 LBS | OXYGEN SATURATION: 97 % | SYSTOLIC BLOOD PRESSURE: 146 MMHG | DIASTOLIC BLOOD PRESSURE: 70 MMHG | HEART RATE: 62 BPM | RESPIRATION RATE: 18 BRPM | BODY MASS INDEX: 29.37 KG/M2

## 2020-11-24 LAB
A/G RATIO: 1.8 (ref 1.1–2.2)
ALBUMIN SERPL-MCNC: 4.1 G/DL (ref 3.4–5)
ALP BLD-CCNC: 71 U/L (ref 40–129)
ALT SERPL-CCNC: 26 U/L (ref 10–40)
ANION GAP SERPL CALCULATED.3IONS-SCNC: 13 MMOL/L (ref 3–16)
AST SERPL-CCNC: 14 U/L (ref 15–37)
BILIRUB SERPL-MCNC: <0.2 MG/DL (ref 0–1)
BUN BLDV-MCNC: 50 MG/DL (ref 7–20)
CALCIUM SERPL-MCNC: 9 MG/DL (ref 8.3–10.6)
CHLORIDE BLD-SCNC: 107 MMOL/L (ref 99–110)
CO2: 24 MMOL/L (ref 21–32)
CREAT SERPL-MCNC: 2 MG/DL (ref 0.6–1.2)
GFR AFRICAN AMERICAN: 29
GFR NON-AFRICAN AMERICAN: 24
GLOBULIN: 2.3 G/DL
GLUCOSE BLD-MCNC: 94 MG/DL (ref 70–99)
POTASSIUM SERPL-SCNC: 4.7 MMOL/L (ref 3.5–5.1)
SODIUM BLD-SCNC: 144 MMOL/L (ref 136–145)
TOTAL PROTEIN: 6.4 G/DL (ref 6.4–8.2)

## 2020-11-24 PROCEDURE — 36415 COLL VENOUS BLD VENIPUNCTURE: CPT | Performed by: INTERNAL MEDICINE

## 2020-11-24 PROCEDURE — 99214 OFFICE O/P EST MOD 30 MIN: CPT | Performed by: INTERNAL MEDICINE

## 2020-11-24 NOTE — PROGRESS NOTES
Manuel Pickering  1945 11/24/20    SUBJECTIVE:      The patient is taking hypertensive medications compliantly without side effects. Denies chest pain, dyspnea, edema, or TIA's. She is off lisinopril because of hyperkalemia. Patient compliant with medications for diabetes. Patient has had no episodes of significant hypoglycemia. She continues on xarelto, cardizem, metoprolol for a fiob. She denies any palpitation, blood in the stool, black stools. OBJECTIVE:    BP (!) 146/70   Pulse 62   Resp 18   Wt 196 lb (88.9 kg)   LMP  (LMP Unknown)   SpO2 97%   BMI 29.37 kg/m²     Physical Exam  Constitutional:       Appearance: She is well-developed. Eyes:      General: No scleral icterus. Conjunctiva/sclera: Conjunctivae normal.   Cardiovascular:      Rate and Rhythm: Normal rate and regular rhythm. Heart sounds: Normal heart sounds. No murmur. Pulmonary:      Effort: Pulmonary effort is normal. No respiratory distress. Breath sounds: Normal breath sounds. No wheezing. ASSESSMENT:    1. Paroxysmal atrial fibrillation (HCC)    2. Essential hypertension    3. Chronic renal insufficiency, stage 3 (moderate)    4. Type 2 diabetes mellitus with nephropathy (HCC)        PLAN:    Salima Ponce was seen today for other. Diagnoses and all orders for this visit:    Paroxysmal atrial fibrillation ([address] - on rate control and anticoagulation; no change in mgmt    Essential hypertension - elevated today but off lisinopril. Check labs, if potassium and creatinine OK will restart low dose lisinpril and check labs soon thereafter    Chronic renal insufficiency, stage 3 (moderate) - as above  -     Hemoglobin A1C; Future    Type 2 diabetes mellitus with nephropathy (HCC) - check labs  -     Hemoglobin A1C; Future  -     Comprehensive Metabolic Panel;  Future

## 2020-11-25 LAB
ESTIMATED AVERAGE GLUCOSE: 128.4 MG/DL
HBA1C MFR BLD: 6.1 %

## 2020-11-25 RX ORDER — LISINOPRIL 2.5 MG/1
2.5 TABLET ORAL DAILY
Qty: 90 TABLET | Refills: 1 | Status: ON HOLD | OUTPATIENT
Start: 2020-11-25 | End: 2021-05-11 | Stop reason: SDUPTHER

## 2020-12-01 PROBLEM — N28.1 ACQUIRED CYST OF KIDNEY: Status: ACTIVE | Noted: 2020-12-01

## 2020-12-01 PROBLEM — N18.30 CHRONIC KIDNEY DISEASE, STAGE III (MODERATE) (HCC): Status: ACTIVE | Noted: 2020-12-01

## 2020-12-01 PROBLEM — N17.8 OTHER ACUTE KIDNEY FAILURE (HCC): Status: ACTIVE | Noted: 2020-12-01

## 2020-12-07 ENCOUNTER — VIRTUAL VISIT (OUTPATIENT)
Dept: PULMONOLOGY | Age: 75
End: 2020-12-07
Payer: COMMERCIAL

## 2020-12-07 PROCEDURE — 99213 OFFICE O/P EST LOW 20 MIN: CPT | Performed by: INTERNAL MEDICINE

## 2020-12-07 ASSESSMENT — ENCOUNTER SYMPTOMS
EYE DISCHARGE: 0
ABDOMINAL PAIN: 0
COUGH: 0
ABDOMINAL DISTENTION: 0
EYE ITCHING: 0
SHORTNESS OF BREATH: 0
BACK PAIN: 0

## 2020-12-07 NOTE — PROGRESS NOTES
Constance Dutton  1945  Referring Provider:     Subjective:     Chief Complaint   Patient presents with    Follow-up     compliance       HPI  Marco Antonio Perez is a 76 y.o. female has come back as a follow up. She has mild ADOLFO on a CPAP of 8 cm h20. She used it for sometime but has not been using it for few months. She has a nasal pillows mask and it burns. She wants to try again first. She has no loss of weight. She is still sleepy and tired during the day time. Her 2 week download data showed that she us for only 1 day.      Current Outpatient Medications   Medication Sig Dispense Refill    lisinopril (PRINIVIL;ZESTRIL) 2.5 MG tablet Take 1 tablet by mouth daily 90 tablet 1    rivaroxaban (XARELTO) 15 MG TABS tablet Take 1 tablet by mouth every 24 hours 21 tablet 5    Omega-3 Fatty Acids (FISH OIL PO) Take 1,200 mg by mouth      FOLIC ACID PO Take 658 mg by mouth daily      cloNIDine (CATAPRES-TTS-1) 0.1 MG/24HR PTWK Place 1 patch onto the skin every 7 days 4 patch 11    propafenone (RYTHMOL) 150 MG tablet Take 1 tablet by mouth every 8 hours 90 tablet 3    furosemide (LASIX) 20 MG tablet take 1 tablet by mouth every other day 90 tablet 3    metFORMIN (GLUCOPHAGE) 500 MG tablet Take 1 tablet by mouth 2 times daily (with meals) 180 tablet 1    oxybutynin (DITROPAN) 5 MG tablet take 1 tablet by mouth twice a day 180 tablet 3    metoprolol tartrate (LOPRESSOR) 50 MG tablet take 1 tablet by mouth twice a day 180 tablet 3    dilTIAZem (CARTIA XT) 120 MG extended release capsule Take 1 capsule by mouth daily 30 capsule 11    doxazosin (CARDURA) 8 MG tablet Take 1 tablet by mouth daily 90 tablet 3    atorvastatin (LIPITOR) 80 MG tablet take 1 tablet by mouth once daily 90 tablet 3    colchicine (COLCRYS) 0.6 MG tablet Take 1 tablet by mouth 2 times daily 30 tablet 0    Multiple Vitamins-Minerals (MULTIVITAMIN ADULT PO) Take by mouth      aspirin 81 MG EC tablet Take 1 tablet by mouth daily 30 tablet     Lancets MISC 1 each by In Vitro route 2 times daily 100 each 3    Glucose Blood (BLOOD GLUCOSE TEST STRIPS) STRP 1 each by In Vitro route 2 times daily 100 strip 3    Blood Glucose Monitoring Suppl AV 1 kit by Does not apply route daily 1 Device 0    ferrous sulfate 325 (65 FE) MG tablet take 1 tablet by mouth three times a day  0    Calcium Carbonate (CALTRATE 600 PO) Take  by mouth 2 times daily. No current facility-administered medications for this visit. Allergies   Allergen Reactions    Latex      \"Blisters\"    Tape Ali Grabiel Tape]      \"Turn Red\"       Past Medical History:   Diagnosis Date    14 day event monitor 11/05/2018    Sinus rhythm    Atrial fibrillation with RVR ([address] 11/25/2013    Edema     0/84 TTE diastolic dysfxn, EF 20%; 41/29 - TTE diastolic dysfxn, EF 58%; Stress myoview  WNl, EF 70%    Family history of cardiovascular disease     GERD (gastroesophageal reflux disease)     H/O 24 hour EKG monitoring 4/23/12    [facility]    H/O cardiovascular stress test     4/23/12-[facility], Probably norm perfusion Lexiscan cardiolite study except for diaphramatic attenuation artifact, otherwise perfusion is normal, norm LV fxn by gated scan. 11/10-EF70%, 9/15/08-EF70%, 1/15/07-EF70%, 9/03    H/O echocardiogram     4/23/12-[facility]- Norm chamber sizes. LVH with norm LV systolic but abn diastolic fxn, mild MR and TR, minimal pulm HTN. 11/10 -EF>55%; 3/05, 9/23/03    History of cardiac catheterization     11/15/2013-EF 55%, No signif CAD -Dr Lennox Mars;;    History of cardiovascular stress test     11/14/2013-EF 70%. Normal Lexiscan Cardiolite, Uniform wall motion;    History of echocardiogram     46/74/6873-[id number] global systolic  function. No wall motion abnormalities. EF 55%. Mild MR/TR;    History of Holter monitoring 11/17/14 11/14-48 hour Holter: Predominant rhythm was sinus, no ventricular ectopy noted, supraventricular ectopics were noted in single beat forms.      Hx of 24 hour EKG monitoring     12/17/13 48 hr holter monitor. Sinus rhythm with intermittent sinus arrhythmia.  Hyperlipidemia     Hypertension     11/13 Cath WNL, EF 55%; 11/13 Stress WNL : 11/13 TTE mild MR and TR, EF 55%; 4/12 Stress WNL; 8/9 - Cath WNL    Iron deficiency anemia 7/9/2013 9/13 EGD WNL    Lumbar radiculopathy 11/25/2013    Menopause     ADOLFO (obstructive sleep apnea)     sleep study 10/3 CPAP 6cm     Osteoarthritis     both knees    Osteopenia     9/12 DEXA T-score -1.5    Other activity(E029.9)     48 hr holter, the 48 hr holter monitor reveals the patient in the sinus rhythm with occasional supraventricular ectopic beats. There is a rare short atrial run.  Paroxysmal atrial fibrillation (HCC)     4/12 Holter [facility]    Prolonged emergence from general anesthesia     Proteinuria     Right Breast Cancer Dx 10-12    Supraventricular tachycardia (HCC)     Type 2 diabetes mellitus (HCC) Dx 2000's    Urge incontinence     Wears partial dentures     upper partial       Past Surgical History:   Procedure Laterality Date    BREAST BIOPSY  10-12    Right Breast Biopsy, Cancer    BREAST BIOPSY  1970's    Right Breast Biopsy, Benign    BREAST LUMPECTOMY  11/6/12    Breast cancer - Right with sentinal node    BUNIONECTOMY  1990's    Right Zahida Meyer    8/10/09- The patient has no significant CAD. The elevated troponin is probably secondary to SVT. , 10/03- no significant CAD    CARPAL TUNNEL RELEASE Left 01/2019    CATARACT REMOVAL Bilateral 03/2020, 5/20    CATARACT REMOVAL Left 05/2020    COLONOSCOPY  \"X 2 Last One 2008 \"    Polpy Removed During Last Colonoscopy    DENTAL SURGERY      Teeth Extracted In Past    ENDOSCOPY, COLON, DIAGNOSTIC  07/18/2016    savary dilatation to 17 mm    HYSTERECTOMY, TOTAL ABDOMINAL  1980's    JOINT REPLACEMENT  2009    Total Left Knee    JOINT REPLACEMENT  2010    Total Right Knee    UPPER GASTROINTESTINAL ENDOSCOPY N/A 2018    EGD DIAGNOSTIC ONLY performed by Thais Araya MD at [address] [place] [address] Marital status:      Spouse name: Not on file    Number of children: Not on file    Years of education: Not on file    Highest education level: Not on file   Occupational History    Not on file   Social Needs    Financial resource strain: Not on file    Food insecurity     Worry: Not on file     Inability: Not on file    Transportation needs     Medical: Not on file     Non-medical: Not on file   Tobacco Use    Smoking status: Former Smoker     Packs/day: 0.50     Years: 10.00     Pack years: 5.00     Last attempt to quit: 1987     Years since quittin.9    Smokeless tobacco: Never Used   Substance and Sexual Activity    Alcohol use: No     Alcohol/week: 0.0 standard drinks     Comment: 3 cups coffee daily    Drug use: No    Sexual activity: Never   Lifestyle    Physical activity     Days per week: Not on file     Minutes per session: Not on file    Stress: Not on file   Relationships    Social connections     Talks on phone: Not on file     Gets together: Not on file     Attends Denominational service: Not on file     Active member of club or organization: Not on file     Attends meetings of clubs or organizations: Not on file     Relationship status: Not on file    Intimate partner violence     Fear of current or ex partner: Not on file     Emotionally abused: Not on file     Physically abused: Not on file     Forced sexual activity: Not on file   Other Topics Concern    Not on file   Social History Narrative    Wears glasses       Review of Systems   Constitutional: Positive for fatigue. HENT: Negative for congestion and postnasal drip. Eyes: Negative for discharge and itching. Respiratory: Negative for cough and shortness of breath. Cardiovascular: Negative for chest pain and leg swelling.    Gastrointestinal: Negative for abdominal distention and abdominal pain. Endocrine: Negative for cold intolerance and heat intolerance. Genitourinary: Negative for enuresis and frequency. Musculoskeletal: Negative for arthralgias and back pain. Allergic/Immunologic: Negative for environmental allergies and food allergies. Neurological: Negative for light-headedness and headaches. Hematological: Negative for adenopathy. Psychiatric/Behavioral: Negative for agitation and behavioral problems. Objective:   LMP  (LMP Unknown)   There is no height or weight on file to calculate BMI. Sleep Medicine 9/4/2019 6/20/2019   Sitting and reading 3 0   Watching TV 3 2   Sitting, inactive in a public place (e.g. a theatre or a meeting) 0 0   As a passenger in a car for an hour without a break 3 3   Lying down to rest in the afternoon when circumstances permit 3 3   Sitting and talking to someone 0 0   Sitting quietly after a lunch without alcohol 3 1   In a car, while stopped for a few minutes in traffic 0 0   Total score 15 9   Neck circumference - 16.5     {MALLAMPATI:3    Physical Exam  Vitals signs reviewed. Constitutional:       Appearance: Normal appearance. HENT:      Head: Normocephalic and atraumatic. Nose: Nose normal.      Mouth/Throat:      Mouth: Mucous membranes are moist.   Eyes:      Extraocular Movements: Extraocular movements intact. Pupils: Pupils are equal, round, and reactive to light. Neck:      Musculoskeletal: Normal range of motion and neck supple. Cardiovascular:      Rate and Rhythm: Normal rate and regular rhythm. Pulses: Normal pulses. Heart sounds: Normal heart sounds. Pulmonary:      Effort: Pulmonary effort is normal.      Breath sounds: Normal breath sounds. Abdominal:      General: Abdomen is flat. Palpations: Abdomen is soft. Musculoskeletal: Normal range of motion. Skin:     General: Skin is warm and dry. Neurological:      General: No focal deficit present.       Mental Status: She is alert and oriented to person, place, and time. Psychiatric:         Mood and Affect: Mood normal.         Behavior: Behavior normal.         Radiology: None    Assessment and Plan     Problem List        Pulmonary Problems    DAOLFO (obstructive sleep apnea)     Advised to be compliant with the CPPA  Loose weight  I'll do mask fitting trial if above doesn't help            Other    Obesity (BMI 30-39. 9)     Advised to loose weight with diet and exercise           Relevant Medications    metFORMIN (GLUCOPHAGE) 500 MG tablet    Excessive daytime sleepiness     Advised to be compliant with the CPAP  Loose weight         Ex-cigarette smoker     Advised to c/w quitting smoking                    Return in about 3 months (around 3/7/2021) for 2 week download data.      Progress notes sent to the referring Provider    Kong Alfaro MD  12/7/2020  10:56 AM

## 2020-12-07 NOTE — ASSESSMENT & PLAN NOTE
Advised to be compliant with the CPPA  Loose weight  I'll do mask fitting trial if above doesn't help

## 2020-12-14 LAB
A/G RATIO: 1.2 (ref 1.1–2.2)
ALBUMIN SERPL-MCNC: 3.8 G/DL (ref 3.4–5)
ALP BLD-CCNC: 64 U/L (ref 40–129)
ALT SERPL-CCNC: 36 U/L (ref 10–40)
ANION GAP SERPL CALCULATED.3IONS-SCNC: 12 MMOL/L (ref 3–16)
AST SERPL-CCNC: 38 U/L (ref 15–37)
BILIRUB SERPL-MCNC: <0.2 MG/DL (ref 0–1)
BUN BLDV-MCNC: 41 MG/DL (ref 7–20)
CALCIUM SERPL-MCNC: 9.2 MG/DL (ref 8.3–10.6)
CHLORIDE BLD-SCNC: 105 MMOL/L (ref 99–110)
CO2: 23 MMOL/L (ref 21–32)
CREAT SERPL-MCNC: 1.9 MG/DL (ref 0.6–1.2)
GFR AFRICAN AMERICAN: 31
GFR NON-AFRICAN AMERICAN: 26
GLOBULIN: 3.3 G/DL
GLUCOSE BLD-MCNC: 100 MG/DL (ref 70–99)
REASON FOR REJECTION: NORMAL
REJECTED TEST: NORMAL
SODIUM BLD-SCNC: 140 MMOL/L (ref 136–145)
TOTAL PROTEIN: 7.1 G/DL (ref 6.4–8.2)

## 2020-12-14 PROCEDURE — 36415 COLL VENOUS BLD VENIPUNCTURE: CPT | Performed by: INTERNAL MEDICINE

## 2020-12-15 LAB
ESTIMATED AVERAGE GLUCOSE: 125.5 MG/DL
HBA1C MFR BLD: 6 %

## 2021-01-03 NOTE — PROGRESS NOTES
for adjuvant radiation therapy and it was finished on March 4, 2013. She started adjuvant hormonal therapy with Femara on March 5, 2013 and she completed it on March 5, 2018. She had colonoscopy on 12/6/18 and it showed 5 mm polyp in distal sigmoid colon, diverticulosis and hemorrhoids. Final pathology from sigmoid polyp was tubular adenoma. Mammogram done on February 3, 2020 showed no evidence of malignancy. Radiologist recommend normal interval follow-up in 12 months. On January 20, 2021, she presented to me for followup. I have been following Ms. 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 She has been followed periodically since then. She is feeling well otherwise and she does not have any other significant symptoms on today's visit. She is due for a mammogram and I will schedule to have it done on 2/3/2021 and I will follow up with the results. She also has an iron deficiency anemia and I believe it is secondary to occult gastrointestinal bleeding from the antiplatelet agents and anticoagulation therapy. She is currently on Xarelto. I reviewed with her findings on laboratory test done on January 13, 2021. Since her serum ferritin is still in low normal range, I recommend her to continue with FeSO4 325 mg daily. I will continue to monitor her iron status periodically. Chronic kidney disease - her serum creatinine is stable and I recommend her to continue to follow up with Dr. Mode Magaña. Diabetes mellitus - on metformin. Recommend to follow up with her PCP. Educate her about diet and exercise. She does not have any significant symptoms at today visit. PAST MEDICAL HISTORY:  Past medical history is significant for the following. 1. Hypertension.,  2. Diabetes. ,  3. Hyperlipidemia.,  4. Atrial fibrillation. ,  5. History of SVT.,  6. Chronic arthritis.      PAST SURGICAL HISTORY:  Past surgical history is significant for the following. 1. Hysterectomy. ,  2. Previous left and right breast biopsy. FAMILY HISTORY:  Family history is significant for lung cancer in her brother and father. The patients father has heart disease and mother has diabetes. SOCIAL HISTORY:  She is a former smoker; however, she quit smoking in the 1980s. She denies alcohol drinking or illicit drug abuse. She is a  and has three children. She is a retiree. Oncology History    No history exists. Review of Systems: \"Per interval history; otherwise 10 point ROS is negative. \"  Her energy level is pretty good, appetite and sleep are fine. She doesn't have fever, chills, night sweats, cough, shortness of breath, chest pain, hemoptysis, or palpitations. Her bowel and bladder functions are normal.  She doesn't have nausea, vomiting, abdominal pain, diarrhea, constipation, dysuria, loss of appetite, or weight loss. She doesn't have neuropathy and she denies bleeding or clotting issues. She doesn't have any pain in her body. No anxiety or depression. The rest of the systems are unremarkable.      Vital Signs:  /65 (Site: Left Upper Arm, Position: Sitting, Cuff Size: Large Adult)   Pulse 61   Temp 97.3 °F (36.3 °C) (Infrared)   Ht 5' 8.5\" (1.74 m)   Wt 195 lb 6.4 oz (88.6 kg)   LMP  (LMP Unknown)   SpO2 98%   BMI 29.28 kg/m²     Physical Exam:  CONSTITUTIONAL: alert, cooperative, awake, no apparent distress   EYES: pupils equal, round and reactive to light, sclera clear and conjunctiva normal  ENT: Normocephalic, without obvious abnormality, atraumatic  NECK: supple, symmetrical, no jugular venous distension and no carotid bruits   HEMATOLOGIC/LYMPHATIC: no cervical, supraclavicular or axillary lymphadenopathy   LUNGS: VBS, no wheezes, no crackles, no rhonchi, no increased work of breathing and clear to auscultation   CARDIOVASCULAR: regular rate and rhythm, normal S1 and S2, no murmur noted  ABDOMEN: normal bowel sounds x 4, soft, non-distended, non-tender, no masses palpated, no hepatosplenomegaly   MUSCULOSKELETAL: full range of motion noted, tone is normal  NEUROLOGIC: awake, alert, oriented to name, place and time. Motor skills grossly intact. SKIN: Normal skin color, texture, turgor and no jaundice. appears intact   EXTREMITIES: no LE edema, no leg swelling, no cyanosis, no clubbing  BREASTS: Post surgical and radiation therapy changes noted in her right breast. No palpable mass in bilateral breasts and axilla.       Labs:  Hematology:  Lab Results   Component Value Date    WBC 6.1 01/13/2021    RBC 4.14 (L) 01/13/2021    HGB 10.4 (L) 01/13/2021    HCT 33.3 (L) 01/13/2021    MCV 80.4 01/13/2021    MCH 25.1 (L) 01/13/2021    MCHC 31.2 (L) 01/13/2021    RDW 16.1 (H) 01/13/2021     01/13/2021    MPV 11.0 01/13/2021    SEGSPCT 65.7 01/13/2021    EOSRELPCT 2.9 01/13/2021    BASOPCT 0.3 01/13/2021    LYMPHOPCT 24.1 01/13/2021    MONOPCT 7.0 (H) 01/13/2021    SEGSABS 4.0 01/13/2021    EOSABS 0.2 01/13/2021    BASOSABS 0.0 01/13/2021    LYMPHSABS 1.5 01/13/2021    MONOSABS 0.4 01/13/2021    DIFFTYPE AUTOMATED DIFFERENTIAL 01/13/2021     Lab Results   Component Value Date    ESR 70 (H) 11/12/2019     Chemistry:  Lab Results   Component Value Date     01/13/2021    K 5.1 01/13/2021     01/13/2021    CO2 24 01/13/2021    BUN 37 (H) 01/13/2021    CREATININE 2.0 (H) 01/13/2021    GLUCOSE 99 01/13/2021    CALCIUM 9.0 01/13/2021    PROT 6.4 01/13/2021    LABALBU 3.9 01/13/2021    BILITOT 0.2 01/13/2021    ALKPHOS 62 01/13/2021    AST 16 01/13/2021    ALT 26 01/13/2021    LABGLOM 24 (L) 01/13/2021    GFRAA 29 (L) 01/13/2021    AGRATIO 1.2 12/14/2020    GLOB 3.3 12/14/2020    PHOS 4.0 11/10/2020    POCCA 1.10 (L) 01/23/2017    POCGLU 119 (H) 11/09/2018     No results found for: MMA, LDH, HOMOCYSTEINE  No components found for: LD  Lab Results   Component Value Date    [facility] 1.240 11/12/2019    T4FREE 1.33 11/30/2017    FT3 2.4 04/01/2014     Immunology:  Lab Results   Component Value Date    PROT 6.4 01/13/2021    ALBUMINELP 3.5 11/30/2017    LABALPH 0.3 11/30/2017    LABALPH 3.7 11/30/2017    LABBETA 1.2 11/30/2017    GAMGLOB 1.0 11/30/2017     No results found for: Vish Redd, KLFLCR  No results found for: B2M  Coagulation Panel:  Lab Results   Component Value Date    PROTIME 13.6 (H) 11/08/2018    INR 1.17 11/08/2018    APTT 35.1 (H) 11/08/2018     Anemia Panel:  Lab Results   Component Value Date    [id number] 816 08/17/2020    FOLATE 15.9 04/01/2014     Tumor Markers:  Lab Results   Component Value Date    CEA <0.5 04/01/2014    LABCA2 36.9 12/17/2018     Observations:  PHQ-9 Total Score: 0 (1/20/2021  9:21 AM)        Assessment & Plan:   Stage IA right breast invasive ductal carcinoma  Mild iron deficiency anemia     PLAN:   Ms. Rosario Juárez has been followed for stage IA right breast invasive ductal carcinoma, iron deficiency anemia superimposed by anemia due to chronic kidney disease. Mammogram done on February 3, 2020 showed no evidence of malignancy. Radiologist recommend normal interval follow-up in 12 months. On January 20, 2021, she presented to me for followup. I have been following Ms. 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 She has been followed periodically since then. She is feeling well otherwise and she does not have any other significant symptoms on today's visit. She is due for a mammogram and I will schedule to have it done on 2/3/2021 and I will follow up with the results. She also has an iron deficiency anemia and I believe it is secondary to occult gastrointestinal bleeding from the antiplatelet agents and anticoagulation therapy. She is currently on Xarelto.      I reviewed with her findings on laboratory test done on January 13, 2021.  Since her serum ferritin is still in low normal range, I recommend her to continue with FeSO4 325 mg daily. I will continue to monitor her iron status periodically. Chronic kidney disease - her serum creatinine is stable and I recommend her to continue to follow up with Dr. Noah Garcia. Diabetes mellitus - on metformin. Recommend to follow up with her PCP. Educate her about diet and exercise. I answered all her questions and concerns for today. I asked her to follow up with her primary care physician, Dr. Mahi Bryan on regular basis. Recent imaging and labs were reviewed and discussed with the patient.

## 2021-01-08 DIAGNOSIS — R53.82 CHRONIC FATIGUE: Primary | ICD-10-CM

## 2021-01-08 DIAGNOSIS — D50.9 IRON DEFICIENCY ANEMIA, UNSPECIFIED IRON DEFICIENCY ANEMIA TYPE: ICD-10-CM

## 2021-01-11 ENCOUNTER — TELEPHONE (OUTPATIENT)
Dept: CARDIOLOGY CLINIC | Age: 76
End: 2021-01-11

## 2021-01-11 DIAGNOSIS — E11.21 TYPE 2 DIABETES MELLITUS WITH NEPHROPATHY (HCC): ICD-10-CM

## 2021-01-13 ENCOUNTER — HOSPITAL ENCOUNTER (OUTPATIENT)
Dept: INFUSION THERAPY | Age: 76
Discharge: HOME | End: 2021-01-13
Payer: COMMERCIAL

## 2021-01-13 DIAGNOSIS — R53.82 CHRONIC FATIGUE: ICD-10-CM

## 2021-01-13 DIAGNOSIS — D50.9 IRON DEFICIENCY ANEMIA, UNSPECIFIED IRON DEFICIENCY ANEMIA TYPE: ICD-10-CM

## 2021-01-13 LAB
ALBUMIN SERPL-MCNC: 3.9 GM/DL (ref 3.4–5)
ALP BLD-CCNC: 62 IU/L (ref 40–129)
ALT SERPL-CCNC: 26 U/L (ref 10–40)
ANION GAP SERPL CALCULATED.3IONS-SCNC: 10 MMOL/L (ref 4–16)
AST SERPL-CCNC: 16 IU/L (ref 15–37)
BASOPHILS ABSOLUTE: 0 K/CU MM
BASOPHILS RELATIVE PERCENT: 0.3 % (ref 0–1)
BILIRUB SERPL-MCNC: 0.2 MG/DL (ref 0–1)
BUN BLDV-MCNC: 37 MG/DL (ref 6–23)
CALCIUM SERPL-MCNC: 9 MG/DL (ref 8.3–10.6)
CHLORIDE BLD-SCNC: 104 MMOL/L (ref 99–110)
CO2: 24 MMOL/L (ref 21–32)
CREAT SERPL-MCNC: 2 MG/DL (ref 0.6–1.1)
DIFFERENTIAL TYPE: ABNORMAL
EOSINOPHILS ABSOLUTE: 0.2 K/CU MM
EOSINOPHILS RELATIVE PERCENT: 2.9 % (ref 0–3)
FERRITIN: 34 NG/ML (ref 15–150)
GFR AFRICAN AMERICAN: 29 ML/MIN/1.73M2
GFR NON-AFRICAN AMERICAN: 24 ML/MIN/1.73M2
GLUCOSE BLD-MCNC: 99 MG/DL (ref 70–99)
HCT VFR BLD CALC: 33.3 % (ref 37–47)
HEMOGLOBIN: 10.4 GM/DL (ref 12.5–16)
IRON: 47 UG/DL (ref 37–145)
LYMPHOCYTES ABSOLUTE: 1.5 K/CU MM
LYMPHOCYTES RELATIVE PERCENT: 24.1 % (ref 24–44)
MCH RBC QN AUTO: 25.1 PG (ref 27–31)
MCHC RBC AUTO-ENTMCNC: 31.2 % (ref 32–36)
MCV RBC AUTO: 80.4 FL (ref 78–100)
MONOCYTES ABSOLUTE: 0.4 K/CU MM
MONOCYTES RELATIVE PERCENT: 7 % (ref 0–4)
PCT TRANSFERRIN: 19 % (ref 10–44)
PDW BLD-RTO: 16.1 % (ref 11.7–14.9)
PLATELET # BLD: 260 K/CU MM (ref 140–440)
PMV BLD AUTO: 11 FL (ref 7.5–11.1)
POTASSIUM SERPL-SCNC: 5.1 MMOL/L (ref 3.5–5.1)
RBC # BLD: 4.14 M/CU MM (ref 4.2–5.4)
SEGMENTED NEUTROPHILS ABSOLUTE COUNT: 4 K/CU MM
SEGMENTED NEUTROPHILS RELATIVE PERCENT: 65.7 % (ref 36–66)
SODIUM BLD-SCNC: 138 MMOL/L (ref 135–145)
TOTAL IRON BINDING CAPACITY: 248 UG/DL (ref 250–450)
TOTAL PROTEIN: 6.4 GM/DL (ref 6.4–8.2)
UNSATURATED IRON BINDING CAPACITY: 201 UG/DL (ref 110–370)
WBC # BLD: 6.1 K/CU MM (ref 4–10.5)

## 2021-01-13 PROCEDURE — 82728 ASSAY OF FERRITIN: CPT

## 2021-01-13 PROCEDURE — 83550 IRON BINDING TEST: CPT

## 2021-01-13 PROCEDURE — 83540 ASSAY OF IRON: CPT

## 2021-01-13 PROCEDURE — 85025 COMPLETE CBC W/AUTO DIFF WBC: CPT

## 2021-01-13 PROCEDURE — 80053 COMPREHEN METABOLIC PANEL: CPT

## 2021-01-13 PROCEDURE — 36415 COLL VENOUS BLD VENIPUNCTURE: CPT

## 2021-01-20 ENCOUNTER — HOSPITAL ENCOUNTER (OUTPATIENT)
Dept: INFUSION THERAPY | Age: 76
Discharge: HOME | End: 2021-01-20
Payer: COMMERCIAL

## 2021-01-20 ENCOUNTER — OFFICE VISIT (OUTPATIENT)
Dept: ONCOLOGY | Age: 76
End: 2021-01-20
Payer: COMMERCIAL

## 2021-01-20 VITALS
WEIGHT: 195.4 LBS | HEIGHT: 69 IN | BODY MASS INDEX: 28.94 KG/M2 | TEMPERATURE: 97.3 F | HEART RATE: 61 BPM | OXYGEN SATURATION: 98 % | SYSTOLIC BLOOD PRESSURE: 123 MMHG | DIASTOLIC BLOOD PRESSURE: 65 MMHG

## 2021-01-20 DIAGNOSIS — C50.411 MALIGNANT NEOPLASM OF UPPER-OUTER QUADRANT OF RIGHT BREAST IN FEMALE, ESTROGEN RECEPTOR POSITIVE (HCC): Primary | ICD-10-CM

## 2021-01-20 DIAGNOSIS — Z17.0 MALIGNANT NEOPLASM OF UPPER-OUTER QUADRANT OF RIGHT BREAST IN FEMALE, ESTROGEN RECEPTOR POSITIVE (HCC): Primary | ICD-10-CM

## 2021-01-20 PROCEDURE — 99213 OFFICE O/P EST LOW 20 MIN: CPT | Performed by: INTERNAL MEDICINE

## 2021-01-20 PROCEDURE — 99211 OFF/OP EST MAY X REQ PHY/QHP: CPT

## 2021-01-20 RX ORDER — FERROUS SULFATE 325(65) MG
325 TABLET ORAL
Qty: 90 TABLET | Refills: 5 | Status: SHIPPED | OUTPATIENT
Start: 2021-01-20 | End: 2022-01-18

## 2021-01-20 ASSESSMENT — PATIENT HEALTH QUESTIONNAIRE - PHQ9
SUM OF ALL RESPONSES TO PHQ QUESTIONS 1-9: 0
2. FEELING DOWN, DEPRESSED OR HOPELESS: 0
SUM OF ALL RESPONSES TO PHQ QUESTIONS 1-9: 0
SUM OF ALL RESPONSES TO PHQ QUESTIONS 1-9: 0

## 2021-01-20 NOTE — PROGRESS NOTES
MA Rooming Questions  Patient: Khang Wilburn  MRN: [id number]    Date: 1/20/2021        1. Do you have any new issues?   no         2. Do you need any refills on medications?    no    3. Have you had any imaging done since your last visit? yes - labs last week here     4. Have you been hospitalized or seen in the emergency room since your last visit here?   no    5. Did the patient have a depression screening completed today?  Yes    PHQ-9 Total Score: 0 (1/20/2021  9:21 AM)       PHQ-9 Given to (if applicable):               PHQ-9 Score (if applicable):                     [] Positive     []  Negative              Does question #9 need addressed (if applicable)                     [] Yes    []  No               Anna Vega MA

## 2021-02-01 ENCOUNTER — HOSPITAL ENCOUNTER (OUTPATIENT)
Dept: WOMENS IMAGING | Age: 76
Discharge: HOME | End: 2021-02-01
Payer: COMMERCIAL

## 2021-02-01 ENCOUNTER — TELEPHONE (OUTPATIENT)
Dept: CARDIOLOGY CLINIC | Age: 76
End: 2021-02-01

## 2021-02-01 DIAGNOSIS — C50.411 MALIGNANT NEOPLASM OF UPPER-OUTER QUADRANT OF RIGHT BREAST IN FEMALE, ESTROGEN RECEPTOR POSITIVE (HCC): ICD-10-CM

## 2021-02-01 DIAGNOSIS — Z17.0 MALIGNANT NEOPLASM OF UPPER-OUTER QUADRANT OF RIGHT BREAST IN FEMALE, ESTROGEN RECEPTOR POSITIVE (HCC): ICD-10-CM

## 2021-02-01 PROCEDURE — G0279 TOMOSYNTHESIS, MAMMO: HCPCS

## 2021-02-01 NOTE — TELEPHONE ENCOUNTER
Phil Benavides from [facility] called and wants to know if you received a notice for pt to stop blood thinners for a biospy.

## 2021-02-01 NOTE — TELEPHONE ENCOUNTER
Cardiologist: Dr. Luis Stahl  Surgeon: Dr. Woo Jacome  Surgery: Stereotactic breast biopsy  Anesthesia: Lidocaine w/Epinephrine  Date: 2/9/2021  FAX# [phone]  # [phone]    Last OV 10/22/2020 w/Gisselle      ADOLFO (obstructive sleep apnea)  · Patient has a CPAP machine  · Patient does wear it most every night  · Patient does sleep well. · Pulmonology manages     Essential hypertension  · Controlled  · To continue Beta blocker, Calcium channel blocker  · advised low salt diet   Last echo 2019 Summary   Left ventricular systolic function is normal.   Ejection fraction is visually estimated at 50-55%.   Mild left ventricular hypertrophy.   Sclerotic aortic valve.   Mild mitral regurgitation.   Physiological pericardial effusion.     Mixed hyperlipidemia  · Lipid panel reviewed  · Patient LDL is at goal, HDL is  at goal  · Patient is on a Statin  · Discussed with the patient the need for exercise, low cholesterol diet, and compliance with medications. Paroxysmal atrial fibrillation (HCC)  · Rate controlled yes. · Chadsvasc score is 5  · She is  on anticoagulation. Xarelto 15 mg due to renal dysfunction per Dr. Pankaj Cotton  · Continue anti rhythmic / rate control medications   · EKG today: sinus braycardia        NM- 9/7/2018   Normal Ardelle Keas nuclear scintigraphic study suggestive of normal myocardial    perfusion.    Gated images demonstrate normal left ventricular systolic function with EF    of 60 %. Echo- 8/18/2020   Left ventricular systolic function is normal.   Ejection fraction is visually estimated at 50-55%. Mild left ventricular hypertrophy. Sclerotic aortic valve. Mild mitral regurgitation. Physiological pericardial effusion.         CATH- 8/2009      Xarelto    Aspirin

## 2021-02-02 DIAGNOSIS — E78.2 MIXED HYPERLIPIDEMIA: ICD-10-CM

## 2021-02-02 DIAGNOSIS — I10 ESSENTIAL HYPERTENSION: ICD-10-CM

## 2021-02-02 RX ORDER — ATORVASTATIN CALCIUM 80 MG/1
TABLET, FILM COATED ORAL
Qty: 90 TABLET | Refills: 3 | Status: SHIPPED | OUTPATIENT
Start: 2021-02-02 | End: 2022-03-07 | Stop reason: SDUPTHER

## 2021-02-02 RX ORDER — DOXAZOSIN 8 MG/1
TABLET ORAL
Qty: 90 TABLET | Refills: 3 | Status: ON HOLD | OUTPATIENT
Start: 2021-02-02 | End: 2021-05-11 | Stop reason: HOSPADM

## 2021-02-08 RX ORDER — PROPAFENONE HYDROCHLORIDE 150 MG/1
150 TABLET, FILM COATED ORAL EVERY 8 HOURS
Qty: 90 TABLET | Refills: 3 | OUTPATIENT
Start: 2021-02-08

## 2021-02-09 ENCOUNTER — HOSPITAL ENCOUNTER (OUTPATIENT)
Dept: WOMENS IMAGING | Age: 76
Discharge: HOME | End: 2021-02-09
Payer: COMMERCIAL

## 2021-02-09 DIAGNOSIS — R92.1 BREAST CALCIFICATION, RIGHT: ICD-10-CM

## 2021-02-09 PROCEDURE — 88341 IMHCHEM/IMCYTCHM EA ADD ANTB: CPT

## 2021-02-09 PROCEDURE — 88342 IMHCHEM/IMCYTCHM 1ST ANTB: CPT

## 2021-02-09 PROCEDURE — 88305 TISSUE EXAM BY PATHOLOGIST: CPT

## 2021-02-10 RX ORDER — PROPAFENONE HYDROCHLORIDE 150 MG/1
150 TABLET, FILM COATED ORAL EVERY 8 HOURS
Qty: 270 TABLET | Refills: 1 | Status: CANCELLED | OUTPATIENT
Start: 2021-02-10

## 2021-02-11 ENCOUNTER — CLINICAL DOCUMENTATION (OUTPATIENT)
Dept: CASE MANAGEMENT | Age: 76
End: 2021-02-11

## 2021-02-11 RX ORDER — PROPAFENONE HYDROCHLORIDE 150 MG/1
150 TABLET, FILM COATED ORAL EVERY 8 HOURS
Qty: 90 TABLET | Refills: 3 | OUTPATIENT
Start: 2021-02-11

## 2021-02-11 RX ORDER — PROPAFENONE HYDROCHLORIDE 150 MG/1
150 TABLET, FILM COATED ORAL EVERY 8 HOURS
Qty: 90 TABLET | Refills: 3 | Status: SHIPPED | OUTPATIENT
Start: 2021-02-11 | End: 2021-07-09 | Stop reason: SDUPTHER

## 2021-02-11 NOTE — PROGRESS NOTES
Benign right breast biopsy results phoned to patient. Instructed to call office with any questions/concerns - voiced understanding.

## 2021-02-24 ENCOUNTER — OFFICE VISIT (OUTPATIENT)
Dept: INTERNAL MEDICINE CLINIC | Age: 76
End: 2021-02-24
Payer: COMMERCIAL

## 2021-02-24 VITALS
RESPIRATION RATE: 18 BRPM | SYSTOLIC BLOOD PRESSURE: 150 MMHG | HEART RATE: 79 BPM | DIASTOLIC BLOOD PRESSURE: 70 MMHG | OXYGEN SATURATION: 96 %

## 2021-02-24 DIAGNOSIS — E11.21 TYPE 2 DIABETES MELLITUS WITH NEPHROPATHY (HCC): ICD-10-CM

## 2021-02-24 DIAGNOSIS — I10 ESSENTIAL HYPERTENSION: Primary | ICD-10-CM

## 2021-02-24 DIAGNOSIS — C50.919 MALIGNANT NEOPLASM OF BREAST IN FEMALE, ESTROGEN RECEPTOR POSITIVE, UNSPECIFIED LATERALITY, UNSPECIFIED SITE OF BREAST (HCC): ICD-10-CM

## 2021-02-24 DIAGNOSIS — I10 ESSENTIAL HYPERTENSION: ICD-10-CM

## 2021-02-24 DIAGNOSIS — Z17.0 MALIGNANT NEOPLASM OF BREAST IN FEMALE, ESTROGEN RECEPTOR POSITIVE, UNSPECIFIED LATERALITY, UNSPECIFIED SITE OF BREAST (HCC): ICD-10-CM

## 2021-02-24 DIAGNOSIS — I48.0 PAROXYSMAL ATRIAL FIBRILLATION (HCC): ICD-10-CM

## 2021-02-24 DIAGNOSIS — E78.2 MIXED HYPERLIPIDEMIA: ICD-10-CM

## 2021-02-24 LAB
A/G RATIO: 1.4 (ref 1.1–2.2)
ALBUMIN SERPL-MCNC: 4.1 G/DL (ref 3.4–5)
ALP BLD-CCNC: 58 U/L (ref 40–129)
ALT SERPL-CCNC: 32 U/L (ref 10–40)
ANION GAP SERPL CALCULATED.3IONS-SCNC: 9 MMOL/L (ref 3–16)
AST SERPL-CCNC: 22 U/L (ref 15–37)
BASOPHILS ABSOLUTE: 0 K/UL (ref 0–0.2)
BASOPHILS RELATIVE PERCENT: 0.6 %
BILIRUB SERPL-MCNC: <0.2 MG/DL (ref 0–1)
BUN BLDV-MCNC: 33 MG/DL (ref 7–20)
CALCIUM SERPL-MCNC: 9.4 MG/DL (ref 8.3–10.6)
CHLORIDE BLD-SCNC: 105 MMOL/L (ref 99–110)
CHOLESTEROL, TOTAL: 179 MG/DL (ref 0–199)
CO2: 25 MMOL/L (ref 21–32)
CREAT SERPL-MCNC: 2.1 MG/DL (ref 0.6–1.2)
EOSINOPHILS ABSOLUTE: 0.2 K/UL (ref 0–0.6)
EOSINOPHILS RELATIVE PERCENT: 3.4 %
GFR AFRICAN AMERICAN: 28
GFR NON-AFRICAN AMERICAN: 23
GLOBULIN: 2.9 G/DL
GLUCOSE BLD-MCNC: 100 MG/DL (ref 70–99)
HCT VFR BLD CALC: 31.3 % (ref 36–48)
HDLC SERPL-MCNC: 59 MG/DL (ref 40–60)
HEMOGLOBIN: 10.1 G/DL (ref 12–16)
LDL CHOLESTEROL CALCULATED: 98 MG/DL
LYMPHOCYTES ABSOLUTE: 1.1 K/UL (ref 1–5.1)
LYMPHOCYTES RELATIVE PERCENT: 20.9 %
MCH RBC QN AUTO: 25.1 PG (ref 26–34)
MCHC RBC AUTO-ENTMCNC: 32.3 G/DL (ref 31–36)
MCV RBC AUTO: 77.9 FL (ref 80–100)
MONOCYTES ABSOLUTE: 0.4 K/UL (ref 0–1.3)
MONOCYTES RELATIVE PERCENT: 6.9 %
NEUTROPHILS ABSOLUTE: 3.5 K/UL (ref 1.7–7.7)
NEUTROPHILS RELATIVE PERCENT: 68.2 %
PDW BLD-RTO: 16.2 % (ref 12.4–15.4)
PLATELET # BLD: 238 K/UL (ref 135–450)
PMV BLD AUTO: 8.9 FL (ref 5–10.5)
POTASSIUM SERPL-SCNC: 4.8 MMOL/L (ref 3.5–5.1)
RBC # BLD: 4.02 M/UL (ref 4–5.2)
SODIUM BLD-SCNC: 139 MMOL/L (ref 136–145)
TOTAL PROTEIN: 7 G/DL (ref 6.4–8.2)
TRIGL SERPL-MCNC: 112 MG/DL (ref 0–150)
VLDLC SERPL CALC-MCNC: 22 MG/DL
WBC # BLD: 5.2 K/UL (ref 4–11)

## 2021-02-24 PROCEDURE — 99214 OFFICE O/P EST MOD 30 MIN: CPT | Performed by: INTERNAL MEDICINE

## 2021-02-24 PROCEDURE — 36415 COLL VENOUS BLD VENIPUNCTURE: CPT | Performed by: INTERNAL MEDICINE

## 2021-02-24 RX ORDER — CLONIDINE 0.2 MG/24H
1 PATCH, EXTENDED RELEASE TRANSDERMAL
Qty: 4 PATCH | Refills: 11 | Status: SHIPPED | OUTPATIENT
Start: 2021-02-24 | End: 2021-03-10

## 2021-02-24 NOTE — LETTER
[facility] INTERNAL MEDICINE  [address] [address]  Phone: [phone]  Fax: [phone]    Cassie Stratton MD        February 24, 2021     Patient: Tita Luna   YOB: 1945   Date of Visit: 2/24/2021       To Whom It May Concern: It is my medical opinion that Cindy Benitez requires a disability parking placard for the following reasons:  She has limited walking ability due to an arthritic condition. Duration of need: permanent    If you have any questions or concerns, please don't hesitate to call.     Sincerely,        Cassie Stratton MD

## 2021-02-24 NOTE — PROGRESS NOTES
breast ([facility] [address]    4. Mixed hyperlipidemia    5. Type 2 diabetes mellitus with nephropathy (HCC)        PLAN:    [facility] was seen today for other. Diagnoses and all orders for this visit:    Essential hypertension - BP not at goal; I will increase the clonidine; check labs; f/u in 2 weeks  -     CBC Auto Differential; Future  -     Comprehensive Metabolic Panel; Future  -     cloNIDine (CATAPRES-TTS-2) 0.2 MG/24HR PTWK; Place 1 patch onto the skin every 7 days    Paroxysmal atrial fibrillation (HCC) - NSR on anticoagulation; no change     Malignant neoplasm of breast in female, estrogen receptor positive, unspecified laterality, unspecified site of breast ([facility] [address] - doing well, last biopsy (-)    Mixed hyperlipidemia - check labs  -     Lipid Panel; Future  -     Comprehensive Metabolic Panel; Future    Type 2 diabetes mellitus with nephropathy (HCC) - check a1c  -     Hemoglobin A1C; Future  -     Lipid Panel; Future  -     Comprehensive Metabolic Panel;  Future

## 2021-02-25 LAB
ESTIMATED AVERAGE GLUCOSE: 128.4 MG/DL
HBA1C MFR BLD: 6.1 %

## 2021-03-08 ENCOUNTER — OFFICE VISIT (OUTPATIENT)
Dept: PULMONOLOGY | Age: 76
End: 2021-03-08
Payer: COMMERCIAL

## 2021-03-08 VITALS
OXYGEN SATURATION: 98 % | HEART RATE: 59 BPM | WEIGHT: 195 LBS | HEIGHT: 69 IN | BODY MASS INDEX: 28.88 KG/M2 | TEMPERATURE: 97 F

## 2021-03-08 DIAGNOSIS — G47.33 OSA (OBSTRUCTIVE SLEEP APNEA): ICD-10-CM

## 2021-03-08 DIAGNOSIS — E66.9 OBESITY (BMI 30-39.9): ICD-10-CM

## 2021-03-08 DIAGNOSIS — Z87.891 EX-CIGARETTE SMOKER: ICD-10-CM

## 2021-03-08 DIAGNOSIS — G47.19 EXCESSIVE DAYTIME SLEEPINESS: ICD-10-CM

## 2021-03-08 PROCEDURE — 99213 OFFICE O/P EST LOW 20 MIN: CPT | Performed by: INTERNAL MEDICINE

## 2021-03-08 ASSESSMENT — ENCOUNTER SYMPTOMS
SHORTNESS OF BREATH: 0
EYE ITCHING: 0
BACK PAIN: 0
ABDOMINAL DISTENTION: 0
ABDOMINAL PAIN: 0
EYE DISCHARGE: 0
COUGH: 0

## 2021-03-08 NOTE — PROGRESS NOTES
 Multiple Vitamins-Minerals (MULTIVITAMIN ADULT PO) Take by mouth      aspirin 81 MG EC tablet Take 1 tablet by mouth daily 30 tablet     Lancets MISC 1 each by In Vitro route 2 times daily 100 each 3    Glucose Blood (BLOOD GLUCOSE TEST STRIPS) STRP 1 each by In Vitro route 2 times daily 100 strip 3    Blood Glucose Monitoring Suppl AV 1 kit by Does not apply route daily 1 Device 0    Calcium Carbonate (CALTRATE 600 PO) Take  by mouth 2 times daily. No current facility-administered medications for this visit. Allergies   Allergen Reactions    Latex      \"Blisters\"    Tape Saravanan Gin Tape]      \"Turn Red\"       Past Medical History:   Diagnosis Date    14 day event monitor 11/05/2018    Sinus rhythm    Atrial fibrillation with RVR ([facility] [address] 11/25/2013    Breast cancer ([facility] [address]     Edema     1/94 TTE diastolic dysfxn, EF 83%; 02/44 - TTE diastolic dysfxn, EF 74%; Stress myoview  WNl, EF 70%    Family history of cardiovascular disease     GERD (gastroesophageal reflux disease)     H/O 24 hour EKG monitoring 4/23/12    [facility]    H/O cardiovascular stress test     4/23/12-[facility], Probably norm perfusion Lexiscan cardiolite study except for diaphramatic attenuation artifact, otherwise perfusion is normal, norm LV fxn by gated scan. 11/10-EF70%, 9/15/08-EF70%, 1/15/07-EF70%, 9/03    H/O echocardiogram     4/23/12-[facility]- Norm chamber sizes. LVH with norm LV systolic but abn diastolic fxn, mild MR and TR, minimal pulm HTN. 11/10 -EF>55%; 3/05, 9/23/03    History of cardiac catheterization     11/15/2013-EF 55%, No signif CAD -Dr Susannah Sparrow;;    History of cardiovascular stress test     11/14/2013-EF 70%. Normal Lexiscan Cardiolite, Uniform wall motion;    History of echocardiogram     67/03/[id number] global systolic  function. No wall motion abnormalities. EF 55%.  Mild MR/TR;    History of Holter monitoring 11/17/14 11/14-48 hour Holter: Predominant rhythm was sinus, no ventricular ectopy noted, supraventricular ectopics were noted in single beat forms.  History of therapeutic radiation     Hx of 24 hour EKG monitoring     12/17/13 48 hr holter monitor. Sinus rhythm with intermittent sinus arrhythmia.  Hyperlipidemia     Hypertension     11/13 Cath WNL, EF 55%; 11/13 Stress WNL : 11/13 TTE mild MR and TR, EF 55%; 4/12 Stress WNL; 8/9 - Cath WNL    Iron deficiency anemia 7/9/2013 9/13 EGD WNL    Lumbar radiculopathy 11/25/2013    Menopause     ADOLFO (obstructive sleep apnea)     sleep study 10/3 CPAP 6cm     Osteoarthritis     both knees    Osteopenia     9/12 DEXA T-score -1.5    Other activity(E029.9)     48 hr holter, the 48 hr holter monitor reveals the patient in the sinus rhythm with occasional supraventricular ectopic beats. There is a rare short atrial run.  Paroxysmal atrial fibrillation (HCC)     4/12 Holter [facility]    Prolonged emergence from general anesthesia     Proteinuria     Right Breast Cancer Dx 10-12    Supraventricular tachycardia (HCC)     Type 2 diabetes mellitus (HCC) Dx 2000's    Urge incontinence     Wears partial dentures     upper partial       Past Surgical History:   Procedure Laterality Date    BREAST BIOPSY  10-12    Right Breast Biopsy, Cancer    BREAST BIOPSY  1970's    Right Breast Biopsy, Benign    BREAST LUMPECTOMY  11/6/12    Breast cancer - Right with sentinal node    BUNIONECTOMY  1990's    Right Wendel Ahumada Dr. Alfred Pickle    8/10/09- The patient has no significant CAD. The elevated troponin is probably secondary to SVT. , 10/03- no significant CAD    CARPAL TUNNEL RELEASE Left 01/2019    CATARACT REMOVAL Bilateral 03/2020, 5/20    CATARACT REMOVAL Left 05/2020    COLONOSCOPY  \"X 2 Last One 2008 \"    Polpy Removed During Last Colonoscopy    DENTAL SURGERY      Teeth Extracted In Past    ENDOSCOPY, COLON, DIAGNOSTIC  07/18/2016    savary dilatation to 17 mm    HYSTERECTOMY, TOTAL ABDOMINAL  1980's    JOINT REPLACEMENT  2009    Total Left Knee    JOINT REPLACEMENT  2010    Total Right Knee    SOFI STEROTACTIC LOC BREAST BIOPSY RIGHT Right 2021    SOFI STEROTACTIC LOC BREAST BIOPSY RIGHT [facility]    OVARY REMOVAL      UPPER GASTROINTESTINAL ENDOSCOPY N/A 2018    EGD DIAGNOSTIC ONLY performed by Anette Garcia MD at [address] [place] [address] History     Socioeconomic History    Marital status:      Spouse name: None    Number of children: None    Years of education: None    Highest education level: None   Occupational History    None   Social Needs    Financial resource strain: None    Food insecurity     Worry: None     Inability: None    Transportation needs     Medical: None     Non-medical: None   Tobacco Use    Smoking status: Former Smoker     Packs/day: 0.50     Years: 10.00     Pack years: 5.00     Quit date: 1987     Years since quittin.1    Smokeless tobacco: Never Used   Substance and Sexual Activity    Alcohol use: No     Alcohol/week: 0.0 standard drinks     Comment: 3 cups coffee daily    Drug use: No    Sexual activity: Never   Lifestyle    Physical activity     Days per week: None     Minutes per session: None    Stress: None   Relationships    Social connections     Talks on phone: None     Gets together: None     Attends Religion service: None     Active member of club or organization: None     Attends meetings of clubs or organizations: None     Relationship status: None    Intimate partner violence     Fear of current or ex partner: None     Emotionally abused: None     Physically abused: None     Forced sexual activity: None   Other Topics Concern    None   Social History Narrative    Wears glasses       Review of Systems   Constitutional: Negative for fatigue. HENT: Negative for congestion and postnasal drip. Eyes: Negative for discharge and itching. Respiratory: Negative for cough and shortness of breath.     Cardiovascular: Negative for chest pain and leg swelling. Gastrointestinal: Negative for abdominal distention and abdominal pain. Endocrine: Negative for cold intolerance and heat intolerance. Genitourinary: Negative for enuresis and frequency. Musculoskeletal: Negative for arthralgias and back pain. Allergic/Immunologic: Negative for environmental allergies and food allergies. Neurological: Negative for light-headedness and headaches. Hematological: Negative for adenopathy. Psychiatric/Behavioral: Negative for agitation and behavioral problems. Objective:   Pulse 59   Temp 97 °F (36.1 °C) (Temporal)   Ht 5' 8.5\" (1.74 m)   Wt 195 lb (88.5 kg)   LMP  (LMP Unknown)   SpO2 98%   BMI 29.22 kg/m²   Body mass index is 29.22 kg/m². Sleep Medicine 9/4/2019 6/20/2019   Sitting and reading 3 0   Watching TV 3 2   Sitting, inactive in a public place (e.g. a theatre or a meeting) 0 0   As a passenger in a car for an hour without a break 3 3   Lying down to rest in the afternoon when circumstances permit 3 3   Sitting and talking to someone 0 0   Sitting quietly after a lunch without alcohol 3 1   In a car, while stopped for a few minutes in traffic 0 0   Total score 15 9   Neck circumference - 16.5     Mallampati 3    Physical Exam  Vitals signs reviewed. Constitutional:       Appearance: Normal appearance. HENT:      Head: Normocephalic and atraumatic. Nose: Nose normal.      Mouth/Throat:      Mouth: Mucous membranes are moist.   Eyes:      Extraocular Movements: Extraocular movements intact. Pupils: Pupils are equal, round, and reactive to light. Neck:      Musculoskeletal: Normal range of motion and neck supple. Cardiovascular:      Rate and Rhythm: Normal rate and regular rhythm. Pulses: Normal pulses. Heart sounds: Normal heart sounds. Pulmonary:      Effort: Pulmonary effort is normal.      Breath sounds: Normal breath sounds. Abdominal:      General: Abdomen is flat. Palpations: Abdomen is soft. Musculoskeletal: Normal range of motion. Skin:     General: Skin is warm and dry. Neurological:      General: No focal deficit present. Mental Status: She is alert and oriented to person, place, and time. Psychiatric:         Mood and Affect: Mood normal.         Behavior: Behavior normal.         Radiology: None    Assessment and Plan     Problem List        Pulmonary Problems    ADOLFO (obstructive sleep apnea)     Advised to be compliant with the CPAP  Loose weight            Other    Obesity (BMI 30-39. 9)     Advised to loose weight with diet and exercise           Relevant Medications    metFORMIN (GLUCOPHAGE) 500 MG tablet    Excessive daytime sleepiness     Advised to be compliant with the CPAP  Loose weight         Ex-cigarette smoker     Advised to c/w quitting smoking                    Return in about 1 year (around 3/8/2022) for 2 week download data.      Progress notes sent to the referring Provider    Tc Dolan MD  3/8/2021  11:06 AM

## 2021-03-10 ENCOUNTER — OFFICE VISIT (OUTPATIENT)
Dept: INTERNAL MEDICINE CLINIC | Age: 76
End: 2021-03-10
Payer: COMMERCIAL

## 2021-03-10 VITALS
HEART RATE: 58 BPM | OXYGEN SATURATION: 97 % | DIASTOLIC BLOOD PRESSURE: 72 MMHG | RESPIRATION RATE: 16 BRPM | SYSTOLIC BLOOD PRESSURE: 130 MMHG

## 2021-03-10 DIAGNOSIS — M10.072 ACUTE IDIOPATHIC GOUT INVOLVING TOE OF LEFT FOOT: ICD-10-CM

## 2021-03-10 DIAGNOSIS — N18.4 CKD (CHRONIC KIDNEY DISEASE), STAGE IV (HCC): ICD-10-CM

## 2021-03-10 DIAGNOSIS — I10 ESSENTIAL HYPERTENSION: Primary | ICD-10-CM

## 2021-03-10 PROCEDURE — 99213 OFFICE O/P EST LOW 20 MIN: CPT | Performed by: INTERNAL MEDICINE

## 2021-03-10 RX ORDER — CLONIDINE 0.1 MG/24H
1 PATCH, EXTENDED RELEASE TRANSDERMAL
Qty: 4 PATCH | Refills: 11 | Status: ON HOLD | OUTPATIENT
Start: 2021-03-10 | End: 2021-05-10 | Stop reason: SDUPTHER

## 2021-03-10 RX ORDER — COLCHICINE 0.6 MG/1
0.6 TABLET ORAL 2 TIMES DAILY
Qty: 30 TABLET | Refills: 0 | Status: SHIPPED | OUTPATIENT
Start: 2021-03-10 | End: 2021-04-27 | Stop reason: SDUPTHER

## 2021-03-10 NOTE — PROGRESS NOTES
Latosha Varma  1945  03/10/21    SUBJECTIVE:    Pt complains of decreased energy over the last couple of weeks. She needs to take breaks when she is active, and she is taking naps. She denies blood in the stool, black stools, fevers, CP. She does have sob without cough, wheezing. She denies weight changes. sugars have been in the low 100s. BP has been 120-153/70-87. OBJECTIVE:    /72   Pulse 58   Resp 16   LMP  (LMP Unknown)   SpO2 97%     Physical Exam    ASSESSMENT:    1. Essential hypertension    2. Acute idiopathic gout involving toe of left foot    3. CKD (chronic kidney disease), stage IV (HCC)        PLAN:    hospitals was seen today for 2 week follow-up and fatigue. Diagnoses and all orders for this visit:    Essential hypertension - increase in the clonidine helped the BP but likely caused the fatigue and sleepiness. Will decrease back to 0.1mg patch, but given the complexity of the pts BP regimen I will ask her to f/u with Dr Mode Magaña    Acute idiopathic gout involving toe of left foot  -     colchicine (COLCRYS) 0.6 MG tablet;  Take 1 tablet by mouth 2 times daily    CKD (chronic kidney disease), stage IV (HCC) - stable, on low dose ACE-I    Other orders  -     cloNIDine (CATAPRES-TTS-1) 0.1 MG/24HR PTWK; Place 1 patch onto the skin every 7 days

## 2021-03-11 ENCOUNTER — HOSPITAL ENCOUNTER (OUTPATIENT)
Age: 76
Discharge: HOME | End: 2021-03-11
Payer: COMMERCIAL

## 2021-03-11 LAB
ALBUMIN SERPL-MCNC: 4.3 GM/DL (ref 3.4–5)
ANION GAP SERPL CALCULATED.3IONS-SCNC: 15 MMOL/L (ref 4–16)
BUN BLDV-MCNC: 40 MG/DL (ref 6–23)
CALCIUM SERPL-MCNC: 8.8 MG/DL (ref 8.3–10.6)
CHLORIDE BLD-SCNC: 106 MMOL/L (ref 99–110)
CO2: 21 MMOL/L (ref 21–32)
CREAT SERPL-MCNC: 2.3 MG/DL (ref 0.6–1.1)
CREATININE URINE: 129.5 MG/DL (ref 28–217)
GFR AFRICAN AMERICAN: 25 ML/MIN/1.73M2
GFR NON-AFRICAN AMERICAN: 21 ML/MIN/1.73M2
GLUCOSE BLD-MCNC: 99 MG/DL (ref 70–99)
PHOSPHORUS: 4 MG/DL (ref 2.5–4.9)
POTASSIUM SERPL-SCNC: 4.8 MMOL/L (ref 3.5–5.1)
PROT/CREAT RATIO, UR: 0.3
SODIUM BLD-SCNC: 142 MMOL/L (ref 135–145)
URINE TOTAL PROTEIN: 35.1 MG/DL

## 2021-03-11 PROCEDURE — 80069 RENAL FUNCTION PANEL: CPT

## 2021-03-11 PROCEDURE — 36415 COLL VENOUS BLD VENIPUNCTURE: CPT

## 2021-03-11 PROCEDURE — 82570 ASSAY OF URINE CREATININE: CPT

## 2021-03-11 PROCEDURE — 84156 ASSAY OF PROTEIN URINE: CPT

## 2021-04-27 DIAGNOSIS — M10.072 ACUTE IDIOPATHIC GOUT INVOLVING TOE OF LEFT FOOT: ICD-10-CM

## 2021-04-27 RX ORDER — COLCHICINE 0.6 MG/1
0.6 TABLET ORAL 2 TIMES DAILY PRN
Qty: 30 TABLET | Refills: 1 | Status: SHIPPED | OUTPATIENT
Start: 2021-04-27 | End: 2021-09-28 | Stop reason: SDUPTHER

## 2021-04-28 ENCOUNTER — TELEPHONE (OUTPATIENT)
Dept: CARDIOLOGY CLINIC | Age: 76
End: 2021-04-28

## 2021-04-28 NOTE — TELEPHONE ENCOUNTER
Cardiologist: Dr. Bertrand Puckett  Surgeon: Dr. Enriqueta Kulkarni  Surgery: Blepharoplasty   Anesthesia: MAC  Date: June 4, 2021 July 9, 2021  FAX# [phone]  # [phone]        Last OV 10/22/2020 w/Gisselle        ADOLFO (obstructive sleep apnea)  · Patient has a CPAP machine  · Patient does wear it most every night  · Patient does sleep well. · Pulmonology manages     Essential hypertension  · Controlled  · To continue Beta blocker, Calcium channel blocker  · advised low salt diet   Last echo 2019 Summary   Left ventricular systolic function is normal.   Ejection fraction is visually estimated at 50-55%.   Mild left ventricular hypertrophy.   Sclerotic aortic valve.   Mild mitral regurgitation.   Physiological pericardial effusion.     Mixed hyperlipidemia  · Lipid panel reviewed  · Patient LDL is at goal, HDL is  at goal  · Patient is on a Statin  · Discussed with the patient the need for exercise, low cholesterol diet, and compliance with medications.      Paroxysmal atrial fibrillation (HCC)  · Rate controlled yes. · Chadsvasc score is 5  · She is  on anticoagulation. Xarelto 15 mg due to renal dysfunction per Dr. Mohan Huertas  · Continue anti rhythmic / rate control medications   · EKG today: sinus braycardia           NM- 9/7/2018   Normal Claudell Silk nuclear scintigraphic study suggestive of normal myocardial    perfusion.    Gated images demonstrate normal left ventricular systolic function with EF    of 60 %.          Echo- 8/18/2020   Left ventricular systolic function is normal.   Ejection fraction is visually estimated at 50-55%.   Mild left ventricular hypertrophy.   Sclerotic aortic valve.   Mild mitral regurgitation.   Physiological pericardial effusion.           CATH- 8/2009        Xarelto     Aspirin

## 2021-05-08 ENCOUNTER — HOSPITAL ENCOUNTER (INPATIENT)
Age: 76
LOS: 3 days | Discharge: HOME | DRG: 312 | End: 2021-05-11
Attending: EMERGENCY MEDICINE | Admitting: INTERNAL MEDICINE
Payer: COMMERCIAL

## 2021-05-08 ENCOUNTER — APPOINTMENT (OUTPATIENT)
Dept: CT IMAGING | Age: 76
DRG: 312 | End: 2021-05-08
Payer: COMMERCIAL

## 2021-05-08 ENCOUNTER — APPOINTMENT (OUTPATIENT)
Dept: GENERAL RADIOLOGY | Age: 76
DRG: 312 | End: 2021-05-08
Payer: COMMERCIAL

## 2021-05-08 DIAGNOSIS — I10 ESSENTIAL HYPERTENSION: ICD-10-CM

## 2021-05-08 DIAGNOSIS — R79.89 ELEVATED BRAIN NATRIURETIC PEPTIDE (BNP) LEVEL: ICD-10-CM

## 2021-05-08 DIAGNOSIS — R51.9 NONINTRACTABLE HEADACHE, UNSPECIFIED CHRONICITY PATTERN, UNSPECIFIED HEADACHE TYPE: ICD-10-CM

## 2021-05-08 DIAGNOSIS — R77.8 TROPONIN LEVEL ELEVATED: ICD-10-CM

## 2021-05-08 DIAGNOSIS — R55 SYNCOPE AND COLLAPSE: Primary | ICD-10-CM

## 2021-05-08 DIAGNOSIS — R06.00 DYSPNEA, UNSPECIFIED TYPE: ICD-10-CM

## 2021-05-08 DIAGNOSIS — R07.9 CHEST PAIN, UNSPECIFIED TYPE: ICD-10-CM

## 2021-05-08 DIAGNOSIS — R00.1 BRADYCARDIA: ICD-10-CM

## 2021-05-08 LAB
ALBUMIN SERPL-MCNC: 3.7 GM/DL (ref 3.4–5)
ALP BLD-CCNC: 72 IU/L (ref 40–129)
ALT SERPL-CCNC: 29 U/L (ref 10–40)
ANION GAP SERPL CALCULATED.3IONS-SCNC: 8 MMOL/L (ref 4–16)
APTT: 63.9 SECONDS (ref 25.1–37.1)
AST SERPL-CCNC: 19 IU/L (ref 15–37)
BACTERIA: NEGATIVE /HPF
BASOPHILS ABSOLUTE: 0 K/CU MM
BASOPHILS RELATIVE PERCENT: 0.3 % (ref 0–1)
BILIRUB SERPL-MCNC: 0.2 MG/DL (ref 0–1)
BILIRUBIN URINE: NEGATIVE MG/DL
BLOOD, URINE: NEGATIVE
BUN BLDV-MCNC: 44 MG/DL (ref 6–23)
CALCIUM SERPL-MCNC: 9.2 MG/DL (ref 8.3–10.6)
CHLORIDE BLD-SCNC: 104 MMOL/L (ref 99–110)
CHP ED QC CHECK: YES
CLARITY: CLEAR
CO2: 25 MMOL/L (ref 21–32)
COLOR: ABNORMAL
CREAT SERPL-MCNC: 2.6 MG/DL (ref 0.6–1.1)
DIFFERENTIAL TYPE: ABNORMAL
EOSINOPHILS ABSOLUTE: 0.3 K/CU MM
EOSINOPHILS RELATIVE PERCENT: 3.3 % (ref 0–3)
GFR AFRICAN AMERICAN: 22 ML/MIN/1.73M2
GFR NON-AFRICAN AMERICAN: 18 ML/MIN/1.73M2
GLUCOSE BLD-MCNC: 101 MG/DL (ref 70–99)
GLUCOSE BLD-MCNC: 123 MG/DL
GLUCOSE BLD-MCNC: 123 MG/DL (ref 70–99)
GLUCOSE BLD-MCNC: 95 MG/DL (ref 70–99)
GLUCOSE, URINE: NEGATIVE MG/DL
HCT VFR BLD CALC: 32.7 % (ref 37–47)
HEMOGLOBIN: 10 GM/DL (ref 12.5–16)
IMMATURE NEUTROPHIL %: 0.1 % (ref 0–0.43)
INR BLD: 1.24 INDEX
KETONES, URINE: NEGATIVE MG/DL
LACTATE: 1.4 MMOL/L (ref 0.4–2)
LEUKOCYTE ESTERASE, URINE: ABNORMAL
LIPASE: 32 IU/L (ref 13–60)
LYMPHOCYTES ABSOLUTE: 1 K/CU MM
LYMPHOCYTES RELATIVE PERCENT: 12.6 % (ref 24–44)
MAGNESIUM: 2.2 MG/DL (ref 1.8–2.4)
MCH RBC QN AUTO: 25.6 PG (ref 27–31)
MCHC RBC AUTO-ENTMCNC: 30.6 % (ref 32–36)
MCV RBC AUTO: 83.8 FL (ref 78–100)
MONOCYTES ABSOLUTE: 0.5 K/CU MM
MONOCYTES RELATIVE PERCENT: 7 % (ref 0–4)
NITRITE URINE, QUANTITATIVE: NEGATIVE
NUCLEATED RBC %: 0 %
PDW BLD-RTO: 15.7 % (ref 11.7–14.9)
PH, URINE: 5 (ref 5–8)
PLATELET # BLD: 278 K/CU MM (ref 140–440)
PMV BLD AUTO: 10.7 FL (ref 7.5–11.1)
POTASSIUM SERPL-SCNC: 5 MMOL/L (ref 3.5–5.1)
PRO-BNP: 1223 PG/ML
PROTEIN UA: NEGATIVE MG/DL
PROTHROMBIN TIME: 15 SECONDS (ref 11.7–14.5)
RBC # BLD: 3.9 M/CU MM (ref 4.2–5.4)
RBC URINE: <1 /HPF (ref 0–6)
SEGMENTED NEUTROPHILS ABSOLUTE COUNT: 5.9 K/CU MM
SEGMENTED NEUTROPHILS RELATIVE PERCENT: 76.7 % (ref 36–66)
SODIUM BLD-SCNC: 137 MMOL/L (ref 135–145)
SPECIFIC GRAVITY UA: 1.02 (ref 1–1.03)
SQUAMOUS EPITHELIAL: <1 /HPF
TOTAL CK: 193 IU/L (ref 26–140)
TOTAL IMMATURE NEUTOROPHIL: 0.01 K/CU MM
TOTAL NUCLEATED RBC: 0 K/CU MM
TOTAL PROTEIN: 6.9 GM/DL (ref 6.4–8.2)
TRICHOMONAS: ABNORMAL /HPF
TROPONIN T: 0.02 NG/ML
TROPONIN T: 0.03 NG/ML
TSH HIGH SENSITIVITY: 1.01 UIU/ML (ref 0.27–4.2)
UROBILINOGEN, URINE: NEGATIVE MG/DL (ref 0.2–1)
WBC # BLD: 7.6 K/CU MM (ref 4–10.5)
WBC UA: 9 /HPF (ref 0–5)

## 2021-05-08 PROCEDURE — 6370000000 HC RX 637 (ALT 250 FOR IP): Performed by: CLINICAL NURSE SPECIALIST

## 2021-05-08 PROCEDURE — 80053 COMPREHEN METABOLIC PANEL: CPT

## 2021-05-08 PROCEDURE — 83605 ASSAY OF LACTIC ACID: CPT

## 2021-05-08 PROCEDURE — 74174 CTA ABD&PLVS W/CONTRAST: CPT

## 2021-05-08 PROCEDURE — 93005 ELECTROCARDIOGRAM TRACING: CPT | Performed by: EMERGENCY MEDICINE

## 2021-05-08 PROCEDURE — 83690 ASSAY OF LIPASE: CPT

## 2021-05-08 PROCEDURE — 6360000004 HC RX CONTRAST MEDICATION: Performed by: EMERGENCY MEDICINE

## 2021-05-08 PROCEDURE — 99285 EMERGENCY DEPT VISIT HI MDM: CPT

## 2021-05-08 PROCEDURE — 85730 THROMBOPLASTIN TIME PARTIAL: CPT

## 2021-05-08 PROCEDURE — 84484 ASSAY OF TROPONIN QUANT: CPT

## 2021-05-08 PROCEDURE — 70450 CT HEAD/BRAIN W/O DYE: CPT

## 2021-05-08 PROCEDURE — 85025 COMPLETE CBC W/AUTO DIFF WBC: CPT

## 2021-05-08 PROCEDURE — 81001 URINALYSIS AUTO W/SCOPE: CPT

## 2021-05-08 PROCEDURE — 83880 ASSAY OF NATRIURETIC PEPTIDE: CPT

## 2021-05-08 PROCEDURE — G0378 HOSPITAL OBSERVATION PER HR: HCPCS

## 2021-05-08 PROCEDURE — 70496 CT ANGIOGRAPHY HEAD: CPT

## 2021-05-08 PROCEDURE — 72125 CT NECK SPINE W/O DYE: CPT

## 2021-05-08 PROCEDURE — 82550 ASSAY OF CK (CPK): CPT

## 2021-05-08 PROCEDURE — 82962 GLUCOSE BLOOD TEST: CPT

## 2021-05-08 PROCEDURE — 83735 ASSAY OF MAGNESIUM: CPT

## 2021-05-08 PROCEDURE — 84443 ASSAY THYROID STIM HORMONE: CPT

## 2021-05-08 PROCEDURE — 85610 PROTHROMBIN TIME: CPT

## 2021-05-08 PROCEDURE — 36415 COLL VENOUS BLD VENIPUNCTURE: CPT

## 2021-05-08 PROCEDURE — 94660 CPAP INITIATION&MGMT: CPT

## 2021-05-08 PROCEDURE — 71045 X-RAY EXAM CHEST 1 VIEW: CPT

## 2021-05-08 PROCEDURE — 2140000000 HC CCU INTERMEDIATE R&B

## 2021-05-08 RX ORDER — ONDANSETRON 2 MG/ML
4 INJECTION INTRAMUSCULAR; INTRAVENOUS EVERY 30 MIN PRN
Status: DISCONTINUED | OUTPATIENT
Start: 2021-05-08 | End: 2021-05-08

## 2021-05-08 RX ORDER — ASPIRIN 81 MG/1
81 TABLET ORAL DAILY
Status: DISCONTINUED | OUTPATIENT
Start: 2021-05-09 | End: 2021-05-11 | Stop reason: HOSPADM

## 2021-05-08 RX ORDER — ASCORBIC ACID 500 MG
250 TABLET ORAL DAILY
Status: DISCONTINUED | OUTPATIENT
Start: 2021-05-09 | End: 2021-05-11 | Stop reason: HOSPADM

## 2021-05-08 RX ORDER — SODIUM CHLORIDE 0.9 % (FLUSH) 0.9 %
10 SYRINGE (ML) INJECTION PRN
Status: DISCONTINUED | OUTPATIENT
Start: 2021-05-08 | End: 2021-05-11 | Stop reason: HOSPADM

## 2021-05-08 RX ORDER — METOPROLOL TARTRATE 50 MG/1
50 TABLET, FILM COATED ORAL 2 TIMES DAILY
Status: DISCONTINUED | OUTPATIENT
Start: 2021-05-08 | End: 2021-05-11 | Stop reason: HOSPADM

## 2021-05-08 RX ORDER — FUROSEMIDE 20 MG/1
20 TABLET ORAL DAILY
Status: DISCONTINUED | OUTPATIENT
Start: 2021-05-09 | End: 2021-05-11 | Stop reason: HOSPADM

## 2021-05-08 RX ORDER — SODIUM CHLORIDE 9 MG/ML
INJECTION, SOLUTION INTRAVENOUS CONTINUOUS
Status: ACTIVE | OUTPATIENT
Start: 2021-05-08 | End: 2021-05-09

## 2021-05-08 RX ORDER — HYDRALAZINE HYDROCHLORIDE 20 MG/ML
5 INJECTION INTRAMUSCULAR; INTRAVENOUS EVERY 6 HOURS PRN
Status: ACTIVE | OUTPATIENT
Start: 2021-05-08 | End: 2021-05-10

## 2021-05-08 RX ORDER — MORPHINE SULFATE 4 MG/ML
1 INJECTION, SOLUTION INTRAMUSCULAR; INTRAVENOUS EVERY 30 MIN PRN
Status: DISCONTINUED | OUTPATIENT
Start: 2021-05-08 | End: 2021-05-08

## 2021-05-08 RX ORDER — DEXTROSE MONOHYDRATE 25 G/50ML
12.5 INJECTION, SOLUTION INTRAVENOUS PRN
Status: DISCONTINUED | OUTPATIENT
Start: 2021-05-08 | End: 2021-05-11 | Stop reason: HOSPADM

## 2021-05-08 RX ORDER — POLYETHYLENE GLYCOL 3350 17 G/17G
17 POWDER, FOR SOLUTION ORAL DAILY PRN
Status: DISCONTINUED | OUTPATIENT
Start: 2021-05-08 | End: 2021-05-11 | Stop reason: HOSPADM

## 2021-05-08 RX ORDER — DILTIAZEM HYDROCHLORIDE 120 MG/1
120 CAPSULE, COATED, EXTENDED RELEASE ORAL DAILY
Status: DISCONTINUED | OUTPATIENT
Start: 2021-05-09 | End: 2021-05-11 | Stop reason: HOSPADM

## 2021-05-08 RX ORDER — COLCHICINE 0.6 MG/1
0.6 TABLET ORAL 2 TIMES DAILY PRN
Status: DISCONTINUED | OUTPATIENT
Start: 2021-05-08 | End: 2021-05-11 | Stop reason: HOSPADM

## 2021-05-08 RX ORDER — ATORVASTATIN CALCIUM 80 MG/1
80 TABLET, FILM COATED ORAL DAILY
Status: DISCONTINUED | OUTPATIENT
Start: 2021-05-09 | End: 2021-05-11 | Stop reason: HOSPADM

## 2021-05-08 RX ORDER — CALCIUM CARBONATE 500(1250)
500 TABLET ORAL 2 TIMES DAILY
Status: DISCONTINUED | OUTPATIENT
Start: 2021-05-08 | End: 2021-05-11 | Stop reason: HOSPADM

## 2021-05-08 RX ORDER — LISINOPRIL 2.5 MG/1
2.5 TABLET ORAL DAILY
Status: DISCONTINUED | OUTPATIENT
Start: 2021-05-09 | End: 2021-05-11

## 2021-05-08 RX ORDER — ACETAMINOPHEN 650 MG/1
650 SUPPOSITORY RECTAL EVERY 6 HOURS PRN
Status: DISCONTINUED | OUTPATIENT
Start: 2021-05-08 | End: 2021-05-11 | Stop reason: HOSPADM

## 2021-05-08 RX ORDER — OXYBUTYNIN CHLORIDE 5 MG/1
5 TABLET ORAL 2 TIMES DAILY
Status: DISCONTINUED | OUTPATIENT
Start: 2021-05-08 | End: 2021-05-11 | Stop reason: HOSPADM

## 2021-05-08 RX ORDER — MULTIVIT WITH MINERALS/LUTEIN
TABLET ORAL DAILY
COMMUNITY

## 2021-05-08 RX ORDER — FOLIC ACID 1 MG/1
500 TABLET ORAL DAILY
Status: DISCONTINUED | OUTPATIENT
Start: 2021-05-09 | End: 2021-05-11 | Stop reason: HOSPADM

## 2021-05-08 RX ORDER — DICYCLOMINE HYDROCHLORIDE 10 MG/ML
20 INJECTION INTRAMUSCULAR ONCE
Status: DISCONTINUED | OUTPATIENT
Start: 2021-05-08 | End: 2021-05-11 | Stop reason: HOSPADM

## 2021-05-08 RX ORDER — NICOTINE POLACRILEX 4 MG
15 LOZENGE BUCCAL PRN
Status: DISCONTINUED | OUTPATIENT
Start: 2021-05-08 | End: 2021-05-11 | Stop reason: HOSPADM

## 2021-05-08 RX ORDER — CLONIDINE 0.1 MG/24H
1 PATCH, EXTENDED RELEASE TRANSDERMAL
Status: DISCONTINUED | OUTPATIENT
Start: 2021-05-12 | End: 2021-05-11 | Stop reason: HOSPADM

## 2021-05-08 RX ORDER — DOXAZOSIN MESYLATE 4 MG/1
8 TABLET ORAL DAILY
Status: DISCONTINUED | OUTPATIENT
Start: 2021-05-09 | End: 2021-05-11 | Stop reason: HOSPADM

## 2021-05-08 RX ORDER — ACETAMINOPHEN 325 MG/1
650 TABLET ORAL EVERY 6 HOURS PRN
Status: DISCONTINUED | OUTPATIENT
Start: 2021-05-08 | End: 2021-05-11 | Stop reason: HOSPADM

## 2021-05-08 RX ORDER — SODIUM CHLORIDE 9 MG/ML
25 INJECTION, SOLUTION INTRAVENOUS PRN
Status: DISCONTINUED | OUTPATIENT
Start: 2021-05-08 | End: 2021-05-11 | Stop reason: HOSPADM

## 2021-05-08 RX ORDER — FERROUS SULFATE 325(65) MG
325 TABLET ORAL
Status: DISCONTINUED | OUTPATIENT
Start: 2021-05-09 | End: 2021-05-11 | Stop reason: HOSPADM

## 2021-05-08 RX ORDER — SODIUM CHLORIDE 0.9 % (FLUSH) 0.9 %
10 SYRINGE (ML) INJECTION EVERY 12 HOURS SCHEDULED
Status: DISCONTINUED | OUTPATIENT
Start: 2021-05-08 | End: 2021-05-11 | Stop reason: HOSPADM

## 2021-05-08 RX ORDER — PROPAFENONE HYDROCHLORIDE 150 MG/1
150 TABLET, FILM COATED ORAL EVERY 8 HOURS
Status: DISCONTINUED | OUTPATIENT
Start: 2021-05-08 | End: 2021-05-11 | Stop reason: HOSPADM

## 2021-05-08 RX ORDER — SODIUM CHLORIDE 0.9 % (FLUSH) 0.9 %
5-40 SYRINGE (ML) INJECTION 2 TIMES DAILY
Status: DISCONTINUED | OUTPATIENT
Start: 2021-05-08 | End: 2021-05-08

## 2021-05-08 RX ORDER — DEXTROSE MONOHYDRATE 50 MG/ML
100 INJECTION, SOLUTION INTRAVENOUS PRN
Status: DISCONTINUED | OUTPATIENT
Start: 2021-05-08 | End: 2021-05-11 | Stop reason: HOSPADM

## 2021-05-08 RX ADMIN — IOPAMIDOL 100 ML: 755 INJECTION, SOLUTION INTRAVENOUS at 19:02

## 2021-05-08 RX ADMIN — OXYBUTYNIN CHLORIDE 5 MG: 5 TABLET ORAL at 23:04

## 2021-05-08 RX ADMIN — SODIUM CHLORIDE: 9 INJECTION, SOLUTION INTRAVENOUS at 22:12

## 2021-05-08 RX ADMIN — PROPAFENONE HYDROCHLORIDE 150 MG: 150 TABLET, FILM COATED ORAL at 23:04

## 2021-05-08 RX ADMIN — RIVAROXABAN 15 MG: 15 TABLET, FILM COATED ORAL at 23:03

## 2021-05-08 RX ADMIN — METOPROLOL TARTRATE 50 MG: 50 TABLET, FILM COATED ORAL at 23:03

## 2021-05-08 ASSESSMENT — ENCOUNTER SYMPTOMS
ALLERGIC/IMMUNOLOGIC NEGATIVE: 1
EYES NEGATIVE: 1
ABDOMINAL PAIN: 1
SHORTNESS OF BREATH: 1
CHEST TIGHTNESS: 1

## 2021-05-08 ASSESSMENT — PAIN SCALES - GENERAL
PAINLEVEL_OUTOF10: 0
PAINLEVEL_OUTOF10: 0

## 2021-05-08 NOTE — ED PROVIDER NOTES
[facility]      TRIAGE CHIEF COMPLAINT:   Loss of Consciousness and Shortness of Breath      [place]:  Roni Gandhi is a 76 y.o. female that presents complaint of loss of consciousness headache chest pain shortness of breath abdominal pain. Patient is here with her daughter apparently she got up today to go to the restroom started walking developed a headache and chest pain shortness of breath abdominal pain and passed out. Patient does not know how long she passed out for she woke up and called her daughter who who then brought her to the hospital patient denies any fevers, nausea vomiting no history of brain tumor or aneurysm no history of DVT PE or AAA. She is on Xarelto for history of A. fib. Patient denies any abdominal pain weakness numbness tingling extremity pain. No other questions or concerns. On arrival she is bradycardic blood pressure normal oxygen normal    REVIEW OF SYSTEMS:  At least 10 systems reviewed and otherwise acutely negative except as in the [address] Review of Systems   Constitutional: Negative. HENT: Negative. Eyes: Negative. Respiratory: Positive for chest tightness and shortness of breath. Cardiovascular: Positive for chest pain. Gastrointestinal: Positive for abdominal pain. Endocrine: Negative. Genitourinary: Negative. Musculoskeletal: Positive for neck pain. Skin: Negative. Allergic/Immunologic: Negative. Neurological: Positive for syncope and headaches. Hematological: Negative. Psychiatric/Behavioral: Negative. All other systems reviewed and are negative.       Past Medical History:   Diagnosis Date    14 day event monitor 11/05/2018    Sinus rhythm    Atrial fibrillation with RVR ([facility] [address] 11/25/2013    Breast cancer ([facility] [address]     Edema     9/76 TTE diastolic dysfxn, EF 83%; 52/57 - TTE diastolic dysfxn, EF 43%; Stress myoview  WNl, EF 70%    Family history of cardiovascular disease     GERD (gastroesophageal reflux disease)     H/O 24 hour EKG monitoring 4/23/12    [facility]    H/O cardiovascular stress test     4/23/12-[facility], Probably norm perfusion Lexiscan cardiolite study except for diaphramatic attenuation artifact, otherwise perfusion is normal, norm LV fxn by gated scan. 11/10-EF70%, 9/15/08-EF70%, 1/15/07-EF70%, 9/03    H/O echocardiogram     4/23/12-[facility]- Norm chamber sizes. LVH with norm LV systolic but abn diastolic fxn, mild MR and TR, minimal pulm HTN. 11/10 -EF>55%; 3/05, 9/23/03    History of cardiac catheterization     11/15/2013-EF 55%, No signif CAD -Dr Stacy Coffey;;    History of cardiovascular stress test     11/14/2013-EF 70%. Normal Lexiscan Cardiolite, Uniform wall motion;    History of echocardiogram     83/71/6790-[id number] global systolic  function. No wall motion abnormalities. EF 55%. Mild MR/TR;    History of Holter monitoring 11/17/14 11/14-48 hour Holter: Predominant rhythm was sinus, no ventricular ectopy noted, supraventricular ectopics were noted in single beat forms.  History of therapeutic radiation     Hx of 24 hour EKG monitoring     12/17/13 48 hr holter monitor. Sinus rhythm with intermittent sinus arrhythmia.  Hyperlipidemia     Hypertension     11/13 Cath WNL, EF 55%; 11/13 Stress WNL : 11/13 TTE mild MR and TR, EF 55%; 4/12 Stress WNL; 8/9 - Cath WNL    Iron deficiency anemia 7/9/2013 9/13 EGD WNL    Lumbar radiculopathy 11/25/2013    Menopause     ADOLFO (obstructive sleep apnea)     sleep study 10/3 CPAP 6cm     Osteoarthritis     both knees    Osteopenia     9/12 DEXA T-score -1.5    Other activity(E029.9)     48 hr holter, the 48 hr holter monitor reveals the patient in the sinus rhythm with occasional supraventricular ectopic beats. There is a rare short atrial run.     Paroxysmal atrial fibrillation (HCC)     4/12 Holter [facility]    Prolonged emergence from general anesthesia     Proteinuria     Right Breast Cancer Dx 10-12    Supraventricular tachycardia ([facility] [address]     Type 2 diabetes mellitus ([facility] [address] Dx 2000's    Urge incontinence     Wears partial dentures     upper partial     Past Surgical History:   Procedure Laterality Date    BREAST BIOPSY  10-12    Right Breast Biopsy, Cancer    BREAST BIOPSY  1970's    Right Breast Biopsy, Benign    BREAST LUMPECTOMY  11/6/12    Breast cancer - Right with sentinal node    BUNIONECTOMY  1990's    Right [place]    8/10/09- The patient has no significant CAD. The elevated troponin is probably secondary to SVT. , 10/03- no significant CAD    CARPAL TUNNEL RELEASE Left 01/2019    CATARACT REMOVAL Bilateral 03/2020, 5/20    CATARACT REMOVAL Left 05/2020    COLONOSCOPY  \"X 2 Last One 2008 \"    Polpy Removed During Last Colonoscopy    DENTAL SURGERY      Teeth Extracted In Past    ENDOSCOPY, COLON, DIAGNOSTIC  07/18/2016    savary dilatation to 17 mm    HYSTERECTOMY, TOTAL ABDOMINAL  1980's    JOINT REPLACEMENT  2009    Total Left Knee    JOINT REPLACEMENT  2010    Total Right Knee    SOFI STEROTACTIC LOC BREAST BIOPSY RIGHT Right 2/18/2021    SOFI STEROTACTIC LOC BREAST BIOPSY RIGHT [address]    OVARY REMOVAL      UPPER GASTROINTESTINAL ENDOSCOPY N/A 11/8/2018    EGD DIAGNOSTIC ONLY performed by Karyna Wilson MD at [facility]     Family History   Problem Relation Age of Onset    Diabetes Mother     Early Death Mother 61    Cancer Father         \"Lung Cancer\"    Heart Disease Father     Diabetes Father     Stroke Sister     Diabetes Sister     Diabetes Brother     Cancer Brother         \"Prostate Cancer\"    Cancer Brother         \"Lung Cancer\"    Thyroid Disease Daughter      Social History     Socioeconomic History    Marital status:       Spouse name: Not on file    Number of children: Not on file    Years of education: Not on file    Highest education level: Not on file   Occupational History    Not on file   Social Needs    Financial (VITAMIN C) 250 MG tablet Take by mouth daily      rivaroxaban (XARELTO) 15 MG TABS tablet Take 1 tablet by mouth every 24 hours 21 tablet 0    propafenone (RYTHMOL) 150 MG tablet Take 1 tablet by mouth every 8 hours 90 tablet 3    atorvastatin (LIPITOR) 80 MG tablet take 1 tablet by mouth once daily 90 tablet 3    ferrous sulfate (IRON 325) 325 (65 Fe) MG tablet Take 1 tablet by mouth daily (with breakfast) 90 tablet 5    metFORMIN (GLUCOPHAGE) 500 MG tablet Take 1 tablet by mouth 2 times daily (with meals) 180 tablet 1    lisinopril (PRINIVIL;ZESTRIL) 2.5 MG tablet Take 1 tablet by mouth daily 90 tablet 1    furosemide (LASIX) 20 MG tablet take 1 tablet by mouth every other day 90 tablet 3    oxybutynin (DITROPAN) 5 MG tablet take 1 tablet by mouth twice a day 180 tablet 3    metoprolol tartrate (LOPRESSOR) 50 MG tablet take 1 tablet by mouth twice a day 180 tablet 3    dilTIAZem (CARTIA XT) 120 MG extended release capsule Take 1 capsule by mouth daily 30 capsule 11    Multiple Vitamins-Minerals (MULTIVITAMIN ADULT PO) Take by mouth      aspirin 81 MG EC tablet Take 1 tablet by mouth daily 30 tablet     Lancets MISC 1 each by In Vitro route 2 times daily 100 each 3    Blood Glucose Monitoring Suppl AV 1 kit by Does not apply route daily 1 Device 0    colchicine (COLCRYS) 0.6 MG tablet Take 1 tablet by mouth 2 times daily as needed for Pain (gout) 30 tablet 1    cloNIDine (CATAPRES-TTS-1) 0.1 MG/24HR PTWK Place 1 patch onto the skin every 7 days 4 patch 11    doxazosin (CARDURA) 8 MG tablet take 1 tablet by mouth once daily 90 tablet 3    Omega-3 Fatty Acids (FISH OIL PO) Take 1,200 mg by mouth      FOLIC ACID PO Take 723 mg by mouth daily      Glucose Blood (BLOOD GLUCOSE TEST STRIPS) STRP 1 each by In Vitro route 2 times daily 100 strip 3    Calcium Carbonate (CALTRATE 600 PO) Take  by mouth 2 times daily.         Allergies   Allergen Reactions    Latex      \"Blisters\"    Tape Bandar Dryer Sherren Husbands Red\"     Current Facility-Administered Medications   Medication Dose Route Frequency Provider Last Rate Last Admin    ondansetron (ZOFRAN) injection 4 mg  4 mg Intravenous Q30 Min PRN Portuguese Cranker, DO        famotidine (PEPCID) injection 20 mg  20 mg Intravenous Once Portuguese Cranker, DO   Stopped at 05/08/21 1828    dicyclomine (BENTYL) injection 20 mg  20 mg Intramuscular Once Portuguese Cranker, DO   Stopped at 05/08/21 1828    morphine sulfate (PF) injection 1 mg  1 mg Intravenous Q30 Min PRN Portuguese Cranker, DO        sodium chloride flush 0.9 % injection 5-40 mL  5-40 mL Intravenous BID Portuguese Cranker, DO         Current Outpatient Medications   Medication Sig Dispense Refill    Ascorbic Acid (VITAMIN C) 250 MG tablet Take by mouth daily      rivaroxaban (XARELTO) 15 MG TABS tablet Take 1 tablet by mouth every 24 hours 21 tablet 0    propafenone (RYTHMOL) 150 MG tablet Take 1 tablet by mouth every 8 hours 90 tablet 3    atorvastatin (LIPITOR) 80 MG tablet take 1 tablet by mouth once daily 90 tablet 3    ferrous sulfate (IRON 325) 325 (65 Fe) MG tablet Take 1 tablet by mouth daily (with breakfast) 90 tablet 5    metFORMIN (GLUCOPHAGE) 500 MG tablet Take 1 tablet by mouth 2 times daily (with meals) 180 tablet 1    lisinopril (PRINIVIL;ZESTRIL) 2.5 MG tablet Take 1 tablet by mouth daily 90 tablet 1    furosemide (LASIX) 20 MG tablet take 1 tablet by mouth every other day 90 tablet 3    oxybutynin (DITROPAN) 5 MG tablet take 1 tablet by mouth twice a day 180 tablet 3    metoprolol tartrate (LOPRESSOR) 50 MG tablet take 1 tablet by mouth twice a day 180 tablet 3    dilTIAZem (CARTIA XT) 120 MG extended release capsule Take 1 capsule by mouth daily 30 capsule 11    Multiple Vitamins-Minerals (MULTIVITAMIN ADULT PO) Take by mouth      aspirin 81 MG EC tablet Take 1 tablet by mouth daily 30 tablet     Lancets MISC 1 each by In Vitro route 2 times daily 100 each 3    Blood Glucose Monitoring Suppl AV 1 kit by Does not apply route daily 1 Device 0    colchicine (COLCRYS) 0.6 MG tablet Take 1 tablet by mouth 2 times daily as needed for Pain (gout) 30 tablet 1    cloNIDine (CATAPRES-TTS-1) 0.1 MG/24HR PTWK Place 1 patch onto the skin every 7 days 4 patch 11    doxazosin (CARDURA) 8 MG tablet take 1 tablet by mouth once daily 90 tablet 3    Omega-3 Fatty Acids (FISH OIL PO) Take 1,200 mg by mouth      FOLIC ACID PO Take 404 mg by mouth daily      Glucose Blood (BLOOD GLUCOSE TEST STRIPS) STRP 1 each by In Vitro route 2 times daily 100 strip 3    Calcium Carbonate (CALTRATE 600 PO) Take  by mouth 2 times daily. Nursing Notes Reviewed    VITAL SIGNS:  ED Triage Vitals   Enc Vitals Group      BP       Pulse       Resp       Temp       Temp src       SpO2       Weight       Height       Head Circumference       Peak Flow       Pain Score       Pain Loc       Pain Edu? Excl. in [address]? PHYSICAL EXAM:  Physical Exam  Vitals signs and nursing note reviewed. Constitutional:       General: She is not in acute distress. Appearance: Normal appearance. She is well-developed and well-groomed. She is not ill-appearing, toxic-appearing or diaphoretic. HENT:      Head: Normocephalic and atraumatic. Right Ear: External ear normal.      Left Ear: External ear normal.      Nose: No congestion or rhinorrhea. Eyes:      General: No scleral icterus. Right eye: No discharge. Left eye: No discharge. Extraocular Movements: Extraocular movements intact. Conjunctiva/sclera: Conjunctivae normal.      Pupils: Pupils are equal, round, and reactive to light. Neck:      Musculoskeletal: Normal range of motion. Normal range of motion. Muscular tenderness present. No edema, erythema, neck rigidity, crepitus, injury, pain with movement, torticollis or spinous process tenderness. Vascular: No carotid bruit or JVD.       Trachea: Phonation normal. Cardiovascular:      Rate and Rhythm: Regular rhythm. Bradycardia present. Pulses: Normal pulses. Heart sounds: Normal heart sounds. No murmur. No friction rub. No gallop. Pulmonary:      Effort: Pulmonary effort is normal. No respiratory distress. Breath sounds: Normal breath sounds. No stridor. No wheezing, rhonchi or rales. Abdominal:      General: Bowel sounds are normal. There is no distension. There are no signs of injury. Palpations: Abdomen is soft. There is no mass or pulsatile mass. Tenderness: There is no abdominal tenderness. There is no guarding or rebound. Negative signs include Miguel's sign, Rovsing's sign and McBurney's sign. Hernia: No hernia is present. Musculoskeletal: Normal range of motion. General: No swelling, tenderness, deformity or signs of injury. Right lower leg: No edema. Left lower leg: No edema. Skin:     General: Skin is warm. Coloration: Skin is not jaundiced or pale. Findings: No bruising, erythema, lesion or rash. Neurological:      General: No focal deficit present. Mental Status: She is alert and oriented to person, place, and time. GCS: GCS eye subscore is 4. GCS verbal subscore is 5. GCS motor subscore is 6. Cranial Nerves: Cranial nerves are intact. No cranial nerve deficit, dysarthria or facial asymmetry. Sensory: Sensation is intact. No sensory deficit. Motor: Motor function is intact. No weakness, tremor, atrophy, abnormal muscle tone or seizure activity. Coordination: Coordination is intact. Coordination normal.   Psychiatric:         Mood and Affect: Mood normal.         Behavior: Behavior normal. Behavior is cooperative. Thought Content:  Thought content normal.         Judgment: Judgment normal.           I have reviewed andinterpreted all of the currently available lab results from this visit (if applicable):    Results for orders placed or performed during the hospital encounter of 05/08/21   Magnesium   Result Value Ref Range    Magnesium 2.2 1.8 - 2.4 mg/dl   Protime/INR & PTT   Result Value Ref Range    Protime 15.0 (H) 11.7 - 14.5 SECONDS    INR 1.24 INDEX    aPTT 63.9 (H) 25.1 - 37.1 SECONDS   CBC Auto Differential   Result Value Ref Range    WBC 7.6 4.0 - 10.5 K/CU MM    RBC 3.90 (L) 4.2 - 5.4 M/CU MM    Hemoglobin 10.0 (L) 12.5 - 16.0 GM/DL    Hematocrit 32.7 (L) 37 - 47 %    MCV 83.8 78 - 100 FL    MCH 25.6 (L) 27 - 31 PG    MCHC 30.6 (L) 32.0 - 36.0 %    RDW 15.7 (H) 11.7 - 14.9 %    Platelets 676 607 - 323 K/CU MM    MPV 10.7 7.5 - 11.1 FL    Differential Type AUTOMATED DIFFERENTIAL     Segs Relative 76.7 (H) 36 - 66 %    Lymphocytes % 12.6 (L) 24 - 44 %    Monocytes % 7.0 (H) 0 - 4 %    Eosinophils % 3.3 (H) 0 - 3 %    Basophils % 0.3 0 - 1 %    Segs Absolute 5.9 K/CU MM    Lymphocytes Absolute 1.0 K/CU MM    Monocytes Absolute 0.5 K/CU MM    Eosinophils Absolute 0.3 K/CU MM    Basophils Absolute 0.0 K/CU MM    Nucleated RBC % 0.0 %    Total Nucleated RBC 0.0 K/CU MM    Total Immature Neutrophil 0.01 K/CU MM    Immature Neutrophil % 0.1 0 - 0.43 %   CMP   Result Value Ref Range    Sodium 137 135 - 145 MMOL/L    Potassium 5.0 3.5 - 5.1 MMOL/L    Chloride 104 99 - 110 mMol/L    CO2 25 21 - 32 MMOL/L    BUN 44 (H) 6 - 23 MG/DL    CREATININE 2.6 (H) 0.6 - 1.1 MG/DL    Glucose 101 (H) 70 - 99 MG/DL    Calcium 9.2 8.3 - 10.6 MG/DL    Albumin 3.7 3.4 - 5.0 GM/DL    Total Protein 6.9 6.4 - 8.2 GM/DL    Total Bilirubin 0.2 0.0 - 1.0 MG/DL    ALT 29 10 - 40 U/L    AST 19 15 - 37 IU/L    Alkaline Phosphatase 72 40 - 129 IU/L    GFR Non- 18 (L) >60 mL/min/1.73m2    GFR  22 (L) >60 mL/min/1.73m2    Anion Gap 8 4 - 16   Lipase   Result Value Ref Range    Lipase 32 13 - 60 IU/L   Lactic Acid, Plasma   Result Value Ref Range    Lactate 1.4 0.4 - 2.0 mMOL/L   CK   Result Value Ref Range    Total  (H) 26 - 140 IU/L   Brain Natriuretic Peptide   Result Value Ref Range    Pro-BNP 1,223 (H) <300 PG/ML   Troponin   Result Value Ref Range    Troponin T 0.028 (H) <0.01 NG/ML   TSH without Reflex   Result Value Ref Range    TSH, High Sensitivity 1.010 0.270 - 4.20 uIu/ml   POC Blood Glucose   Result Value Ref Range    Glucose 123 mg/dL    QC OK? yes    POCT Glucose   Result Value Ref Range    POC Glucose 123 (H) 70 - 99 MG/DL        Radiographs (if obtained):  [] The following radiograph was interpreted by myself in the absence of a radiologist:  [x] Radiologist's Report Reviewed:    CXR, CT Brain, C spine, CTA CAP    EKG (if obtained): (All EKG's are interpreted by myself in the absence of a cardiologist)    12 lead EKG per my interpretation:  Sinus Bradycardia 55  Axis is   Normal  QTc is  464  There is specific T wave changes appreciated. Inverted T waves lead III  There is no specific ST wave changes appreciated. Prior EKG to compare with was not available       MDM:    Patient here with chest pain shortness of breath syncope headache. Again patient is on Xarelto for history of A. fib she states she stood up to go to the restroom today started walking and developed the symptoms and passed out unknown amount of time denies any seizure activity. She woke up and called her daughter and then came here. She appears well on arrival she is in no distress she has good breath sounds vital signs are stable she is bradycardic EKG is negative. She is good pulses good sensation and she has no hernia no pulsatile mass no carotid bruits no one-sided weakness numbness or tingling. I did order labs imaging including CT of the head neck chest and pelvis to rule out dissection, intracranial aneurysm, brain bleed, tumor etc. will get labs imaging patient need admission for chest pain syncope. Patient doing well she does have chronic kidney disease serum creatinine elevated BNP is elevated troponin is elevated.   Imaging does show patchy groundglass opacities

## 2021-05-08 NOTE — H&P
History and Physical      Name:  Chiquis Frederick /Age/Sex: 1945  (76 y.o. female)   MRN & CSN:  [id number] & [id number] Admission Date/Time: 2021  4:45 PM   [facility] PCP: Elena Dave, [address] Day: 1    Admitting Physician: DR Alban Agustin    Discussed assessment and treatment plan with the admitting and supervising physician who agrees with the plan below. Assessment and Plan: Chiquis Frederick is a 76 y.o.  female  who presents with syncope and collapse    Syncope and collapse: . EKG sinus bradycardia HR 58 QTc 464. Admit MedSurg  Orthostatic blood pressure  2D echocardiogram  Fall precautions  Trend troponins every 3 hours x2  PT OT evaluation  Continuous cardiac monitoring  Inpatient cardiology consult  Gentle hydration 0.9 normal saline at 50 cc/h x1 day    Nonzero troponin most likely secondary to demand ischemia/CKD:   Trop 0.028, denies chest pain, shortness of breath  -Management as above    Elevated Pro BNP: EF 50-55% per ECHO 2020. ProBNP 1223   Repeat 2D echocardiogram     Diabetes mellitus type 2 with nephropathy: Blood glucose 101  Check before meals and at bedtime  SSI-Low-dose    CKD stage IV: BUN 44/CR 2. 6(baseline 1.9 -2.0 ), GFR 22(baseline 31 on 2020): Follows up with Dr. Herlinda Harris. Currently on lisinopril and taking Lasix every other day, last dose 2021  -Gentle hydration,  Inpatient nephrology consult    Anemia secondary to chronic iron deficiency anemia /CKD l: Hb 10/HCT 32.7, MCH 25.6, MCHC 30.6  CBC daily  Continue folic acid    Hx Proximal A. Fib: Telemetry sinus bradycardia.    [id number] score 5(age, gender, hypertension, diabetes)  -Currently Cardura, Xarelto, Cartia and Rythmol    Obstructive sleep apnea: Uses CPAP  Respiratory care evaluation  Keep oxygen saturation more than 90%     Overweight: BMI 30.20 kg/m²  Lifestyle modification    Chronic condition  Hypertension: Continue home medication lisinopril, metoprolol  Hydralazine 5 mg every 6 hours as needed for SBP>180  Hyperlipidemia: Continue statin  Acquired cyst of kidney  Hx SVT   Hx breast cancer, invasive ductal carcinoma/right lumpectomy number 6/20/2012, s/p radiation therapy March 4, 2013, and with Femara on March 5, 2013 completed March 5, 2018    Diet Carb control   DVT Prophylaxis [x] Lovenox, []  Heparin, [] SCDs, [] Warfarin  [] NOAC     GI Prophylaxis [] PPI,  [] H2 Blocker,  [] Carafate,  [] Diet/Tube Feeds   Code Status Full    MDM [] Low, [x] Moderate,[]  High  Patient's risk as above due to      [x] One or more chronic illnesses with exacerbation progression      [x] Two or more stable chronic illnesses      [] Undiagnosed new problem with uncertain prognosis      [] Elective major surgery      []Prescription drug management     History of Present Illness:     Chief Complaint:  syncope and collapse  Eva Stinson is a 76 y.o.  female, with past medical history significant for A. fib, V. tach, hypertension, hyperlipidemia, CKD, diabetes mellitus, who presented to the ED from home due syncope and collapse. Patient states she was at the kitchen cleaning vegetables, started having sudden onset of right-sided neck pain radiating to the right abdomen. Pain was constant. So she went to the great room and sat down, which did not relieve the pain. She got back up again to go to the kitchen and woke up on the floor. Patient states she was down approximately less than 10 minutes. Patient admits the pain was gone when she woke up. Denies similar pain in the past.  Patient denies chest pain, chest pressure, shortness of breath, injury, headache. Patient admit having palpitation couple of weeks ago, unable to provide me the details. Patient  on multiple blood pressure medications. She had history of A. fib on Xarelto. Patient had all her morning medications including Cardura and Cartia.   Denies any weight gain, takes Lasix every other day Denies any incontinence     Wears partial dentures     upper partial     PSHX:  has a past surgical history that includes Bunionectomy (); Cardiac catheterization (Sees Dr. Thomas Massey); Breast biopsy (10-12); Breast biopsy (); Colonoscopy (\"X 2 Last One  \"); Dental surgery; joint replacement (); joint replacement (); Hysterectomy, total abdominal (); Breast lumpectomy (12); Endoscopy, colon, diagnostic (2016); Upper gastrointestinal endoscopy (N/A, 2018); Carpal tunnel release (Left, 2019); Cataract removal (Bilateral, 2020, ); Cataract removal (Left, 2020); Ovary removal; and SOFI STEREO BREAST BX W LOC DEVICE 1ST LESION RIGHT (Right, 2021). Allergies: Allergies   Allergen Reactions    Latex      \"Blisters\"    Tape [Adhesive Tape]      \"Turn Red\"       FAM HX: family history includes Cancer in her brother, brother, and father; Diabetes in her brother, father, mother, and sister; Early Death (age of onset: 61) in her mother; Heart Disease in her father; Stroke in her sister; Thyroid Disease in her daughter. Soc HX:   Social History     Socioeconomic History    Marital status:       Spouse name: None    Number of children: None    Years of education: None    Highest education level: None   Occupational History    None   Social Needs    Financial resource strain: None    Food insecurity     Worry: None     Inability: None    Transportation needs     Medical: None     Non-medical: None   Tobacco Use    Smoking status: Former Smoker     Packs/day: 0.50     Years: 10.00     Pack years: 5.00     Quit date: 1987     Years since quittin.3    Smokeless tobacco: Never Used   Substance and Sexual Activity    Alcohol use: No     Alcohol/week: 0.0 standard drinks     Comment: 3 cups coffee daily    Drug use: No    Sexual activity: Never   Lifestyle    Physical activity     Days per week: None     Minutes per session: None    Stress: None Relationships    Social connections     Talks on phone: None     Gets together: None     Attends Yarsani service: None     Active member of club or organization: None     Attends meetings of clubs or organizations: None     Relationship status: None    Intimate partner violence     Fear of current or ex partner: None     Emotionally abused: None     Physically abused: None     Forced sexual activity: None   Other Topics Concern    None   Social History Narrative    Wears glasses       Medications:     Home Medication   Prior to Admission medications    Medication Sig Start Date End Date Taking?  Authorizing Provider   Ascorbic Acid (VITAMIN C) 250 MG tablet Take by mouth daily   Yes Historical Provider, MD   rivaroxaban (XARELTO) 15 MG TABS tablet Take 1 tablet by mouth every 24 hours 4/26/21  Yes Irwin Angeles MD   propafenone (RYTHMOL) 150 MG tablet Take 1 tablet by mouth every 8 hours 2/11/21  Yes Richy Mahoney MD   atorvastatin (LIPITOR) 80 MG tablet take 1 tablet by mouth once daily 2/2/21  Yes Richy Mahoney MD   ferrous sulfate (IRON 325) 325 (65 Fe) MG tablet Take 1 tablet by mouth daily (with breakfast) 1/20/21 5/8/21 Yes Ángel Medina MD   metFORMIN (GLUCOPHAGE) 500 MG tablet Take 1 tablet by mouth 2 times daily (with meals) 1/11/21  Yes Richy Mahoney MD   lisinopril (PRINIVIL;ZESTRIL) 2.5 MG tablet Take 1 tablet by mouth daily 11/25/20  Yes Richy Mahoney MD   furosemide (LASIX) 20 MG tablet take 1 tablet by mouth every other day 7/13/20  Yes Richy Mahoney MD   oxybutynin (DITROPAN) 5 MG tablet take 1 tablet by mouth twice a day 6/11/20  Yes Richy Mahoney MD   metoprolol tartrate (LOPRESSOR) 50 MG tablet take 1 tablet by mouth twice a day 4/24/20  Yes Richy Mahoney MD   dilTIAZem (CARTIA XT) 120 MG extended release capsule Take 1 capsule by mouth daily 4/17/20  Yes Richy Mahoney MD   Multiple Vitamins-Minerals (MULTIVITAMIN ADULT PO) Take by mouth   Yes Historical Provider, MD   aspirin 81 MG EC tablet Take 1 tablet by mouth daily 11/13/18  Yes Franklin Osorio MD   Lancets MISC 1 each by In Vitro route 2 times daily 12/1/17  Yes Franklin Osorio MD   Blood Glucose Monitoring Suppl AV 1 kit by Does not apply route daily 12/1/17  Yes Franklin Osorio MD   colchicine (COLCRYS) 0.6 MG tablet Take 1 tablet by mouth 2 times daily as needed for Pain (gout) 4/27/21   Franklin Osorio MD   cloNIDine (CATAPRES-TTS-1) 0.1 MG/24HR PTWK Place 1 patch onto the skin every 7 days 3/10/21 3/10/22  Franklin Osorio MD   doxazosin (CARDURA) 8 MG tablet take 1 tablet by mouth once daily 2/2/21   Franklin Osorio MD   Omega-3 Fatty Acids (FISH OIL PO) Take 1,200 mg by mouth    Historical Provider, MD   FOLIC ACID PO Take 938 mg by mouth daily    Historical Provider, MD   Glucose Blood (BLOOD GLUCOSE TEST STRIPS) STRP 1 each by In Vitro route 2 times daily 12/1/17   Franklin Osorio MD   oxybutynin (DITROPAN) 5 MG tablet Take 1 tablet by mouth 2 times daily. 1/7/13 1/21/14  Franklin Osorio MD   Calcium Carbonate (CALTRATE 600 PO) Take  by mouth 2 times daily. Historical Provider, MD     Medications:    famotidine (PEPCID) injection  20 mg Intravenous Once    dicyclomine  20 mg Intramuscular Once    sodium chloride flush  5-40 mL Intravenous BID      Infusions:   PRN Meds: ondansetron, 4 mg, Q30 Min PRN  morphine, 1 mg, Q30 Min PRN        Recent Labs     05/08/21  1740   WBC 7.6   HGB 10.0*   HCT 32.7*         Recent Labs     05/08/21  1740      K 5.0      CO2 25   BUN 44*   CREATININE 2.6*     Recent Labs     05/08/21  1740   AST 19   ALT 29   BILITOT 0.2   ALKPHOS 72     Recent Labs     05/08/21  1740   INR 1.24     Recent Labs     05/08/21  1740   CKTOTAL 193*   TROPONINT 0.028*        Imaging reviewed  Ct Head Wo Contrast  Result Date: 5/8/2021    1.  No acute intracranial abnormality. No acute intracranial hemorrhage or mass effect. 2. No focal hemodynamically significant stenosis, occlusion or aneurysm in the large arteries of the head and neck. .     Ct Cervical Spine Wo Contrast  Result Date: 5/8/2021  No acute abnormality of the cervical spine. Xr Chest Portable  Result Date: 5/8/2021  No acute abnormality detected. Cta Chest Abdomen Pelvis W Contrast    Result Date: 5/8/2021  No evidence of thoracic or abdominal aortic dissection or aneurysm. Mild patchy ground-glass opacity within the lungs bilaterally, most likely microatelectasis, but correlate with any clinical evidence of pulmonary edema or inflammatory/infectious pneumonitis. Mild diverticulosis of the large bowel is present without CT evidence of diverticulitis. Cta Head Neck W Contrast  Result Date: 5/8/2021  1. No acute intracranial abnormality. No acute intracranial hemorrhage or mass effect. 2. No focal hemodynamically significant stenosis, occlusion or aneurysm in the large arteries of the head and neck. .      EKG Interpretation: Sinus bradycardia, HR 55, QTc 464    ECHO 8/18/2020  Summary   Left ventricular systolic function is normal.   Ejection fraction is visually estimated at 50-55%. Mild left ventricular hypertrophy. Sclerotic aortic valve. Mild mitral regurgitation. Physiological pericardial effusion.     NM perfusion scan 9/6/2018  Summary    Normal Lexiscan nuclear scintigraphic study suggestive of normal myocardial    perfusion.    Gated images demonstrate normal left ventricular systolic function with EF    of 60 %.           Electronically signed by FAITH Urbina on 5/8/2021 at 7:57 PM

## 2021-05-09 LAB
ANION GAP SERPL CALCULATED.3IONS-SCNC: 13 MMOL/L (ref 4–16)
BUN BLDV-MCNC: 42 MG/DL (ref 6–23)
CALCIUM SERPL-MCNC: 8.4 MG/DL (ref 8.3–10.6)
CHLORIDE BLD-SCNC: 107 MMOL/L (ref 99–110)
CO2: 21 MMOL/L (ref 21–32)
CREAT SERPL-MCNC: 2.4 MG/DL (ref 0.6–1.1)
EKG ATRIAL RATE: 55 BPM
EKG DIAGNOSIS: NORMAL
EKG P AXIS: 99 DEGREES
EKG P-R INTERVAL: 192 MS
EKG Q-T INTERVAL: 486 MS
EKG QRS DURATION: 98 MS
EKG QTC CALCULATION (BAZETT): 464 MS
EKG R AXIS: -13 DEGREES
EKG T AXIS: -8 DEGREES
EKG VENTRICULAR RATE: 55 BPM
GFR AFRICAN AMERICAN: 24 ML/MIN/1.73M2
GFR NON-AFRICAN AMERICAN: 20 ML/MIN/1.73M2
GLUCOSE BLD-MCNC: 119 MG/DL (ref 70–99)
GLUCOSE BLD-MCNC: 144 MG/DL (ref 70–99)
GLUCOSE BLD-MCNC: 173 MG/DL (ref 70–99)
GLUCOSE BLD-MCNC: 189 MG/DL (ref 70–99)
GLUCOSE BLD-MCNC: 95 MG/DL (ref 70–99)
HCT VFR BLD CALC: 33.5 % (ref 37–47)
HEMOGLOBIN: 10.1 GM/DL (ref 12.5–16)
MCH RBC QN AUTO: 25.6 PG (ref 27–31)
MCHC RBC AUTO-ENTMCNC: 30.1 % (ref 32–36)
MCV RBC AUTO: 84.8 FL (ref 78–100)
PDW BLD-RTO: 15.9 % (ref 11.7–14.9)
PLATELET # BLD: 257 K/CU MM (ref 140–440)
PMV BLD AUTO: 10.6 FL (ref 7.5–11.1)
POTASSIUM SERPL-SCNC: 4.4 MMOL/L (ref 3.5–5.1)
PRO-BNP: 998.3 PG/ML
RBC # BLD: 3.95 M/CU MM (ref 4.2–5.4)
REASON FOR REJECTION: NORMAL
REJECTED TEST: NORMAL
SODIUM BLD-SCNC: 141 MMOL/L (ref 135–145)
TOTAL CK: 218 IU/L (ref 26–140)
WBC # BLD: 5.9 K/CU MM (ref 4–10.5)

## 2021-05-09 PROCEDURE — 97166 OT EVAL MOD COMPLEX 45 MIN: CPT

## 2021-05-09 PROCEDURE — G0378 HOSPITAL OBSERVATION PER HR: HCPCS

## 2021-05-09 PROCEDURE — 2580000003 HC RX 258: Performed by: CLINICAL NURSE SPECIALIST

## 2021-05-09 PROCEDURE — 6370000000 HC RX 637 (ALT 250 FOR IP): Performed by: CLINICAL NURSE SPECIALIST

## 2021-05-09 PROCEDURE — 82550 ASSAY OF CK (CPK): CPT

## 2021-05-09 PROCEDURE — 36415 COLL VENOUS BLD VENIPUNCTURE: CPT

## 2021-05-09 PROCEDURE — 83880 ASSAY OF NATRIURETIC PEPTIDE: CPT

## 2021-05-09 PROCEDURE — 97162 PT EVAL MOD COMPLEX 30 MIN: CPT

## 2021-05-09 PROCEDURE — 97116 GAIT TRAINING THERAPY: CPT

## 2021-05-09 PROCEDURE — 85027 COMPLETE CBC AUTOMATED: CPT

## 2021-05-09 PROCEDURE — 93010 ELECTROCARDIOGRAM REPORT: CPT | Performed by: INTERNAL MEDICINE

## 2021-05-09 PROCEDURE — 84484 ASSAY OF TROPONIN QUANT: CPT

## 2021-05-09 PROCEDURE — 99223 1ST HOSP IP/OBS HIGH 75: CPT | Performed by: INTERNAL MEDICINE

## 2021-05-09 PROCEDURE — 97530 THERAPEUTIC ACTIVITIES: CPT

## 2021-05-09 PROCEDURE — 94660 CPAP INITIATION&MGMT: CPT

## 2021-05-09 PROCEDURE — 99222 1ST HOSP IP/OBS MODERATE 55: CPT | Performed by: INTERNAL MEDICINE

## 2021-05-09 PROCEDURE — 80048 BASIC METABOLIC PNL TOTAL CA: CPT

## 2021-05-09 PROCEDURE — 82962 GLUCOSE BLOOD TEST: CPT

## 2021-05-09 PROCEDURE — 94761 N-INVAS EAR/PLS OXIMETRY MLT: CPT

## 2021-05-09 PROCEDURE — 2140000000 HC CCU INTERMEDIATE R&B

## 2021-05-09 RX ADMIN — Medication 500 MG: at 09:36

## 2021-05-09 RX ADMIN — OXYBUTYNIN CHLORIDE 5 MG: 5 TABLET ORAL at 09:35

## 2021-05-09 RX ADMIN — PROPAFENONE HYDROCHLORIDE 150 MG: 150 TABLET, FILM COATED ORAL at 22:00

## 2021-05-09 RX ADMIN — SODIUM CHLORIDE, PRESERVATIVE FREE 10 ML: 5 INJECTION INTRAVENOUS at 09:36

## 2021-05-09 RX ADMIN — PROPAFENONE HYDROCHLORIDE 150 MG: 150 TABLET, FILM COATED ORAL at 06:11

## 2021-05-09 RX ADMIN — FERROUS SULFATE TAB 325 MG (65 MG ELEMENTAL FE) 325 MG: 325 (65 FE) TAB at 09:39

## 2021-05-09 RX ADMIN — OXYCODONE HYDROCHLORIDE AND ACETAMINOPHEN 250 MG: 500 TABLET ORAL at 09:35

## 2021-05-09 RX ADMIN — OXYBUTYNIN CHLORIDE 5 MG: 5 TABLET ORAL at 21:23

## 2021-05-09 RX ADMIN — FOLIC ACID 500 MCG: 1 TABLET ORAL at 09:36

## 2021-05-09 RX ADMIN — RIVAROXABAN 15 MG: 15 TABLET, FILM COATED ORAL at 18:15

## 2021-05-09 RX ADMIN — Medication 500 MG: at 21:23

## 2021-05-09 RX ADMIN — FUROSEMIDE 20 MG: 20 TABLET ORAL at 09:36

## 2021-05-09 RX ADMIN — ASPIRIN 81 MG: 81 TABLET, COATED ORAL at 09:35

## 2021-05-09 RX ADMIN — METOPROLOL TARTRATE 50 MG: 50 TABLET, FILM COATED ORAL at 09:36

## 2021-05-09 RX ADMIN — SODIUM CHLORIDE, PRESERVATIVE FREE 10 ML: 5 INJECTION INTRAVENOUS at 21:23

## 2021-05-09 RX ADMIN — ATORVASTATIN CALCIUM 80 MG: 80 TABLET, FILM COATED ORAL at 09:36

## 2021-05-09 RX ADMIN — METOPROLOL TARTRATE 50 MG: 50 TABLET, FILM COATED ORAL at 21:23

## 2021-05-09 RX ADMIN — PROPAFENONE HYDROCHLORIDE 150 MG: 150 TABLET, FILM COATED ORAL at 18:15

## 2021-05-09 RX ADMIN — DILTIAZEM HYDROCHLORIDE 120 MG: 120 CAPSULE, COATED, EXTENDED RELEASE ORAL at 09:36

## 2021-05-09 ASSESSMENT — PAIN SCALES - GENERAL
PAINLEVEL_OUTOF10: 0
PAINLEVEL_OUTOF10: 0

## 2021-05-09 NOTE — ED NOTES
Report called to [facility], RN. All questions answered.      Kaleb Garvey  05/08/21 [id number]

## 2021-05-09 NOTE — CONSULTS
CARDIOLOGY CONSULT NOTE   Reason for consultation:  syncope    Referring physician:  Jose Matthews MD     Primary care physician: Jackie Valdez MD      Dear  Dr. Jose Matthews MD   Thanks for the consult. Chief Complaints :  Chief Complaint   Patient presents with    Loss of Consciousness    Shortness of Breath        History of present illness: Evangelina Juárez is a 76 y. o.year old who presents after passing out event at home she says she was working in her kitchen and felt dizzy lightheaded she felt a sharp pain in the back of her neck and head going down as she was trying to get food off the stove she passed out  She knows she was down on the floor she is not sure for how long she might have been out for? When she come around she was awake and alert not confused did not have any bowel movements. She is not having any pain anymore. She does have history of atrial fibrillation she is on Xarelto and propafenone Cardizem. She is diabetic says blood sugars were normal.  Has renal insufficiency with creatinine 2.4 range. Past medical history:    has a past medical history of 14 day event monitor, Atrial fibrillation with RVR ([address] Breast cancer ([address] Edema, Family history of cardiovascular disease, GERD (gastroesophageal reflux disease), H/O 24 hour EKG monitoring, H/O cardiovascular stress test, H/O echocardiogram, History of cardiac catheterization, History of cardiovascular stress test, History of echocardiogram, History of Holter monitoring, History of therapeutic radiation, Hx of 24 hour EKG monitoring, Hyperlipidemia, Hypertension, Iron deficiency anemia, Lumbar radiculopathy, Menopause, ADOLFO (obstructive sleep apnea), Osteoarthritis, Osteopenia, Other activity(E029.9), Paroxysmal atrial fibrillation (HCC), Prolonged emergence from general anesthesia, Proteinuria, Right Breast Cancer, Supraventricular tachycardia ([address] Type 2 diabetes mellitus ([address] Urge incontinence, and Wears partial dentures.   Past surgical history:   has a past surgical history that includes Bunionectomy (1990's); Cardiac catheterization (Sees Dr. Marissa Gr); Breast biopsy (10-12); Breast biopsy (1970's); Colonoscopy (\"X 2 Last One 2008 \"); Dental surgery; joint replacement (2009); joint replacement (2010); Hysterectomy, total abdominal (1980's); Breast lumpectomy (11/6/12); Endoscopy, colon, diagnostic (07/18/2016); Upper gastrointestinal endoscopy (N/A, 11/8/2018); Carpal tunnel release (Left, 01/2019); Cataract removal (Bilateral, 03/2020, 5/20); Cataract removal (Left, 05/2020); Ovary removal; and SOFI STEREO BREAST BX W LOC DEVICE 1ST LESION RIGHT (Right, 2/18/2021). Social History:   reports that she quit smoking about 34 years ago. She has a 5.00 pack-year smoking history. She has never used smokeless tobacco. She reports that she does not drink alcohol or use drugs.   Family history:   no family history of CAD, STROKE of DM at early age    Allergies   Allergen Reactions    Latex      \"Blisters\"    Tape [Adhesive Tape]      \"Turn Red\"       famotidine (PEPCID) injection 20 mg, Once  dicyclomine (BENTYL) injection 20 mg, Once  sodium chloride flush 0.9 % injection 10 mL, 2 times per day  sodium chloride flush 0.9 % injection 10 mL, PRN  0.9 % sodium chloride infusion, PRN  polyethylene glycol (GLYCOLAX) packet 17 g, Daily PRN  acetaminophen (TYLENOL) tablet 650 mg, Q6H PRN    Or  acetaminophen (TYLENOL) suppository 650 mg, Q6H PRN  aspirin EC tablet 81 mg, Daily  atorvastatin (LIPITOR) tablet 80 mg, Daily  calcium elemental (OSCAL) tablet 500 mg, BID  [START ON 5/12/2021] cloNIDine (CATAPRES) 0.1 MG/24HR 1 patch, Once per day on Wed  colchicine (COLCRYS) tablet 0.6 mg, BID PRN  dilTIAZem (CARDIZEM CD) extended release capsule 120 mg, Daily  [Held by provider] doxazosin (CARDURA) tablet 8 mg, Daily  ferrous sulfate (IRON 325) tablet 325 mg, Daily with breakfast  folic acid (FOLVITE) tablet 500 mcg, Daily  furosemide (LASIX) tablet 20 mg, Daily  [Held by provider] lisinopril (PRINIVIL;ZESTRIL) tablet 2.5 mg, Daily  metoprolol tartrate (LOPRESSOR) tablet 50 mg, BID  oxybutynin (DITROPAN) tablet 5 mg, BID  ascorbic acid (VITAMIN C) tablet 250 mg, Daily  0.9 % sodium chloride infusion, Continuous  insulin lispro (HUMALOG) injection vial 0-6 Units, TID WC  insulin lispro (HUMALOG) injection vial 0-3 Units, Nightly  glucose (GLUTOSE) 40 % oral gel 15 g, PRN  dextrose 50 % IV solution, PRN  glucagon (rDNA) injection 1 mg, PRN  dextrose 5 % solution, PRN  hydrALAZINE (APRESOLINE) injection 5 mg, Q6H PRN  propafenone (RYTHMOL) tablet 150 mg, Q8H  rivaroxaban (XARELTO) tablet 15 mg, Dinner      Current Facility-Administered Medications   Medication Dose Route Frequency Provider Last Rate Last Admin    famotidine (PEPCID) injection 20 mg  20 mg Intravenous Once [place] Rota, DO   Stopped at 05/08/21 1828    dicyclomine (BENTYL) injection 20 mg  20 mg Intramuscular Once Sindy Rota, DO   Stopped at 05/08/21 1828    sodium chloride flush 0.9 % injection 10 mL  10 mL Intravenous 2 times per day Bindhu Sajay, APRN   10 mL at 05/09/21 0936    sodium chloride flush 0.9 % injection 10 mL  10 mL Intravenous PRN Bindhu Sajay, APRN        0.9 % sodium chloride infusion  25 mL Intravenous PRN Bindhu Sajay, APRN        polyethylene glycol (GLYCOLAX) packet 17 g  17 g Oral Daily PRN Bindhu Sajay, APRN        acetaminophen (TYLENOL) tablet 650 mg  650 mg Oral Q6H PRN Bindhu Sajay, APRN        Or    acetaminophen (TYLENOL) suppository 650 mg  650 mg Rectal Q6H PRN Bindhu Sajay, APRN        aspirin EC tablet 81 mg  81 mg Oral Daily Bindhu Sajay, APRN   81 mg at 05/09/21 0935    atorvastatin (LIPITOR) tablet 80 mg  80 mg Oral Daily Bindhu Sajay, APRN   80 mg at 05/09/21 0936    calcium elemental (OSCAL) tablet 500 mg  500 mg Oral BID Bindhu Sajay, APRN   500 mg at 05/09/21 0936    [START ON 5/12/2021] cloNIDine (CATAPRES) 0.1 MG/24HR 1 patch  1 patch Transdermal Once per day on Wed Bindhu Sajay, APRN        colchicine (COLCRYS) tablet 0.6 mg  0.6 mg Oral BID PRN Bindhu Sajay, APRN        dilTIAZem (CARDIZEM CD) extended release capsule 120 mg  120 mg Oral Daily Bindhu Sajay, APRN   120 mg at 05/09/21 0936    [Held by provider] doxazosin (CARDURA) tablet 8 mg  8 mg Oral Daily Bindhu Sajay, APRN        ferrous sulfate (IRON 325) tablet 325 mg  325 mg Oral Daily with breakfast Bindhu Sajay, APRN   325 mg at 96/47/90 2080    folic acid (FOLVITE) tablet 500 mcg  500 mcg Oral Daily Bindhu Sajay, APRN   500 mcg at 05/09/21 0936    furosemide (LASIX) tablet 20 mg  20 mg Oral Daily Bindhu Sajay, APRN   20 mg at 05/09/21 0936    [Held by provider] lisinopril (PRINIVIL;ZESTRIL) tablet 2.5 mg  2.5 mg Oral Daily Bindhu Sajay, APRN        metoprolol tartrate (LOPRESSOR) tablet 50 mg  50 mg Oral BID Bindhu Sajay, APRN   50 mg at 05/09/21 0936    oxybutynin (DITROPAN) tablet 5 mg  5 mg Oral BID Bindhu Sajay, APRN   5 mg at 05/09/21 0935    ascorbic acid (VITAMIN C) tablet 250 mg  250 mg Oral Daily Bindhu Sajay, APRN   250 mg at 05/09/21 0935    0.9 % sodium chloride infusion   Intravenous Continuous Bindhu Sajay, APRN 50 mL/hr at 05/08/21 2212 New Bag at 05/08/21 2212    insulin lispro (HUMALOG) injection vial 0-6 Units  0-6 Units Subcutaneous TID WC Bindhu Sajay, APRN        insulin lispro (HUMALOG) injection vial 0-3 Units  0-3 Units Subcutaneous Nightly Bindhu Sajay, APRN        glucose (GLUTOSE) 40 % oral gel 15 g  15 g Oral PRN Bindhu Sajay, APRN        dextrose 50 % IV solution  12.5 g Intravenous PRN Bindhu Sajay, APRN        glucagon (rDNA) injection 1 mg  1 mg Intramuscular PRN Bindhu Sajay, APRN        dextrose 5 % solution  100 mL/hr Intravenous PRN Bindhu Sajay, APRN        hydrALAZINE (APRESOLINE) injection 5 mg  5 mg Intravenous Q6H PRN Bindhu Sajay, APRN        propafenone (RYTHMOL) tablet 150 mg  150 mg Oral Q8H Bindhu Sajay, APRN   150 mg at 05/09/21 4938    rivaroxaban (XARELTO) tablet 15 mg  15 mg Oral Dinner FAITH English   15 mg at 05/08/21 2303     Review of Systems:   · Constitutional: No Fever or Weight Loss   · Eyes: No Decreased Vision  · ENT: No Headaches, Hearing Loss or Vertigo  · Cardiovascular: As per HPI  · Respiratory: As per HPI  · Gastrointestinal: No abdominal pain, appetite loss, blood in stools, constipation, diarrhea or heartburn  · Genitourinary: No dysuria, trouble voiding, or hematuria  · Musculoskeletal:  No gait disturbance, weakness or joint complaints  · Integumentary: No rash or pruritis  · Neurological: No TIA or stroke symptoms  · Psychiatric: No anxiety or depression  · Endocrine: No malaise, fatigue or temperature intolerance  · Hematologic/Lymphatic: No bleeding problems, blood clots or swollen lymph nodes  · Allergic/Immunologic: No nasal congestion or hives  All systems negative except as marked. Physical Examination:    Vitals:    05/09/21 0700 05/09/21 0725 05/09/21 0726 05/09/21 0941   BP: 131/60   136/61   Pulse: 59   72   Resp: 22 18 15 16   Temp:    98.7 °F (37.1 °C)   TempSrc:    Oral   SpO2: 95%  91% 95%   Weight:       Height:           General Appearance:  No distress, conversant    Constitutional:  Well developed, Well nourished, No acute distress, Non-toxic appearance. HENT:  Normocephalic, Atraumatic, Bilateral external ears normal, Oropharynx moist, No oral exudates, Nose normal. Neck- Normal range of motion, No tenderness, Supple, No stridor,no apical-carotid delay  Lymphatics : no palpable lymph nodes  Eyes:  PERRL, EOMI, Conjunctiva normal, No discharge. Respiratory:  Normal breath sounds, No respiratory distress, No wheezing, No chest tenderness. ,no use of accessory muscles, crackles Absent   Cardiovascular: (PMI) apex non displaced,no lifts no thrills, ankle swelling Absent  , 1+, s1 and s2 audible,Murmur. Absent , JVD not noted    Abdomen /GI:  Bowel sounds normal, Soft, No tenderness, No masses, No gross visceromegaly   :  No costovertebral angle tenderness   Musculoskeletal:  No edema, no tenderness, no deformities. Back- no tenderness  Integument:  Well hydrated, no rash   Lymphatic:  No lymphadenopathy noted   Neurologic:  Alert & oriented x 3, CN 2-12 normal, normal motor function, normal sensory function, no focal deficits noted           Medical decision making and Data review:    Lab Review   Recent Labs     05/09/21  0740   WBC 5.9   HGB 10.1*   HCT 33.5*         Recent Labs     05/09/21  1000      K 4.4      CO2 21   BUN 42*   CREATININE 2.4*     Recent Labs     05/08/21  1740   AST 19   ALT 29   BILITOT 0.2   ALKPHOS 72     Recent Labs     05/08/21  1740 05/08/21  2304   TROPONINT 0.028* 0.021*       Recent Labs     05/08/21  1740   PROBNP 1,223*     Lab Results   Component Value Date    INR 1.24 05/08/2021    PROTIME 15.0 (H) 05/08/2021       EKG: (reviewed by myself)    ECHO:(reviewed by myself)    Chest Xray:(reviewed by myself)  Ct Head Wo Contrast    Result Date: 5/8/2021  EXAMINATION: CTA OF THE HEAD AND NECK WITH CONTRAST; CT OF THE HEAD WITHOUT CONTRAST 5/8/2021 7:02 pm; 5/8/2021 7:03 pm: TECHNIQUE: CTA of the head and neck was performed with the administration of intravenous contrast. Multiplanar reformatted images are provided for review. MIP images are provided for review. Stenosis of the internal carotid arteries measured using NASCET criteria. Dose modulation, iterative reconstruction, and/or weight based adjustment of the mA/kV was utilized to reduce the radiation dose to as low as reasonably achievable.; CT of the head was performed without the administration of intravenous contrast. Dose modulation, iterative reconstruction, and/or weight based adjustment of the mA/kV was utilized to reduce the radiation dose to as low as reasonably achievable. Noncontrast CT of the head with reconstructed 2-D images are also provided for review. COMPARISON: CT head done September 30, 2012. HISTORY: ORDERING SYSTEM PROVIDED HISTORY: headache/syncope TECHNOLOGIST PROVIDED HISTORY: Reason for exam:->headache/syncope Decision Support Exception - unselect if not a suspected or confirmed emergency medical condition->Emergency Medical Condition (MA) Reason for Exam: ha/nausea/vomiting/vladimir d-dimer; ORDERING SYSTEM PROVIDED HISTORY: headache/syncope TECHNOLOGIST PROVIDED HISTORY: Has a \"code stroke\" or \"stroke alert\" been called? ->No Reason for exam:->headache/syncope Decision Support Exception - unselect if not a suspected or confirmed emergency medical condition->Emergency Medical Condition (MA) Reason for Exam: syncope/cp/ FINDINGS: CT HEAD: BRAIN/VENTRICLES:  No acute intracranial hemorrhage or extraaxial fluid collection. Grey-white differentiation is maintained. No evidence of mass, mass effect or midline shift. No evidence of hydrocephalus. ORBITS: The visualized portion of the orbits demonstrate no acute abnormality. SINUSES:  The visualized paranasal sinuses and mastoid air cells demonstrate no acute abnormality. SOFT TISSUES/SKULL: No acute abnormality of the visualized skull or soft tissues. CTA NECK: AORTIC ARCH/ARCH VESSELS: No dissection or arterial injury. No significant stenosis of the brachiocephalic or subclavian arteries. Atherosclerosis in the visualized thoracic aorta. CAROTID ARTERIES: No dissection, arterial injury, or hemodynamically significant stenosis by NASCET criteria. Paramedian course of the bilateral common and right cervical internal carotid arteries. Bilateral carotid bulb atherosclerotic plaque. VERTEBRAL ARTERIES: No dissection, arterial injury, or significant stenosis. SOFT TISSUES: Visualized upper lungs demonstrate scattered bilateral patchy ground-glass opacities. Cardiomegaly. Partially visualized small pericardial effusion. Coronary artery calcifications. No cervical or superior mediastinal lymphadenopathy.   The larynx and pharynx are unremarkable. No acute abnormality of the salivary and thyroid glands. BONES: No acute osseous abnormality. Multilevel degenerative changes in the visualized spine. CTA HEAD: ANTERIOR CIRCULATION: No significant stenosis of the intracranial internal carotid, anterior cerebral, or middle cerebral arteries. No aneurysm. Atherosclerosis of the bilateral intracranial internal carotid arteries without hemodynamically significant stenosis. POSTERIOR CIRCULATION: No significant stenosis of the vertebral, basilar, or posterior cerebral arteries. No aneurysm. OTHER: No dural venous sinus thrombosis on this non-dedicated study     1. No acute intracranial abnormality. No acute intracranial hemorrhage or mass effect. 2. No focal hemodynamically significant stenosis, occlusion or aneurysm in the large arteries of the head and neck. .     Ct Cervical Spine Wo Contrast    Result Date: 5/8/2021  EXAMINATION: CT OF THE CERVICAL SPINE WITHOUT CONTRAST 5/8/2021 7:05 pm TECHNIQUE: CT of the cervical spine was performed without the administration of intravenous contrast. Multiplanar reformatted images are provided for review. Dose modulation, iterative reconstruction, and/or weight based adjustment of the mA/kV was utilized to reduce the radiation dose to as low as reasonably achievable. COMPARISON: None. HISTORY: ORDERING SYSTEM PROVIDED HISTORY: syncope/neck pain TECHNOLOGIST PROVIDED HISTORY: Reason for exam:->syncope/neck pain Decision Support Exception - unselect if not a suspected or confirmed emergency medical condition->Emergency Medical Condition (MA) Reason for Exam: ha/sob FINDINGS: BONES/ALIGNMENT: There is no acute fracture or traumatic malalignment. DEGENERATIVE CHANGES: No significant degenerative changes. SOFT TISSUES: There is no prevertebral soft tissue swelling. No acute abnormality of the cervical spine.      Xr Chest Portable    Result Date: 5/8/2021  EXAMINATION: ONE XRAY VIEW OF THE CHEST 5/8/2021 2:01 pm COMPARISON: 11/07/2018 HISTORY: ORDERING SYSTEM PROVIDED HISTORY: syncope/collapse/CP/dyspnea TECHNOLOGIST PROVIDED HISTORY: Reason for exam:->syncope/collapse/CP/dyspnea Reason for Exam: syncope/collapse/CP/dyspnea Acuity: Acute Type of Exam: Initial FINDINGS: The cardial pericardial silhouette is enlarged but stable. Lungs are clear. No pneumothorax is found. No free air. No acute bony abnormality. No acute abnormality detected. Cta Chest Abdomen Pelvis W Contrast    Result Date: 5/8/2021  EXAMINATION: CTA OF THE CHEST, ABDOMEN AND PELVIS WITH CONTRAST 5/8/2021 4:05 pm: TECHNIQUE: CTA of the chest, abdomen and pelvis was performed after the administration of intravenous contrast.  Multiplanar reformatted images are provided for review. MIP images are provided for review. Dose modulation, iterative reconstruction, and/or weight based adjustment of the mA/kV was utilized to reduce the radiation dose to as low as reasonably achievable. COMPARISON: None. HISTORY: ORDERING SYSTEM PROVIDED HISTORY: chest pain/syncope/abdominal pain TECHNOLOGIST PROVIDED HISTORY: Reason for exam:->chest pain/syncope/abdominal pain Decision Support Exception - unselect if not a suspected or confirmed emergency medical condition->Emergency Medical Condition (MA) Reason for Exam: ams FINDINGS: CTA CHEST: Thoracic aorta: There is a suboptimal contrast bolus within the thoracic aorta. That said, there is no evidence of dissection. Mediastinum: Visualized great vessels are unremarkable in appearance. Thyroid is unremarkable in appearance. No mediastinal or hilar lymphadenopathy is identified. Esophagus is unremarkable. Cardiac chambers are enlarged. There is a small pericardial effusion. Limited imaging of the pulmonary arteries is unremarkable. Lungs/pleura: There is a scant amount of pleural fluid seen bilaterally. Mild adjacent passive atelectasis is seen.   There is mild patchy ground-glass opacity noted within the lungs bilaterally, greater within the upper lungs. No pneumothorax. Airways are patent. Soft tissue/bones: Left shoulder effusion is found. No acute bony abnormalities are identified. CTA all caps that: Abdominal aorta/Branches: The abdominal aorta is normal in caliber, without evidence of dissection. The celiac artery and its branches are patent. The superior mesenteric artery is patent. Inferior mesenteric artery is diminutive but patent. Renal arteries are patent bilaterally. Iliac systems are patent bilaterally. Organs: No hypervascular abnormalities are detected within the liver. The gallbladder is unremarkable. Portal vein is patent. Spleen is unremarkable in appearance. 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 Right adrenal gland is unremarkable in appearance. Pancreas unremarkable. No acute or suspicious renal abnormality is identified. No hydronephrosis or hydroureter. GI/Bowel: Mild diverticulosis of the large bowel is present without CT evidence of diverticulitis. The large bowel is otherwise unremarkable in appearance. Appendix is normal.  Stomach, duodenal sweep, and the remainder of the small bowel are unremarkable in appearance. No evidence of bowel obstruction. Peritoneum/Retroperitoneum: No retroperitoneal lymphadenopathy. Bones/Soft Tissues: No osteolytic or osteoblastic bone lesions. No acute bony abnormalities are detected. The extra-abdominal and extra pelvic soft tissues are unremarkable. No evidence of thoracic or abdominal aortic dissection or aneurysm. Mild patchy ground-glass opacity within the lungs bilaterally, most likely microatelectasis, but correlate with any clinical evidence of pulmonary edema or inflammatory/infectious pneumonitis. Mild diverticulosis of the large bowel is present without CT evidence of diverticulitis.      Cta paranasal sinuses and mastoid air cells demonstrate no acute abnormality. SOFT TISSUES/SKULL: No acute abnormality of the visualized skull or soft tissues. CTA NECK: AORTIC ARCH/ARCH VESSELS: No dissection or arterial injury. No significant stenosis of the brachiocephalic or subclavian arteries. Atherosclerosis in the visualized thoracic aorta. CAROTID ARTERIES: No dissection, arterial injury, or hemodynamically significant stenosis by NASCET criteria. Paramedian course of the bilateral common and right cervical internal carotid arteries. Bilateral carotid bulb atherosclerotic plaque. VERTEBRAL ARTERIES: No dissection, arterial injury, or significant stenosis. SOFT TISSUES: Visualized upper lungs demonstrate scattered bilateral patchy ground-glass opacities. Cardiomegaly. Partially visualized small pericardial effusion. Coronary artery calcifications. No cervical or superior mediastinal lymphadenopathy. The larynx and pharynx are unremarkable. No acute abnormality of the salivary and thyroid glands. BONES: No acute osseous abnormality. Multilevel degenerative changes in the visualized spine. CTA HEAD: ANTERIOR CIRCULATION: No significant stenosis of the intracranial internal carotid, anterior cerebral, or middle cerebral arteries. No aneurysm. Atherosclerosis of the bilateral intracranial internal carotid arteries without hemodynamically significant stenosis. POSTERIOR CIRCULATION: No significant stenosis of the vertebral, basilar, or posterior cerebral arteries. No aneurysm. OTHER: No dural venous sinus thrombosis on this non-dedicated study     1. No acute intracranial abnormality. No acute intracranial hemorrhage or mass effect. 2. No focal hemodynamically significant stenosis, occlusion or aneurysm in the large arteries of the head and neck. .       All labs, medications and tests reviewed by myself including data  from outside source , patient and available family .   Continue all other medications of all above medical condition listed as is. Impression:  Active Problems:    JESSI (acute kidney injury) ([address]    Stage 4 chronic kidney disease (HCC)    Syncope and collapse  Resolved Problems:    * No resolved hospital problems. *      Assessment: 76 y. o.year old with PMH of  has a past medical history of 14 day event monitor, Atrial fibrillation with RVR ([address] Breast cancer ([address] Edema, Family history of cardiovascular disease, GERD (gastroesophageal reflux disease), H/O 24 hour EKG monitoring, H/O cardiovascular stress test, H/O echocardiogram, History of cardiac catheterization, History of cardiovascular stress test, History of echocardiogram, History of Holter monitoring, History of therapeutic radiation, Hx of 24 hour EKG monitoring, Hyperlipidemia, Hypertension, Iron deficiency anemia, Lumbar radiculopathy, Menopause, ADOLFO (obstructive sleep apnea), Osteoarthritis, Osteopenia, Other activity(E029.9), Paroxysmal atrial fibrillation (HCC), Prolonged emergence from general anesthesia, Proteinuria, Right Breast Cancer, Supraventricular tachycardia ([address] Type 2 diabetes mellitus ([address] Urge incontinence, and Wears partial dentures. Plan and Recommendations:    Neck pain followed by syncope: serial cardiac enzymes, EKG reviewed, further plan as per enzymes and clinical course we will get stress test  Concerning for possible orthostatic event? Hold BP meds check orthostatics  Differential also includes arrhythmias she is in sinus bradycardia plan outpatient 30-day monitor  Atrial Fibrillation: Currently in sinus bradycardia on propafenone and Cardizem on Xarelto  Syncope /Dizziness : check orthostatics, compression stockings, will get echo, 6 months ago echo showed no significant valvular disease. Outpatient 30-day monitor  DVT prophylaxis if no contraindication  6.    Dyslipidemia: continue statins           Thank you  much for consult and giving us the opportunity in contributing in the care of this patient. Please feel free to call me for any questions.        Jonathan Collins MD, 5/9/2021 1:32 PM

## 2021-05-09 NOTE — PROGRESS NOTES
Hospitalist Progress Note      Name:  Heather Pierre /Age/Sex: 1945  (76 y.o. female)   MRN & CSN:  [id number] & [id number] Admission Date/Time: 2021  4:45 PM   Location:  [facility] PCP: Maggy Gonzalez MD         Hospital Day: 2    Assessment and Plan: Heather Pierre is a 76 y.o.  female  who presents with syncope and collapse     1) Syncope:  -Rule out cardiogenic etiology  -EKG sinus bradycardia HR.  -TTE ordered  -Orthostatic vitals negative  -Discontinue Doxazosin  -Cardiology on board        2) JESSI on CKD 3  -Diuresis on hold  -Avoid nephrotoxic medications  -Nephrology on board    Other Chronic medical conditions; medication resumed unless contraindicated  DM Type 2   Paroxysmal A fib  ADOLFO  Essential HTN  Hyperlipidemia  Acquired cyst of kidney  Hx breast cancer, invasive ductal carcinoma/right lumpectomy number 2012, s/p radiation therapy    Diet DIET CARB CONTROL;   DVT Prophylaxis [] Lovenox, []  Heparin, [] SCDs, [] Ambulation   GI Prophylaxis [] PPI,  [] H2 Blocker,  [] Carafate,  [] Diet/Tube Feeds   Code Status Full Code   Disposition Home   MDM      History of Present Illness:     Patient was seen and examined  Denied any chest pain or SOB  No dizziness or palpitations  No acute event overnight    Ten point ROS reviewed negative, unless as noted above    Objective:   No intake or output data in the 24 hours ending 21 1050   Vitals:   Vitals:    21 0941   BP: 136/61   Pulse: 72   Resp: 16   Temp: 98.7 °F (37.1 °C)   SpO2: 95%     Physical Exam:   GEN Awake female, sitting upright in bed in no apparent distress. Appears given age. EYES Pupils are equally round. No scleral erythema, discharge, or conjunctivitis. HENT Mucous membranes are moist. Oral pharynx without exudates, no evidence of thrush. NECK Supple, no apparent thyromegaly or masses. RESP Clear to auscultation, no wheezes, rales or rhonchi.   Symmetric chest movement while on room air.  CARDIO/VASC S1/S2 auscultated. Regular rate without appreciable murmurs, rubs, or gallops. No JVD or carotid bruits. Peripheral pulses equal bilaterally and palpable. No peripheral edema. GI Abdomen is soft without significant tenderness, masses, or guarding. Bowel sounds are normoactive. Rectal exam deferred.  No costovertebral angle tenderness. Vigil catheter is not present. HEME/LYMPH No palpable cervical lymphadenopathy and no hepatosplenomegaly. No petechiae or ecchymoses. MSK No gross joint deformities. SKIN Normal coloration, warm, dry. NEURO Cranial nerves appear grossly intact, normal speech, no lateralizing weakness. PSYCH Awake, alert, oriented x 4. Affect appropriate.     Medications:   Medications:    famotidine (PEPCID) injection  20 mg Intravenous Once    dicyclomine  20 mg Intramuscular Once    sodium chloride flush  10 mL Intravenous 2 times per day    aspirin  81 mg Oral Daily    atorvastatin  80 mg Oral Daily    calcium elemental  500 mg Oral BID    [START ON 5/12/2021] cloNIDine  1 patch Transdermal Once per day on Wed    dilTIAZem  120 mg Oral Daily    [Held by provider] doxazosin  8 mg Oral Daily    ferrous sulfate  325 mg Oral Daily with breakfast    folic acid  019 mcg Oral Daily    furosemide  20 mg Oral Daily    [Held by provider] lisinopril  2.5 mg Oral Daily    metoprolol tartrate  50 mg Oral BID    oxybutynin  5 mg Oral BID    ascorbic acid  250 mg Oral Daily    insulin lispro  0-6 Units Subcutaneous TID     insulin lispro  0-3 Units Subcutaneous Nightly    propafenone  150 mg Oral Q8H    rivaroxaban  15 mg Oral Dinner      Infusions:    sodium chloride      sodium chloride 50 mL/hr at 05/08/21 2212    dextrose       PRN Meds: sodium chloride flush, 10 mL, PRN  sodium chloride, 25 mL, PRN  polyethylene glycol, 17 g, Daily PRN  acetaminophen, 650 mg, Q6H PRN    Or  acetaminophen, 650 mg, Q6H PRN  colchicine, 0.6 mg, BID PRN  glucose, 15 g, PRN  dextrose, 12.5 g, PRN  glucagon (rDNA), 1 mg, PRN  dextrose, 100 mL/hr, PRN  hydrALAZINE, 5 mg, Q6H PRN          Electronically signed by Apolonia Krishnamurthy MD on 5/9/2021 at 10:50 AM

## 2021-05-09 NOTE — CONSULTS
[address], 1945, [id number]/[id number], 5/9/2021    History  [place]:  The primary encounter diagnosis was Syncope and collapse. Diagnoses of Dyspnea, unspecified type, Chest pain, unspecified type, Nonintractable headache, unspecified chronicity pattern, unspecified headache type, Bradycardia, Troponin level elevated, and Elevated brain natriuretic peptide (BNP) level were also pertinent to this visit. Patient  has a past medical history of 14 day event monitor, Atrial fibrillation with RVR ([address] Breast cancer ([address] Edema, Family history of cardiovascular disease, GERD (gastroesophageal reflux disease), H/O 24 hour EKG monitoring, H/O cardiovascular stress test, H/O echocardiogram, History of cardiac catheterization, History of cardiovascular stress test, History of echocardiogram, History of Holter monitoring, History of therapeutic radiation, Hx of 24 hour EKG monitoring, Hyperlipidemia, Hypertension, Iron deficiency anemia, Lumbar radiculopathy, Menopause, ADOLFO (obstructive sleep apnea), Osteoarthritis, Osteopenia, Other activity(E029.9), Paroxysmal atrial fibrillation (HCC), Prolonged emergence from general anesthesia, Proteinuria, Right Breast Cancer, Supraventricular tachycardia ([address] Type 2 diabetes mellitus ([address] Urge incontinence, and Wears partial dentures. Patient  has a past surgical history that includes Bunionectomy (1990's); Cardiac catheterization (Sees Dr. Solis Escobedo); Breast biopsy (10-12); Breast biopsy (1970's); Colonoscopy (\"X 2 Last One 2008 \"); Dental surgery; joint replacement (2009); joint replacement (2010); Hysterectomy, total abdominal (1980's); Breast lumpectomy (11/6/12); Endoscopy, colon, diagnostic (07/18/2016); Upper gastrointestinal endoscopy (N/A, 11/8/2018); Carpal tunnel release (Left, 01/2019); Cataract removal (Bilateral, 03/2020, 5/20); Cataract removal (Left, 05/2020);  Ovary removal; and SOFI STEREO BREAST BX W LOC DEVICE 1ST LESION RIGHT (Right, 2/18/2021). Subjective:  Patient states:  \"I'm feeling better. \"    Pain:  Denies pain. Communication with other providers:  Handoff to RN, OT  Restrictions: general precautions, fall risk     Home Setup/Prior level of function  Social/Functional History  Lives With: Son  Type of Home: House  Home Layout: One level  Home Access: Level entry  Bathroom Shower/Tub: Walk-in shower, Tub/Shower unit, Shower chair with back  Bathroom Toilet: Standard  Home Equipment: Cane, Rolling walker  ADL Assistance: Independent  Homemaking Assistance: Independent  Homemaking Responsibilities: Yes  Ambulation Assistance: Independent  Transfer Assistance: Independent  Active : Yes  Mode of Transportation: Car  Additional Comments: Pt reports 1 fall in the last 6 months and reports she passed out. Examination of body systems (includes body structures/functions, activity/participation limitations):  · Observation:  Pt supine in bed upon arrival and agreeable to therapy  · Vision:  [employer]  · Hearing:  [facility]  · Cardiopulmonary:  No O2 needs  · Cognition: WFL, see OT/SLP note for further evaluation. Musculoskeletal  · ROM R/L:  WFL. · Strength R/L:  5/5, no impairment in function and endurance. · Neuro:  [facility]      Mobility:  · Rolling L/R:  Mod I  · Supine to sit:  Mod I  · Transfers: CGA progressing to supervision, performed STS from bed and to chair  · Sitting balance:  good. · Standing balance:  good. · Gait: Pt ambulated 200' without an AD CGA progressing to SBA with decreased bibiana but otherwise no gait abnormalities. Pt states she is ambulating at baseline. [facility] 6 Clicks Inpatient Mobility:  AM-PAC Inpatient Mobility Raw Score : 20    Safety: patient left in chair with alarm on, call light within reach, RN notified, gait belt used. Assessment:  Pt is a 76 y.o. female admitted to the hospital for syncope.  Pt was ambulating to restroom when she had a syncopal episode and woke up on the floor. This is pt's first fall in past 6 months. Pt typically does not ambulate with an AD and is completely independent with all ambulation and transfers. Pt currently is performing bed mobility mod I, transferring with supervision, and ambulating 200' without an AD SBA. Pt is functioning close to baseline and does not require any further acute care PT. Pt will be discharged from caseload. Complexity: moderate    Prognosis: Good, no significant barriers to participation at this time.      Equipment: none, pt does not require an AD    Treatment plan:  Bed mobility, transfers, balance, gait, TA, TX    Recommendations for NURSING mobility: amb with gait belt    Time:   Time in: 0948  Time out: 1004  Timed treatment minutes: 8  Total time: 16    Electronically signed by:    Gloria Rey PT  5/9/2021, 11:58 AM

## 2021-05-09 NOTE — CONSULTS
Nephrology Service Consultation      [address] [facility] [address], [address] [address]  Phone: [phone]  Office Hours: 8:30AM - 4:30PM  Monday - Friday            Patient: Jo-Ann Ricci  MRN: [id number]  Consulting physician:  Stanley Gardiner MD  Reason for Consult: elevated creat      PCP: Analy Garcia MD    HISTORY OF PRESENT ILLNESS:   The patient is a 76 y.o. female with CKD, Afib presented due to passing out at home. She reports finding herself on the floor upon awakening and prior to that, she was having right neck pain radiating to the abdomen. Renal  consult for cr 2.6 from baseline 2, office pt of Dr Thorne Lights  She is on NS, alert and awake this morning and eating breakfast    REVIEW OF SYSTEMS:  14 point ROS is Negative. See positive ROS per HPI    Past Medical History:        Diagnosis Date    14 day event monitor 11/05/2018    Sinus rhythm    Atrial fibrillation with RVR ([facility] 11/25/2013    Breast cancer ([facility]     Edema     7/87 TTE diastolic dysfxn, EF 19%; 62/45 - TTE diastolic dysfxn, EF 03%; Stress myoview  WNl, EF 70%    Family history of cardiovascular disease     GERD (gastroesophageal reflux disease)     H/O 24 hour EKG monitoring 4/23/12    [facility]    H/O cardiovascular stress test     4/23/12-[facility], Probably norm perfusion Lexiscan cardiolite study except for diaphramatic attenuation artifact, otherwise perfusion is normal, norm LV fxn by gated scan. 11/10-EF70%, 9/15/08-EF70%, 1/15/07-EF70%, 9/03    H/O echocardiogram     4/23/12-[facility]- Norm chamber sizes. LVH with norm LV systolic but abn diastolic fxn, mild MR and TR, minimal pulm HTN. 11/10 -EF>55%; 3/05, 9/23/03    History of cardiac catheterization     11/15/2013-EF 55%, No signif CAD -Dr Cordell Mireles;;    History of cardiovascular stress test     11/14/2013-EF 70%. Normal Lexiscan Cardiolite, Uniform wall motion;    History of echocardiogram     13/99/7187-YHGFQXOMA global systolic  function.  No wall motion abnormalities. EF 55%. Mild MR/TR;    History of Holter monitoring 11/17/14 11/14-48 hour Holter: Predominant rhythm was sinus, no ventricular ectopy noted, supraventricular ectopics were noted in single beat forms.  History of therapeutic radiation     Hx of 24 hour EKG monitoring     12/17/13 48 hr holter monitor. Sinus rhythm with intermittent sinus arrhythmia.  Hyperlipidemia     Hypertension     11/13 Cath WNL, EF 55%; 11/13 Stress WNL : 11/13 TTE mild MR and TR, EF 55%; 4/12 Stress WNL; 8/9 - Cath WNL    Iron deficiency anemia 7/9/2013 9/13 EGD WNL    Lumbar radiculopathy 11/25/2013    Menopause     ADOLFO (obstructive sleep apnea)     sleep study 10/3 CPAP 6cm     Osteoarthritis     both knees    Osteopenia     9/12 DEXA T-score -1.5    Other activity(E029.9)     48 hr holter, the 48 hr holter monitor reveals the patient in the sinus rhythm with occasional supraventricular ectopic beats. There is a rare short atrial run.  Paroxysmal atrial fibrillation (HCC)     4/12 Holter [facility]    Prolonged emergence from general anesthesia     Proteinuria     Right Breast Cancer Dx 10-12    Supraventricular tachycardia (HCC)     Type 2 diabetes mellitus (HCC) Dx 2000's    Urge incontinence     Wears partial dentures     upper partial       Past Surgical History:        Procedure Laterality Date    BREAST BIOPSY  10-12    Right Breast Biopsy, Cancer    BREAST BIOPSY  1970's    Right Breast Biopsy, Benign    BREAST LUMPECTOMY  11/6/12    Breast cancer - Right with sentinal node    BUNIONECTOMY  1990's    Right Neida Mariscal    8/10/09- The patient has no significant CAD. The elevated troponin is probably secondary to SVT. , 10/03- no significant CAD    CARPAL TUNNEL RELEASE Left 01/2019    CATARACT REMOVAL Bilateral 03/2020, 5/20    CATARACT REMOVAL Left 05/2020    COLONOSCOPY  \"X 2 Last One 2008 \"    Polpy Removed During Last Colonoscopy   [facility] [place] [facility]      Teeth Extracted In Past    ENDOSCOPY, COLON, DIAGNOSTIC  07/18/2016    savary dilatation to 17 mm    HYSTERECTOMY, TOTAL ABDOMINAL  1980's    JOINT REPLACEMENT  2009    Total Left Knee    JOINT REPLACEMENT  2010    Total Right Knee    SOFI STEROTACTIC LOC BREAST BIOPSY RIGHT Right 2/18/2021    SOFI STEROTACTIC LOC BREAST BIOPSY RIGHT [facility]    OVARY REMOVAL      UPPER GASTROINTESTINAL ENDOSCOPY N/A 11/8/2018    EGD DIAGNOSTIC ONLY performed by Mansi Trevino MD at [facility] ENDOSCOPY       Medications:   Prior to Admission medications    Medication Sig Start Date End Date Taking?  Authorizing Provider   Ascorbic Acid (VITAMIN C) 250 MG tablet Take by mouth daily   Yes Historical Provider, MD   colchicine (COLCRYS) 0.6 MG tablet Take 1 tablet by mouth 2 times daily as needed for Pain (gout) 4/27/21  Yes Geoffrey Brooks MD   rivaroxaban (XARELTO) 15 MG TABS tablet Take 1 tablet by mouth every 24 hours 4/26/21  Yes Arminda Kayser, MD   cloNIDine (CATAPRES-TTS-1) 0.1 MG/24HR PTWK Place 1 patch onto the skin every 7 days 3/10/21 3/10/22 Yes Geoffrey Brooks MD   propafenone (RYTHMOL) 150 MG tablet Take 1 tablet by mouth every 8 hours 2/11/21  Yes Geoffrey Brooks MD   doxazosin (CARDURA) 8 MG tablet take 1 tablet by mouth once daily 2/2/21  Yes Geoffrey Brooks MD   atorvastatin (LIPITOR) 80 MG tablet take 1 tablet by mouth once daily 2/2/21  Yes Geoffrey Brooks MD   ferrous sulfate (IRON 325) 325 (65 Fe) MG tablet Take 1 tablet by mouth daily (with breakfast) 1/20/21 5/8/21 Yes Irwin Adams MD   metFORMIN (GLUCOPHAGE) 500 MG tablet Take 1 tablet by mouth 2 times daily (with meals) 1/11/21  Yes Geoffrey Brooks MD   lisinopril (PRINIVIL;ZESTRIL) 2.5 MG tablet Take 1 tablet by mouth daily 11/25/20  Yes Geoffrey Brooks MD   Omega-3 Fatty Acids (FISH OIL PO) Take 1,200 mg by mouth   Yes Historical Provider, MD   FOLIC ACID PO Take 620 mg by mouth daily   Yes Historical Provider, MD   furosemide (LASIX) 20 MG tablet take 1 tablet by mouth every other day 7/13/20  Yes Geoffrey Brooks MD   oxybutynin [facility]) 5 MG tablet take 1 tablet by mouth twice a day 6/11/20  Yes Geoffrey Brooks MD   metoprolol tartrate (LOPRESSOR) 50 MG tablet take 1 tablet by mouth twice a day 4/24/20  Yes Geoffrey Brooks MD   dilTIAZem (CARTIA XT) 120 MG extended release capsule Take 1 capsule by mouth daily 4/17/20  Yes Geoffrey Brooks MD   Multiple Vitamins-Minerals (MULTIVITAMIN ADULT PO) Take by mouth   Yes Historical Provider, MD   aspirin 81 MG EC tablet Take 1 tablet by mouth daily 11/13/18  Yes Geoffrey Brooks MD   Lancets MISC 1 each by In Vitro route 2 times daily 12/1/17  Yes Geoffrey Brooks MD   Blood Glucose Monitoring Suppl AV 1 kit by Does not apply route daily 12/1/17  Yes Geoffrey Brooks MD   Calcium Carbonate (CALTRATE 600 PO) Take  by mouth 2 times daily. Yes Historical Provider, MD   Glucose Blood (BLOOD GLUCOSE TEST STRIPS) STRP 1 each by In Vitro route 2 times daily 12/1/17   Geoffrey Brooks MD   oxybutynin (DITROPAN) 5 MG tablet Take 1 tablet by mouth 2 times daily. 1/7/13 1/21/14  Geoffrey Brooks MD        Allergies:  Latex and Tape Lourdes Edilson tape]    Social History:   TOBACCO:   reports that she quit smoking about 34 years ago. She has a 5.00 pack-year smoking history. She has never used smokeless tobacco.  ETOH:   reports no history of alcohol use.   OCCUPATION:      Family History:       Problem Relation Age of Onset    Diabetes Mother     Early Death Mother 61    Cancer Father         \"Lung Cancer\"    Heart Disease Father     Diabetes Father     Stroke Sister     Diabetes Sister     Diabetes Brother     Cancer Brother         \"Prostate Cancer\"    Cancer Brother         \"Lung Cancer\"    Thyroid Disease Daughter            Physical Exam:    Vitals: /66   Pulse 66 11/13/2015    Iron deficiency anemia 11/13/2015    Gastroesophageal reflux disease without esophagitis 11/13/2015    Edema of both legs 06/15/2015    Breast cancer ([facility] 04/22/2015    Urge incontinence     Bilateral leg edema 10/21/2013    Colon polyps 08/22/2013    Osteopenia     Osteoarthritis     ADOLFO (obstructive sleep apnea)     Menopause     [facility]      JESSI  CKD3  HTN  SYNcope and fall  Afib hx    PLAN;  -CONTINUE NS  -Hold lisinopril for now since in JESSI  -Hold doxazosin since it can cause orthostasis  -Will follow    Thank you    Electronically signed by Patricia Walker DO on 5/9/2021 at 8:26 AM    [address],  [facility]  [place], [place]  PHONE: [phone]  FAX: [phone]

## 2021-05-10 ENCOUNTER — APPOINTMENT (OUTPATIENT)
Dept: NUCLEAR MEDICINE | Age: 76
DRG: 312 | End: 2021-05-10
Payer: COMMERCIAL

## 2021-05-10 DIAGNOSIS — R55 SYNCOPE AND COLLAPSE: Primary | ICD-10-CM

## 2021-05-10 LAB
ANION GAP SERPL CALCULATED.3IONS-SCNC: 8 MMOL/L (ref 4–16)
BUN BLDV-MCNC: 35 MG/DL (ref 6–23)
CALCIUM SERPL-MCNC: 8.9 MG/DL (ref 8.3–10.6)
CHLORIDE BLD-SCNC: 110 MMOL/L (ref 99–110)
CO2: 25 MMOL/L (ref 21–32)
CREAT SERPL-MCNC: 2.2 MG/DL (ref 0.6–1.1)
GFR AFRICAN AMERICAN: 26 ML/MIN/1.73M2
GFR NON-AFRICAN AMERICAN: 22 ML/MIN/1.73M2
GLUCOSE BLD-MCNC: 104 MG/DL (ref 70–99)
GLUCOSE BLD-MCNC: 110 MG/DL (ref 70–99)
GLUCOSE BLD-MCNC: 141 MG/DL (ref 70–99)
GLUCOSE BLD-MCNC: 174 MG/DL (ref 70–99)
GLUCOSE BLD-MCNC: 82 MG/DL (ref 70–99)
LV EF: 53 %
LV EF: 61 %
LVEF MODALITY: NORMAL
LVEF MODALITY: NORMAL
POTASSIUM SERPL-SCNC: 4.3 MMOL/L (ref 3.5–5.1)
SODIUM BLD-SCNC: 143 MMOL/L (ref 135–145)

## 2021-05-10 PROCEDURE — 93017 CV STRESS TEST TRACING ONLY: CPT

## 2021-05-10 PROCEDURE — 6360000002 HC RX W HCPCS: Performed by: INTERNAL MEDICINE

## 2021-05-10 PROCEDURE — 3430000000 HC RX DIAGNOSTIC RADIOPHARMACEUTICAL: Performed by: INTERNAL MEDICINE

## 2021-05-10 PROCEDURE — 99233 SBSQ HOSP IP/OBS HIGH 50: CPT | Performed by: INTERNAL MEDICINE

## 2021-05-10 PROCEDURE — 36415 COLL VENOUS BLD VENIPUNCTURE: CPT

## 2021-05-10 PROCEDURE — 94761 N-INVAS EAR/PLS OXIMETRY MLT: CPT

## 2021-05-10 PROCEDURE — 82962 GLUCOSE BLOOD TEST: CPT

## 2021-05-10 PROCEDURE — 2140000000 HC CCU INTERMEDIATE R&B

## 2021-05-10 PROCEDURE — G0378 HOSPITAL OBSERVATION PER HR: HCPCS

## 2021-05-10 PROCEDURE — APPSS45 APP SPLIT SHARED TIME 31-45 MINUTES: Performed by: NURSE PRACTITIONER

## 2021-05-10 PROCEDURE — 93306 TTE W/DOPPLER COMPLETE: CPT

## 2021-05-10 PROCEDURE — 2580000003 HC RX 258: Performed by: CLINICAL NURSE SPECIALIST

## 2021-05-10 PROCEDURE — 6370000000 HC RX 637 (ALT 250 FOR IP): Performed by: CLINICAL NURSE SPECIALIST

## 2021-05-10 PROCEDURE — 80048 BASIC METABOLIC PNL TOTAL CA: CPT

## 2021-05-10 PROCEDURE — 99232 SBSQ HOSP IP/OBS MODERATE 35: CPT | Performed by: INTERNAL MEDICINE

## 2021-05-10 PROCEDURE — 78452 HT MUSCLE IMAGE SPECT MULT: CPT

## 2021-05-10 PROCEDURE — A9500 TC99M SESTAMIBI: HCPCS | Performed by: INTERNAL MEDICINE

## 2021-05-10 RX ORDER — CLONIDINE 0.1 MG/24H
1 PATCH, EXTENDED RELEASE TRANSDERMAL
Qty: 4 PATCH | Refills: 11 | Status: SHIPPED | OUTPATIENT
Start: 2021-05-10 | End: 2021-11-09 | Stop reason: ALTCHOICE

## 2021-05-10 RX ADMIN — PROPAFENONE HYDROCHLORIDE 150 MG: 150 TABLET, FILM COATED ORAL at 06:05

## 2021-05-10 RX ADMIN — Medication 500 MG: at 21:12

## 2021-05-10 RX ADMIN — REGADENOSON 0.4 MG: 0.08 INJECTION, SOLUTION INTRAVENOUS at 11:23

## 2021-05-10 RX ADMIN — RIVAROXABAN 15 MG: 15 TABLET, FILM COATED ORAL at 18:30

## 2021-05-10 RX ADMIN — Medication 30 MILLICURIE: at 11:30

## 2021-05-10 RX ADMIN — FOLIC ACID 500 MCG: 1 TABLET ORAL at 08:12

## 2021-05-10 RX ADMIN — ASPIRIN 81 MG: 81 TABLET, COATED ORAL at 08:12

## 2021-05-10 RX ADMIN — METOPROLOL TARTRATE 50 MG: 50 TABLET, FILM COATED ORAL at 21:12

## 2021-05-10 RX ADMIN — FERROUS SULFATE TAB 325 MG (65 MG ELEMENTAL FE) 325 MG: 325 (65 FE) TAB at 08:12

## 2021-05-10 RX ADMIN — Medication 500 MG: at 08:12

## 2021-05-10 RX ADMIN — PROPAFENONE HYDROCHLORIDE 150 MG: 150 TABLET, FILM COATED ORAL at 21:30

## 2021-05-10 RX ADMIN — OXYBUTYNIN CHLORIDE 5 MG: 5 TABLET ORAL at 21:12

## 2021-05-10 RX ADMIN — Medication 10 MILLICURIE: at 08:05

## 2021-05-10 RX ADMIN — OXYBUTYNIN CHLORIDE 5 MG: 5 TABLET ORAL at 08:12

## 2021-05-10 RX ADMIN — ATORVASTATIN CALCIUM 80 MG: 80 TABLET, FILM COATED ORAL at 08:12

## 2021-05-10 RX ADMIN — OXYCODONE HYDROCHLORIDE AND ACETAMINOPHEN 250 MG: 500 TABLET ORAL at 08:12

## 2021-05-10 RX ADMIN — PROPAFENONE HYDROCHLORIDE 150 MG: 150 TABLET, FILM COATED ORAL at 15:13

## 2021-05-10 RX ADMIN — SODIUM CHLORIDE, PRESERVATIVE FREE 10 ML: 5 INJECTION INTRAVENOUS at 21:12

## 2021-05-10 ASSESSMENT — PAIN SCALES - GENERAL: PAINLEVEL_OUTOF10: 0

## 2021-05-10 NOTE — PROGRESS NOTES
Hospitalist Progress Note      Name:  Nikita Juarez /Age/Sex: 1945  (76 y.o. female)   MRN & CSN:  [id number] & [id number] Admission Date/Time: 2021  4:45 PM   Location:  [address] [place] [address] PCP: Deepika Daniels MD         Hospital Day: 3    Assessment and Plan: Nikita Juarez is a 76 y.o.  female  who presents with syncope and collapse     1) Syncope:  -Rule out cardiogenic etiology  -EKG sinus bradycardia HR.  -TTE ordered  -Orthostatic vitals negative  -Discontinue Doxazosin  -Cardiology on board; for stress test today        2) JESSI on CKD 3  -Diuresis on hold  -Avoid nephrotoxic medications  -Nephrology on board    Other Chronic medical conditions; medication resumed unless contraindicated  DM Type 2   Paroxysmal A fib  ADOLFO  Essential HTN  Hyperlipidemia  Acquired cyst of kidney  Hx breast cancer, invasive ductal carcinoma/right lumpectomy number 2012, s/p radiation therapy    Diet DIET CARB CONTROL;   DVT Prophylaxis [] Lovenox, []  Heparin, [] SCDs, [] Ambulation   GI Prophylaxis [] PPI,  [] H2 Blocker,  [] Carafate,  [] Diet/Tube Feeds   Code Status Full Code   Disposition Home   MDM      History of Present Illness:     Patient was seen and examined  Denied any chest pain or SOB  No dizziness or palpitations  No acute event overnight    Ten point ROS reviewed negative, unless as noted above    Objective:   No intake or output data in the 24 hours ending 05/10/21 0933   Vitals:   Vitals:    05/10/21 0809   BP: (!) 155/64   Pulse: 61   Resp: 15   Temp: 98 °F (36.7 °C)   SpO2: 96%     Physical Exam:   GEN Awake female, sitting upright in bed in no apparent distress. Appears given age. EYES Pupils are equally round. No scleral erythema, discharge, or conjunctivitis. HENT Mucous membranes are moist. Oral pharynx without exudates, no evidence of thrush. NECK Supple, no apparent thyromegaly or masses. RESP Clear to auscultation, no wheezes, rales or rhonchi.   Symmetric chest movement while on room air. CARDIO/VASC S1/S2 auscultated. Regular rate without appreciable murmurs, rubs, or gallops. No JVD or carotid bruits. Peripheral pulses equal bilaterally and palpable. No peripheral edema. GI Abdomen is soft without significant tenderness, masses, or guarding. Bowel sounds are normoactive. Rectal exam deferred.  No costovertebral angle tenderness. Vigil catheter is not present. HEME/LYMPH No palpable cervical lymphadenopathy and no hepatosplenomegaly. No petechiae or ecchymoses. MSK No gross joint deformities. SKIN Normal coloration, warm, dry. NEURO Cranial nerves appear grossly intact, normal speech, no lateralizing weakness. PSYCH Awake, alert, oriented x 4. Affect appropriate.     Medications:   Medications:    famotidine (PEPCID) injection  20 mg Intravenous Once    dicyclomine  20 mg Intramuscular Once    sodium chloride flush  10 mL Intravenous 2 times per day    aspirin  81 mg Oral Daily    atorvastatin  80 mg Oral Daily    calcium elemental  500 mg Oral BID    [START ON 5/12/2021] cloNIDine  1 patch Transdermal Once per day on Wed    dilTIAZem  120 mg Oral Daily    [Held by provider] doxazosin  8 mg Oral Daily    ferrous sulfate  325 mg Oral Daily with breakfast    folic acid  028 mcg Oral Daily    furosemide  20 mg Oral Daily    [Held by provider] lisinopril  2.5 mg Oral Daily    metoprolol tartrate  50 mg Oral BID    oxybutynin  5 mg Oral BID    ascorbic acid  250 mg Oral Daily    insulin lispro  0-6 Units Subcutaneous TID WC    insulin lispro  0-3 Units Subcutaneous Nightly    propafenone  150 mg Oral Q8H    rivaroxaban  15 mg Oral Dinner      Infusions:    sodium chloride      dextrose       PRN Meds: technetium sestamibi, 10 millicurie, ONCE PRN  technetium sestamibi, 30 millicurie, ONCE PRN  sodium chloride flush, 10 mL, PRN  sodium chloride, 25 mL, PRN  polyethylene glycol, 17 g, Daily PRN  acetaminophen, 650 mg, Q6H PRN    Or  acetaminophen, 650 mg, Q6H PRN  colchicine, 0.6 mg, BID PRN  glucose, 15 g, PRN  dextrose, 12.5 g, PRN  glucagon (rDNA), 1 mg, PRN  dextrose, 100 mL/hr, PRN  hydrALAZINE, 5 mg, Q6H PRN          Electronically signed by Claire Sullivan MD on 5/10/2021 at 9:33 AM

## 2021-05-10 NOTE — PLAN OF CARE
Problem: Falls - Risk of:  Goal: Will remain free from falls  Description: Will remain free from falls  5/10/2021 1202 by Meg Manzo RN  Outcome: Ongoing  5/10/2021 0123 by Delwin Najjar, RN  Outcome: Ongoing  Goal: Absence of physical injury  Description: Absence of physical injury  5/10/2021 1202 by Meg Manzo RN  Outcome: Ongoing  5/10/2021 0123 by Delwin Najjar, RN  Outcome: Ongoing     Problem: Cardiac:  Goal: Ability to maintain vital signs within normal range will improve  Description: Ability to maintain vital signs within normal range will improve  5/10/2021 1202 by Meg Manzo RN  Outcome: Ongoing  5/10/2021 0123 by Delwin Najjar, RN  Outcome: Ongoing  Goal: Cardiovascular alteration will improve  Description: Cardiovascular alteration will improve  5/10/2021 1202 by Meg Manzo RN  Outcome: Ongoing  5/10/2021 0123 by Delwin Najjar, RN  Outcome: Ongoing     Problem: Health Behavior:  Goal: Will modify at least one risk factor affecting health status  Description: Will modify at least one risk factor affecting health status  5/10/2021 1202 by Meg Manzo RN  Outcome: Ongoing  5/10/2021 0123 by Delwin Najjar, RN  Outcome: Ongoing  Goal: Identification of resources available to assist in meeting health care needs will improve  Description: Identification of resources available to assist in meeting health care needs will improve  5/10/2021 1202 by Meg Manzo RN  Outcome: Ongoing  5/10/2021 0123 by Delwin Najjar, RN  Outcome: Ongoing     Problem: Physical Regulation:  Goal: Complications related to the disease process, condition or treatment will be avoided or minimized  Description: Complications related to the disease process, condition or treatment will be avoided or minimized  5/10/2021 1202 by Meg Manzo RN  Outcome: Ongoing  5/10/2021 0123 by Delwin Najjar, RN  Outcome: Ongoing

## 2021-05-10 NOTE — PROGRESS NOTES
Cardiology Progress Note     Today's Plan NM stress test and echo     Admit Date:  5/8/2021    Consult reason/ Seen today for: syncope    Subjective and  Overnight Events:  Reports she is feeling better. No further syncope. Denies chest pain. Telemetry SR/ SB   No arrhythmia noted      Assessment / Plan / Recommendation:     1. Syncope- negative orthostatics- bradycardia on telemetry with rate in the 50's - consider outpatient 30 day event monitor -on low dose cardizem-   2. Elevated troponin- denies chest pain NM stress test normal, echo shows no wall motion abnormality   3. HTN- negative orthostatics - can resume Cardizem and metoprolol   4. Paroxysmal atrial fib- in SR with the help of rhythmol - continue with anticoagulation with xarelto   5. JESSI- hold ACE as creatinine is elevated   6. Dyslipidemia- continue with atorvastatin         History of Presenting Illness:    Chief complain on admission : 76 y. o.year old who is admitted for  Chief Complaint   Patient presents with    Loss of Consciousness    Shortness of Breath        Past medical history:    has a past medical history of 14 day event monitor, Atrial fibrillation with RVR ([address] Breast cancer ([address] Edema, Family history of cardiovascular disease, GERD (gastroesophageal reflux disease), H/O 24 hour EKG monitoring, H/O cardiovascular stress test, H/O echocardiogram, History of cardiac catheterization, History of cardiovascular stress test, History of echocardiogram, History of Holter monitoring, History of therapeutic radiation, Hx of 24 hour EKG monitoring, Hyperlipidemia, Hypertension, Iron deficiency anemia, Lumbar radiculopathy, Menopause, ADOLFO (obstructive sleep apnea), Osteoarthritis, Osteopenia, Other activity(E029.9), Paroxysmal atrial fibrillation ([address] Prolonged emergence from general anesthesia, Proteinuria, Right Breast Cancer, Supraventricular tachycardia ([facility] Type 2 diabetes mellitus ([facility] Urge incontinence, and Wears partial dentures. Past surgical history:   has a past surgical history that includes Bunionectomy (1990's); Cardiac catheterization (Sees Dr. Dirk Blevins); Breast biopsy (10-12); Breast biopsy (1970's); Colonoscopy (\"X 2 Last One 2008 \"); Dental surgery; joint replacement (2009); joint replacement (2010); Hysterectomy, total abdominal (1980's); Breast lumpectomy (11/6/12); Endoscopy, colon, diagnostic (07/18/2016); Upper gastrointestinal endoscopy (N/A, 11/8/2018); Carpal tunnel release (Left, 01/2019); Cataract removal (Bilateral, 03/2020, 5/20); Cataract removal (Left, 05/2020); Ovary removal; and SOFI STEREO BREAST BX W LOC DEVICE 1ST LESION RIGHT (Right, 2/18/2021). Social History:   reports that she quit smoking about 34 years ago. She has a 5.00 pack-year smoking history. She has never used smokeless tobacco. She reports that she does not drink alcohol or use drugs. Family history:  family history includes Cancer in her brother, brother, and father; Diabetes in her brother, father, mother, and sister; Early Death (age of onset: 61) in her mother; Heart Disease in her father; Stroke in her sister; Thyroid Disease in her daughter.     Allergies   Allergen Reactions    Latex      \"Blisters\"    Tape [Adhesive Tape]      \"Turn Red\"       Review of Systems:   All 14 systems were reviewed and are negative  Except for the positive findings which are documented     BP (!) 156/66   Pulse 76   Temp 98 °F (36.7 °C) (Oral)   Resp 20   Ht 5' 8\" (1.727 m)   Wt 195 lb 5.2 oz (88.6 kg)   LMP  (LMP Unknown)   SpO2 96%   BMI 29.70 kg/m²   No intake or output data in the 24 hours ending 05/10/21 1421    Physical Exam:  Constitutional:  Well developed, Well nourished, No acute distress  HENT:  Normocephalic, Atraumatic, Bilateral external ears normal,  Nose normal.   Neck- trachea is midline  Eyes:  PEERL, Conjunctiva normal  Respiratory:  Normal breath 05/08/21 1740   PROTIME 15.0*   INR 1.24     BNP:    Recent Labs     05/08/21  1740 05/09/21 2026   PROBNP 1,223* 998.3*     TROPONIN:   Recent Labs     05/08/21 1740 05/08/21  2304   TROPONINT 0.028* 0.021*        NM stress 05/2021   Normal EF 61 % with normal ventricular contractility. No infarct or ischemia    noted.    Normal stress myocardial perfusion.    This is a normal study.    ECG portion of stress test is negative for ischemia by diagnostic criteria.               Impression:  Active Problems:    Paroxysmal atrial fibrillation (HCC)    Essential hypertension    JESSI (acute kidney injury) ([facility])    Stage 4 chronic kidney disease ([facility])    Syncope and collapse  Resolved Problems:    * No resolved hospital problems. *       All labs, medications and tests reviewed by myself, continue all other medications of all above medical condition listed as is except for changes mentioned above. Thank you   Please call with questions. Electronically signed by FAITH Gutierres CNP on 5/10/2021 at 2:21 PM   [address] [facility]    I have seen ,spoken to  and examined this patient personally, independently of the nurse practitioner. I have reviewed the hospital care given to date and reviewed all pertinent labs and imaging. The plan was developed mutually at the time of the visit with the patient,  NP   and myself. I have spoken with patient, nursing staff and provided written and verbal instructions . The above note has been reviewed and I agree with the assessment, diagnosis, and treatment plan with changes made by me as follows     HPI:  I have reviewed the above HPI  And agree with above   Deneise Essex is a 76 y. o.year old who and presents with had concerns including Loss of Consciousness and Shortness of Breath. Chief Complaint   Patient presents with    Loss of Consciousness    Shortness of Breath     Please review addendum/changes made to note above   Interval history: Orthostatic event?   Unclear

## 2021-05-10 NOTE — PROGRESS NOTES
Nephrology Progress Note        [address], [address] [address]  Phone: [phone]  Office Hours: 8:30AM - 4:30PM  Monday - Friday        5/10/2021 7:47 AM  Subjective:   Admit Date: 5/8/2021  PCP: Maribell Kelley MD  Interval History: resting  On room air    Diet: DIET CARB CONTROL;      Data:   Scheduled Meds:   famotidine (PEPCID) injection  20 mg Intravenous Once    dicyclomine  20 mg Intramuscular Once    sodium chloride flush  10 mL Intravenous 2 times per day    aspirin  81 mg Oral Daily    atorvastatin  80 mg Oral Daily    calcium elemental  500 mg Oral BID    [START ON 5/12/2021] cloNIDine  1 patch Transdermal Once per day on Wed    dilTIAZem  120 mg Oral Daily    [Held by provider] doxazosin  8 mg Oral Daily    ferrous sulfate  325 mg Oral Daily with breakfast    folic acid  022 mcg Oral Daily    furosemide  20 mg Oral Daily    [Held by provider] lisinopril  2.5 mg Oral Daily    metoprolol tartrate  50 mg Oral BID    oxybutynin  5 mg Oral BID    ascorbic acid  250 mg Oral Daily    insulin lispro  0-6 Units Subcutaneous TID     insulin lispro  0-3 Units Subcutaneous Nightly    propafenone  150 mg Oral Q8H    rivaroxaban  15 mg Oral Dinner     Continuous Infusions:   sodium chloride      dextrose       PRN Meds:sodium chloride flush, sodium chloride, polyethylene glycol, acetaminophen **OR** acetaminophen, colchicine, glucose, dextrose, glucagon (rDNA), dextrose, hydrALAZINE  No intake/output data recorded. No intake/output data recorded.   No intake or output data in the 24 hours ending 05/10/21 0747    CBC:   Recent Labs     05/08/21  1740 05/09/21  0740   WBC 7.6 5.9   HGB 10.0* 10.1*    257       BMP:    Recent Labs     05/08/21  1705 05/08/21  1740 05/09/21  1000   NA  --  137 141   K  --  5.0 4.4   CL  --  104 107   CO2  --  25 21   BUN  --  44* 42*   CREATININE  --  2.6* 2.4*   GLUCOSE 123 101* 144*     Hepatic:   Recent Labs 05/08/21  1740   AST 19   ALT 29   BILITOT 0.2   ALKPHOS 72       Objective:   Vitals: BP (!) 133/49   Pulse 68   Temp 97.8 °F (36.6 °C) (Oral)   Resp 18   Ht 5' 8\" (1.727 m)   Wt 195 lb 5.2 oz (88.6 kg)   LMP  (LMP Unknown)   SpO2 98%   BMI 29.70 kg/m²   General appearance:  in no acute distress  HEENT: normocephalic, atraumatic,   Neck: supple, trachea midline  Lungs:  breathing comfortably on room air  Heart[de-identified] regular rate and rhythm, S1, S2 normal,  Extremities: extremities atraumatic, no cyanosis or edema    Assessment and Plan:     Patient Active Problem List     Diagnosis Date Noted    Syncope, near 05/08/2021    CKD (chronic kidney disease), stage IV ([address] 03/10/2021    Other acute kidney failure ([address] 12/01/2020    Acquired cyst of kidney 12/01/2020    Malignant neoplasm of upper-outer quadrant of right female breast ([address] 11/17/2020    Intestinal malabsorption 11/17/2020    Obesity (BMI 30-39.9) 06/20/2019    Excessive daytime sleepiness 06/20/2019    Ex-cigarette smoker 06/20/2019    Acute blood loss anemia 11/08/2018    Chest pain 09/06/2018    Carpal tunnel syndrome on left 08/14/2018    Trigger finger, left ring finger 08/14/2018    Dizzy spells 01/30/2018    Chronic fatigue 08/28/2017    Type 2 diabetes mellitus with nephropathy ([address] 08/10/2016    Essential hypertension 12/17/2015    Abnormal EKG 12/17/2015    Mixed hyperlipidemia 11/13/2015    Iron deficiency anemia 11/13/2015    Gastroesophageal reflux disease without esophagitis 11/13/2015    Edema of both legs 06/15/2015    Breast cancer ([address] 04/22/2015    Urge incontinence      Bilateral leg edema 10/21/2013    Colon polyps 08/22/2013    Osteopenia      Osteoarthritis      ADOLFO (obstructive sleep apnea)      Menopause      Paroxysmal atrial fibrillation (HCC)        JESSI  CKD3  HTN  SYNcope and fall  Afib hx     PLAN;  -cr down to 2.4  -Hold lisinopril for now since in JESSI  -Hold doxazosin since it can cause

## 2021-05-11 VITALS
WEIGHT: 197.53 LBS | TEMPERATURE: 98.2 F | BODY MASS INDEX: 29.94 KG/M2 | SYSTOLIC BLOOD PRESSURE: 138 MMHG | HEART RATE: 58 BPM | DIASTOLIC BLOOD PRESSURE: 68 MMHG | RESPIRATION RATE: 18 BRPM | OXYGEN SATURATION: 96 % | HEIGHT: 68 IN

## 2021-05-11 LAB
ANION GAP SERPL CALCULATED.3IONS-SCNC: 6 MMOL/L (ref 4–16)
BUN BLDV-MCNC: 30 MG/DL (ref 6–23)
CALCIUM SERPL-MCNC: 9.5 MG/DL (ref 8.3–10.6)
CHLORIDE BLD-SCNC: 105 MMOL/L (ref 99–110)
CO2: 28 MMOL/L (ref 21–32)
CREAT SERPL-MCNC: 2.2 MG/DL (ref 0.6–1.1)
GFR AFRICAN AMERICAN: 26 ML/MIN/1.73M2
GFR NON-AFRICAN AMERICAN: 22 ML/MIN/1.73M2
GLUCOSE BLD-MCNC: 102 MG/DL (ref 70–99)
GLUCOSE BLD-MCNC: 147 MG/DL (ref 70–99)
POTASSIUM SERPL-SCNC: 4.2 MMOL/L (ref 3.5–5.1)
SODIUM BLD-SCNC: 139 MMOL/L (ref 135–145)

## 2021-05-11 PROCEDURE — 82962 GLUCOSE BLOOD TEST: CPT

## 2021-05-11 PROCEDURE — 99231 SBSQ HOSP IP/OBS SF/LOW 25: CPT | Performed by: INTERNAL MEDICINE

## 2021-05-11 PROCEDURE — G0378 HOSPITAL OBSERVATION PER HR: HCPCS

## 2021-05-11 PROCEDURE — 6370000000 HC RX 637 (ALT 250 FOR IP): Performed by: CLINICAL NURSE SPECIALIST

## 2021-05-11 PROCEDURE — 2580000003 HC RX 258: Performed by: CLINICAL NURSE SPECIALIST

## 2021-05-11 PROCEDURE — 36415 COLL VENOUS BLD VENIPUNCTURE: CPT

## 2021-05-11 PROCEDURE — 94660 CPAP INITIATION&MGMT: CPT

## 2021-05-11 PROCEDURE — 94761 N-INVAS EAR/PLS OXIMETRY MLT: CPT

## 2021-05-11 PROCEDURE — 99232 SBSQ HOSP IP/OBS MODERATE 35: CPT | Performed by: INTERNAL MEDICINE

## 2021-05-11 PROCEDURE — 6370000000 HC RX 637 (ALT 250 FOR IP): Performed by: INTERNAL MEDICINE

## 2021-05-11 PROCEDURE — 80048 BASIC METABOLIC PNL TOTAL CA: CPT

## 2021-05-11 PROCEDURE — APPSS45 APP SPLIT SHARED TIME 31-45 MINUTES: Performed by: NURSE PRACTITIONER

## 2021-05-11 RX ORDER — LISINOPRIL 5 MG/1
5 TABLET ORAL DAILY
Status: DISCONTINUED | OUTPATIENT
Start: 2021-05-11 | End: 2021-05-11 | Stop reason: HOSPADM

## 2021-05-11 RX ORDER — LISINOPRIL 2.5 MG/1
5 TABLET ORAL DAILY
Qty: 90 TABLET | Refills: 1
Start: 2021-05-11 | End: 2021-05-24

## 2021-05-11 RX ADMIN — PROPAFENONE HYDROCHLORIDE 150 MG: 150 TABLET, FILM COATED ORAL at 06:00

## 2021-05-11 RX ADMIN — SODIUM CHLORIDE, PRESERVATIVE FREE 10 ML: 5 INJECTION INTRAVENOUS at 08:12

## 2021-05-11 RX ADMIN — Medication 500 MG: at 08:11

## 2021-05-11 RX ADMIN — FUROSEMIDE 20 MG: 20 TABLET ORAL at 08:11

## 2021-05-11 RX ADMIN — FOLIC ACID 500 MCG: 1 TABLET ORAL at 08:12

## 2021-05-11 RX ADMIN — OXYCODONE HYDROCHLORIDE AND ACETAMINOPHEN 250 MG: 500 TABLET ORAL at 08:12

## 2021-05-11 RX ADMIN — METOPROLOL TARTRATE 50 MG: 50 TABLET, FILM COATED ORAL at 08:12

## 2021-05-11 RX ADMIN — DILTIAZEM HYDROCHLORIDE 120 MG: 120 CAPSULE, COATED, EXTENDED RELEASE ORAL at 08:12

## 2021-05-11 RX ADMIN — LISINOPRIL 5 MG: 5 TABLET ORAL at 10:23

## 2021-05-11 RX ADMIN — ATORVASTATIN CALCIUM 80 MG: 80 TABLET, FILM COATED ORAL at 08:11

## 2021-05-11 RX ADMIN — ASPIRIN 81 MG: 81 TABLET, COATED ORAL at 08:12

## 2021-05-11 RX ADMIN — FERROUS SULFATE TAB 325 MG (65 MG ELEMENTAL FE) 325 MG: 325 (65 FE) TAB at 08:12

## 2021-05-11 RX ADMIN — OXYBUTYNIN CHLORIDE 5 MG: 5 TABLET ORAL at 08:12

## 2021-05-11 ASSESSMENT — PAIN SCALES - GENERAL
PAINLEVEL_OUTOF10: 0
PAINLEVEL_OUTOF10: 0

## 2021-05-11 NOTE — PROGRESS NOTES
Nephrology Progress Note        [address], [address] [address]  Phone: [phone]  Office Hours: 8:30AM - 4:30PM  Monday - Friday 5/11/2021 7:43 AM  Subjective:   Admit Date: 5/8/2021  PCP: Jamari Merchant MD  Interval History: resting on room air      Diet: DIET CARB CONTROL;      Data:   Scheduled Meds:   lisinopril  5 mg Oral Daily    famotidine (PEPCID) injection  20 mg Intravenous Once    dicyclomine  20 mg Intramuscular Once    sodium chloride flush  10 mL Intravenous 2 times per day    aspirin  81 mg Oral Daily    atorvastatin  80 mg Oral Daily    calcium elemental  500 mg Oral BID    [START ON 5/12/2021] cloNIDine  1 patch Transdermal Once per day on Wed    dilTIAZem  120 mg Oral Daily    [Held by provider] doxazosin  8 mg Oral Daily    ferrous sulfate  325 mg Oral Daily with breakfast    folic acid  477 mcg Oral Daily    furosemide  20 mg Oral Daily    metoprolol tartrate  50 mg Oral BID    oxybutynin  5 mg Oral BID    ascorbic acid  250 mg Oral Daily    insulin lispro  0-6 Units Subcutaneous TID WC    insulin lispro  0-3 Units Subcutaneous Nightly    propafenone  150 mg Oral Q8H    rivaroxaban  15 mg Oral Dinner     Continuous Infusions:   sodium chloride      dextrose       PRN Meds:sodium chloride flush, sodium chloride, polyethylene glycol, acetaminophen **OR** acetaminophen, colchicine, glucose, dextrose, glucagon (rDNA), dextrose  No intake/output data recorded. No intake/output data recorded.   No intake or output data in the 24 hours ending 05/11/21 0743    CBC:   Recent Labs     05/08/21  1740 05/09/21  0740   WBC 7.6 5.9   HGB 10.0* 10.1*    257       BMP:    Recent Labs     05/08/21  1740 05/09/21  1000 05/10/21  0656    141 143   K 5.0 4.4 4.3    107 110   CO2 25 21 25   BUN 44* 42* 35*   CREATININE 2.6* 2.4* 2.2*   GLUCOSE 101* 144* 104*     Hepatic:   Recent Labs     05/08/21  1740   AST 19   ALT 29 BILITOT 0.2   ALKPHOS 72     Troponin: No results for input(s): TROPONINI in the last 72 hours. BNP: No results for input(s): BNP in the last 72 hours. Lipids: No results for input(s): CHOL, HDL in the last 72 hours.     Invalid input(s): LDLCALCU  ABGs: No results found for: PHART, PO2ART, MNS4URV  INR:   Recent Labs     05/08/21  1740   INR 1.24       Objective:   Vitals: BP (!) 143/64   Pulse 56   Temp 97.9 °F (36.6 °C) (Oral)   Resp 15   Ht 5' 8\" (1.727 m)   Wt 197 lb 8.5 oz (89.6 kg)   LMP  (LMP Unknown)   SpO2 95%   BMI 30.03 kg/m²   General appearance:  in no acute distress  HEENT: normocephalic, atraumatic,   Neck: supple, trachea midline  Lungs:  breathing comfortably on room air  Heart[de-identified] regular rate and rhythm,   Extremities: extremities atraumatic, no cyanosis or edema      Assessment and Plan:       Patient Active Problem List     Diagnosis Date Noted    Syncope, near 05/08/2021    CKD (chronic kidney disease), stage IV ([address] 03/10/2021    Other acute kidney failure ([address] 12/01/2020    Acquired cyst of kidney 12/01/2020    Malignant neoplasm of upper-outer quadrant of right female breast ([address] 11/17/2020    Intestinal malabsorption 11/17/2020    Obesity (BMI 30-39.9) 06/20/2019    Excessive daytime sleepiness 06/20/2019    Ex-cigarette smoker 06/20/2019    Acute blood loss anemia 11/08/2018    Chest pain 09/06/2018    Carpal tunnel syndrome on left 08/14/2018    Trigger finger, left ring finger 08/14/2018    Dizzy spells 01/30/2018    Chronic fatigue 08/28/2017    Type 2 diabetes mellitus with nephropathy ([address] 08/10/2016    Essential hypertension 12/17/2015    Abnormal EKG 12/17/2015    Mixed hyperlipidemia 11/13/2015    Iron deficiency anemia 11/13/2015    Gastroesophageal reflux disease without esophagitis 11/13/2015    Edema of both legs 06/15/2015    Breast cancer ([address] 04/22/2015    Urge incontinence      Bilateral leg edema 10/21/2013    Colon polyps 08/22/2013    Osteopenia      Osteoarthritis      ADOLFO (obstructive sleep apnea)      Menopause      Paroxysmal atrial fibrillation (HCC)        JESSI  CKD3  HTN  SYNcope and fall  Afib hx     PLAN;  -cr down to 2.2  -resume lisinopril and increase dse to 5mg daily  -Hold doxazosin since it can cause orthostasis  -Will follow     Thank you                     Electronically signed by Ashele Lehman DO on 5/11/2021 at 7:43 AM    [address],  [facility]  [place], [place] [address]  PHONE: [phone]  FAX: [phone]

## 2021-05-11 NOTE — PROGRESS NOTES
Physician Progress Note      PATIENT:               Mariusz Cleaning  CSN #:                  [id number]  :                       1945  ADMIT DATE:       2021 4:45 PM  [address] [place] DATE:  RESPONDING  PROVIDER #:        Maribell Palumbo MD          QUERY TEXT:    Hospitalists,    Patient admitted with syncope and noted documentation of CKD 3 and CKD 4. If   possible, please document in progress notes and discharge summary if you are   evaluating and /or treating any of the following: The medical record reflects the following:  Risk Factors: HTN, DM  Clinical Indicators: documentation of both CKD 3 and CKD 4, GFR 22-26  Treatment: labs, some BP meds held, Nephro consult    Thank you,  Tyler Muniz RN CDS  [phone]  Options provided:  -- CKD 3  -- CKD 4  -- Other - I will add my own diagnosis  -- Disagree - Not applicable / Not valid  -- Disagree - Clinically unable to determine / Unknown  -- Refer to Clinical Documentation Reviewer    PROVIDER RESPONSE TEXT:    After study, this patient has CKD 3. Query created by: Bunny Bardales on 2021 12:36 PM      QUERY TEXT:    Hospitalists,    Patient admitted with syncope and noted to have bradycardia and JESSI. If   possible, please document in progress notes and discharge summary if you are   evaluating and/or treating any of the following: The medical record reflects the following:  Risk Factors: nonspec  Clinical Indicators: bradycardia documented, HR 52-82, JESSI  Treatment: labs, imaging, IVF, BP med adjustment    Thank you,  Tyler Muniz RN CDS  Options provided:  -- Syncope due to bradycardia  -- Syncope due to JESSI  -- Syncope due to ##please specify, please specify.   -- Other - I will add my own diagnosis  -- Disagree - Not applicable / Not valid  -- Disagree - Clinically unable to determine / Unknown  -- Refer to Clinical Documentation Reviewer    PROVIDER RESPONSE TEXT:    The syncope is due to Hypotension sec to medication    Query created by: Fazal Bagley, Magda on 5/11/2021 12:44 PM      Electronically signed by:  Ana Lopez MD 5/11/2021 12:49 PM

## 2021-05-11 NOTE — PLAN OF CARE
Problem: Falls - Risk of:  Goal: Will remain free from falls  Description: Will remain free from falls  5/10/2021 2320 by Ulices Francois RN  Outcome: Ongoing  5/10/2021 1202 by Renata Bess RN  Outcome: Ongoing  Goal: Absence of physical injury  Description: Absence of physical injury  5/10/2021 2320 by Ulices Francois RN  Outcome: Ongoing  5/10/2021 1202 by Renata Bess RN  Outcome: Ongoing     Problem: Cardiac:  Goal: Ability to maintain vital signs within normal range will improve  Description: Ability to maintain vital signs within normal range will improve  5/10/2021 2320 by Ulices Francois RN  Outcome: Ongoing  5/10/2021 1202 by Renata Bess RN  Outcome: Ongoing  Goal: Cardiovascular alteration will improve  Description: Cardiovascular alteration will improve  5/10/2021 2320 by Ulices Francois RN  Outcome: Ongoing  5/10/2021 1202 by Renata Bess RN  Outcome: Ongoing     Problem: Health Behavior:  Goal: Will modify at least one risk factor affecting health status  Description: Will modify at least one risk factor affecting health status  5/10/2021 2320 by Ulices Francois RN  Outcome: Ongoing  5/10/2021 1202 by Renata Bess RN  Outcome: Ongoing  Goal: Identification of resources available to assist in meeting health care needs will improve  Description: Identification of resources available to assist in meeting health care needs will improve  5/10/2021 2320 by Ulices Francois RN  Outcome: Ongoing  5/10/2021 1202 by Renata Bess RN  Outcome: Ongoing     Problem: Physical Regulation:  Goal: Complications related to the disease process, condition or treatment will be avoided or minimized  Description: Complications related to the disease process, condition or treatment will be avoided or minimized  5/10/2021 2320 by Uilces Francois RN  Outcome: Ongoing  5/10/2021 1202 by Renata Bess RN  Outcome: Ongoing

## 2021-05-11 NOTE — DISCHARGE SUMMARY
Discharge Summary    Name:  Keila Mccray /Age/Sex: 1945  (76 y.o. female)   MRN & CSN:  [id number] & [id number] Admission Date/Time: 2021  4:45 PM   Attending:  Kiana Vilchis MD Discharging Physician: Marcelo Grace MD     HPI:     Keila Mccray is a 76 y.o.  female, with past medical history significant for A. fib, V. tach, hypertension, hyperlipidemia, CKD, diabetes mellitus, who presented to the ED from home due syncope and collapse. Patient states she was at the kitchen cleaning vegetables, started having sudden onset of right-sided neck pain radiating to the right abdomen. Pain was constant. So she went to the great room and sat down, which did not relieve the pain. She got back up again to go to the kitchen and woke up on the floor. Patient states she was down approximately less than 10 minutes. Patient admits the pain was gone when she woke up. Denies similar pain in the past.  Patient denies chest pain, chest pressure, shortness of breath, injury, headache. Patient admit having palpitation couple of weeks ago, unable to provide me the details. Patient  on multiple blood pressure medications. She had history of A. fib on Xarelto. Patient had all her morning medications including Cardura and Cartia. Denies any weight gain, takes Lasix every other day Denies any fever cough, chills, dysuria, hematuria, lower extremity weakness. Denies any aggravating or alleviating factors. Hospital Course: Keila Mccray is a 76 y.o.  female  who presents with Syncope and collapse    > Syncope: most likely sec to hypotension sec to medication  -Rule out cardiogenic etiology  -EKG sinus bradycardia HR. Orthostatic vitals negative  -Discontinued Doxazosin  -Cardiology on board; for stress test normal, Echo with no wall motion abnormality  - OP Cardiac monitor recommended  - pt sx and clinically improved and was discharged home in a stable condition.       > JESSI on CKD 3  -Diuresis on hold  -Avoid nephrotoxic medications  -Nephrology on board, medications adjusted. - Cr improved , OP FU recommended    > Lung Micro atelectasis on CT  - OP FU imaging with PCP      >Other Chronic medical conditions; medication resumed unless contraindicated  DM Type 2   Paroxysmal A fib  ADOLFO  Essential HTN  Hyperlipidemia  Acquired cyst of kidney  Hx breast cancer, invasive ductal carcinoma/right lumpectomy number 6/20/2012, s/p radiation therapy    The patient expressed appropriate understanding of and agreement with the discharge recommendations, medications, and plan. Consults this admission:  IP CONSULT TO HOSPITALIST  IP CONSULT TO CARDIOLOGY  IP CONSULT TO NEPHROLOGY    Discharge Instruction:   Follow up appointments:     See AVS    Disposition: Discharged to:   []Home  Condition on discharge: Stable    Discharge Medications:      Deniz Hilario   Home Medication Instructions JST:[id number]    Printed on:05/11/21 1043   Medication Information                      Ascorbic Acid (VITAMIN C) 250 MG tablet  Take by mouth daily             aspirin 81 MG EC tablet  Take 1 tablet by mouth daily             atorvastatin (LIPITOR) 80 MG tablet  take 1 tablet by mouth once daily             Blood Glucose Monitoring Suppl AV  1 kit by Does not apply route daily             Calcium Carbonate (CALTRATE 600 PO)  Take  by mouth 2 times daily.              cloNIDine (CATAPRES-TTS-1) 0.1 MG/24HR PTWK  Place 1 patch onto the skin every 7 days             colchicine (COLCRYS) 0.6 MG tablet  Take 1 tablet by mouth 2 times daily as needed for Pain (gout)             dilTIAZem (CARTIA XT) 120 MG extended release capsule  Take 1 capsule by mouth daily             ferrous sulfate (IRON 325) 325 (65 Fe) MG tablet  Take 1 tablet by mouth daily (with breakfast)             FOLIC ACID PO  Take 248 mg by mouth daily             furosemide (LASIX) 20 MG tablet  take 1 tablet by mouth every other day             Glucose Blood (BLOOD GLUCOSE TEST STRIPS) STRP  1 each by In Vitro route 2 times daily             Lancets MISC  1 each by In Vitro route 2 times daily             lisinopril (PRINIVIL;ZESTRIL) 2.5 MG tablet  Take 2 tablets by mouth daily             metoprolol tartrate (LOPRESSOR) 50 MG tablet  take 1 tablet by mouth twice a day             Multiple Vitamins-Minerals (MULTIVITAMIN ADULT PO)  Take by mouth             Omega-3 Fatty Acids (FISH OIL PO)  Take 1,200 mg by mouth             oxybutynin (DITROPAN) 5 MG tablet  take 1 tablet by mouth twice a day             propafenone (RYTHMOL) 150 MG tablet  Take 1 tablet by mouth every 8 hours             rivaroxaban (XARELTO) 15 MG TABS tablet  Take 1 tablet by mouth every 24 hours                 Objective Findings at Discharge:   /60   Pulse 80   Temp 98.4 °F (36.9 °C) (Oral)   Resp 19   Ht 5' 8\" (1.727 m)   Wt 197 lb 8.5 oz (89.6 kg)   LMP  (LMP Unknown)   SpO2 95%   BMI 30.03 kg/m²            PHYSICAL EXAM     GEN Awake female, cooperative, no apparent distress. RESP Clear to auscultation, no wheezes, rales or rhonchi. Symmetric chest movement . CARDIO/VASC S1/S2 auscultated. Regular rate. GI Abdomen is soft without significant tenderness, Bowel sounds are normoactive. MSK No gross joint deformities. Spontaneous movement of all extremities  SKIN Normal coloration, warm, dry. NEURO normal speech, no lateralizing weakness. PSYCH Awake, alert, oriented x 4. Affect appropriate.     BMP/CBC  Recent Labs     05/08/21  1740 05/09/21  0740 05/09/21  1000 05/10/21  0656 05/11/21  0629     --  141 143 139   K 5.0  --  4.4 4.3 4.2     --  107 110 105   CO2 25  --  21 25 28   BUN 44*  --  42* 35* 30*   CREATININE 2.6*  --  2.4* 2.2* 2.2*   WBC 7.6 5.9  --   --   --    HCT 32.7* 33.5*  --   --   --     257  --   --   --        IMAGING:  Echocardiogram Complete 2d With Doppler With Color    Result Date: 5/10/2021  Transthoracic Echocardiography Report (TTE)  Demographics   Patient Name       Jodie Maciel     Date of Study       05/10/2021   Date of Birth      1945        Gender              Female   Age                76 year(s)        Race                Black   Patient Number     [id number]        Room Number         5847   Visit Number       [id number]   Corporate ID       [id number]   Accession Number   [id number]        [address] [facility],                                                           [facility]   Ordering Physician Luther CUEVAS Interpreting        Leighann Aguilar                                       Physician           MD  Procedure Type of Study   TTE procedure:ECHOCARDIOGRAM COMPLETE 2D W DOPPLER W COLOR. Procedure Date Date: 05/10/2021 Start: 01:51 PM Study Location: Portable Technical Quality: Fair visualization Indications:Syncope. Patient Status: Routine Height: 68 inches Weight: 195 pounds BSA: 2.02 m2 BMI: 29.65 kg/m2 HR: 58 bpm BP: 155/64 mmHg  Conclusions   Summary  Left ventricular systolic function is normal.  Ejection fraction is visually estimated at 50-55%. Sclerotic, but non-stenotic aortic valve. No evidence of any pericardial effusion. Signature   ------------------------------------------------------------------  Electronically signed by Leighann Aguilar MD (Interpreting  physician) on 05/10/2021 at 02:51 PM  ------------------------------------------------------------------   Findings   Left Ventricle  Left ventricular systolic function is normal.  Ejection fraction is visually estimated at 50-55%. Mild left ventricular hypertrophy. Indeterminate diastolic function; E/A flow reversal is noted. Left Atrium  Essentially normal left atrium. Right Atrium  Essentially normal right atrium. Right Ventricle  Essentially normal right ventricle. Aortic Valve  Sclerotic, but non-stenotic aortic valve. Mitral Valve  Mild mitral regurgitation.    Tricuspid Valve  Trace tricuspid regurgitation; RVSP: 19 mmHg. Pulmonic Valve  Mild pulmonic regurgitation present. Pericardial Effusion  No evidence of any pericardial effusion. Pleural Effusion  No evidence of pleural effusion.   M-Mode/2D Measurements & Calculations   LV Diastolic Dimension:  LV Systolic Dimension:  LA Dimension: 3.3 cmAO Root  4.33 cm                  2.89 cm                 Dimension: 3.3 cmLA Area:  LV FS:33.3 %             LV Volume Diastolic: 96 17.7 cm2  LV PW Diastolic: 8.76 cm ml  LV PW Systolic: 2.88 cm  LV Volume Systolic: 46  Septum Diastolic: 2.99   ml  cm                       LV EDV/LV EDV Index: 96 RV Diastolic Dimension:  Septum Systolic: 3.87 cm QX/38 D0JA ESV/LV ESV   3.04 cm  CO: 4.28 l/min           Index: 46 ml/23 m2  CI: 2.12 l/m*m2          EF Calculated (A4C):    LA/Aorta: 1                           52.1 %  LV Area Diastolic: 39.2  EF Calculated (2D): 59  LA volume/Index: 49 ml  cm2                      %                       /62I7  LV Area Systolic: 19.6  cm2                      LV Length: 8 cm                            LVOT: 2 cm  Doppler Measurements & Calculations   MV Peak E-Wave: 81.6 AV Peak Velocity: 141 cm/s    LVOT Peak Velocity: 98.2  cm/s                 AV Peak Gradient: 7.95 mmHg   cm/s  MV Peak A-Wave: 89.1 AV Mean Velocity: 105 cm/s    LVOT Mean Velocity: 79.6  cm/s                 AV Mean Gradient: 5 mmHg      cm/s  MV E/A Ratio: 0.92   AV VTI: 30.4 cm               LVOT Peak Gradient: 4  MV Peak Gradient:    AV Area (Continuity):2.43 cm2 mmHgLVOT Mean Gradient: 3  2.66 mmHg                                          mmHg                       LVOT VTI: 23.5 cm             Estimated RVSP: 19 mmHg  MV P1/2t: 60 msec                                  Estimated RAP:3 mmHg  MVA by PHT:3.67 cm2  Estimated PASP: 19.48 mmHg   MV E' Septal                                       TR Velocity:203 cm/s  Velocity: 3.84 cm/s                                TR Gradient:16.48 mmHg  MV E' Lateral Velocity: 4.93 cm/s  MV E/E' septal:  21.25  MV E/E' lateral:  16.55      Ct Head Wo Contrast    Result Date: 5/8/2021  EXAMINATION: CTA OF THE HEAD AND NECK WITH CONTRAST; CT OF THE HEAD WITHOUT CONTRAST 5/8/2021 7:02 pm; 5/8/2021 7:03 pm: TECHNIQUE: CTA of the head and neck was performed with the administration of intravenous contrast. Multiplanar reformatted images are provided for review. MIP images are provided for review. Stenosis of the internal carotid arteries measured using NASCET criteria. Dose modulation, iterative reconstruction, and/or weight based adjustment of the mA/kV was utilized to reduce the radiation dose to as low as reasonably achievable.; CT of the head was performed without the administration of intravenous contrast. Dose modulation, iterative reconstruction, and/or weight based adjustment of the mA/kV was utilized to reduce the radiation dose to as low as reasonably achievable. Noncontrast CT of the head with reconstructed 2-D images are also provided for review. COMPARISON: CT head done September 30, 2012. HISTORY: ORDERING SYSTEM PROVIDED HISTORY: headache/syncope TECHNOLOGIST PROVIDED HISTORY: Reason for exam:->headache/syncope Decision Support Exception - unselect if not a suspected or confirmed emergency medical condition->Emergency Medical Condition (MA) Reason for Exam: ha/nausea/vomiting/vladimir d-dimer; ORDERING SYSTEM PROVIDED HISTORY: headache/syncope TECHNOLOGIST PROVIDED HISTORY: Has a \"code stroke\" or \"stroke alert\" been called? ->No Reason for exam:->headache/syncope Decision Support Exception - unselect if not a suspected or confirmed emergency medical condition->Emergency Medical Condition (MA) Reason for Exam: syncope/cp/ FINDINGS: CT HEAD: BRAIN/VENTRICLES:  No acute intracranial hemorrhage or extraaxial fluid collection. Grey-white differentiation is maintained. No evidence of mass, mass effect or midline shift. No evidence of hydrocephalus.  ORBITS: The visualized portion CERVICAL SPINE WITHOUT CONTRAST 5/8/2021 7:05 pm TECHNIQUE: CT of the cervical spine was performed without the administration of intravenous contrast. Multiplanar reformatted images are provided for review. Dose modulation, iterative reconstruction, and/or weight based adjustment of the mA/kV was utilized to reduce the radiation dose to as low as reasonably achievable. COMPARISON: None. HISTORY: ORDERING SYSTEM PROVIDED HISTORY: syncope/neck pain TECHNOLOGIST PROVIDED HISTORY: Reason for exam:->syncope/neck pain Decision Support Exception - unselect if not a suspected or confirmed emergency medical condition->Emergency Medical Condition (MA) Reason for Exam: ha/sob FINDINGS: BONES/ALIGNMENT: There is no acute fracture or traumatic malalignment. DEGENERATIVE CHANGES: No significant degenerative changes. SOFT TISSUES: There is no prevertebral soft tissue swelling. No acute abnormality of the cervical spine. Xr Chest Portable    Result Date: 5/8/2021  EXAMINATION: ONE XRAY VIEW OF THE CHEST 5/8/2021 2:01 pm COMPARISON: 11/07/2018 HISTORY: ORDERING SYSTEM PROVIDED HISTORY: syncope/collapse/CP/dyspnea TECHNOLOGIST PROVIDED HISTORY: Reason for exam:->syncope/collapse/CP/dyspnea Reason for Exam: syncope/collapse/CP/dyspnea Acuity: Acute Type of Exam: Initial FINDINGS: The cardial pericardial silhouette is enlarged but stable. Lungs are clear. No pneumothorax is found. No free air. No acute bony abnormality. No acute abnormality detected. Cta Chest Abdomen Pelvis W Contrast    Result Date: 5/8/2021  EXAMINATION: CTA OF THE CHEST, ABDOMEN AND PELVIS WITH CONTRAST 5/8/2021 4:05 pm: TECHNIQUE: CTA of the chest, abdomen and pelvis was performed after the administration of intravenous contrast.  Multiplanar reformatted images are provided for review. MIP images are provided for review.  Dose modulation, iterative reconstruction, and/or weight based adjustment of the mA/kV was utilized to reduce the radiation dose to as low as reasonably achievable. COMPARISON: None. HISTORY: ORDERING SYSTEM PROVIDED HISTORY: chest pain/syncope/abdominal pain TECHNOLOGIST PROVIDED HISTORY: Reason for exam:->chest pain/syncope/abdominal pain Decision Support Exception - unselect if not a suspected or confirmed emergency medical condition->Emergency Medical Condition (MA) Reason for Exam: ams FINDINGS: CTA CHEST: Thoracic aorta: There is a suboptimal contrast bolus within the thoracic aorta. That said, there is no evidence of dissection. Mediastinum: Visualized great vessels are unremarkable in appearance. Thyroid is unremarkable in appearance. No mediastinal or hilar lymphadenopathy is identified. Esophagus is unremarkable. Cardiac chambers are enlarged. There is a small pericardial effusion. Limited imaging of the pulmonary arteries is unremarkable. Lungs/pleura: There is a scant amount of pleural fluid seen bilaterally. Mild adjacent passive atelectasis is seen. There is mild patchy ground-glass opacity noted within the lungs bilaterally, greater within the upper lungs. No pneumothorax. Airways are patent. Soft tissue/bones: Left shoulder effusion is found. No acute bony abnormalities are identified. CTA all caps that: Abdominal aorta/Branches: The abdominal aorta is normal in caliber, without evidence of dissection. The celiac artery and its branches are patent. The superior mesenteric artery is patent. Inferior mesenteric artery is diminutive but patent. Renal arteries are patent bilaterally. Iliac systems are patent bilaterally. Organs: No hypervascular abnormalities are detected within the liver. The gallbladder is unremarkable. Portal vein is patent. Spleen is unremarkable in appearance. 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 Right adrenal gland is unremarkable in appearance. Pancreas unremarkable. No acute or suspicious renal abnormality is identified. No hydronephrosis or hydroureter. GI/Bowel: Mild diverticulosis of the large bowel is present without CT evidence of diverticulitis. The large bowel is otherwise unremarkable in appearance. Appendix is normal.  Stomach, duodenal sweep, and the remainder of the small bowel are unremarkable in appearance. No evidence of bowel obstruction. Peritoneum/Retroperitoneum: No retroperitoneal lymphadenopathy. Bones/Soft Tissues: No osteolytic or osteoblastic bone lesions. No acute bony abnormalities are detected. The extra-abdominal and extra pelvic soft tissues are unremarkable. No evidence of thoracic or abdominal aortic dissection or aneurysm. Mild patchy ground-glass opacity within the lungs bilaterally, most likely microatelectasis, but correlate with any clinical evidence of pulmonary edema or inflammatory/infectious pneumonitis. Mild diverticulosis of the large bowel is present without CT evidence of diverticulitis. Cta Head Neck W Contrast    Result Date: 5/8/2021  EXAMINATION: CTA OF THE HEAD AND NECK WITH CONTRAST; CT OF THE HEAD WITHOUT CONTRAST 5/8/2021 7:02 pm; 5/8/2021 7:03 pm: TECHNIQUE: CTA of the head and neck was performed with the administration of intravenous contrast. Multiplanar reformatted images are provided for review. MIP images are provided for review. Stenosis of the internal carotid arteries measured using NASCET criteria. Dose modulation, iterative reconstruction, and/or weight based adjustment of the mA/kV was utilized to reduce the radiation dose to as low as reasonably achievable.; CT of the head was performed without the administration of intravenous contrast. Dose modulation, iterative reconstruction, and/or weight based adjustment of the mA/kV was utilized to reduce the radiation dose to as low as reasonably achievable. Noncontrast CT of the head with reconstructed 2-D images are also provided for review.  COMPARISON: CT pharynx are unremarkable. No acute abnormality of the salivary and thyroid glands. BONES: No acute osseous abnormality. Multilevel degenerative changes in the visualized spine. CTA HEAD: ANTERIOR CIRCULATION: No significant stenosis of the intracranial internal carotid, anterior cerebral, or middle cerebral arteries. No aneurysm. Atherosclerosis of the bilateral intracranial internal carotid arteries without hemodynamically significant stenosis. POSTERIOR CIRCULATION: No significant stenosis of the vertebral, basilar, or posterior cerebral arteries. No aneurysm. OTHER: No dural venous sinus thrombosis on this non-dedicated study     1. No acute intracranial abnormality. No acute intracranial hemorrhage or mass effect. 2. No focal hemodynamically significant stenosis, occlusion or aneurysm in the large arteries of the head and neck. Calderon Cuevas Myocardial Spect Rest Exercise Or Rx    Result Date: 5/10/2021  Cardiac Perfusion Imaging   Demographics   Patient Name      Marissa HAMILTON      Date of study        05/10/2021   Date of Birth     1945         Gender               Female   Age               76 year(s)         Race                 Black   Patient Number    [id number]         Room Number          5752   Visit Number      [id number]          Height               68 inches   Corporate ID      [id number]           Weight               195 pounds   Accession Number  [id number]                                        NM Technologist      Desi Gee                                                            [facility]   Ordering          Allison Matamoros  Physician         MD                 Cardiologist         MD   Conclusions   Summary  Normal EF 61 % with normal ventricular contractility. No infarct or ischemia  noted. Normal stress myocardial perfusion. This is a normal study. ECG portion of stress test is negative for ischemia by diagnostic criteria.    Signatures ------------------------------------------------------------------  Electronically signed by Zunilda Will MD (Interpreting  cardiologist) on 05/10/2021 at 13:42  ------------------------------------------------------------------  Procedure Procedure Type:   Nuclear Stress Test:Pharmacological, Myocardial Perfusion Imaging with  Pharm, NM MYOCARDIAL SPECT REST EXERCISE OR RX  Indications: Chest pain. Risk Factors   The patient risk factors include:Current - Frequency unknown tobacco use,  hypercholesterolemia, hypertension and diabetes mellitus. Stress Protocols   Resting ECG  Sinus bradycardia. Resting HR:58 bpm  Resting BP:140/62 mmHg  Stress Protocol:Pharmacologic - Lexiscan  Peak HR:83 bpm                               HR/BP product:25244  Peak BP:140/62 mmHg  Predicted HR: 145 bpm  % of predicted HR: 57   Exercise duration: 01:01 min  Reason for termination:Completed   ECG Findings  Sinus bradycardia. Arrhythmias  No rhythm abnormality. Symptoms  No symptoms with Lexiscan infusion. Stress Interpretation  ECG portion of stress test is negative for ischemia by diagnostic criteria. Procedure Medications   - Lexiscan I.V. bolus (over 15sec.) 0.4 mg admininstered @ 05/10/2021 11:30. Imaging Protocols   Rest                             Stress   Isotope:Sestamibi 99mTc          Isotope: Sestamibi 99mTc  Isotope dose:10.8 mCi            Isotope dose:31.4 mCi  Administration route: I.V. Administration route: I.V. Injection Date:05/10/2021 08:05  Injection Date:05/10/2021 11:30  Scan Date:05/10/2021 09:05       Scan Date:05/10/2021 12:30   Technique:        SPECT          Technique:        Gated                                                     SPECT   Procedure Description   Upon patient arrival, the patient is identified using two identifiers and  the physician order is verified.  An IV is established and 8-11mCi of 99mTc  Sestamibi is intravenously injected and followed with 10mL 0.9% Normal Saline flush. A circulation period of 45 minutes occurs prior to resting  SPECT imaging. After imaging is complete the patient is escorted to the  stress lab. The patient is connected to the ECG and blood pressure is  measured. The patient is assisted onto the treadmill and the appropriate  exercise protocol (Cayden or Modified Cayden) is selected and the patient  begins to exercise on the treadmill. Once the patient has reached 85% of the  target heart rate then the Nuclear Technologist intravenously injects  22-33mCi of 99mTc Sestamibi and 10mL 0.9% Normal Saline flush. After  completion, recovery, and removal of the IV, the patient rests during the  second circulation period of 45 minutes. Final stress SPECT gated imaging is  performed. The patient may return home or to their room after stress  imaging. The images are processed and final charting is completed and sent  to the appropriate cardiologist for interpretation and reporting. Perfusion Interpretation   Normal EF 61 % with normal ventricular contractility. No infarct or ischemia  noted. Imaging Results    Summed scores     - Summed stress score: 9     - Summed rest score: 1     - Summed difference score:    8   Rest ejection  Ejection fraction:61 %  EDV :102 ml  ESV :40 ml  Stroke volume :62 ml  Medical History   Accession#:  [id number]  Admission Data Admission date: 05/08/2021 Admission Time: 16:45 Hospital Status: Inpatient.       Discharge Time of 37 minutes    Electronically signed by Ap Hoang MD on 5/11/2021 at 10:43 AM

## 2021-05-11 NOTE — CARE COORDINATION
.CM met with pt for d/c planning. Introduced self and updated white board. Pt is independent with ADL's. She states that her son lives with her. Pt drives, has a PCP, has insurance, and is able to afford her medication. Pt has a walker, cane, bi-pap, and a shower seat. [facility] offered and pt declined. Pt denies any d/c needs at this time. D/c plan is home w/son, no needs. Notify CM if any d/c needs arise.   TE

## 2021-05-11 NOTE — PROGRESS NOTES
respiratory distress, No wheezing, No chest tenderness. Cardiovascular:  Normal heart rate, Normal rhythm, no murmurs appreciated, No rubs appreciated, No gallops appreciated, JVP not elevated  Abdomen/GI:  Soft, No tenderness  Musculoskeletal:  Intact distal pulses, no edema to lower legs,  No tenderness, No cyanosis, No clubbing. Integument:  Warm, Dry  Lymphatic:  No lymphadenopathy noted.    Neurologic:  Alert & oriented  Psychiatric:  Affect and Mood :pleasant     Medications:    lisinopril  5 mg Oral Daily    famotidine (PEPCID) injection  20 mg Intravenous Once    dicyclomine  20 mg Intramuscular Once    sodium chloride flush  10 mL Intravenous 2 times per day    aspirin  81 mg Oral Daily    atorvastatin  80 mg Oral Daily    calcium elemental  500 mg Oral BID    [START ON 5/12/2021] cloNIDine  1 patch Transdermal Once per day on Wed    dilTIAZem  120 mg Oral Daily    [Held by provider] doxazosin  8 mg Oral Daily    ferrous sulfate  325 mg Oral Daily with breakfast    folic acid  541 mcg Oral Daily    furosemide  20 mg Oral Daily    metoprolol tartrate  50 mg Oral BID    oxybutynin  5 mg Oral BID    ascorbic acid  250 mg Oral Daily    insulin lispro  0-6 Units Subcutaneous TID WC    insulin lispro  0-3 Units Subcutaneous Nightly    propafenone  150 mg Oral Q8H    rivaroxaban  15 mg Oral Dinner      sodium chloride      dextrose       sodium chloride flush, sodium chloride, polyethylene glycol, acetaminophen **OR** acetaminophen, colchicine, glucose, dextrose, glucagon (rDNA), dextrose    Lab Data:  CBC:   Recent Labs     05/08/21  1740 05/09/21  0740   WBC 7.6 5.9   HGB 10.0* 10.1*   HCT 32.7* 33.5*   MCV 83.8 84.8    257     BMP:   Recent Labs     05/09/21  1000 05/10/21  0656 05/11/21  0629    143 139   K 4.4 4.3 4.2    110 105   CO2 21 25 28   BUN 42* 35* 30*   CREATININE 2.4* 2.2* 2.2*     PT/INR:   Recent Labs     05/08/21  1740   PROTIME 15.0*   INR 1.24 BNP:    Recent Labs     05/08/21  1740 05/09/21 2026   PROBNP 1,223* 998.3*     TROPONIN:   Recent Labs     05/08/21  1740 05/08/21  2304   TROPONINT 0.028* 0.021*        NM stress 05/2021   Normal EF 61 % with normal ventricular contractility. No infarct or ischemia    noted.    Normal stress myocardial perfusion.    This is a normal study.    ECG portion of stress test is negative for ischemia by diagnostic criteria.               Impression:  Principal Problem:    Syncope and collapse  Active Problems:    Paroxysmal atrial fibrillation (HCC)    Essential hypertension    JESSI (acute kidney injury) (HCC)    Stage 4 chronic kidney disease (HCC)  Resolved Problems:    * No resolved hospital problems. *       All labs, medications and tests reviewed by myself, continue all other medications of all above medical condition listed as is except for changes mentioned above. Thank you   Please call with questions. Electronically signed by FAITH Mckeon CNP on 5/11/2021 at 12:54 PM   CARDIOLOGY ATTENDING ADDENDUM    I have seen ,spoken to  and examined this patient personally, independently of the nurse practitioner. I have reviewed the hospital care given to date and reviewed all pertinent labs and imaging. The plan was developed mutually at the time of the visit with the patient,  NP   and myself. I have spoken with patient, nursing staff and provided written and verbal instructions . The above note has been reviewed and I agree with the assessment, diagnosis, and treatment plan with changes made by me as follows     HPI:  I have reviewed the above HPI  And agree with above   Lina Hirsch is a 76 y. o.year old who and presents with had concerns including Loss of Consciousness and Shortness of Breath.   Chief Complaint   Patient presents with    Loss of Consciousness    Shortness of Breath     Please review addendum/changes made to note above   Interval history: She is feeling much better    Physical Exam:  General:  Awake, alert, NAD  Head:normal  Eye:normal  Neck:  No JVD   Chest:  Clear to auscultation, respiration easy  Cardiovascular:  S1 and S2 audible, No added heart sounds, No signs of ankle edema, or volume overload, No evidence of JVD, No crackles  Abdomen:   nontender  Extremities:  no edema  Pulses; palpable  Neuro: grossly normal      MEDICAL DECISION MAKING;    I agree with the above plan, which was planned by myself and discussed with NP.   Plan outpatient 30-day monitor  Suspect orthostatic episode for syncope  Stress shows no ischemia  follow up with primary cardiologist      Cordell Lane MD [address] [facility] 05/11/21

## 2021-05-12 ENCOUNTER — CARE COORDINATION (OUTPATIENT)
Dept: CASE MANAGEMENT | Age: 76
End: 2021-05-12

## 2021-05-12 ENCOUNTER — TELEPHONE (OUTPATIENT)
Dept: INTERNAL MEDICINE CLINIC | Age: 76
End: 2021-05-12

## 2021-05-12 NOTE — TELEPHONE ENCOUNTER
[place] [facility] Transitions Initial Follow Up Call    Outreach made within 2 business days of discharge: Yes    Patient: Jo-Ann Ricci Patient : 1945   MRN: [id number]  Reason for Admission: There are no discharge diagnoses documented for the most recent discharge. Discharge Date: 21       Spoke with: Sarah Contreras    Discharge department/facility: [facility]    TCM Interactive Patient Contact:  Was patient able to fill all prescriptions: Yes  Was patient instructed to bring all medications to the follow-up visit: Yes  Is patient taking all medications as directed in the discharge summary?  Yes  Does patient understand their discharge instructions: Yes  Does patient have questions or concerns that need addressed prior to 7-14 day follow up office visit: no    Scheduled appointment with PCP within 7-14 days    Follow Up  Future Appointments   Date Time Provider Yash Roper   2021 11:15 AM MD Julia Mendosa E S [facility]   2021 10:00 AM Padmini Mar APRN - CNP [facility]   2021  8:30 AM MD Julia Mendosa E [facility]   2021 11:15 AM Samantha Crews MD [facility]   10/18/2021  1:10 PM Ashvin Durand MD [facility]   2022  9:00 AM SCHEDULE, [address] [facility] MED ONC LAB [address] [facility] MED ONC [place]   2022  9:00  S MD Julia Short [facility]   3/7/2022 11:00 AM MD Julia Beach PULM [facility]       Erik Taveras

## 2021-05-13 ENCOUNTER — CARE COORDINATION (OUTPATIENT)
Dept: CARE COORDINATION | Age: 76
End: 2021-05-13

## 2021-05-13 ENCOUNTER — OFFICE VISIT (OUTPATIENT)
Dept: INTERNAL MEDICINE CLINIC | Age: 76
End: 2021-05-13
Payer: COMMERCIAL

## 2021-05-13 VITALS
HEART RATE: 70 BPM | SYSTOLIC BLOOD PRESSURE: 144 MMHG | DIASTOLIC BLOOD PRESSURE: 70 MMHG | RESPIRATION RATE: 18 BRPM | OXYGEN SATURATION: 96 %

## 2021-05-13 DIAGNOSIS — I10 ESSENTIAL HYPERTENSION: ICD-10-CM

## 2021-05-13 DIAGNOSIS — E11.21 TYPE 2 DIABETES MELLITUS WITH NEPHROPATHY (HCC): ICD-10-CM

## 2021-05-13 DIAGNOSIS — R55 SYNCOPE, UNSPECIFIED SYNCOPE TYPE: Primary | ICD-10-CM

## 2021-05-13 PROCEDURE — 99496 TRANSJ CARE MGMT HIGH F2F 7D: CPT | Performed by: INTERNAL MEDICINE

## 2021-05-13 PROCEDURE — 1111F DSCHRG MED/CURRENT MED MERGE: CPT | Performed by: INTERNAL MEDICINE

## 2021-05-13 NOTE — PROGRESS NOTES
aspirin 81 MG EC tablet  Take 1 tablet by mouth daily             atorvastatin (LIPITOR) 80 MG tablet  take 1 tablet by mouth once daily             Blood Glucose Monitoring Suppl AV  1 kit by Does not apply route daily             Calcium Carbonate (CALTRATE 600 PO)  Take  by mouth 2 times daily.              cloNIDine (CATAPRES-TTS-1) 0.1 MG/24HR PTWK  Place 1 patch onto the skin every 7 days             colchicine (COLCRYS) 0.6 MG tablet  Take 1 tablet by mouth 2 times daily as needed for Pain (gout)             dilTIAZem (CARTIA XT) 120 MG extended release capsule  Take 1 capsule by mouth daily             ferrous sulfate (IRON 325) 325 (65 Fe) MG tablet  Take 1 tablet by mouth daily (with breakfast)             FOLIC ACID PO  Take 836 mg by mouth daily             furosemide (LASIX) 20 MG tablet  take 1 tablet by mouth every other day             Glucose Blood (BLOOD GLUCOSE TEST STRIPS) STRP  1 each by In Vitro route 2 times daily             Lancets MISC  1 each by In Vitro route 2 times daily             lisinopril (PRINIVIL;ZESTRIL) 2.5 MG tablet  Take 2 tablets by mouth daily             metoprolol tartrate (LOPRESSOR) 50 MG tablet  take 1 tablet by mouth twice a day             Multiple Vitamins-Minerals (MULTIVITAMIN ADULT PO)  Take by mouth             Omega-3 Fatty Acids (FISH OIL PO)  Take 1,200 mg by mouth             oxybutynin (DITROPAN) 5 MG tablet  take 1 tablet by mouth twice a day             propafenone (RYTHMOL) 150 MG tablet  Take 1 tablet by mouth every 8 hours             rivaroxaban (XARELTO) 15 MG TABS tablet  Take 1 tablet by mouth every 24 hours                   Medications marked \"taking\" at this time  Outpatient Medications Marked as Taking for the 5/13/21 encounter (Office Visit) with Ade Anthony MD   Medication Sig Dispense Refill    lisinopril (PRINIVIL;ZESTRIL) 2.5 MG tablet Take 2 tablets by mouth daily 90 tablet 1    cloNIDine (CATAPRES-TTS-1) 0.1 MG/24HR PTWK walk she continued to have the pain and she did have pre-syncope. She did have palpitations. She denies any blood in the stool, black stool, tongue biting, incontinence. She is not sure how long she was unconscious. She was able to crawl across the floor to a chair to get up. She called her daughter who took her to the ER. Inpatient course: Discharge summary reviewed- see chart. Interval history/Current status:     Metformin and cardura were discontinued. Lisinopril was increased to 5mg. She denies an syncope/pre-syncope. Energy is doing well.     sugars have been 100-140    A comprehensive review of systems was negative except for what was noted in the HPI. Vitals:    05/13/21 1134   BP: (!) 144/70   Pulse: 70   Resp: 18   SpO2: 96%     There is no height or weight on file to calculate BMI.    Wt Readings from Last 3 Encounters:   05/11/21 197 lb 8.5 oz (89.6 kg)   04/14/21 198 lb 9.6 oz (90.1 kg)   03/08/21 195 lb (88.5 kg)     BP Readings from Last 3 Encounters:   05/13/21 (!) 144/70   05/11/21 138/68   04/14/21 126/70        Physical Exam:  General Appearance: alert and oriented to person, place and time, well developed and well- nourished, in no acute distress  Skin: warm and dry, no rash or erythema  Head: normocephalic and atraumatic  Eyes: pupils equal, round, and reactive to light, extraocular eye movements intact, conjunctivae normal  ENT: tympanic membrane, external ear and ear canal normal bilaterally, nose without deformity, nasal mucosa and turbinates normal without polyps  Neck: supple and non-tender without mass, no thyromegaly or thyroid nodules, no cervical lymphadenopathy  Pulmonary/Chest: clear to auscultation bilaterally- no wheezes, rales or rhonchi, normal air movement, no respiratory distress  Cardiovascular: normal rate, regular rhythm, normal S1 and S2, no murmurs, rubs, clicks, or gallops, distal pulses intact, no carotid bruits  Abdomen: soft, non-tender, non-distended, normal bowel sounds, no masses or organomegaly  Extremities: no cyanosis, clubbing or edema  Musculoskeletal: normal range of motion, no joint swelling, deformity or tenderness  Neurologic: reflexes normal and symmetric, no cranial nerve deficit, gait, coordination and speech normal    Assessment/Plan:  1. Syncope, unspecified syncope type - I suspect the syncope was from the pain, the cardura, and then standing from a seated position. She will follow with cardiology. meds have been adjusted and pt feeling well. We unfortunately need to accept higher BP to avoid further syncope - this of course worsens the risk for further renal dysfxn. - VA DISCHARGE MEDS RECONCILED W/ CURRENT OUTPATIENT MED LIST    2. Essential hypertension - as above. Following with Dr Lissette Saravia    3. Type 2 diabetes mellitus with nephropathy (HCC) - off metformin, but sugars ok. Most oral meds are not appropriate given the renal dyxfxn. Could use glipizide, januvia 25mg, or insulin.  Will watch for now        Medical Decision Making: high complexity

## 2021-05-13 NOTE — CARE COORDINATION
Second and final attempt made to outreach patient.  Left HIPAA compliant message and provided call back number

## 2021-05-17 RX ORDER — DILTIAZEM HYDROCHLORIDE 120 MG/1
CAPSULE, COATED, EXTENDED RELEASE ORAL
Qty: 30 CAPSULE | Refills: 11 | Status: SHIPPED | OUTPATIENT
Start: 2021-05-17 | End: 2022-05-23

## 2021-05-18 ENCOUNTER — OFFICE VISIT (OUTPATIENT)
Dept: CARDIOLOGY CLINIC | Age: 76
End: 2021-05-18
Payer: COMMERCIAL

## 2021-05-18 ENCOUNTER — NURSE ONLY (OUTPATIENT)
Dept: CARDIOLOGY CLINIC | Age: 76
End: 2021-05-18
Payer: COMMERCIAL

## 2021-05-18 VITALS
SYSTOLIC BLOOD PRESSURE: 120 MMHG | WEIGHT: 199 LBS | HEIGHT: 68 IN | HEART RATE: 59 BPM | BODY MASS INDEX: 30.16 KG/M2 | DIASTOLIC BLOOD PRESSURE: 60 MMHG

## 2021-05-18 DIAGNOSIS — I48.0 PAROXYSMAL ATRIAL FIBRILLATION (HCC): ICD-10-CM

## 2021-05-18 DIAGNOSIS — Z92.89 HISTORY OF RECENT HOSPITALIZATION: ICD-10-CM

## 2021-05-18 DIAGNOSIS — E66.9 OBESITY (BMI 30-39.9): ICD-10-CM

## 2021-05-18 DIAGNOSIS — R55 SYNCOPE AND COLLAPSE: Primary | ICD-10-CM

## 2021-05-18 DIAGNOSIS — R55 SYNCOPE AND COLLAPSE: ICD-10-CM

## 2021-05-18 DIAGNOSIS — I10 ESSENTIAL HYPERTENSION: Primary | ICD-10-CM

## 2021-05-18 DIAGNOSIS — Z87.891 EX-CIGARETTE SMOKER: ICD-10-CM

## 2021-05-18 PROCEDURE — 99214 OFFICE O/P EST MOD 30 MIN: CPT | Performed by: NURSE PRACTITIONER

## 2021-05-18 PROCEDURE — 93228 REMOTE 30 DAY ECG REV/REPORT: CPT | Performed by: INTERNAL MEDICINE

## 2021-05-18 PROCEDURE — 93000 ELECTROCARDIOGRAM COMPLETE: CPT | Performed by: NURSE PRACTITIONER

## 2021-05-18 ASSESSMENT — ENCOUNTER SYMPTOMS
SHORTNESS OF BREATH: 0
COUGH: 0

## 2021-05-18 NOTE — PATIENT INSTRUCTIONS
Please be informed that if you contact our office outside of normal business hours the physician on call cannot help with any scheduling or rescheduling issues, procedure instruction questions or any type of medication issue. We advise you for any urgent/emergency that you go to the nearest emergency room! PLEASE CALL OUR OFFICE DURING NORMAL BUSINESS HOURS    Monday - Friday   8 am to 5 pm    [place]: [phone]    [place]:  [phone]  **It is YOUR responsibilty to bring medication bottles and/or updated medication list to [address] [place] [address].  This will allow us to better serve you and all your healthcare needs**

## 2021-05-18 NOTE — PROGRESS NOTES
[facility] [address] [facility],Third [address]  Phone: [phone]    Fax [phone]    Benjamin Crook MD, Puneet Goldman MD, Jim Knott MD, MD Aleksandra Monroy MD Linna Scurry, MD Jannis Coulter, MD Thais Peri, [facility]      Uma Ferreira, APRXIN [place], [facility], [facility]    CARDIOLOGY  NOTE    2021    Chan Hyde (:  1945) is a 76 y.o. female,Established patient with Dr. Seth Dunlap, here for evaluation of the following chief complaint(s):  Hypertension, Hyperlipidemia, and Atrial Fibrillation    SUBJECTIVE/OBJECTIVE:    Patient is her to follow up on the following:  Essential hypertension  (primary encounter diagnosis)  Ex-cigarette smoker  Obesity (BMI 30-39. 9)  Paroxysmal atrial fibrillation (HCC)  Syncope and collapse  History of recent hospitalization    Patient was recently in the hospital for syncope. She presented after passing out event at home she says she was working in her kitchen and felt dizzy lightheaded she felt a sharp pain in the back of her neck and head going down as she was trying to get food off the stove she passed out. She knows she was down on the floor she is not sure for how long she might have been out for? When she come around she was awake and alert not confused did not have any bowel movements. She is not having any pain anymore. She does have history of atrial fibrillation she is on Xarelto and propafenone Cardizem. She is diabetic says blood sugars were normal.  Has renal insufficiency with creatinine 2.4 range. She reports since hospital she is feeling very well. Patient reports that she had been having some issues with gout and was using her colchicine two or three times a day the week prior to hospitalization. Her gout has resolved and has not had to use any more of her colchicine since. Patient is a former smoker.  Patient denies issues obtaining or taking medications. Patient is active and does not do organized exercise. Patient denies chest pain, shortness of breath, palpitations, dizziness, orthopnea, lower leg swelling, or syncope. Review of Systems   Constitutional: Negative for fatigue and fever. Respiratory: Negative for cough and shortness of breath. Cardiovascular: Negative for chest pain, palpitations and leg swelling. Musculoskeletal: Negative for arthralgias and gait problem. Neurological: Negative for dizziness, syncope, weakness, light-headedness and headaches. Vitals:    05/18/21 0829   BP: 120/60   Pulse: 59   Weight: 199 lb (90.3 kg)   Height: 5' 8\" (1.727 m)       Wt Readings from Last 3 Encounters:   05/18/21 199 lb (90.3 kg)   05/11/21 197 lb 8.5 oz (89.6 kg)   04/14/21 198 lb 9.6 oz (90.1 kg)       BP Readings from Last 3 Encounters:   05/18/21 120/60   05/13/21 (!) 144/70   05/11/21 138/68       Prior to Admission medications    Medication Sig Start Date End Date Taking?  Authorizing Provider   dilTIAZem (CARDIZEM CD) 120 MG extended release capsule take 1 capsule by mouth once daily 5/17/21  Yes Bob Catalan MD   lisinopril (PRINIVIL;ZESTRIL) 2.5 MG tablet Take 2 tablets by mouth daily 5/11/21  Yes Rhea Peres MD   cloNIDine (CATAPRES-TTS-1) 0.1 MG/24HR PTWK Place 1 patch onto the skin every 7 days 5/10/21 5/10/22 Yes Bob Catalan MD   Ascorbic Acid (VITAMIN C) 250 MG tablet Take by mouth daily   Yes Historical Provider, MD   colchicine (COLCRYS) 0.6 MG tablet Take 1 tablet by mouth 2 times daily as needed for Pain (gout) 4/27/21  Yes Bob Catalan MD   rivaroxaban (XARELTO) 15 MG TABS tablet Take 1 tablet by mouth every 24 hours 4/26/21  Yes Orin Llanos MD   propafenone (RYTHMOL) 150 MG tablet Take 1 tablet by mouth every 8 hours 2/11/21  Yes Bob Catalan MD   atorvastatin (LIPITOR) 80 MG tablet take 1 tablet by mouth once daily 2/2/21  Yes Bob Catalan MD   Omega-3 Fatty Acids (FISH OIL PO) Take 1,200 mg by mouth   Yes Historical Provider, MD   FOLIC ACID PO Take 270 mg by mouth daily   Yes Historical Provider, MD   furosemide (LASIX) 20 MG tablet take 1 tablet by mouth every other day 7/13/20  Yes Geoffrey Brooks MD   oxybutynin (DITROPAN) 5 MG tablet take 1 tablet by mouth twice a day 6/11/20  Yes Geoffrey Brooks MD   metoprolol tartrate (LOPRESSOR) 50 MG tablet take 1 tablet by mouth twice a day 4/24/20  Yes Geoffrey Brooks MD   Multiple Vitamins-Minerals (MULTIVITAMIN ADULT PO) Take by mouth   Yes Historical Provider, MD   aspirin 81 MG EC tablet Take 1 tablet by mouth daily 11/13/18  Yes Geoffrey Brooks MD   Lancets MISC 1 each by In Vitro route 2 times daily 12/1/17  Yes Geoffrey Brooks MD   Glucose Blood (BLOOD GLUCOSE TEST STRIPS) STRP 1 each by In Vitro route 2 times daily 12/1/17  Yes Geoffrey Brooks MD   Blood Glucose Monitoring Suppl AV 1 kit by Does not apply route daily 12/1/17  Yes Geoffrey Brooks MD   Calcium Carbonate (CALTRATE 600 PO) Take  by mouth 2 times daily. Yes Historical Provider, MD   ferrous sulfate (IRON 325) 325 (65 Fe) MG tablet Take 1 tablet by mouth daily (with breakfast) 1/20/21 5/8/21  Irwin Adams MD   oxybutynin (DITROPAN) 5 MG tablet Take 1 tablet by mouth 2 times daily. 1/7/13 1/21/14  Geoffrey Brooks MD       Physical Exam  Vitals reviewed. Constitutional:       General: She is not in acute distress. Appearance: Normal appearance. She is obese. She is not ill-appearing. HENT:      Head: Atraumatic. Neck:      Vascular: No carotid bruit. Cardiovascular:      Rate and Rhythm: Normal rate and regular rhythm. Pulses: Normal pulses. Heart sounds: Normal heart sounds. No murmur heard. Pulmonary:      Effort: Pulmonary effort is normal. No respiratory distress. Breath sounds: Normal breath sounds.    Musculoskeletal:         General: No swelling or Factors  Component Value   C CHF No 0   H HTN Yes 1   A2 Age >= 76 Yes,  (69 y.o.) 2   D DM Yes 1   S2 Prior Stroke/TIA No 0   V Vascular Disease No 0   A Age 74-69 No,  (69 y.o.) 0   Sc Sex female 1    [id number]  Score  5   Score last updated 10/22/20 76:16 PM EDT    Click here for a link to the UpToDate guideline \"Atrial Fibrillation: Anticoagulation therapy to prevent embolization    Disclaimer: Risk Score calculation is dependent on accuracy of patient problem list and past encounter diagnosis. Orders:  -     Cardiac event monitor; Future  -     EKG 12 lead; Future  3. Ex-cigarette smoker  Assessment & Plan:  Encourage patient and congratulated her for being tobacco free  4. Obesity (BMI 30-39. 9)  Assessment & Plan:  Encouraged to exercise and lose weight      5. Syncope and collapse  Assessment & Plan:  No syncope since discharge. Was using colchicine the week prior to syncope. EKG upon arrival to ED mayer not show prolonged Qtc, but can not rule out arrhythmia  Due to multiple medications being used to that prolong QTC. Will check 30 day event monitor. Orders:  -     Cardiac event monitor; Future  6. History of recent hospitalization  -     Cardiac event monitor; Future      Return in about 6 weeks (around 6/29/2021) for with Dr. Teodoro Ko, or brayden if needed. An electronic signature was used to authenticate this note.     Electronically signed by FAITH Gray CNP on 5/18/2021 at 8:38 AM

## 2021-05-18 NOTE — PROGRESS NOTES
Applied @ office, 30 day w/monitor# [id number]. Educated pt on proper holter usage; how to keep sx diary; & when to mail monitor back to [employer]. Pt voiced understanding.

## 2021-05-18 NOTE — ASSESSMENT & PLAN NOTE
 Controlled   Doxazosin DC in hospital.    Continue metoprolol, lisinopril, clonidine patch, and lasix.    advised low salt diet

## 2021-05-18 NOTE — ASSESSMENT & PLAN NOTE
No syncope since discharge. Was using colchicine the week prior to syncope. EKG upon arrival to ED mayre not show prolonged Qtc, but can not rule out arrhythmia  Due to multiple medications being used to that prolong QTC. Will check 30 day event monitor.

## 2021-05-22 DIAGNOSIS — I10 ESSENTIAL HYPERTENSION: ICD-10-CM

## 2021-05-24 ENCOUNTER — HOSPITAL ENCOUNTER (OUTPATIENT)
Age: 76
Discharge: HOME | End: 2021-05-24
Payer: COMMERCIAL

## 2021-05-24 DIAGNOSIS — N17.9 AKI (ACUTE KIDNEY INJURY) (HCC): ICD-10-CM

## 2021-05-24 DIAGNOSIS — I10 ESSENTIAL HYPERTENSION: ICD-10-CM

## 2021-05-24 LAB
ANION GAP SERPL CALCULATED.3IONS-SCNC: 13 MMOL/L (ref 4–16)
BUN BLDV-MCNC: 39 MG/DL (ref 6–23)
CALCIUM SERPL-MCNC: 9.2 MG/DL (ref 8.3–10.6)
CHLORIDE BLD-SCNC: 104 MMOL/L (ref 99–110)
CO2: 24 MMOL/L (ref 21–32)
CREAT SERPL-MCNC: 2.4 MG/DL (ref 0.6–1.1)
GFR AFRICAN AMERICAN: 24 ML/MIN/1.73M2
GFR NON-AFRICAN AMERICAN: 20 ML/MIN/1.73M2
GLUCOSE BLD-MCNC: 101 MG/DL (ref 70–99)
POTASSIUM SERPL-SCNC: 4.6 MMOL/L (ref 3.5–5.1)
SODIUM BLD-SCNC: 141 MMOL/L (ref 135–145)

## 2021-05-24 PROCEDURE — 80048 BASIC METABOLIC PNL TOTAL CA: CPT

## 2021-05-24 PROCEDURE — 36415 COLL VENOUS BLD VENIPUNCTURE: CPT

## 2021-05-24 RX ORDER — METOPROLOL TARTRATE 50 MG/1
50 TABLET, FILM COATED ORAL 2 TIMES DAILY
Qty: 180 TABLET | Refills: 3 | Status: SHIPPED | OUTPATIENT
Start: 2021-05-24 | End: 2022-03-07 | Stop reason: SDUPTHER

## 2021-05-24 RX ORDER — LISINOPRIL 2.5 MG/1
2.5 TABLET ORAL DAILY
Qty: 90 TABLET | Refills: 1 | Status: SHIPPED | OUTPATIENT
Start: 2021-05-24 | End: 2021-12-14 | Stop reason: ALTCHOICE

## 2021-05-24 RX ORDER — LISINOPRIL 2.5 MG/1
TABLET ORAL
Qty: 90 TABLET | Refills: 1 | Status: SHIPPED | OUTPATIENT
Start: 2021-05-24 | End: 2021-05-24 | Stop reason: SDUPTHER

## 2021-06-11 NOTE — H&P
2010    Total Right Knee       Medications Prior to Admission:    Prior to Admission medications    Medication Sig Start Date End Date Taking? Authorizing Provider   metFORMIN (GLUCOPHAGE) 500 MG tablet Take 1 tablet by mouth 2 times daily (with meals) 11/6/18  Yes Radha Santizo MD   Cholecalciferol (VITAMIN D3 PO) Take 630 mg by mouth daily   Yes Historical Provider, MD   rivaroxaban (XARELTO) 15 MG TABS tablet Take 1 tablet by mouth every 24 hours 10/11/18  Yes FAITH Horton - CNP   metoprolol tartrate (LOPRESSOR) 50 MG tablet take 1 tablet by mouth twice a day 10/5/18  Yes Radha Santizo MD   diltiazem (CARDIZEM CD) 120 MG extended release capsule Take 1 capsule by mouth daily 9/7/18  Yes Radha Santizo MD   propafenone (RYTHMOL) 150 MG tablet take 1 tablet by mouth every 8 hours 9/4/18  Yes Radha Santizo MD   lisinopril (PRINIVIL;ZESTRIL) 20 MG tablet Take 1 tablet by mouth daily 8/7/18  Yes Radha Santizo MD   fluticasone [facility]) 50 MCG/ACT nasal spray 2 sprays by Nasal route daily 7/9/18  Yes Radha Santizo MD   furosemide (LASIX) 20 MG tablet take 1 tablet by mouth once daily 5/24/18  Yes Radha Santizo MD   oxybutynin (DITROPAN) 5 MG tablet take 1 tablet by mouth twice a day 5/1/18  Yes Radha Santizo MD   atorvastatin (LIPITOR) 80 MG tablet take 1 tablet by mouth once daily 2/5/18  Yes Radha Santizo MD   omeprazole (PRILOSEC) 40 MG delayed release capsule Take 40 mg by mouth daily   Yes Historical Provider, MD   ferrous sulfate 325 (65 FE) MG tablet take 1 tablet by mouth three times a day 11/23/16  Yes Historical Provider, MD   Ascorbic Acid (VITAMIN C) 100 MG tablet Take 100 mg by mouth daily. Yes Historical Provider, MD   aspirin 81 MG tablet Take 81 mg by mouth every morning Over The Counter   Yes Historical Provider, MD   Calcium Carbonate (CALTRATE 600 PO) Take  by mouth 2 times daily.    Yes Historical Provider, MD   UNABLE TO FIND Please administer two SHINGRIX vaccines 2-6 months apart 5/7/18   Everett Roberts MD   Lancets MISC 1 each by In Vitro route 2 times daily 12/1/17   Everett Roberts MD   Glucose Blood (BLOOD GLUCOSE TEST STRIPS) STRP 1 each by In Vitro route 2 times daily 12/1/17   Everett Roberts MD   Blood Glucose Monitoring Suppl AV 1 kit by Does not apply route daily 12/1/17   Everett Roberts MD   oxybutynin (DITROPAN) 5 MG tablet Take 1 tablet by mouth 2 times daily. 1/7/13 1/21/14  Everett Roberts MD       Allergies:  Latex and Tape Paulette Carlos tape]    Social History:   TOBACCO:   reports that she quit smoking about 31 years ago. She has a 5.00 pack-year smoking history. She has never used smokeless tobacco.  ETOH:   reports that she does not drink alcohol. Family History:   Family History   Problem Relation Age of Onset    Diabetes Mother     Early Death Mother 61    Cancer Father         \"Lung Cancer\"    Heart Disease Father     Diabetes Father     Stroke Sister     Diabetes Sister     Diabetes Brother     Cancer Brother         \"Prostate Cancer\"    Cancer Brother         \"Lung Cancer\"    Thyroid Disease Daughter        REVIEW OF SYSTEMS:  Other systems negative except as noted above. Physical Exam:    Vitals: /61   Pulse 93   Temp 98.1 °F (36.7 °C) (Oral)   Resp 16   Ht 5' 8\" (1.727 m)   Wt 196 lb 4.8 oz (89 kg)   LMP  (LMP Unknown)   SpO2 100%   BMI 29.85 kg/m²   General appearance: appears stated age and cooperative  Skin: Skin color, texture, turgor normal. No rashes or lesions. HEENT: No scleral icterus. Conjunctiva pink. Mucous membranes moist. No gum lesions, tongue lesions. Neck: Supple, trachea midline. No JVD. No thyromegaly, no masses. Lungs: Appropriate chest expansion. Clear to auscultation bilaterally, with no wheezes, rhonchi, or crackles.  No use of accessory muscles  Heart: Regular rate and rhythm with normal S1 and S2. No murmurs, rubs, or gallops. Carotids are brisk bilaterally. Abdomen:  Soft, non-tender, non-distended. There is no organomegaly, no masses. Bowel sounds are normo-active  Extremities: No cyanosis, clubbing, or edema. Lymphatic: No cervical or supraclavicular lymphadenopathy  Vascular: Dorsalis pedis and posterior tibial pulses 2+ bilaterally  Neurologic: Grossly nonfocal  Psych: Alert and oriented, insight and judgement WNL; no depression or anxiety         Assessment and Plan   Active Problems:    Paroxysmal atrial fibrillation (HCC)    Type 2 diabetes mellitus with nephropathy (HCC)    Chronic renal insufficiency, stage 3 (moderate) (HCC)    GI bleed    Acute blood loss anemia    Acute kidney injury ([facility] [address]  Resolved Problems:    * No resolved hospital problems. *    Pt is a 77yo female admitted with symptomatic anemia likely from an upper GI bleed, in part because of the xarelto she takes for the a fib. She has already been given 2 units and symptoms are much improved. She is scheduled for a EGD today. She is on PPI BID, and blood thinners are being held. JESSI should resolve with transfusion and improvement of kidney perfusion. CP, SOB, palpitations are likely from the anemia itself. Will watch sugars while admitted. Further mgmt will depend on the pts hospital course.        [address] no nausea/no vomiting/no diarrhea/no constipation/no change in bowel habits/no flatulence/no abdominal pain

## 2021-06-15 ENCOUNTER — TELEPHONE (OUTPATIENT)
Dept: CARDIOLOGY CLINIC | Age: 76
End: 2021-06-15

## 2021-06-23 ENCOUNTER — OFFICE VISIT (OUTPATIENT)
Dept: INTERNAL MEDICINE CLINIC | Age: 76
End: 2021-06-23
Payer: COMMERCIAL

## 2021-06-23 VITALS
DIASTOLIC BLOOD PRESSURE: 78 MMHG | SYSTOLIC BLOOD PRESSURE: 132 MMHG | BODY MASS INDEX: 29.65 KG/M2 | RESPIRATION RATE: 18 BRPM | HEART RATE: 58 BPM | WEIGHT: 195 LBS | OXYGEN SATURATION: 96 %

## 2021-06-23 DIAGNOSIS — N18.4 CKD (CHRONIC KIDNEY DISEASE), STAGE IV (HCC): ICD-10-CM

## 2021-06-23 DIAGNOSIS — E11.21 TYPE 2 DIABETES MELLITUS WITH NEPHROPATHY (HCC): ICD-10-CM

## 2021-06-23 DIAGNOSIS — E11.21 TYPE 2 DIABETES MELLITUS WITH NEPHROPATHY (HCC): Primary | ICD-10-CM

## 2021-06-23 LAB
A/G RATIO: 1.5 (ref 1.1–2.2)
ALBUMIN SERPL-MCNC: 4.3 G/DL (ref 3.4–5)
ALP BLD-CCNC: 72 U/L (ref 40–129)
ALT SERPL-CCNC: 54 U/L (ref 10–40)
ANION GAP SERPL CALCULATED.3IONS-SCNC: 18 MMOL/L (ref 3–16)
AST SERPL-CCNC: 35 U/L (ref 15–37)
BILIRUB SERPL-MCNC: <0.2 MG/DL (ref 0–1)
BUN BLDV-MCNC: 47 MG/DL (ref 7–20)
CALCIUM SERPL-MCNC: 9.5 MG/DL (ref 8.3–10.6)
CHLORIDE BLD-SCNC: 107 MMOL/L (ref 99–110)
CHOLESTEROL, TOTAL: 191 MG/DL (ref 0–199)
CO2: 21 MMOL/L (ref 21–32)
CREAT SERPL-MCNC: 2.5 MG/DL (ref 0.6–1.2)
GFR AFRICAN AMERICAN: 23
GFR NON-AFRICAN AMERICAN: 19
GLOBULIN: 2.8 G/DL
GLUCOSE BLD-MCNC: 99 MG/DL (ref 70–99)
HDLC SERPL-MCNC: 55 MG/DL (ref 40–60)
LDL CHOLESTEROL CALCULATED: 111 MG/DL
POTASSIUM SERPL-SCNC: 5.3 MMOL/L (ref 3.5–5.1)
SODIUM BLD-SCNC: 146 MMOL/L (ref 136–145)
TOTAL PROTEIN: 7.1 G/DL (ref 6.4–8.2)
TRIGL SERPL-MCNC: 123 MG/DL (ref 0–150)
VLDLC SERPL CALC-MCNC: 25 MG/DL

## 2021-06-23 PROCEDURE — 36415 COLL VENOUS BLD VENIPUNCTURE: CPT | Performed by: INTERNAL MEDICINE

## 2021-06-23 PROCEDURE — 99213 OFFICE O/P EST LOW 20 MIN: CPT | Performed by: INTERNAL MEDICINE

## 2021-06-23 NOTE — PROGRESS NOTES
Marianne Malagonfoot  1945 06/23/21    SUBJECTIVE:    Pt with evacuation of a hematoma of the earlier this month of the right foot. She will follow with podiatry next week. Pain is well controlled. She has had no further episodes of syncope. Sugars have been 120-140. She continues to follow with Dr Gracia Sun - will be seen in August.    OBJECTIVE:    /78   Pulse 58   Resp 18   Wt 195 lb (88.5 kg)   LMP  (LMP Unknown)   SpO2 96%   BMI 29.65 kg/m²     Physical Exam  Constitutional:       Appearance: She is well-developed. Eyes:      General: No scleral icterus. Conjunctiva/sclera: Conjunctivae normal.   Cardiovascular:      Rate and Rhythm: Normal rate and regular rhythm. Heart sounds: Normal heart sounds. No murmur heard. Pulmonary:      Effort: Pulmonary effort is normal. No respiratory distress. Breath sounds: Normal breath sounds. No wheezing. ASSESSMENT:    1. Type 2 diabetes mellitus with nephropathy ([address]    2. CKD (chronic kidney disease), stage IV (HCC)        PLAN:    [facility] was seen today for other. Diagnoses and all orders for this visit:    Type 2 diabetes mellitus with nephropathy ([facility] [address] - seems to be doing well; check labs  -     Comprehensive Metabolic Panel; Future  -     Lipid Panel; Future  -     Hemoglobin A1C; Future    CKD (chronic kidney disease), stage IV (HCC) - cont lisinopril; recheck labs  -     Comprehensive Metabolic Panel; Future  -     Lipid Panel;  Future  -     Hemoglobin A1C; Future

## 2021-06-24 LAB
ESTIMATED AVERAGE GLUCOSE: 134.1 MG/DL
HBA1C MFR BLD: 6.3 %

## 2021-06-28 ENCOUNTER — OFFICE VISIT (OUTPATIENT)
Dept: CARDIOLOGY CLINIC | Age: 76
End: 2021-06-28
Payer: COMMERCIAL

## 2021-06-28 VITALS
DIASTOLIC BLOOD PRESSURE: 68 MMHG | HEART RATE: 62 BPM | OXYGEN SATURATION: 96 % | HEIGHT: 68 IN | SYSTOLIC BLOOD PRESSURE: 130 MMHG | BODY MASS INDEX: 29.46 KG/M2 | WEIGHT: 194.4 LBS

## 2021-06-28 DIAGNOSIS — E66.9 OBESITY (BMI 30-39.9): Primary | ICD-10-CM

## 2021-06-28 DIAGNOSIS — I10 ESSENTIAL HYPERTENSION: ICD-10-CM

## 2021-06-28 PROCEDURE — 99214 OFFICE O/P EST MOD 30 MIN: CPT | Performed by: INTERNAL MEDICINE

## 2021-06-28 NOTE — PROGRESS NOTES
Elizabeth  is a  Established patient  ,68 y.o.   female here for evaluation of the following chief complaint(s):    Here for fu        SUBJECTIVE/OBJECTIVE:  HPI : h/o  Htn, hyperlipidimea, dm, paf now here  Has no cardiac complains    Review of Systems    neg    Vitals:    06/28/21 1505   BP: 130/68   Pulse: 62   SpO2: 96%   Weight: 194 lb 6.4 oz (88.2 kg)   Height: 5' 8\" (1.727 m)     /68   Pulse 62   Ht 5' 8\" (1.727 m)   Wt 194 lb 6.4 oz (88.2 kg)   LMP  (LMP Unknown)   SpO2 96%   BMI 29.56 kg/m²   Patient-Reported Vitals 12/10/2020   Patient-Reported Weight 194   Patient-Reported Height -   Patient-Reported Systolic 030   Patient-Reported Diastolic 70   Patient-Reported Pulse 67   Patient-Reported SpO2 -     Wt Readings from Last 3 Encounters:   06/28/21 194 lb 6.4 oz (88.2 kg)   06/23/21 195 lb (88.5 kg)   05/18/21 199 lb (90.3 kg)     Body mass index is 29.56 kg/m². Physical Exam     Neck: JVD  no    Lungs : clear    Cardio : Si and S2 audilble      Ext: edema      All pertinent data reviewed  y    Meds : reviewed   y      Tests ordered    y    ASSESSMENT/PLAN:    - monitor  Has no  afib    - Atrial fibrillation, pt is  compliant with meds. Patient does not have symptoms from atrial fibrillation  Has aflutter    -  Hypertension: Patients blood pressure is normal. Patient is advised about low sodium diet. Present medical regimen will not be changed. On lisinopril                    -  LIPID MANAGEMENT:  Importance of lipid levels discussed with patient   and patient was given dietary advice. NCEP- ATP III guidelines reviewed with patient. -   Changes  in medicines made: No     On lipitor                           -   DIABETES MELLITUS: Available pertinent lab data reviewed   and  patient was given dietary advice . Advised to check blood glucose level on a regular basis. -   Changes  in medicines made:  No                    An electronic signature was used to authenticate this note.    --Luis Dejesus MD

## 2021-07-02 ENCOUNTER — TELEPHONE (OUTPATIENT)
Dept: CARDIOLOGY CLINIC | Age: 76
End: 2021-07-02

## 2021-07-02 NOTE — TELEPHONE ENCOUNTER
----- Message from FAITH Zamora CNP sent at 7/2/2021  2:44 PM EDT -----  Please pone patient with results- no significant ectopy

## 2021-07-09 DIAGNOSIS — N39.41 URGE INCONTINENCE: ICD-10-CM

## 2021-07-09 RX ORDER — PROPAFENONE HYDROCHLORIDE 150 MG/1
150 TABLET, FILM COATED ORAL EVERY 8 HOURS
Qty: 90 TABLET | Refills: 3 | Status: SHIPPED | OUTPATIENT
Start: 2021-07-09 | End: 2022-02-11 | Stop reason: SDUPTHER

## 2021-07-09 RX ORDER — OXYBUTYNIN CHLORIDE 5 MG/1
TABLET ORAL
Qty: 180 TABLET | Refills: 3 | Status: SHIPPED | OUTPATIENT
Start: 2021-07-09 | End: 2022-07-19

## 2021-08-02 RX ORDER — FUROSEMIDE 20 MG/1
TABLET ORAL
Qty: 90 TABLET | Refills: 3 | Status: SHIPPED | OUTPATIENT
Start: 2021-08-02 | End: 2022-07-20

## 2021-08-23 ENCOUNTER — HOSPITAL ENCOUNTER (OUTPATIENT)
Age: 76
Discharge: HOME | End: 2021-08-23
Payer: COMMERCIAL

## 2021-08-23 DIAGNOSIS — N17.9 AKI (ACUTE KIDNEY INJURY) (HCC): ICD-10-CM

## 2021-08-23 PROCEDURE — 80048 BASIC METABOLIC PNL TOTAL CA: CPT

## 2021-08-25 ENCOUNTER — HOSPITAL ENCOUNTER (OUTPATIENT)
Age: 76
Setting detail: SPECIMEN
Discharge: HOME | End: 2021-08-25
Payer: COMMERCIAL

## 2021-08-25 LAB
ANION GAP SERPL CALCULATED.3IONS-SCNC: 12 MMOL/L (ref 4–16)
BUN BLDV-MCNC: 58 MG/DL (ref 6–23)
CALCIUM SERPL-MCNC: 9.3 MG/DL (ref 8.3–10.6)
CHLORIDE BLD-SCNC: 104 MMOL/L (ref 99–110)
CO2: 23 MMOL/L (ref 21–32)
CREAT SERPL-MCNC: 2.9 MG/DL (ref 0.6–1.1)
GFR AFRICAN AMERICAN: 19 ML/MIN/1.73M2
GFR NON-AFRICAN AMERICAN: 16 ML/MIN/1.73M2
GLUCOSE BLD-MCNC: 97 MG/DL (ref 70–99)
POTASSIUM SERPL-SCNC: 5.7 MMOL/L (ref 3.5–5.1)
SODIUM BLD-SCNC: 139 MMOL/L (ref 135–145)

## 2021-08-25 PROCEDURE — 80048 BASIC METABOLIC PNL TOTAL CA: CPT

## 2021-08-25 PROCEDURE — 36415 COLL VENOUS BLD VENIPUNCTURE: CPT

## 2021-08-30 ENCOUNTER — HOSPITAL ENCOUNTER (OUTPATIENT)
Age: 76
Discharge: HOME | End: 2021-08-30
Payer: COMMERCIAL

## 2021-08-30 DIAGNOSIS — N18.32 STAGE 3B CHRONIC KIDNEY DISEASE (HCC): ICD-10-CM

## 2021-08-30 DIAGNOSIS — E11.21 TYPE 2 DIABETES MELLITUS WITH NEPHROPATHY (HCC): ICD-10-CM

## 2021-08-30 DIAGNOSIS — E87.5 HYPERKALEMIA: ICD-10-CM

## 2021-08-30 LAB
ALBUMIN SERPL-MCNC: 4 GM/DL (ref 3.4–5)
ANION GAP SERPL CALCULATED.3IONS-SCNC: 14 MMOL/L (ref 4–16)
BUN BLDV-MCNC: 57 MG/DL (ref 6–23)
CALCIUM SERPL-MCNC: 9.1 MG/DL (ref 8.3–10.6)
CHLORIDE BLD-SCNC: 105 MMOL/L (ref 99–110)
CO2: 21 MMOL/L (ref 21–32)
CREAT SERPL-MCNC: 2.7 MG/DL (ref 0.6–1.1)
CREATININE URINE: 125 MG/DL (ref 28–217)
GFR AFRICAN AMERICAN: 21 ML/MIN/1.73M2
GFR NON-AFRICAN AMERICAN: 17 ML/MIN/1.73M2
GLUCOSE BLD-MCNC: 101 MG/DL (ref 70–99)
PHOSPHORUS: 4.4 MG/DL (ref 2.5–4.9)
POTASSIUM SERPL-SCNC: 5.2 MMOL/L (ref 3.5–5.1)
PROT/CREAT RATIO, UR: 0.6
SODIUM BLD-SCNC: 140 MMOL/L (ref 135–145)
URINE TOTAL PROTEIN: 71 MG/DL

## 2021-08-30 PROCEDURE — 36415 COLL VENOUS BLD VENIPUNCTURE: CPT

## 2021-08-30 PROCEDURE — 84156 ASSAY OF PROTEIN URINE: CPT

## 2021-08-30 PROCEDURE — 82570 ASSAY OF URINE CREATININE: CPT

## 2021-08-30 PROCEDURE — 80069 RENAL FUNCTION PANEL: CPT

## 2021-09-13 ENCOUNTER — HOSPITAL ENCOUNTER (OUTPATIENT)
Age: 76
Setting detail: SPECIMEN
Discharge: HOME | End: 2021-09-13
Payer: COMMERCIAL

## 2021-09-13 ENCOUNTER — OFFICE VISIT (OUTPATIENT)
Dept: FAMILY MEDICINE CLINIC | Age: 76
End: 2021-09-13
Payer: COMMERCIAL

## 2021-09-13 VITALS — HEART RATE: 63 BPM | TEMPERATURE: 96.9 F | OXYGEN SATURATION: 100 %

## 2021-09-13 DIAGNOSIS — R09.89 RUNNY NOSE: ICD-10-CM

## 2021-09-13 DIAGNOSIS — Z20.822 CLOSE EXPOSURE TO COVID-19 VIRUS: ICD-10-CM

## 2021-09-13 DIAGNOSIS — R51.9 GENERALIZED HEADACHE: Primary | ICD-10-CM

## 2021-09-13 PROCEDURE — 99213 OFFICE O/P EST LOW 20 MIN: CPT | Performed by: NURSE PRACTITIONER

## 2021-09-13 PROCEDURE — U0005 INFEC AGEN DETEC AMPLI PROBE: HCPCS

## 2021-09-13 PROCEDURE — U0003 INFECTIOUS AGENT DETECTION BY NUCLEIC ACID (DNA OR RNA); SEVERE ACUTE RESPIRATORY SYNDROME CORONAVIRUS 2 (SARS-COV-2) (CORONAVIRUS DISEASE [COVID-19]), AMPLIFIED PROBE TECHNIQUE, MAKING USE OF HIGH THROUGHPUT TECHNOLOGIES AS DESCRIBED BY CMS-2020-01-R: HCPCS

## 2021-09-13 NOTE — PROGRESS NOTES
9/13/2021    HPI:  Chief complaint and history of present illness as per medical assistant/nurse documented today in the Flu/COVID-19 clinic. Patient is here with complaints of headache and runny nose x 4 days. Patient states she has been in close contact with a friend that has recently tested positive for covid. Patient states she has had her covid vaccine. MEDICATIONS:  Prior to Visit Medications    Medication Sig Taking?  Authorizing Provider   apixaban (ELIQUIS) 5 MG TABS tablet Take 1 tablet by mouth 2 times daily  Patient not taking: Reported on 8/24/2021  Mercy Juarez MD   furosemide (LASIX) 20 MG tablet take 1 tablet by mouth every other day  Raine Orona MD   oxybutynin (DITROPAN) 5 MG tablet take 1 tablet by mouth twice a day  Raine Orona MD   propafenone (RYTHMOL) 150 MG tablet Take 1 tablet by mouth every 8 hours  Raine Orona MD   apixaban (ELIQUIS) 5 MG TABS tablet Take 1 tablet by mouth 2 times daily  FAITH Fields - CNP   lisinopril (PRINIVIL;ZESTRIL) 2.5 MG tablet Take 1 tablet by mouth daily  Raine Orona MD   metoprolol tartrate (LOPRESSOR) 50 MG tablet Take 1 tablet by mouth 2 times daily take 1 tablet by mouth twice a day  Raine Orona MD   dilTIAZem (CARDIZEM CD) 120 MG extended release capsule take 1 capsule by mouth once daily  Raine Orona MD   cloNIDine (CATAPRES-TTS-1) 0.1 MG/24HR PTWK Place 1 patch onto the skin every 7 days  Raine Orona MD   Ascorbic Acid (VITAMIN C) 250 MG tablet Take by mouth daily  Historical Provider, MD   colchicine (COLCRYS) 0.6 MG tablet Take 1 tablet by mouth 2 times daily as needed for Pain (gout)  Raine Orona MD   atorvastatin (LIPITOR) 80 MG tablet take 1 tablet by mouth once daily  Raine Orona MD   ferrous sulfate (IRON 325) 325 (65 Fe) MG tablet Take 1 tablet by mouth daily (with breakfast)  Mercy Biggs MD   Omega-3 Fatty Acids (FISH OIL PO) Take 1,200 mg by mouth  Historical Provider, MD   FOLIC ACID PO Take 471 mg by mouth daily  Historical Provider, MD   Multiple Vitamins-Minerals (MULTIVITAMIN ADULT PO) Take by mouth  Historical Provider, MD   aspirin 81 MG EC tablet Take 1 tablet by mouth daily  Dandre Koch MD   Lancets MISC 1 each by In Vitro route 2 times daily  Dandre Koch MD   Glucose Blood (BLOOD GLUCOSE TEST STRIPS) STRP 1 each by In Vitro route 2 times daily  Dandre Koch MD   Blood Glucose Monitoring Suppl AV 1 kit by Does not apply route daily  Dandre Koch MD   oxybutynin (DITROPAN) 5 MG tablet Take 1 tablet by mouth 2 times daily. Dandre Koch MD   Calcium Carbonate (CALTRATE 600 PO) Take  by mouth 2 times daily. Historical Provider, MD       Allergies   Allergen Reactions    Latex      \"Blisters\"    Tape [Adhesive Arnold Ke      \"Turn Red\"   ,   Past Medical History:   Diagnosis Date    14 day event monitor 11/05/2018    Sinus rhythm    Atrial fibrillation with RVR ([facility] [address] 11/25/2013    Breast cancer ([facility] [address]     Edema     1/91 TTE diastolic dysfxn, EF 97%; 91/99 - TTE diastolic dysfxn, EF 10%; Stress myoview  WNl, EF 70%    Family history of cardiovascular disease     GERD (gastroesophageal reflux disease)     H/O 24 hour EKG monitoring 4/23/12    [facility]    H/O cardiovascular stress test     [facility]/23/12-[facility], Probably norm perfusion Lexiscan cardiolite study except for diaphramatic attenuation artifact, otherwise perfusion is normal, norm LV fxn by gated scan. 11/10-EF70%, 9/15/08-EF70%, 1/15/07-EF70%, 9/03    H/O echocardiogram     4/23/12-[facility]- Norm chamber sizes. LVH with norm LV systolic but abn diastolic fxn, mild MR and TR, minimal pulm HTN. 11/10 -EF>55%; 3/05, 9/23/03    History of cardiac catheterization     11/15/2013-EF 55%, No signif CAD -Dr Gwen Kapoor;;    History of cardiovascular stress test     11/14/2013-EF 70%.  Normal Lexiscan Cardiolite, Uniform wall motion;    History of echocardiogram     10/99/8731-HFIRDOYSE global systolic  function. No wall motion abnormalities. EF 55%. Mild MR/TR;    History of Holter monitoring 11/17/14 11/14-48 hour Holter: Predominant rhythm was sinus, no ventricular ectopy noted, supraventricular ectopics were noted in single beat forms.  History of therapeutic radiation     Hx of 24 hour EKG monitoring     12/17/13 48 hr holter monitor. Sinus rhythm with intermittent sinus arrhythmia.  Hyperlipidemia     Hypertension     11/13 Cath WNL, EF 55%; 11/13 Stress WNL : 11/13 TTE mild MR and TR, EF 55%; 4/12 Stress WNL; 8/9 - Cath WNL    Iron deficiency anemia 7/9/2013 9/13 EGD WNL    Lumbar radiculopathy 11/25/2013    Menopause     ADOLFO (obstructive sleep apnea)     sleep study 10/3 CPAP 6cm     Osteoarthritis     both knees    Osteopenia     9/12 DEXA T-score -1.5    Other activity(E029.9)     48 hr holter, the 48 hr holter monitor reveals the patient in the sinus rhythm with occasional supraventricular ectopic beats. There is a rare short atrial run.  Paroxysmal atrial fibrillation (HCC)     4/12 Holter [facility]    Prolonged emergence from general anesthesia     Proteinuria     Right Breast Cancer Dx 10-12    Supraventricular tachycardia (HCC)     Type 2 diabetes mellitus (HCC) Dx 2000's    Urge incontinence     Wears partial dentures     upper partial   ,   Past Surgical History:   Procedure Laterality Date    BREAST BIOPSY  10-12    Right Breast Biopsy, Cancer    BREAST BIOPSY  1970's    Right Breast Biopsy, Benign    BREAST LUMPECTOMY  11/6/12    Breast cancer - Right with sentinal node    BUNIONECTOMY  1990's    Right Sai Elio Kent    8/10/09- The patient has no significant CAD. The elevated troponin is probably secondary to SVT. , 10/03- no significant CAD    CARPAL TUNNEL RELEASE Left 01/2019    CATARACT REMOVAL Bilateral 03/2020, 5/20    CATARACT REMOVAL Left 05/2020  COLONOSCOPY  \"X 2 Last One  \"    Polpy Removed During Last Colonoscopy    DENTAL SURGERY      Teeth Extracted In Past    ENDOSCOPY, COLON, DIAGNOSTIC  2016    savary dilatation to 17 mm    HYSTERECTOMY, TOTAL ABDOMINAL  1980's    JOINT REPLACEMENT      Total Left Knee    JOINT REPLACEMENT      Total Right Knee    SOFI STEROTACTIC LOC BREAST BIOPSY RIGHT Right 2021    SOFI STEROTACTIC LOC BREAST BIOPSY RIGHT [facility]    OVARY REMOVAL      UPPER GASTROINTESTINAL ENDOSCOPY N/A 2018    EGD DIAGNOSTIC ONLY performed by Jairo Estrada MD at [facility] ENDOSCOPY   ,   Social History     Tobacco Use    Smoking status: Former Smoker     Packs/day: 0.50     Years: 10.00     Pack years: 5.00     Quit date: 1987     Years since quittin.7    Smokeless tobacco: Never Used   Substance Use Topics    Alcohol use: No     Alcohol/week: 0.0 standard drinks     Comment: 3 cups coffee daily    Drug use: No   ,   Family History   Problem Relation Age of Onset    Diabetes Mother     Early Death Mother 61    Cancer Father         \"Lung Cancer\"    Heart Disease Father     Diabetes Father     Stroke Sister     Diabetes Sister     Diabetes Brother     Cancer Brother         \"Prostate Cancer\"    Cancer Brother         \"Lung Cancer\"    Thyroid Disease Daughter    ,   Immunization History   Administered Date(s) Administered    COVID-19, Tilley Peter, PF, 30mcg/0.3mL 2021, 2021    Influenza 10/16/2012, 10/14/2013    Influenza Virus Vaccine 2011    Influenza, High Dose (Fluzone 65 yrs and older) 2014, 2015, 2017, 11/10/2017, 10/01/2018, 10/09/2020    Pneumococcal Conjugate 13-valent (Ineyucu36) 2015    Pneumococcal Polysaccharide ([id number]) 2011, 10/09/2020    Td, unspecified formulation 2006    Tdap (Boostrix, Adacel) 08/10/2016, 10/09/2020    Zoster Live (Zostavax) 2011    Zoster Recombinant (Shingrix) 03/26/2019, 07/30/2019   ,   Health Maintenance   Topic Date Due    Annual Wellness Visit (AWV)  Never done    Flu vaccine (1) 09/01/2021    Lipid screen  06/23/2022    Potassium monitoring  08/30/2022    Creatinine monitoring  08/30/2022    DTaP/Tdap/Td vaccine (3 - Td or Tdap) 10/09/2030    DEXA (modify frequency per FRAX score)  Completed    Shingles Vaccine  Completed    Pneumococcal 65+ yrs at Risk Vaccine  Completed    COVID-19 Vaccine  Completed    Hepatitis C screen  Completed    Hepatitis A vaccine  Aged Out    Hib vaccine  Aged Out    Meningococcal (ACWY) vaccine  Aged Out       PHYSICAL EXAM:  Physical Exam  HENT:      Head: Normocephalic. Right Ear: Tympanic membrane, ear canal and external ear normal.      Left Ear: Tympanic membrane, ear canal and external ear normal.      Nose: Nose normal.      Mouth/Throat:      Lips: Pink. Mouth: Mucous membranes are moist.      Pharynx: Oropharynx is clear. Cardiovascular:      Rate and Rhythm: Normal rate and regular rhythm. Pulmonary:      Effort: Pulmonary effort is normal.      Breath sounds: Normal breath sounds. Musculoskeletal:      Cervical back: Neck supple. Skin:     General: Skin is warm and dry. Neurological:      Mental Status: She is alert and oriented to person, place, and time. Psychiatric:         Mood and Affect: Mood normal.         Behavior: Behavior normal.         ASSESSMENT/PLAN:  1. Generalized headache  Your COVID 19 test can take 1-5 days for the results to come back. We ask that you make a Mychart page and view your test results this way. You will need to Self quarantine until you know your results. Increase fluids and rest  Saline nasal spray as needed for nasal congestion  Warm salt gargles as needed for throat discomfort  Monitor temperature twice a day  Tylenol as needed for fevers and/or discomfort.    Big deep breaths periodically throughout the day  Regular Mucinex over the counter as needed for chest congestion  If symptoms worsen -Go to the ER. Follow up with your primary care provider  - Covid-19 Ambulatory    2. Runny nose  - Covid-19 Ambulatory    3. Close exposure to COVID-19 virus  - Covid-19 Ambulatory      FOLLOW-UP:  Return if symptoms worsen or fail to improve.     In addition to other information, the printed after visit summary provided to the patient includes:  [x] COVID-19 Self care instructions  [x] COVID-19 General patient information

## 2021-09-13 NOTE — PATIENT INSTRUCTIONS
Your COVID 19 test can take 1-5 days for the results to come back. We ask that you make a Mychart page and view your test results this way. You will need to Self quarantine until you know your results. Increase fluids and rest  Saline nasal spray as needed for nasal congestion  Warm salt gargles as needed for throat discomfort  Monitor temperature twice a day  Tylenol as needed for fevers and/or discomfort. Big deep breaths periodically throughout the day  Regular Mucinex over the counter as needed for chest congestion  If symptoms worsen -Go to the ER. Follow up with your primary care provider      To Whom it May Concern: Don Smith was tested for COVID-19 9/13/2021. He/she must stay home until test results are back. If test is positive, he/she must quarantine for a total of 10 days starting from day one of symptom onset. He/she must also be fever-free for 24 hours at that time, and also have improvement in symptoms. We do not recommend retesting as patients may continue to test positive for months even though no longer contagious. It is suggested you call [address] or [address] [place] [address] with any questions regarding quarantine timeframe/return to work/school details.

## 2021-09-14 LAB
SARS-COV-2: NOT DETECTED
SOURCE: NORMAL

## 2021-09-28 ENCOUNTER — OFFICE VISIT (OUTPATIENT)
Dept: INTERNAL MEDICINE CLINIC | Age: 76
End: 2021-09-28
Payer: COMMERCIAL

## 2021-09-28 VITALS
OXYGEN SATURATION: 97 % | HEART RATE: 67 BPM | DIASTOLIC BLOOD PRESSURE: 80 MMHG | RESPIRATION RATE: 18 BRPM | SYSTOLIC BLOOD PRESSURE: 142 MMHG

## 2021-09-28 DIAGNOSIS — N28.89 HYPERTENSION SECONDARY TO OTHER RENAL DISORDERS: ICD-10-CM

## 2021-09-28 DIAGNOSIS — N28.89 HYPERTENSION SECONDARY TO OTHER RENAL DISORDERS: Primary | ICD-10-CM

## 2021-09-28 DIAGNOSIS — I15.1 HYPERTENSION SECONDARY TO OTHER RENAL DISORDERS: Primary | ICD-10-CM

## 2021-09-28 DIAGNOSIS — I48.0 PAROXYSMAL ATRIAL FIBRILLATION (HCC): ICD-10-CM

## 2021-09-28 DIAGNOSIS — M10.072 ACUTE IDIOPATHIC GOUT INVOLVING TOE OF LEFT FOOT: ICD-10-CM

## 2021-09-28 DIAGNOSIS — G47.33 OSA (OBSTRUCTIVE SLEEP APNEA): ICD-10-CM

## 2021-09-28 DIAGNOSIS — N18.4 STAGE 4 CHRONIC KIDNEY DISEASE (HCC): ICD-10-CM

## 2021-09-28 DIAGNOSIS — I15.1 HYPERTENSION SECONDARY TO OTHER RENAL DISORDERS: ICD-10-CM

## 2021-09-28 PROBLEM — N17.9 AKI (ACUTE KIDNEY INJURY) (HCC): Status: RESOLVED | Noted: 2018-11-08 | Resolved: 2021-09-28

## 2021-09-28 PROBLEM — R42 DIZZY SPELLS: Status: RESOLVED | Noted: 2018-01-30 | Resolved: 2021-09-28

## 2021-09-28 PROBLEM — R53.82 CHRONIC FATIGUE: Status: RESOLVED | Noted: 2017-08-28 | Resolved: 2021-09-28

## 2021-09-28 PROBLEM — R07.9 CHEST PAIN: Status: RESOLVED | Noted: 2018-09-06 | Resolved: 2021-09-28

## 2021-09-28 PROBLEM — N17.8 OTHER ACUTE KIDNEY FAILURE (HCC): Status: RESOLVED | Noted: 2020-12-01 | Resolved: 2021-09-28

## 2021-09-28 LAB
A/G RATIO: 1.2 (ref 1.1–2.2)
ALBUMIN SERPL-MCNC: 4.1 G/DL (ref 3.4–5)
ALP BLD-CCNC: 89 U/L (ref 40–129)
ALT SERPL-CCNC: 36 U/L (ref 10–40)
ANION GAP SERPL CALCULATED.3IONS-SCNC: 16 MMOL/L (ref 3–16)
AST SERPL-CCNC: 30 U/L (ref 15–37)
BASOPHILS ABSOLUTE: 0 K/UL (ref 0–0.2)
BASOPHILS RELATIVE PERCENT: 0.7 %
BILIRUB SERPL-MCNC: <0.2 MG/DL (ref 0–1)
BUN BLDV-MCNC: 48 MG/DL (ref 7–20)
CALCIUM SERPL-MCNC: 9.6 MG/DL (ref 8.3–10.6)
CHLORIDE BLD-SCNC: 107 MMOL/L (ref 99–110)
CO2: 19 MMOL/L (ref 21–32)
CREAT SERPL-MCNC: 2.6 MG/DL (ref 0.6–1.2)
EOSINOPHILS ABSOLUTE: 0.2 K/UL (ref 0–0.6)
EOSINOPHILS RELATIVE PERCENT: 3.7 %
GFR AFRICAN AMERICAN: 22
GFR NON-AFRICAN AMERICAN: 18
GLOBULIN: 3.4 G/DL
GLUCOSE BLD-MCNC: 95 MG/DL (ref 70–99)
HCT VFR BLD CALC: 35.2 % (ref 36–48)
HEMOGLOBIN: 11.1 G/DL (ref 12–16)
LYMPHOCYTES ABSOLUTE: 1.1 K/UL (ref 1–5.1)
LYMPHOCYTES RELATIVE PERCENT: 19.6 %
MCH RBC QN AUTO: 24.7 PG (ref 26–34)
MCHC RBC AUTO-ENTMCNC: 31.4 G/DL (ref 31–36)
MCV RBC AUTO: 78.6 FL (ref 80–100)
MONOCYTES ABSOLUTE: 0.4 K/UL (ref 0–1.3)
MONOCYTES RELATIVE PERCENT: 7.3 %
NEUTROPHILS ABSOLUTE: 3.9 K/UL (ref 1.7–7.7)
NEUTROPHILS RELATIVE PERCENT: 68.7 %
PDW BLD-RTO: 17.6 % (ref 12.4–15.4)
PLATELET # BLD: 224 K/UL (ref 135–450)
PMV BLD AUTO: 9.6 FL (ref 5–10.5)
POTASSIUM SERPL-SCNC: 5 MMOL/L (ref 3.5–5.1)
RBC # BLD: 4.48 M/UL (ref 4–5.2)
SODIUM BLD-SCNC: 142 MMOL/L (ref 136–145)
TOTAL PROTEIN: 7.5 G/DL (ref 6.4–8.2)
WBC # BLD: 5.7 K/UL (ref 4–11)

## 2021-09-28 PROCEDURE — 36415 COLL VENOUS BLD VENIPUNCTURE: CPT | Performed by: INTERNAL MEDICINE

## 2021-09-28 PROCEDURE — 99214 OFFICE O/P EST MOD 30 MIN: CPT | Performed by: INTERNAL MEDICINE

## 2021-09-28 PROCEDURE — 90471 IMMUNIZATION ADMIN: CPT | Performed by: INTERNAL MEDICINE

## 2021-09-28 PROCEDURE — 90694 VACC AIIV4 NO PRSRV 0.5ML IM: CPT | Performed by: INTERNAL MEDICINE

## 2021-09-28 RX ORDER — COLCHICINE 0.6 MG/1
0.6 TABLET ORAL 2 TIMES DAILY PRN
Qty: 30 TABLET | Refills: 1 | Status: SHIPPED | OUTPATIENT
Start: 2021-09-28 | End: 2022-01-18 | Stop reason: ALTCHOICE

## 2021-09-28 NOTE — PROGRESS NOTES
Stephanie Carey  1945 09/28/21    SUBJECTIVE:    Patient compliant with medications for diabetes. Blood sugars have averaged around 100-120, with pt checking blood sugar 1 time daily. Patient has had no episodes of significant hypoglycemia. She continues to follow with Dr Georgia Hastings for the HTN. She has had mild episodes of gout which responded well to colchicine. She denies bloodin the stool, black stools, palpitations. She continues on eliquis. She is using the CPAP - in general this provides benefit. OBJECTIVE:    BP (!) 142/80   Pulse 67   Resp 18   LMP  (LMP Unknown)   SpO2 97%     Physical Exam  Constitutional:       Appearance: She is well-developed. Eyes:      General: No scleral icterus. Conjunctiva/sclera: Conjunctivae normal.   Neck:      Thyroid: No thyromegaly. Trachea: No tracheal deviation. Cardiovascular:      Rate and Rhythm: Normal rate and regular rhythm. Heart sounds: No murmur heard. No friction rub. No gallop. Pulmonary:      Effort: No respiratory distress. Breath sounds: No wheezing or rales. Abdominal:      General: Bowel sounds are normal. There is no distension. Palpations: Abdomen is soft. There is no hepatomegaly or mass. Tenderness: There is no abdominal tenderness. There is no guarding or rebound. Musculoskeletal:      Cervical back: Neck supple. Lymphadenopathy:      Cervical: No cervical adenopathy. Skin:     Nails: There is no clubbing. Neurological:      Mental Status: She is alert and oriented to person, place, and time. Psychiatric:         Behavior: Behavior normal.         Judgment: Judgment normal.         ASSESSMENT:    1. Hypertension secondary to other renal disorders    2. Acute idiopathic gout involving toe of left foot    3. Paroxysmal atrial fibrillation (HCC)    4. Stage 4 chronic kidney disease ([address]    5.  ADOLFO (obstructive sleep apnea)        PLAN:    Sharon Childs was seen today for medication refill. Diagnoses and all orders for this visit:    Hypertension secondary to other renal disorders - MAKAYLA narayanan spoken with Dr Rush Barthel, and he will be adjusting meds further  -     Comprehensive Metabolic Panel; Future  -     CBC Auto Differential; Future    Acute idiopathic gout involving toe of left foot - OK for infrequent use  -     colchicine (COLCRYS) 0.6 MG tablet; Take 1 tablet by mouth 2 times daily as needed for Pain (gout)    Paroxysmal atrial fibrillation (HCC) - no current symptoms; no change in mgmt    Stage 4 chronic kidney disease ([facility] [address] - check labs  -     Comprehensive Metabolic Panel;  Future  -     CBC Auto Differential; Future    ADOLFO (obstructive sleep apnea) - doing well, no change in mgmt    Other orders  -     INFLUENZA, HIGH-DOSE, QUADV, 65 YRS +, IM, PF, 0.7ML (FLUZONE)

## 2021-11-22 DIAGNOSIS — R92.8 ABNORMAL MAMMOGRAM: Primary | ICD-10-CM

## 2021-12-11 ENCOUNTER — HOSPITAL ENCOUNTER (OUTPATIENT)
Age: 76
Discharge: HOME | End: 2021-12-11
Payer: COMMERCIAL

## 2021-12-11 DIAGNOSIS — C50.411 MALIGNANT NEOPLASM OF UPPER-OUTER QUADRANT OF RIGHT BREAST IN FEMALE, ESTROGEN RECEPTOR POSITIVE (HCC): ICD-10-CM

## 2021-12-11 DIAGNOSIS — Z17.0 MALIGNANT NEOPLASM OF UPPER-OUTER QUADRANT OF RIGHT BREAST IN FEMALE, ESTROGEN RECEPTOR POSITIVE (HCC): ICD-10-CM

## 2021-12-11 LAB
ALBUMIN SERPL-MCNC: 4 GM/DL (ref 3.4–5)
ANION GAP SERPL CALCULATED.3IONS-SCNC: 8 MMOL/L (ref 4–16)
BUN BLDV-MCNC: 56 MG/DL (ref 6–23)
CALCIUM SERPL-MCNC: 8.8 MG/DL (ref 8.3–10.6)
CHLORIDE BLD-SCNC: 105 MMOL/L (ref 99–110)
CO2: 25 MMOL/L (ref 21–32)
CREAT SERPL-MCNC: 3.7 MG/DL (ref 0.6–1.1)
CREATININE URINE: 74.1 MG/DL (ref 28–217)
GFR AFRICAN AMERICAN: 14 ML/MIN/1.73M2
GFR NON-AFRICAN AMERICAN: 12 ML/MIN/1.73M2
GLUCOSE BLD-MCNC: 115 MG/DL (ref 70–99)
PHOSPHORUS: 4.5 MG/DL (ref 2.5–4.9)
POTASSIUM SERPL-SCNC: 4.8 MMOL/L (ref 3.5–5.1)
PROT/CREAT RATIO, UR: 0.6
SODIUM BLD-SCNC: 138 MMOL/L (ref 135–145)
URIC ACID: 7.8 MG/DL (ref 2.6–6)
URINE TOTAL PROTEIN: 47.6 MG/DL

## 2021-12-11 PROCEDURE — 84156 ASSAY OF PROTEIN URINE: CPT

## 2021-12-11 PROCEDURE — 80069 RENAL FUNCTION PANEL: CPT

## 2021-12-11 PROCEDURE — 82570 ASSAY OF URINE CREATININE: CPT

## 2021-12-11 PROCEDURE — 84550 ASSAY OF BLOOD/URIC ACID: CPT

## 2021-12-14 PROBLEM — N17.9 AKI (ACUTE KIDNEY INJURY) (HCC): Status: ACTIVE | Noted: 2021-12-14

## 2021-12-16 ENCOUNTER — HOSPITAL ENCOUNTER (OUTPATIENT)
Dept: WOMENS IMAGING | Age: 76
Discharge: HOME | End: 2021-12-16
Payer: COMMERCIAL

## 2021-12-16 ENCOUNTER — HOSPITAL ENCOUNTER (OUTPATIENT)
Dept: ULTRASOUND IMAGING | Age: 76
Discharge: HOME | End: 2021-12-16
Payer: COMMERCIAL

## 2021-12-16 DIAGNOSIS — R92.8 ABNORMAL MAMMOGRAM: ICD-10-CM

## 2021-12-16 PROCEDURE — G0279 TOMOSYNTHESIS, MAMMO: HCPCS

## 2021-12-27 ENCOUNTER — TELEPHONE (OUTPATIENT)
Dept: INTERNAL MEDICINE CLINIC | Age: 76
End: 2021-12-27

## 2021-12-27 DIAGNOSIS — Z00.00 ENCOUNTER FOR PREVENTIVE CARE: Primary | ICD-10-CM

## 2021-12-28 ENCOUNTER — HOSPITAL ENCOUNTER (OUTPATIENT)
Age: 76
Setting detail: SPECIMEN
Discharge: HOME | End: 2021-12-28
Payer: COMMERCIAL

## 2021-12-28 PROCEDURE — U0003 INFECTIOUS AGENT DETECTION BY NUCLEIC ACID (DNA OR RNA); SEVERE ACUTE RESPIRATORY SYNDROME CORONAVIRUS 2 (SARS-COV-2) (CORONAVIRUS DISEASE [COVID-19]), AMPLIFIED PROBE TECHNIQUE, MAKING USE OF HIGH THROUGHPUT TECHNOLOGIES AS DESCRIBED BY CMS-2020-01-R: HCPCS

## 2021-12-28 PROCEDURE — U0005 INFEC AGEN DETEC AMPLI PROBE: HCPCS

## 2021-12-29 LAB
SARS-COV-2: NOT DETECTED
SOURCE: NORMAL

## 2022-01-06 ENCOUNTER — OFFICE VISIT (OUTPATIENT)
Dept: CARDIOLOGY CLINIC | Age: 77
End: 2022-01-06
Payer: COMMERCIAL

## 2022-01-06 VITALS
DIASTOLIC BLOOD PRESSURE: 68 MMHG | WEIGHT: 199 LBS | SYSTOLIC BLOOD PRESSURE: 142 MMHG | HEART RATE: 60 BPM | HEIGHT: 69 IN | BODY MASS INDEX: 29.47 KG/M2

## 2022-01-06 DIAGNOSIS — I48.0 PAROXYSMAL ATRIAL FIBRILLATION (HCC): Primary | ICD-10-CM

## 2022-01-06 DIAGNOSIS — I15.1 HYPERTENSION SECONDARY TO OTHER RENAL DISORDERS: ICD-10-CM

## 2022-01-06 DIAGNOSIS — E78.2 MIXED HYPERLIPIDEMIA: ICD-10-CM

## 2022-01-06 DIAGNOSIS — N28.89 HYPERTENSION SECONDARY TO OTHER RENAL DISORDERS: ICD-10-CM

## 2022-01-06 DIAGNOSIS — G47.33 OSA (OBSTRUCTIVE SLEEP APNEA): ICD-10-CM

## 2022-01-06 PROCEDURE — 99214 OFFICE O/P EST MOD 30 MIN: CPT | Performed by: NURSE PRACTITIONER

## 2022-01-06 RX ORDER — HYDRALAZINE HYDROCHLORIDE 50 MG/1
50 TABLET, FILM COATED ORAL 2 TIMES DAILY
Qty: 60 TABLET | Refills: 1 | Status: SHIPPED | OUTPATIENT
Start: 2022-01-06 | End: 2022-01-12

## 2022-01-06 ASSESSMENT — ENCOUNTER SYMPTOMS
ORTHOPNEA: 0
SHORTNESS OF BREATH: 0

## 2022-01-06 NOTE — PROGRESS NOTES
1/6/2022  Primary cardiologist: Dr. Matias Hilario  is an established 68 y.o.  female here for follow-up on   1. Paroxysmal atrial fibrillation (HCC)    2. Hypertension secondary to other renal disorders    3. Mixed hyperlipidemia    4. ADOLFO (obstructive sleep apnea)            SUBJECTIVE/OBJECTIVE:    HPI : Annie Alex is a pleasant 70-year-old female patient with history of PAF, hypertension, hyperlipidemia, obstructive sleep apnea, diabetes mellitus type 2 and CKD. Annie Alex reports overall she is feeling well. She denies chest pain or shortness of breath. She keeps active her son engage in organized exercise. She has follow-up with Dr. Pat Karimi for CKD. She is not anuric. Review of Systems   Constitutional: Negative for diaphoresis and malaise/fatigue. Cardiovascular: Negative for chest pain, claudication, dyspnea on exertion, irregular heartbeat, leg swelling, near-syncope, orthopnea, palpitations and paroxysmal nocturnal dyspnea. Respiratory: Negative for shortness of breath. Neurological: Negative for dizziness and light-headedness. Vitals:    01/06/22 0921   BP: (!) 142/68   Site: Left Upper Arm   Position: Sitting   Cuff Size: Medium Adult   Pulse: 60   Weight: 199 lb (90.3 kg)   Height: 5' 8.5\" (1.74 m)     Patient-Reported Vitals 12/10/2020   Patient-Reported Weight 194   Patient-Reported Height -   Patient-Reported Systolic 208   Patient-Reported Diastolic 70   Patient-Reported Pulse 67   Patient-Reported SpO2 -     Wt Readings from Last 3 Encounters:   01/06/22 199 lb (90.3 kg)   12/14/21 198 lb (89.8 kg)   11/09/21 196 lb 3.2 oz (89 kg)     Body mass index is 29.82 kg/m². Physical Exam  Vitals reviewed. Neck:      Vascular: No carotid bruit. Cardiovascular:      Rate and Rhythm: Normal rate and regular rhythm. Pulses: Normal pulses. Heart sounds: Normal heart sounds. Pulmonary:      Effort: Pulmonary effort is normal.      Breath sounds: Normal breath sounds. Musculoskeletal:      Right lower leg: No edema. Left lower leg: No edema. Skin:     General: Skin is warm and dry. Capillary Refill: Capillary refill takes less than 2 seconds. Neurological:      General: No focal deficit present. Mental Status: She is alert.                 Current Outpatient Medications   Medication Sig Dispense Refill    hydrALAZINE (APRESOLINE) 50 MG tablet Take 1 tablet by mouth 2 times daily 60 tablet 1    cloNIDine (CATAPRES-TTS-3) 0.3 MG/24HR PTWK Place 1 patch onto the skin once a week 4 patch 5    colchicine (COLCRYS) 0.6 MG tablet Take 1 tablet by mouth 2 times daily as needed for Pain (gout) 30 tablet 1    furosemide (LASIX) 20 MG tablet take 1 tablet by mouth every other day 90 tablet 3    oxybutynin (DITROPAN) 5 MG tablet take 1 tablet by mouth twice a day 180 tablet 3    propafenone (RYTHMOL) 150 MG tablet Take 1 tablet by mouth every 8 hours 90 tablet 3    apixaban (ELIQUIS) 5 MG TABS tablet Take 1 tablet by mouth 2 times daily 60 tablet 5    metoprolol tartrate (LOPRESSOR) 50 MG tablet Take 1 tablet by mouth 2 times daily take 1 tablet by mouth twice a day 180 tablet 3    dilTIAZem (CARDIZEM CD) 120 MG extended release capsule take 1 capsule by mouth once daily 30 capsule 11    Ascorbic Acid (VITAMIN C) 250 MG tablet Take by mouth daily      atorvastatin (LIPITOR) 80 MG tablet take 1 tablet by mouth once daily 90 tablet 3    Omega-3 Fatty Acids (FISH OIL PO) Take 1,200 mg by mouth      FOLIC ACID PO Take 412 mg by mouth daily      Multiple Vitamins-Minerals (MULTIVITAMIN ADULT PO) Take by mouth      aspirin 81 MG EC tablet Take 1 tablet by mouth daily 30 tablet     Lancets MISC 1 each by In Vitro route 2 times daily 100 each 3    Glucose Blood (BLOOD GLUCOSE TEST STRIPS) STRP 1 each by In Vitro route 2 times daily 100 strip 3    Blood Glucose Monitoring Suppl AV 1 kit by Does not apply route daily 1 Device 0    Calcium Carbonate (CALTRATE 600 PO) Take  by mouth 2 times daily.  ferrous sulfate (IRON 325) 325 (65 Fe) MG tablet Take 1 tablet by mouth daily (with breakfast) 90 tablet 5     No current facility-administered medications for this visit. All pertinent data reviewed and discussed with patient       ASSESSMENT/PLAN:    1. Paroxysmal atrial fibrillation (HCC)  In RRR with with the help of Rythmol. We will continue with Rythmol and Cardizem. She denies symptoms such as shortness of breath or palpitations. HUMAIRA VAS score 5 recommend he continue with anticoagulation with Eliquis for stroke prevention. 2. Hypertension secondary to other renal disorders  Blood pressure mildly elevated today. Will increase hydralazine to 50 mg twice daily  ACE has been stopped d/t JESSI    3. Mixed hyperlipidemia  Results for Luis Daniel Hennessy (MRN [id number])    Ref. Range 6/23/2021 11:03   Cholesterol, Total Latest Ref Range: 0 - 199 mg/dL 191   HDL Cholesterol Latest Ref Range: 40 - 60 mg/dL 55   LDL Calculated Latest Ref Range: <100 mg/dL 111 (H)   Triglycerides Latest Ref Range: 0 - 150 mg/dL 123   VLDL Cholesterol Calculated Latest Ref Range: Not Established mg/dL 25     Her lipids are acceptable-continue with    4. ADOLFO (obstructive sleep apnea)  Using CPAP nightly    CKD  GFR [id number] with Dr. Paulette Gonzales-       Medications reviewed and confirmed with patient     Tests ordered:  ekg at next OV      Follow-up  6 months      Signed:  FAITH Sanchez CNP, 1/6/2022, 9:48 AM    An electronic signature was used to authenticate this note. Please note this report has been partially produced using speech recognition software and may contain errors related to that system including errors in grammar, punctuation, and spelling, as well as words and phrases that may be inappropriate. If there are any questions or concerns please feel free to contact the dictating provider for clarification.

## 2022-01-06 NOTE — PATIENT INSTRUCTIONS
Please be informed that if you contact our office outside of normal business hours the physician on call cannot help with any scheduling or rescheduling issues, procedure instruction questions or any type of medication issue. We advise you for any urgent/emergency that you go to the nearest emergency room! PLEASE CALL OUR OFFICE DURING NORMAL BUSINESS HOURS    Monday - Friday   8 am to 5 pm    [place]: [address]: [phone]    [place]:  [phone]    **It is YOUR responsibilty to bring medication bottles and/or updated medication list to [address] [place] [address].  This will allow us to better serve you and all your healthcare needs**

## 2022-01-06 NOTE — LETTER
Osmel Rodriguez  1945  [id number]    Have you had any Chest Pain that is not new? - No    Have you had any Shortness of Breath - No    Have you had any dizziness - No    Have you had any palpitations that are not new? - No    Do you have any edema - swelling in ankles    If Yes - CHECK TO SEE IF THE EDEMA IS PITTING  How long have they been having edema - Years  If Yes - Have they worn compression stockings Yes  If they have worn compression stockings 1 Years    Vein \"LEG PROBLEM Questionnaire\"  1. Do you have prominent leg veins? Yes   2. Do you have any skin discoloration? No  3. Do you have any healed or active sores? No  4. Do you have swelling of the legs? Yes  5. Do you have a family history of varicose veins? No  6. Does your profession involve pro-longed        standing or heavy lifting? No  7. Have you been fighting overweight problems? Yes  8. Do you have restless legs? No  9. Do you have any night time cramps? Yes  10. Do you have any of the following in your legs:        Aching     When did you have your last labs drawn 9/28/21  Where did you have them done   What doctor ordered     If we do not have these labs you are retrieve these labs for these providers! Do you have a surgery or procedure scheduled in the near future - No                     [id number] Score for Atrial Fibrillation Stroke Risk   Risk   Factors  Component Value   C CHF No 0   H HTN Yes 1   A2 Age >= 76 Yes,  (77 y.o.) 2   D DM Yes 1   S2 Prior Stroke/TIA No 0   V Vascular Disease No 0   A Age 74-69 No,  (77 y.o.) 0   Sc Sex female 1    [id number]  Score  5   Score last updated 1/6/22 2:30 AM EST    Click here for a link to the UpToDate guideline \"Atrial Fibrillation: Anticoagulation therapy to prevent embolization    Disclaimer: Risk Score calculation is dependent on accuracy of patient problem list and past encounter diagnosis.

## 2022-01-12 ENCOUNTER — OFFICE VISIT (OUTPATIENT)
Dept: INTERNAL MEDICINE CLINIC | Age: 77
End: 2022-01-12
Payer: COMMERCIAL

## 2022-01-12 ENCOUNTER — HOSPITAL ENCOUNTER (OUTPATIENT)
Dept: INFUSION THERAPY | Age: 77
Discharge: HOME | End: 2022-01-12
Payer: COMMERCIAL

## 2022-01-12 VITALS
OXYGEN SATURATION: 97 % | RESPIRATION RATE: 18 BRPM | HEART RATE: 63 BPM | SYSTOLIC BLOOD PRESSURE: 152 MMHG | DIASTOLIC BLOOD PRESSURE: 78 MMHG

## 2022-01-12 DIAGNOSIS — N28.89 HYPERTENSION SECONDARY TO OTHER RENAL DISORDERS: ICD-10-CM

## 2022-01-12 DIAGNOSIS — Z17.0 MALIGNANT NEOPLASM OF UPPER-OUTER QUADRANT OF RIGHT BREAST IN FEMALE, ESTROGEN RECEPTOR POSITIVE (HCC): ICD-10-CM

## 2022-01-12 DIAGNOSIS — C50.919 MALIGNANT NEOPLASM OF BREAST IN FEMALE, ESTROGEN RECEPTOR POSITIVE, UNSPECIFIED LATERALITY, UNSPECIFIED SITE OF BREAST (HCC): ICD-10-CM

## 2022-01-12 DIAGNOSIS — E11.21 TYPE 2 DIABETES MELLITUS WITH NEPHROPATHY (HCC): ICD-10-CM

## 2022-01-12 DIAGNOSIS — N18.4 STAGE 4 CHRONIC KIDNEY DISEASE (HCC): ICD-10-CM

## 2022-01-12 DIAGNOSIS — Z17.0 MALIGNANT NEOPLASM OF BREAST IN FEMALE, ESTROGEN RECEPTOR POSITIVE, UNSPECIFIED LATERALITY, UNSPECIFIED SITE OF BREAST (HCC): ICD-10-CM

## 2022-01-12 DIAGNOSIS — C50.411 MALIGNANT NEOPLASM OF UPPER-OUTER QUADRANT OF RIGHT BREAST IN FEMALE, ESTROGEN RECEPTOR POSITIVE (HCC): ICD-10-CM

## 2022-01-12 DIAGNOSIS — I15.1 HYPERTENSION SECONDARY TO OTHER RENAL DISORDERS: ICD-10-CM

## 2022-01-12 DIAGNOSIS — I48.0 PAROXYSMAL ATRIAL FIBRILLATION (HCC): ICD-10-CM

## 2022-01-12 DIAGNOSIS — M79.605 PAIN OF LEFT LOWER EXTREMITY: Primary | ICD-10-CM

## 2022-01-12 PROCEDURE — 85652 RBC SED RATE AUTOMATED: CPT

## 2022-01-12 PROCEDURE — 99214 OFFICE O/P EST MOD 30 MIN: CPT | Performed by: INTERNAL MEDICINE

## 2022-01-12 PROCEDURE — 83540 ASSAY OF IRON: CPT

## 2022-01-12 PROCEDURE — 83615 LACTATE (LD) (LDH) ENZYME: CPT

## 2022-01-12 RX ORDER — HYDRALAZINE HYDROCHLORIDE 100 MG/1
100 TABLET, FILM COATED ORAL 2 TIMES DAILY
Qty: 60 TABLET | Refills: 1 | Status: SHIPPED | OUTPATIENT
Start: 2022-01-12 | End: 2022-01-18 | Stop reason: ALTCHOICE

## 2022-01-12 NOTE — PROGRESS NOTES
Inga Ear  1945 01/12/22    SUBJECTIVE:    Pt struck the medial ankle on a cabinet a week ago, and since then she has had swelling, pain. Walking does not affect the pain. She has elevated the leg and used ice with short term benefit. She continues to see Dr Carlo Ty for the renal insufficiency,    Patient compliant with medications for diabetes. Blood sugars have averaged around 100-150, with pt checking blood sugar 1 time daily. Patient has had no episodes of significant hypoglycemia. The patient is taking hypertensive medications compliantly without side effects. Denies chest pain, dyspnea, edema, or TIA's. Blood pressure has been 649 systolic yesterday. She will see Dr Denilson Rae for the breast cancer today. Patient denies any chest pain, shortness of breath, myalgias, Patient is tolerating cholesterol medications without difficulty. She continues on eliquis and sotalol for the a fib. She denies any blood in the stool,black stools. OBJECTIVE:    BP (!) 152/78   Pulse 63   Resp 18   LMP  (LMP Unknown)   SpO2 97%     Physical Exam  Constitutional:       Appearance: She is well-developed. Eyes:      General: No scleral icterus. Conjunctiva/sclera: Conjunctivae normal.   Neck:      Thyroid: No thyromegaly. Trachea: No tracheal deviation. Cardiovascular:      Rate and Rhythm: Normal rate and regular rhythm. Heart sounds: No murmur heard. No friction rub. No gallop. Pulmonary:      Effort: No respiratory distress. Breath sounds: No wheezing or rales. Abdominal:      General: Bowel sounds are normal. There is no distension. Palpations: Abdomen is soft. There is no hepatomegaly or mass. Tenderness: There is no abdominal tenderness. There is no guarding or rebound. Musculoskeletal:      Cervical back: Neck supple. Lymphadenopathy:      Cervical: No cervical adenopathy. Skin:     Nails: There is no clubbing.    Neurological:      Mental Status: She

## 2022-01-13 ENCOUNTER — HOSPITAL ENCOUNTER (OUTPATIENT)
Age: 77
Setting detail: SPECIMEN
Discharge: HOME | End: 2022-01-13
Payer: COMMERCIAL

## 2022-01-13 ENCOUNTER — HOSPITAL ENCOUNTER (OUTPATIENT)
Dept: INFUSION THERAPY | Age: 77
Discharge: HOME | End: 2022-01-13
Payer: COMMERCIAL

## 2022-01-13 LAB
ALBUMIN SERPL-MCNC: 4.1 GM/DL (ref 3.4–5)
ALP BLD-CCNC: 95 IU/L (ref 40–128)
ALT SERPL-CCNC: 40 U/L (ref 10–40)
ANION GAP SERPL CALCULATED.3IONS-SCNC: 16 MMOL/L (ref 4–16)
AST SERPL-CCNC: 22 IU/L (ref 15–37)
BASOPHILS ABSOLUTE: 0 K/CU MM
BASOPHILS ABSOLUTE: 0 K/CU MM
BASOPHILS RELATIVE PERCENT: 0.2 % (ref 0–1)
BASOPHILS RELATIVE PERCENT: 0.5 % (ref 0–1)
BILIRUB SERPL-MCNC: 0.2 MG/DL (ref 0–1)
BUN BLDV-MCNC: 68 MG/DL (ref 6–23)
CALCIUM SERPL-MCNC: 9 MG/DL (ref 8.3–10.6)
CHLORIDE BLD-SCNC: 104 MMOL/L (ref 99–110)
CHOLESTEROL: 200 MG/DL
CO2: 19 MMOL/L (ref 21–32)
CREAT SERPL-MCNC: 4.2 MG/DL (ref 0.6–1.1)
DIFFERENTIAL TYPE: ABNORMAL
DIFFERENTIAL TYPE: ABNORMAL
EOSINOPHILS ABSOLUTE: 0.3 K/CU MM
EOSINOPHILS ABSOLUTE: 0.3 K/CU MM
EOSINOPHILS RELATIVE PERCENT: 3.2 % (ref 0–3)
EOSINOPHILS RELATIVE PERCENT: 3.2 % (ref 0–3)
ERYTHROCYTE SEDIMENTATION RATE: 47 MM/HR (ref 0–30)
ESTIMATED AVERAGE GLUCOSE: 126 MG/DL
FERRITIN: 95 NG/ML (ref 15–150)
FOLATE: 16.8 NG/ML (ref 3.1–17.5)
GFR AFRICAN AMERICAN: 12 ML/MIN/1.73M2
GFR NON-AFRICAN AMERICAN: 10 ML/MIN/1.73M2
GLUCOSE BLD-MCNC: 116 MG/DL (ref 70–99)
HBA1C MFR BLD: 6 % (ref 4.2–6.3)
HCT VFR BLD CALC: 29.4 % (ref 37–47)
HCT VFR BLD CALC: 30.9 % (ref 37–47)
HDLC SERPL-MCNC: 59 MG/DL
HEMOGLOBIN: 9.1 GM/DL (ref 12.5–16)
HEMOGLOBIN: 9.3 GM/DL (ref 12.5–16)
IMMATURE NEUTROPHIL %: 0.3 % (ref 0–0.43)
IRON: 59 UG/DL (ref 37–145)
LACTATE DEHYDROGENASE: 261 IU/L (ref 120–246)
LDL CHOLESTEROL DIRECT: 115 MG/DL
LYMPHOCYTES ABSOLUTE: 1.3 K/CU MM
LYMPHOCYTES ABSOLUTE: 1.4 K/CU MM
LYMPHOCYTES RELATIVE PERCENT: 15.3 % (ref 24–44)
LYMPHOCYTES RELATIVE PERCENT: 15.9 % (ref 24–44)
MCH RBC QN AUTO: 25.3 PG (ref 27–31)
MCH RBC QN AUTO: 25.8 PG (ref 27–31)
MCHC RBC AUTO-ENTMCNC: 29.4 % (ref 32–36)
MCHC RBC AUTO-ENTMCNC: 31.6 % (ref 32–36)
MCV RBC AUTO: 81.4 FL (ref 78–100)
MCV RBC AUTO: 86.1 FL (ref 78–100)
MONOCYTES ABSOLUTE: 0.7 K/CU MM
MONOCYTES ABSOLUTE: 0.7 K/CU MM
MONOCYTES RELATIVE PERCENT: 7.5 % (ref 0–4)
MONOCYTES RELATIVE PERCENT: 7.8 % (ref 0–4)
NUCLEATED RBC %: 0 %
PCT TRANSFERRIN: 23 % (ref 10–44)
PDW BLD-RTO: 16.1 % (ref 11.7–14.9)
PDW BLD-RTO: 16.5 % (ref 11.7–14.9)
PLATELET # BLD: 307 K/CU MM (ref 140–440)
PLATELET # BLD: 308 K/CU MM (ref 140–440)
PMV BLD AUTO: 10 FL (ref 7.5–11.1)
PMV BLD AUTO: 10.7 FL (ref 7.5–11.1)
POTASSIUM SERPL-SCNC: 5.3 MMOL/L (ref 3.5–5.1)
RBC # BLD: 3.59 M/CU MM (ref 4.2–5.4)
RBC # BLD: 3.61 M/CU MM (ref 4.2–5.4)
SEGMENTED NEUTROPHILS ABSOLUTE COUNT: 6.4 K/CU MM
SEGMENTED NEUTROPHILS ABSOLUTE COUNT: 6.4 K/CU MM
SEGMENTED NEUTROPHILS RELATIVE PERCENT: 72.9 % (ref 36–66)
SEGMENTED NEUTROPHILS RELATIVE PERCENT: 73.2 % (ref 36–66)
SODIUM BLD-SCNC: 139 MMOL/L (ref 135–145)
TOTAL IMMATURE NEUTOROPHIL: 0.03 K/CU MM
TOTAL IRON BINDING CAPACITY: 253 UG/DL (ref 250–450)
TOTAL NUCLEATED RBC: 0 K/CU MM
TOTAL PROTEIN: 6.6 GM/DL (ref 6.4–8.2)
TRIGL SERPL-MCNC: 134 MG/DL
UNSATURATED IRON BINDING CAPACITY: 194 UG/DL (ref 110–370)
VITAMIN B-12: 866.3 PG/ML (ref 211–911)
WBC # BLD: 8.7 K/CU MM (ref 4–10.5)
WBC # BLD: 8.8 K/CU MM (ref 4–10.5)

## 2022-01-13 PROCEDURE — 80061 LIPID PANEL: CPT

## 2022-01-13 PROCEDURE — 83550 IRON BINDING TEST: CPT

## 2022-01-13 PROCEDURE — 82728 ASSAY OF FERRITIN: CPT

## 2022-01-13 PROCEDURE — 83036 HEMOGLOBIN GLYCOSYLATED A1C: CPT

## 2022-01-13 PROCEDURE — 83615 LACTATE (LD) (LDH) ENZYME: CPT

## 2022-01-13 PROCEDURE — 85652 RBC SED RATE AUTOMATED: CPT

## 2022-01-13 PROCEDURE — 80053 COMPREHEN METABOLIC PANEL: CPT

## 2022-01-13 PROCEDURE — 86300 IMMUNOASSAY TUMOR CA 15-3: CPT

## 2022-01-13 PROCEDURE — 85025 COMPLETE CBC W/AUTO DIFF WBC: CPT

## 2022-01-13 PROCEDURE — 83721 ASSAY OF BLOOD LIPOPROTEIN: CPT

## 2022-01-13 PROCEDURE — 82607 VITAMIN B-12: CPT

## 2022-01-13 PROCEDURE — 36415 COLL VENOUS BLD VENIPUNCTURE: CPT

## 2022-01-13 PROCEDURE — 82746 ASSAY OF FOLIC ACID SERUM: CPT

## 2022-01-13 PROCEDURE — 83540 ASSAY OF IRON: CPT

## 2022-01-15 ENCOUNTER — HOSPITAL ENCOUNTER (OUTPATIENT)
Dept: GENERAL RADIOLOGY | Age: 77
Discharge: HOME | End: 2022-01-15
Payer: COMMERCIAL

## 2022-01-15 ENCOUNTER — HOSPITAL ENCOUNTER (OUTPATIENT)
Age: 77
Discharge: HOME | End: 2022-01-15
Payer: COMMERCIAL

## 2022-01-15 DIAGNOSIS — M79.605 PAIN OF LEFT LOWER EXTREMITY: ICD-10-CM

## 2022-01-15 PROCEDURE — 73590 X-RAY EXAM OF LOWER LEG: CPT

## 2022-01-16 NOTE — PROGRESS NOTES
Patient Name: Marvine Bence  Patient : 1945  Patient MRN: [id number]     Primary Oncologist: Ely Conway MD  Referring Provider: Maribeth Reyes MD     Date of Service: 2022      Chief Complaint:   Chief Complaint   Patient presents with    Follow-up    Discuss Labs     Patient Active Problem List:     Osteoarthritis     ADOLFO (obstructive sleep apnea)     Menopause     Paroxysmal atrial fibrillation (HCC)     Osteopenia     Colon polyps     Bilateral leg edema     Urge incontinence     Breast cancer ([address]     Edema of both legs     Mixed hyperlipidemia     Iron deficiency anemia     Gastroesophageal reflux disease without esophagitis     Essential hypertension     Abnormal EKG     Type 2 diabetes mellitus with nephropathy ([address]     Chronic fatigue     Dizzy spells     Carpal tunnel syndrome on left     Trigger finger, left ring finger     Chest pain     Acute blood loss anemia     Obesity (BMI 30-39. 9)     Excessive daytime sleepiness     Ex-cigarette smoker     Malignant neoplasm of upper-outer quadrant of right female breast ([address]     Intestinal malabsorption     Other acute kidney failure ([address]     Acquired cyst of kidney    HPI:   Ms. Bertha Layton is a 68-year-old very pleasant woman with a past medical history significant for hypertension, diabetes, hyperlipidemia, history of SVT and atrial fibrillation, initially referred to me on 10/2012 for evaluation of right breast cancer. She stated that she felt a lump in the right breast and had mammogram on 2012. The patient was found to have a suspicious lesion in the right breast.  She subsequently had a sonogram-guided biopsy of the right breast.  Pathology report is consistent with invasive ductal carcinoma, ER/CA positive, HER-2/nitish negative. The patient was evaluated by a general surgeon, Dr. Abdifatah Figueroa, and had a right lumpectomy on 2012. OncotypeDx was sent and she is low risk for recurrence (Recurrence score 11). She then underwent for adjuvant radiation therapy and it was finished on March 4, 2013. She started adjuvant hormonal therapy with Femara on March 5, 2013 and she completed it on March 5, 2018. She had colonoscopy on 12/6/18 and it showed 5 mm polyp in distal sigmoid colon, diverticulosis and hemorrhoids. Final pathology from sigmoid polyp was tubular adenoma. Mammogram done on February 3, 2020 showed no evidence of malignancy. Radiologist recommend normal interval follow-up in 12 months. Mammogram done on 12/16/2021 showed stable cluster of microcalcifications in the inferior medial right breast.  Radiologist recommended short interval diagnostic right mammogram in 6 months. On January 19, 2022, she presented to me for followup. I have been following Ms. 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 She has been followed periodically since then. She does not have any sign or symptom suggestive for recurrent or metastatic breast cancer on today visit. I reviewed with her findings of mammogram done on 12/16/2021 and I recommended to have repeat diagnostic right mammogram in 6 months. She also has an iron deficiency anemia and I believe it is secondary to occult gastrointestinal bleeding from the antiplatelet agents and anticoagulation therapy. She is currently on eliquis. I reviewed with her findings on laboratory test done on 1/13/2022. Her serum ferritin was 95 ng/ml. Chronic kidney disease - her serum creatinine is 4.2 mg/dl and I recommend her to continue to follow up with Dr. Mila Lala. Diabetes mellitus - on metformin. Recommend to follow up with her PCP. Educate her about diet and exercise. She does not have any significant symptoms at today visit. PAST MEDICAL HISTORY:  Past medical history is significant for the following. 1. Hypertension.,  2. Diabetes. ,  3. Hyperlipidemia.,  4. Atrial fibrillation. ,  5. History of SVT.,  6. Chronic arthritis. PAST SURGICAL HISTORY:  Past surgical history is significant for the following. 1. Hysterectomy. ,  2. Previous left and right breast biopsy. FAMILY HISTORY:  Family history is significant for lung cancer in her brother and father. The patients father has heart disease and mother has diabetes. SOCIAL HISTORY:  She is a former smoker; however, she quit smoking in the 1980s. She denies alcohol drinking or illicit drug abuse. She is a  and has three children. She is a retiree. Oncology History    No history exists. Review of Systems: \"Per interval history; otherwise 10 point ROS is negative. \"  Her energy level is fair and appetite is stable. Her sleep is good. She denies fever, chills, night sweats, cough, shortness of breath, chest pain, hemoptysis or palpitations. Her bowel and bladder functions are normal. She doesn't have nausea, vomiting, abdominal pain, diarrhea, constipation, dysuria, loss of appetite or weight loss. She denies neuropathy and she doesn't have bleeding or clotting issues. She denies any pain in her body. No anxiety or depression. The rest of the systems are unremarkable.      Vital Signs:  BP (!) 186/79 (Site: Right Upper Arm, Position: Sitting, Cuff Size: Medium Adult)   Pulse 70   Temp 97 °F (36.1 °C) (Infrared)   Resp 16   Ht 5' 8\" (1.727 m)   Wt 199 lb 9.6 oz (90.5 kg)   LMP  (LMP Unknown)   SpO2 98%   BMI 30.35 kg/m²     Physical Exam:  CONSTITUTIONAL: alert, cooperative, awake, no apparent distress   EYES: pupils equal, round and reactive to light, sclera clear and conjunctiva normal  ENT: Normocephalic, without obvious abnormality, atraumatic  NECK: supple, symmetrical, no jugular venous distension and no carotid bruits   HEMATOLOGIC/LYMPHATIC: no cervical, supraclavicular or axillary lymphadenopathy   LUNGS: VBS, no wheezes, clear to auscultation, no crackles, no rhonchi, no increased work of breathing,   CARDIOVASCULAR: regular rate and rhythm, normal S1 and S2, no murmur noted  ABDOMEN: normal bowel sounds x 4, soft, non-distended, non-tender, no masses palpated, no hepatosplenomegaly   MUSCULOSKELETAL: full range of motion noted, tone is normal  NEUROLOGIC: awake, alert, oriented to name, place and time. Motor skills grossly intact. SKIN: Normal skin color, texture, turgor and no jaundice. appears intact   EXTREMITIES: no LE edema, no clubbing, no leg swelling, no cyanosis,   BREASTS: Post surgical and radiation therapy changes noted in her right breast. No palpable mass in bilateral breasts and axilla.       Labs:  Hematology:  Lab Results   Component Value Date    WBC 8.8 01/13/2022    WBC 8.7 01/13/2022    RBC 3.61 (L) 01/13/2022    RBC 3.59 (L) 01/13/2022    HGB 9.3 (L) 01/13/2022    HGB 9.1 (L) 01/13/2022    HCT 29.4 (L) 01/13/2022    HCT 30.9 (L) 01/13/2022    MCV 81.4 01/13/2022    MCV 86.1 01/13/2022    MCH 25.8 (L) 01/13/2022    MCH 25.3 (L) 01/13/2022    MCHC 31.6 (L) 01/13/2022    MCHC 29.4 (L) 01/13/2022    RDW 16.5 (H) 01/13/2022    RDW 16.1 (H) 01/13/2022     01/13/2022     01/13/2022    MPV 10.0 01/13/2022    MPV 10.7 01/13/2022    SEGSPCT 73.2 (H) 01/13/2022    SEGSPCT 72.9 (H) 01/13/2022    EOSRELPCT 3.2 (H) 01/13/2022    EOSRELPCT 3.2 (H) 01/13/2022    BASOPCT 0.2 01/13/2022    BASOPCT 0.5 01/13/2022    LYMPHOPCT 15.9 (L) 01/13/2022    LYMPHOPCT 15.3 (L) 01/13/2022    MONOPCT 7.5 (H) 01/13/2022    MONOPCT 7.8 (H) 01/13/2022    SEGSABS 6.4 01/13/2022    SEGSABS 6.4 01/13/2022    EOSABS 0.3 01/13/2022    EOSABS 0.3 01/13/2022    BASOSABS 0.0 01/13/2022    BASOSABS 0.0 01/13/2022    LYMPHSABS 1.4 01/13/2022    LYMPHSABS 1.3 01/13/2022    MONOSABS 0.7 01/13/2022    MONOSABS 0.7 01/13/2022    DIFFTYPE AUTOMATED DIFFERENTIAL 01/13/2022    DIFFTYPE AUTOMATED DIFFERENTIAL 01/13/2022     Lab Results   Component Value Date    ESR 47 (H) 01/13/2022     Chemistry:  Lab Results   Component Value Date     01/13/2022    K 5.3 (H) 01/13/2022     01/13/2022    CO2 19 (L) 01/13/2022    BUN 68 (H) 01/13/2022    CREATININE 4.2 (H) 01/13/2022    GLUCOSE 116 (H) 01/13/2022    CALCIUM 9.0 01/13/2022    PROT 6.6 01/13/2022    LABALBU 4.1 01/13/2022    BILITOT 0.2 01/13/2022    ALKPHOS 95 01/13/2022    AST 22 01/13/2022    ALT 40 01/13/2022    LABGLOM 10 (L) 01/13/2022    GFRAA 12 (L) 01/13/2022    AGRATIO 1.2 09/28/2021    GLOB 3.4 09/28/2021    PHOS 4.5 12/11/2021    MG 2.2 05/08/2021    POCCA 1.10 (L) 01/23/2017    POCGLU 147 (H) 05/11/2021     Lab Results   Component Value Date     (H) 01/13/2022     No components found for: LD  Lab Results   Component Value Date    TSHHS 1.010 05/08/2021    T4FREE 1.33 11/30/2017    FT3 2.4 04/01/2014     Immunology:  Lab Results   Component Value Date    PROT 6.6 01/13/2022    ALBUMINELP 3.5 11/30/2017    LABALPH 0.3 11/30/2017    LABALPH 3.7 11/30/2017    LABBETA 1.2 11/30/2017    GAMGLOB 1.0 11/30/2017     No results found for: Radha Chacko, KLFLCR  No results found for: B2M  Coagulation Panel:  Lab Results   Component Value Date    PROTIME 15.0 (H) 05/08/2021    INR 1.24 05/08/2021    APTT 63.9 (H) 05/08/2021     Anemia Panel:  Lab Results   Component Value Date    OPTYHLQD93 866.3 01/13/2022    FOLATE 16.8 01/13/2022     Tumor Markers:  Lab Results   Component Value Date    CEA <0.5 04/01/2014    LABCA2 54.8 (H) 01/13/2022     Observations:  PHQ-9 Total Score: 0 (1/19/2022  9:26 AM)        Assessment & Plan:   Stage IA right breast invasive ductal carcinoma  Mild iron deficiency anemia     PLAN:   Ms. Marquis Lock has been followed for stage IA right breast invasive ductal carcinoma, iron deficiency anemia superimposed by anemia due to chronic kidney disease. Mammogram done on February 3, 2020 showed no evidence of malignancy.   Radiologist recommend normal interval follow-up in 12 months. Mammogram done on 12/16/2021 showed stable cluster of microcalcifications in the inferior medial right breast.  Radiologist recommended short interval diagnostic right mammogram in 6 months. On January 19, 2022, she presented to me for followup. I have been following Ms. 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 She has been followed periodically since then. She does not have any sign or symptom suggestive for recurrent or metastatic breast cancer on today visit. I reviewed with her findings of mammogram done on 12/16/2021 and I recommended to have repeat diagnostic right mammogram in 6 months. She also has an iron deficiency anemia and I believe it is secondary to occult gastrointestinal bleeding from the antiplatelet agents and anticoagulation therapy. She is currently on eliquis. I reviewed with her findings on laboratory test done on 1/13/2022. Her serum ferritin was 95 ng/ml. Chronic kidney disease - her serum creatinine is 4.2 mg/dl and I recommend her to continue to follow up with Dr. Danyelle Cuba. Diabetes mellitus - on metformin. Recommend to follow up with her PCP. Educate her about diet and exercise. I answered all her questions and concerns for today. I asked her to follow up with her primary care physician, Dr. Caitlin Spain on regular basis. Recent imaging and labs were reviewed and discussed with the patient.

## 2022-01-18 PROBLEM — I25.119 ATHEROSCLEROTIC HEART DISEASE OF NATIVE CORONARY ARTERY WITH UNSPECIFIED ANGINA PECTORIS (HCC): Status: ACTIVE | Noted: 2022-01-18

## 2022-01-18 PROBLEM — I20.9 ANGINA PECTORIS, UNSPECIFIED (HCC): Status: ACTIVE | Noted: 2022-01-18

## 2022-01-18 LAB — CA 27.29: 54.8 U/ML

## 2022-01-19 ENCOUNTER — OFFICE VISIT (OUTPATIENT)
Dept: ONCOLOGY | Age: 77
End: 2022-01-19
Payer: COMMERCIAL

## 2022-01-19 ENCOUNTER — HOSPITAL ENCOUNTER (OUTPATIENT)
Dept: INFUSION THERAPY | Age: 77
Discharge: HOME | End: 2022-01-19

## 2022-01-19 VITALS
HEART RATE: 70 BPM | RESPIRATION RATE: 16 BRPM | HEIGHT: 68 IN | SYSTOLIC BLOOD PRESSURE: 186 MMHG | BODY MASS INDEX: 30.25 KG/M2 | DIASTOLIC BLOOD PRESSURE: 79 MMHG | OXYGEN SATURATION: 98 % | TEMPERATURE: 97 F | WEIGHT: 199.6 LBS

## 2022-01-19 DIAGNOSIS — Z17.0 MALIGNANT NEOPLASM OF UPPER-OUTER QUADRANT OF RIGHT BREAST IN FEMALE, ESTROGEN RECEPTOR POSITIVE (HCC): Primary | ICD-10-CM

## 2022-01-19 DIAGNOSIS — C50.411 MALIGNANT NEOPLASM OF UPPER-OUTER QUADRANT OF RIGHT BREAST IN FEMALE, ESTROGEN RECEPTOR POSITIVE (HCC): Primary | ICD-10-CM

## 2022-01-19 PROCEDURE — 99214 OFFICE O/P EST MOD 30 MIN: CPT | Performed by: INTERNAL MEDICINE

## 2022-01-19 ASSESSMENT — PATIENT HEALTH QUESTIONNAIRE - PHQ9
SUM OF ALL RESPONSES TO PHQ9 QUESTIONS 1 & 2: 0
2. FEELING DOWN, DEPRESSED OR HOPELESS: 0
SUM OF ALL RESPONSES TO PHQ QUESTIONS 1-9: 0
SUM OF ALL RESPONSES TO PHQ QUESTIONS 1-9: 0
1. LITTLE INTEREST OR PLEASURE IN DOING THINGS: 0
SUM OF ALL RESPONSES TO PHQ QUESTIONS 1-9: 0
SUM OF ALL RESPONSES TO PHQ QUESTIONS 1-9: 0

## 2022-01-19 NOTE — PROGRESS NOTES
MA Rooming Questions  Patient: Sofia Aly  MRN: [id number]    Date: 1/19/2022        1. Do you have any new issues?   no         2. Do you need any refills on medications?    no    3. Have you had any imaging done since your last visit? yes - Mammogram    4. Have you been hospitalized or seen in the emergency room since your last visit here?   no    5. Did the patient have a depression screening completed today?  Yes    PHQ-9 Total Score: 0 (1/19/2022  9:26 AM)       PHQ-9 Given to (if applicable):               PHQ-9 Score (if applicable):                     [] Positive     [x]  Negative              Does question #9 need addressed (if applicable)                     [] Yes    []  No               Ninfa Foss CMA

## 2022-01-26 ENCOUNTER — HOSPITAL ENCOUNTER (OUTPATIENT)
Age: 77
Discharge: HOME | End: 2022-01-26
Payer: COMMERCIAL

## 2022-01-26 DIAGNOSIS — N17.9 AKI (ACUTE KIDNEY INJURY) (HCC): ICD-10-CM

## 2022-01-26 DIAGNOSIS — N18.4 CKD (CHRONIC KIDNEY DISEASE), STAGE IV (HCC): ICD-10-CM

## 2022-01-26 DIAGNOSIS — R60.0 EDEMA OF BOTH LEGS: ICD-10-CM

## 2022-01-26 DIAGNOSIS — N18.4 STAGE 4 CHRONIC KIDNEY DISEASE (HCC): ICD-10-CM

## 2022-01-26 LAB
ALBUMIN SERPL-MCNC: 3.5 GM/DL (ref 3.4–5)
ANION GAP SERPL CALCULATED.3IONS-SCNC: 12 MMOL/L (ref 4–16)
BUN BLDV-MCNC: 59 MG/DL (ref 6–23)
CALCIUM SERPL-MCNC: 8.5 MG/DL (ref 8.3–10.6)
CHLORIDE BLD-SCNC: 104 MMOL/L (ref 99–110)
CO2: 23 MMOL/L (ref 21–32)
CREAT SERPL-MCNC: 4.6 MG/DL (ref 0.6–1.1)
FERRITIN: 107 NG/ML (ref 15–150)
GFR AFRICAN AMERICAN: 11 ML/MIN/1.73M2
GFR NON-AFRICAN AMERICAN: 9 ML/MIN/1.73M2
GLUCOSE BLD-MCNC: 102 MG/DL (ref 70–99)
IRON: 40 UG/DL (ref 37–145)
PCT TRANSFERRIN: 18 % (ref 10–44)
PHOSPHORUS: 4.2 MG/DL (ref 2.5–4.9)
POTASSIUM SERPL-SCNC: 4.9 MMOL/L (ref 3.5–5.1)
SODIUM BLD-SCNC: 139 MMOL/L (ref 135–145)
TOTAL IRON BINDING CAPACITY: 223 UG/DL (ref 250–450)
UNSATURATED IRON BINDING CAPACITY: 183 UG/DL (ref 110–370)

## 2022-01-26 PROCEDURE — 83550 IRON BINDING TEST: CPT

## 2022-01-26 PROCEDURE — 36415 COLL VENOUS BLD VENIPUNCTURE: CPT

## 2022-01-26 PROCEDURE — 82728 ASSAY OF FERRITIN: CPT

## 2022-01-26 PROCEDURE — 80069 RENAL FUNCTION PANEL: CPT

## 2022-01-26 PROCEDURE — 83540 ASSAY OF IRON: CPT

## 2022-02-11 RX ORDER — PROPAFENONE HYDROCHLORIDE 150 MG/1
150 TABLET, FILM COATED ORAL EVERY 8 HOURS
Qty: 90 TABLET | Refills: 0 | Status: SHIPPED | OUTPATIENT
Start: 2022-02-11 | End: 2022-03-31 | Stop reason: SDUPTHER

## 2022-02-11 NOTE — TELEPHONE ENCOUNTER
----- Message from Haja Solorzanoe sent at 2/10/2022  3:21 PM EST -----  Subject: Refill Request    QUESTIONS  Name of Medication? propafenone (RYTHMOL) 150 MG tablet  Patient-reported dosage and instructions? 150 mg every 8hrs  How many days do you have left? 4  Preferred Pharmacy? [address] phone number (if available)? [phone]  ---------------------------------------------------------------------------  --------------  CALL BACK INFO  What is the best way for the office to contact you? OK to leave message on   voicemail  Preferred Call Back Phone Number?  [phone]

## 2022-02-12 ENCOUNTER — HOSPITAL ENCOUNTER (OUTPATIENT)
Age: 77
Discharge: HOME | End: 2022-02-12
Payer: COMMERCIAL

## 2022-02-12 LAB
ALBUMIN SERPL-MCNC: 3.8 GM/DL (ref 3.4–5)
ANION GAP SERPL CALCULATED.3IONS-SCNC: 10 MMOL/L (ref 4–16)
BUN BLDV-MCNC: 67 MG/DL (ref 6–23)
CALCIUM SERPL-MCNC: 8.8 MG/DL (ref 8.3–10.6)
CHLORIDE BLD-SCNC: 104 MMOL/L (ref 99–110)
CO2: 23 MMOL/L (ref 21–32)
CREAT SERPL-MCNC: 5.2 MG/DL (ref 0.6–1.1)
GFR AFRICAN AMERICAN: 10 ML/MIN/1.73M2
GFR NON-AFRICAN AMERICAN: 8 ML/MIN/1.73M2
GLUCOSE BLD-MCNC: 100 MG/DL (ref 70–99)
PHOSPHORUS: 4.2 MG/DL (ref 2.5–4.9)
POTASSIUM SERPL-SCNC: 5.3 MMOL/L (ref 3.5–5.1)
SODIUM BLD-SCNC: 137 MMOL/L (ref 135–145)

## 2022-02-12 PROCEDURE — 80069 RENAL FUNCTION PANEL: CPT

## 2022-02-12 PROCEDURE — 36415 COLL VENOUS BLD VENIPUNCTURE: CPT

## 2022-02-15 PROBLEM — E87.5 HYPERPOTASSEMIA: Status: ACTIVE | Noted: 2022-02-15

## 2022-02-15 PROBLEM — N18.5 CHRONIC KIDNEY DISEASE, STAGE V (HCC): Status: ACTIVE | Noted: 2021-03-10

## 2022-03-01 ENCOUNTER — HOSPITAL ENCOUNTER (OUTPATIENT)
Age: 77
Discharge: HOME | End: 2022-03-01

## 2022-03-08 ENCOUNTER — OFFICE VISIT (OUTPATIENT)
Dept: PULMONOLOGY | Age: 77
End: 2022-03-08
Payer: COMMERCIAL

## 2022-03-08 VITALS
DIASTOLIC BLOOD PRESSURE: 54 MMHG | BODY MASS INDEX: 28.58 KG/M2 | WEIGHT: 193 LBS | HEART RATE: 49 BPM | OXYGEN SATURATION: 94 % | HEIGHT: 69 IN | SYSTOLIC BLOOD PRESSURE: 120 MMHG

## 2022-03-08 DIAGNOSIS — G47.19 EXCESSIVE DAYTIME SLEEPINESS: ICD-10-CM

## 2022-03-08 DIAGNOSIS — G47.33 OSA (OBSTRUCTIVE SLEEP APNEA): ICD-10-CM

## 2022-03-08 DIAGNOSIS — Z87.891 EX-CIGARETTE SMOKER: ICD-10-CM

## 2022-03-08 DIAGNOSIS — E66.9 OBESITY (BMI 30-39.9): ICD-10-CM

## 2022-03-08 PROCEDURE — 99213 OFFICE O/P EST LOW 20 MIN: CPT | Performed by: INTERNAL MEDICINE

## 2022-03-08 ASSESSMENT — ENCOUNTER SYMPTOMS
ABDOMINAL PAIN: 0
COUGH: 0
EYE DISCHARGE: 0
EYE ITCHING: 0
ABDOMINAL DISTENTION: 0
SHORTNESS OF BREATH: 0
BACK PAIN: 0

## 2022-03-08 NOTE — PROGRESS NOTES
Candida Tierneyfabian  1945  Referring Provider: Xiang Samayoa MD    Subjective:     Chief Complaint   Patient presents with   Margo Leigh is a 68 y.o. female has come back as a follow up. She has mild ADOLFO with EDS. She is on a recall CPAP. She has used this old one about 3/30 days for more than 4 hours (10%). She has a nasal pillows mask which is burning her nose. She has 3 lb weight loss. She is still sleepy and tired during the day time. Her 2 week download data showed a residual AHI of 2.2 and leak is 26.4 L/min.      Current Outpatient Medications   Medication Sig Dispense Refill    atorvastatin (LIPITOR) 80 MG tablet Take 1 tab by mouth once daily 90 tablet 3    metoprolol tartrate (LOPRESSOR) 50 MG tablet Take 1 tablet by mouth 2 times daily take 1 tablet by mouth twice a day 180 tablet 3    propafenone (RYTHMOL) 150 MG tablet Take 1 tablet by mouth every 8 hours 90 tablet 0    apixaban (ELIQUIS) 5 MG TABS tablet Take 1 tablet by mouth 2 times daily 42 tablet 0    doxazosin (CARDURA) 4 MG tablet Take 1 tablet by mouth daily 30 tablet 3    chlorthalidone (HYGROTON) 25 MG tablet Take 1 tablet by mouth daily 30 tablet 3    furosemide (LASIX) 20 MG tablet take 1 tablet by mouth every other day 90 tablet 3    oxybutynin (DITROPAN) 5 MG tablet take 1 tablet by mouth twice a day 180 tablet 3    dilTIAZem (CARDIZEM CD) 120 MG extended release capsule take 1 capsule by mouth once daily 30 capsule 11    Ascorbic Acid (VITAMIN C) 250 MG tablet Take by mouth daily      Omega-3 Fatty Acids (FISH OIL PO) Take 1,200 mg by mouth      FOLIC ACID PO Take 663 mg by mouth daily      Multiple Vitamins-Minerals (MULTIVITAMIN ADULT PO) Take by mouth      aspirin 81 MG EC tablet Take 1 tablet by mouth daily 30 tablet     Lancets MISC 1 each by In Vitro route 2 times daily 100 each 3    Glucose Blood (BLOOD GLUCOSE TEST STRIPS) STRP 1 each by In Vitro route 2 times daily 100 strip 3    Blood Glucose Monitoring Suppl AV 1 kit by Does not apply route daily 1 Device 0    Calcium Carbonate (CALTRATE 600 PO) Take  by mouth 2 times daily.  isosorbide mononitrate (IMDUR) 30 MG extended release tablet Take 1 tablet by mouth 2 times daily 60 tablet 3    cloNIDine (CATAPRES-TTS-3) 0.3 MG/24HR PTWK Place 1 patch onto the skin once a week 4 patch 5     No current facility-administered medications for this visit. Allergies   Allergen Reactions    Latex      \"Blisters\"    Tape Willodean Maxim Tape]      \"Turn Red\"       Past Medical History:   Diagnosis Date    14 day event monitor 11/05/2018    Sinus rhythm    Atrial fibrillation with RVR ([facility] [address] 11/25/2013    Breast cancer ([facility] [address]     Edema     4/08 TTE diastolic dysfxn, EF 45%; 47/21 - TTE diastolic dysfxn, EF 80%; Stress myoview  WNl, EF 70%    Family history of cardiovascular disease     GERD (gastroesophageal reflux disease)     H/O 24 hour EKG monitoring 4/23/12    [facility]    H/O cardiovascular stress test     4/23/12-[facility], Probably norm perfusion Lexiscan cardiolite study except for diaphramatic attenuation artifact, otherwise perfusion is normal, norm LV fxn by gated scan. 11/10-EF70%, 9/15/08-EF70%, 1/15/07-EF70%, 9/03    H/O echocardiogram     4/23/12-[facility]- Norm chamber sizes. LVH with norm LV systolic but abn diastolic fxn, mild MR and TR, minimal pulm HTN. 11/10 -EF>55%; 3/05, 9/23/03    History of cardiac catheterization     11/15/2013-EF 55%, No signif CAD -Dr Francisco Whittaker;;    History of cardiovascular stress test     11/14/2013-EF 70%. Normal Lexiscan Cardiolite, Uniform wall motion;    History of echocardiogram     28/86/7058-[facility] global systolic  function. No wall motion abnormalities. EF 55%. Mild MR/TR;    History of Holter monitoring 11/17/14 11/14-48 hour Holter: Predominant rhythm was sinus, no ventricular ectopy noted, supraventricular ectopics were noted in single beat forms.      History of therapeutic radiation     Hx of 24 hour EKG monitoring     12/17/13 48 hr holter monitor. Sinus rhythm with intermittent sinus arrhythmia.  Hyperlipidemia     Hypertension     11/13 Cath WNL, EF 55%; 11/13 Stress WNL : 11/13 TTE mild MR and TR, EF 55%; 4/12 Stress WNL; 8/9 - Cath WNL    Iron deficiency anemia 7/9/2013 9/13 EGD WNL    Lumbar radiculopathy 11/25/2013    Menopause     ADOLFO (obstructive sleep apnea)     sleep study 10/3 CPAP 6cm     Osteoarthritis     both knees    Osteopenia     9/12 DEXA T-score -1.5    Other activity(E029.9)     48 hr holter, the 48 hr holter monitor reveals the patient in the sinus rhythm with occasional supraventricular ectopic beats. There is a rare short atrial run.  Paroxysmal atrial fibrillation (HCC)     4/12 Holter [facility]    Prolonged emergence from general anesthesia     Proteinuria     Right Breast Cancer Dx 10-12    Supraventricular tachycardia (HCC)     Type 2 diabetes mellitus (HCC) Dx 2000's    Urge incontinence     Wears partial dentures     upper partial       Past Surgical History:   Procedure Laterality Date    BREAST BIOPSY  10-12    Right Breast Biopsy, Cancer    BREAST BIOPSY  1970's    Right Breast Biopsy, Benign    BREAST LUMPECTOMY  11/6/12    Breast cancer - Right with sentinal node    BUNIONECTOMY  1990's    Right Caitlyn Chua    8/10/09- The patient has no significant CAD. The elevated troponin is probably secondary to SVT. , 10/03- no significant CAD    CARPAL TUNNEL RELEASE Left 01/2019    CATARACT REMOVAL Bilateral 03/2020, 5/20    CATARACT REMOVAL Left 05/2020    COLONOSCOPY  \"X 2 Last One 2008 \"    Polpy Removed During Last Colonoscopy    DENTAL SURGERY      Teeth Extracted In Past    ENDOSCOPY, COLON, DIAGNOSTIC  07/18/2016    savary dilatation to 17 mm    HYSTERECTOMY, TOTAL ABDOMINAL  1980's    JOINT REPLACEMENT  2009    Total Left Knee    JOINT REPLACEMENT  2010    Total Right Knee    SOFI STEROTACTIC LOC BREAST BIOPSY RIGHT Right 2021    SOFI STEROTACTIC LOC BREAST BIOPSY RIGHT [facility]    OVARY REMOVAL      UPPER GASTROINTESTINAL ENDOSCOPY N/A 2018    EGD DIAGNOSTIC ONLY performed by Rodger Rainey MD at [address] History     Socioeconomic History    Marital status:      Spouse name: None    Number of children: None    Years of education: None    Highest education level: None   Occupational History    None   Tobacco Use    Smoking status: Former Smoker     Packs/day: 0.50     Years: 10.00     Pack years: 5.00     Quit date: 1987     Years since quittin.1    Smokeless tobacco: Never Used   Substance and Sexual Activity    Alcohol use: No     Alcohol/week: 0.0 standard drinks     Comment: 3 cups coffee daily    Drug use: No    Sexual activity: Never   Other Topics Concern    None   Social History Narrative    Wears glasses     Social Determinants of Health     Financial Resource Strain:     Difficulty of Paying Living Expenses: Not on file   Food Insecurity:     Worried About Running Out of Food in the Last Year: Not on file    Lucrecia of Food in the Last Year: Not on file   Transportation Needs:     Lack of Transportation (Medical): Not on file    Lack of Transportation (Non-Medical):  Not on file   Physical Activity:     Days of Exercise per Week: Not on file    Minutes of Exercise per Session: Not on file   Stress:     Feeling of Stress : Not on file   Social Connections:     Frequency of Communication with Friends and Family: Not on file    Frequency of Social Gatherings with Friends and Family: Not on file    Attends Synagogue Services: Not on file    Active Member of Clubs or Organizations: Not on file    Attends Club or Organization Meetings: Not on file    Marital Status: Not on file   Intimate Partner Violence:     Fear of Current or Ex-Partner: Not on file    Emotionally Abused: Not on file    Physically Abused: Not on file    Sexually Abused: Not on file   Housing Stability:     Unable to Pay for Housing in the Last Year: Not on file    Number of Places Lived in the Last Year: Not on file    Unstable Housing in the Last Year: Not on file       Review of Systems   Constitutional: Positive for fatigue. HENT: Negative for congestion and postnasal drip. Eyes: Negative for discharge and itching. Respiratory: Negative for cough and shortness of breath. Cardiovascular: Negative for chest pain and leg swelling. Gastrointestinal: Negative for abdominal distention and abdominal pain. Endocrine: Negative for cold intolerance and heat intolerance. Genitourinary: Negative for enuresis and frequency. Musculoskeletal: Negative for arthralgias and back pain. Allergic/Immunologic: Negative for environmental allergies and food allergies. Neurological: Negative for light-headedness and headaches. Hematological: Negative for adenopathy. Psychiatric/Behavioral: Negative for agitation and behavioral problems. Objective:   BP (!) 120/54   Pulse (!) 49   Ht 5' 8.5\" (1.74 m)   Wt 193 lb (87.5 kg)   LMP  (LMP Unknown)   SpO2 94%   BMI 28.92 kg/m²   Body mass index is 28.92 kg/m². Sleep Medicine 9/4/2019 6/20/2019   Sitting and reading 3 0   Watching TV 3 2   Sitting, inactive in a public place (e.g. a theatre or a meeting) 0 0   As a passenger in a car for an hour without a break 3 3   Lying down to rest in the afternoon when circumstances permit 3 3   Sitting and talking to someone 0 0   Sitting quietly after a lunch without alcohol 3 1   In a car, while stopped for a few minutes in traffic 0 0   Total score 15 9   Neck circumference (Inches) - 16.5     Mallampati 3    Physical Exam  Vitals reviewed. Constitutional:       Appearance: Normal appearance. HENT:      Head: Normocephalic and atraumatic.       Nose: Nose normal.      Mouth/Throat:      Mouth: Mucous membranes are moist.   Eyes:      Extraocular Movements: Extraocular movements intact. Pupils: Pupils are equal, round, and reactive to light. Cardiovascular:      Rate and Rhythm: Normal rate and regular rhythm. Pulses: Normal pulses. Heart sounds: Normal heart sounds. Pulmonary:      Effort: Pulmonary effort is normal.      Breath sounds: Normal breath sounds. Abdominal:      General: Abdomen is flat. Palpations: Abdomen is soft. Musculoskeletal:         General: Normal range of motion. Cervical back: Normal range of motion and neck supple. Skin:     General: Skin is warm and dry. Neurological:      General: No focal deficit present. Mental Status: She is alert and oriented to person, place, and time. Psychiatric:         Mood and Affect: Mood normal.         Behavior: Behavior normal.         Radiology: None    Assessment and Plan     Problem List        Pulmonary Problems    ADOLFO (obstructive sleep apnea)      Advised to be compliant with the CPAP  Loose weight  Need a new type of mask              Other    Obesity (BMI 30-39. 9)      Advised to loose weight with diet and exercise           Excessive daytime sleepiness      Advised to be compliant with the CPAP  Loose weight         Ex-cigarette smoker      Advised to c/w quitting smoking                    Follow-Up:    Return in about 2 months (around 5/8/2022) for 2 week download data.      Progress notes sent to the referring Provider    Pablito Garcia MD MD  3/8/2022  10:05 AM

## 2022-03-10 ENCOUNTER — OFFICE VISIT (OUTPATIENT)
Dept: SURGERY | Age: 77
End: 2022-03-10
Payer: COMMERCIAL

## 2022-03-10 VITALS
HEIGHT: 69 IN | HEART RATE: 66 BPM | BODY MASS INDEX: 28.57 KG/M2 | SYSTOLIC BLOOD PRESSURE: 138 MMHG | WEIGHT: 192.9 LBS | OXYGEN SATURATION: 98 % | DIASTOLIC BLOOD PRESSURE: 70 MMHG

## 2022-03-10 DIAGNOSIS — N18.5 CHRONIC KIDNEY DISEASE, STAGE V (HCC): ICD-10-CM

## 2022-03-10 DIAGNOSIS — N17.9 AKI (ACUTE KIDNEY INJURY) (HCC): Primary | ICD-10-CM

## 2022-03-10 PROCEDURE — 99204 OFFICE O/P NEW MOD 45 MIN: CPT | Performed by: SURGERY

## 2022-03-10 ASSESSMENT — PATIENT HEALTH QUESTIONNAIRE - PHQ9
SUM OF ALL RESPONSES TO PHQ QUESTIONS 1-9: 0
2. FEELING DOWN, DEPRESSED OR HOPELESS: 0
1. LITTLE INTEREST OR PLEASURE IN DOING THINGS: 0
SUM OF ALL RESPONSES TO PHQ9 QUESTIONS 1 & 2: 0
SUM OF ALL RESPONSES TO PHQ QUESTIONS 1-9: 0

## 2022-03-14 ENCOUNTER — TELEPHONE (OUTPATIENT)
Dept: CARDIOLOGY CLINIC | Age: 77
End: 2022-03-14

## 2022-03-14 DIAGNOSIS — Z01.818 PREOPERATIVE CLEARANCE: Primary | ICD-10-CM

## 2022-03-16 ENCOUNTER — TELEPHONE (OUTPATIENT)
Dept: SURGERY | Age: 77
End: 2022-03-16

## 2022-03-21 ENCOUNTER — HOSPITAL ENCOUNTER (OUTPATIENT)
Age: 77
Discharge: HOME | End: 2022-03-21
Payer: COMMERCIAL

## 2022-03-21 LAB
ALBUMIN SERPL-MCNC: 4.1 GM/DL (ref 3.4–5)
ALP BLD-CCNC: 86 IU/L (ref 40–128)
ALT SERPL-CCNC: 23 U/L (ref 10–40)
ANION GAP SERPL CALCULATED.3IONS-SCNC: 16 MMOL/L (ref 4–16)
AST SERPL-CCNC: 25 IU/L (ref 15–37)
BASOPHILS ABSOLUTE: 0 K/CU MM
BASOPHILS RELATIVE PERCENT: 0.6 % (ref 0–1)
BILIRUB SERPL-MCNC: 0.2 MG/DL (ref 0–1)
BUN BLDV-MCNC: 65 MG/DL (ref 6–23)
CALCIUM SERPL-MCNC: 9.1 MG/DL (ref 8.3–10.6)
CHLORIDE BLD-SCNC: 104 MMOL/L (ref 99–110)
CO2: 20 MMOL/L (ref 21–32)
CREAT SERPL-MCNC: 6.1 MG/DL (ref 0.6–1.1)
DIFFERENTIAL TYPE: ABNORMAL
EOSINOPHILS ABSOLUTE: 0.2 K/CU MM
EOSINOPHILS RELATIVE PERCENT: 3.4 % (ref 0–3)
GFR AFRICAN AMERICAN: 8 ML/MIN/1.73M2
GFR NON-AFRICAN AMERICAN: 7 ML/MIN/1.73M2
GLUCOSE BLD-MCNC: 106 MG/DL (ref 70–99)
HCT VFR BLD CALC: 36.1 % (ref 37–47)
HEMOGLOBIN: 9.6 GM/DL (ref 12.5–16)
IMMATURE NEUTROPHIL %: 0.6 % (ref 0–0.43)
LYMPHOCYTES ABSOLUTE: 1.3 K/CU MM
LYMPHOCYTES RELATIVE PERCENT: 19.6 % (ref 24–44)
MCH RBC QN AUTO: 26.1 PG (ref 27–31)
MCHC RBC AUTO-ENTMCNC: 26.6 % (ref 32–36)
MCV RBC AUTO: 98.1 FL (ref 78–100)
MONOCYTES ABSOLUTE: 0.6 K/CU MM
MONOCYTES RELATIVE PERCENT: 8.2 % (ref 0–4)
NUCLEATED RBC %: 0 %
PDW BLD-RTO: 15 % (ref 11.7–14.9)
PLATELET # BLD: 297 K/CU MM (ref 140–440)
PMV BLD AUTO: 9.9 FL (ref 7.5–11.1)
POTASSIUM SERPL-SCNC: 5.5 MMOL/L (ref 3.5–5.1)
RBC # BLD: 3.68 M/CU MM (ref 4.2–5.4)
SEGMENTED NEUTROPHILS ABSOLUTE COUNT: 4.6 K/CU MM
SEGMENTED NEUTROPHILS RELATIVE PERCENT: 67.6 % (ref 36–66)
SODIUM BLD-SCNC: 140 MMOL/L (ref 135–145)
TOTAL IMMATURE NEUTOROPHIL: 0.04 K/CU MM
TOTAL NUCLEATED RBC: 0 K/CU MM
TOTAL PROTEIN: 6.7 GM/DL (ref 6.4–8.2)
WBC # BLD: 6.7 K/CU MM (ref 4–10.5)

## 2022-03-21 PROCEDURE — 80053 COMPREHEN METABOLIC PANEL: CPT

## 2022-03-21 PROCEDURE — 36415 COLL VENOUS BLD VENIPUNCTURE: CPT

## 2022-03-21 PROCEDURE — 85025 COMPLETE CBC W/AUTO DIFF WBC: CPT

## 2022-03-28 ENCOUNTER — ANESTHESIA EVENT (OUTPATIENT)
Dept: OPERATING ROOM | Age: 77
End: 2022-03-28
Payer: COMMERCIAL

## 2022-03-28 ASSESSMENT — LIFESTYLE VARIABLES: SMOKING_STATUS: 0

## 2022-03-28 NOTE — PROGRESS NOTES
3/28/22 - . LM concerning  surgery @ [facility] on  3/30/22. Please call the PAT Nurse as soon as possible for a phone assessment and surgery instructions. Please stop taking aspirin and Eliquis today if you have not done so.

## 2022-03-28 NOTE — PROGRESS NOTES
3/28/22 - [employer] in Dr. Rudolph Akbar office notified to have labs on 3/21/22 reviewed by Dr. Placido Montano due to surgery scheduled 3/30/22. RBC 3.68, Hgb 9.6 and Potassium 5.5. An order for CBC and CMP was placed for day of surgery per anesthesia protocol.

## 2022-03-28 NOTE — ANESTHESIA PRE PROCEDURE
Department of Anesthesiology  Preprocedure Note       Name:  Demetria Wadsworth   Age:  68 y.o.  :  1945                                          MRN:  [id number]         Date:  3/28/2022      Surgeon: Anuradha Webster):  Samantha Lopez MD    Procedure: CATHETER INSERTION PERITONEAL DIALYSIS LAPAROSCOPIC (N/A Abdomen)    Medications prior to admission:   Prior to Admission medications    Medication Sig Start Date End Date Taking?  Authorizing Provider   apixaban (ELIQUIS) 5 MG TABS tablet Take 1 tablet by mouth 2 times daily 3/11/22   Fredricka Mcardle, APRN - CNP   atorvastatin (LIPITOR) 80 MG tablet Take 1 tab by mouth once daily 3/7/22   Keke Meeks MD   metoprolol tartrate (LOPRESSOR) 50 MG tablet Take 1 tablet by mouth 2 times daily take 1 tablet by mouth twice a day 3/7/22   Keke Mekes MD   propafenone (RYTHMOL) 150 MG tablet Take 1 tablet by mouth every 8 hours 22   Home Lujan MD   doxazosin (CARDURA) 4 MG tablet Take 1 tablet by mouth daily 22   Keke Meeks MD   isosorbide mononitrate (IMDUR) 30 MG extended release tablet Take 1 tablet by mouth 2 times daily 22  Keke Meeks MD   chlorthalidone (HYGROTON) 25 MG tablet Take 1 tablet by mouth daily 22   Keke Meeks MD   cloNIDine (CATAPRES-TTS-3) 0.3 MG/24HR PTWK Place 1 patch onto the skin once a week 21  Keke Meeks MD   furosemide (LASIX) 20 MG tablet take 1 tablet by mouth every other day 21   Home Lujan MD   oxybutynin (DITROPAN) 5 MG tablet take 1 tablet by mouth twice a day 21   Home Lujan MD   dilTIAZem (CARDIZEM CD) 120 MG extended release capsule take 1 capsule by mouth once daily 21   Home Lujan MD   Ascorbic Acid (VITAMIN C) 250 MG tablet Take by mouth daily    Historical Provider, MD   Omega-3 Fatty Acids (FISH OIL PO) Take 1,200 mg by mouth    Historical Provider, MD   FOLIC ACID PO Take 914 mg by mouth daily    Historical Provider, MD Multiple Vitamins-Minerals (MULTIVITAMIN ADULT PO) Take by mouth    Historical Provider, MD   aspirin 81 MG EC tablet Take 1 tablet by mouth daily 11/13/18   Geoff Rahman MD   Lancets MISC 1 each by In Vitro route 2 times daily 12/1/17   Geoff Rahman MD   Glucose Blood (BLOOD GLUCOSE TEST STRIPS) STRP 1 each by In Vitro route 2 times daily 12/1/17   Geoff Rahman MD   Blood Glucose Monitoring Suppl AV 1 kit by Does not apply route daily 12/1/17   Geoff Rahman MD   oxybutynin (DITROPAN) 5 MG tablet Take 1 tablet by mouth 2 times daily. 1/7/13 1/21/14  Geoff Rahman MD   Calcium Carbonate (CALTRATE 600 PO) Take  by mouth 2 times daily.     Historical Provider, MD       Current medications:    Current Outpatient Medications   Medication Sig Dispense Refill    apixaban (ELIQUIS) 5 MG TABS tablet Take 1 tablet by mouth 2 times daily 42 tablet 0    atorvastatin (LIPITOR) 80 MG tablet Take 1 tab by mouth once daily 90 tablet 3    metoprolol tartrate (LOPRESSOR) 50 MG tablet Take 1 tablet by mouth 2 times daily take 1 tablet by mouth twice a day 180 tablet 3    propafenone (RYTHMOL) 150 MG tablet Take 1 tablet by mouth every 8 hours 90 tablet 0    doxazosin (CARDURA) 4 MG tablet Take 1 tablet by mouth daily 30 tablet 3    isosorbide mononitrate (IMDUR) 30 MG extended release tablet Take 1 tablet by mouth 2 times daily 60 tablet 3    chlorthalidone (HYGROTON) 25 MG tablet Take 1 tablet by mouth daily 30 tablet 3    cloNIDine (CATAPRES-TTS-3) 0.3 MG/24HR PTWK Place 1 patch onto the skin once a week 4 patch 5    furosemide (LASIX) 20 MG tablet take 1 tablet by mouth every other day 90 tablet 3    oxybutynin (DITROPAN) 5 MG tablet take 1 tablet by mouth twice a day 180 tablet 3    dilTIAZem (CARDIZEM CD) 120 MG extended release capsule take 1 capsule by mouth once daily 30 capsule 11    Ascorbic Acid (VITAMIN C) 250 MG tablet Take by mouth daily      Omega-3 Fatty Acids (FISH OIL PO) Take 1,200 mg by mouth      FOLIC ACID PO Take 418 mg by mouth daily      Multiple Vitamins-Minerals (MULTIVITAMIN ADULT PO) Take by mouth      aspirin 81 MG EC tablet Take 1 tablet by mouth daily 30 tablet     Lancets MISC 1 each by In Vitro route 2 times daily 100 each 3    Glucose Blood (BLOOD GLUCOSE TEST STRIPS) STRP 1 each by In Vitro route 2 times daily 100 strip 3    Blood Glucose Monitoring Suppl AV 1 kit by Does not apply route daily 1 Device 0    Calcium Carbonate (CALTRATE 600 PO) Take  by mouth 2 times daily. No current facility-administered medications for this visit. 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 9) E66.9    Excessive daytime sleepiness G47.19    Ex-cigarette smoker Z87.891    Malignant neoplasm of upper-outer quadrant of right female breast (HCC) C50.411    Intestinal malabsorption K90.9    Acquired cyst of kidney N28.1    Chronic kidney disease, stage V (HCC) N18.5    Syncope and collapse R55    JESSI (acute kidney injury) ([facility] [address] N17.9    Angina pectoris, unspecified I20.9    Atherosclerotic heart disease of native coronary artery with unspecified angina pectoris I25.119    Hyperpotassemia E87.5       Past Medical History:        Diagnosis Date    14 day event monitor 11/05/2018    Sinus rhythm    Atrial fibrillation with RVR ([facility] [address] 11/25/2013    Breast cancer ([facility] [address]     Edema     1/32 TTE diastolic dysfxn, EF 21%; 97/02 - TTE diastolic dysfxn, EF 41%; Stress myoview  WNl, EF 70%    Family history of cardiovascular disease     GERD (gastroesophageal reflux disease)     H/O 24 hour EKG monitoring 4/23/12    [facility]    H/O cardiovascular stress test     4/23/12-[facility], Probably norm perfusion Lexiscan cardiolite study except for diaphramatic attenuation artifact, otherwise perfusion is normal, norm LV fxn by gated scan. 11/10-EF70%, 9/15/08-EF70%, 1/15/07-EF70%, 9/03    H/O echocardiogram     4/23/12-[facility]- Norm chamber sizes. LVH with norm LV systolic but abn diastolic fxn, mild MR and TR, minimal pulm HTN. 11/10 -EF>55%; 3/05, 9/23/03    History of cardiac catheterization     11/15/2013-EF 55%, No signif CAD -Dr Konstantin Hernandez;;    History of cardiovascular stress test     11/14/2013-EF 70%. Normal Lexiscan Cardiolite, Uniform wall motion;    History of echocardiogram     53/09/6917-[id number] global systolic  function. No wall motion abnormalities. EF 55%. Mild MR/TR;    History of Holter monitoring 11/17/14 11/14-48 hour Holter: Predominant rhythm was sinus, no ventricular ectopy noted, supraventricular ectopics were noted in single beat forms.  History of therapeutic radiation     Hx of 24 hour EKG monitoring     12/17/13 48 hr holter monitor. Sinus rhythm with intermittent sinus arrhythmia.     Hyperlipidemia     Hypertension     11/13 Cath WNL, EF 55%; 11/13 Stress WNL : 11/13 TTE mild MR and TR, EF 55%; 4/12 Stress WNL; 8/9 - Cath WNL    Iron deficiency anemia 7/9/2013 9/13 EGD WNL    Lumbar radiculopathy 11/25/2013    Menopause     ADOLFO (obstructive sleep apnea)     sleep study 10/3 CPAP 6cm     Osteoarthritis both knees    Osteopenia     9/12 DEXA T-score -1.5    Other activity(E029.9)     48 hr holter, the 48 hr holter monitor reveals the patient in the sinus rhythm with occasional supraventricular ectopic beats. There is a rare short atrial run.  Paroxysmal atrial fibrillation (HCC)     4/12 Holter [facility]    Prolonged emergence from general anesthesia     Proteinuria     Right Breast Cancer Dx 10-12    Supraventricular tachycardia (HCC)     Type 2 diabetes mellitus (HCC) Dx 2000's    Urge incontinence     Wears partial dentures     upper partial       Past Surgical History:        Procedure Laterality Date    BREAST BIOPSY  10-12    Right Breast Biopsy, Cancer    BREAST BIOPSY  1970's    Right Breast Biopsy, Benign    BREAST LUMPECTOMY  11/6/12    Breast cancer - Right with sentinal node    BUNIONECTOMY  1990's    Right Serene Boeck Dr. Audrie Carbon    8/10/09- The patient has no significant CAD. The elevated troponin is probably secondary to SVT. , 10/03- no significant CAD    CARPAL TUNNEL RELEASE Left 01/2019    CATARACT REMOVAL Bilateral 03/2020, 5/20    CATARACT REMOVAL Left 05/2020    COLONOSCOPY  \"X 2 Last One 2008 \"    Polpy Removed During Last Colonoscopy    DENTAL SURGERY      Teeth Extracted In Past    ENDOSCOPY, COLON, DIAGNOSTIC  07/18/2016    savary dilatation to 17 mm    HYSTERECTOMY, TOTAL ABDOMINAL  1980's    JOINT REPLACEMENT  2009    Total Left Knee    JOINT REPLACEMENT  2010    Total Right Knee    SOFI STEROTACTIC LOC BREAST BIOPSY RIGHT Right 2/18/2021    SOFI [facility]    OVARY REMOVAL      UPPER GASTROINTESTINAL ENDOSCOPY N/A 11/8/2018    EGD DIAGNOSTIC ONLY performed by Baljit Malave MD at [address] [facility] ENDOSCOPY       Social History:    Social History     Tobacco Use    Smoking status: Former Smoker     Packs/day: 0.50     Years: 10.00     Pack years: 5.00     Quit date: 1/5/1987     Years since quitting: 35.2    Smokeless tobacco: Never Used   Substance Use Topics    Alcohol use: No     Alcohol/week: 0.0 standard drinks     Comment: 3 cups coffee daily                                Counseling given: Not Answered      Vital Signs (Current): There were no vitals filed for this visit. BP Readings from Last 3 Encounters:   03/22/22 138/74   03/10/22 138/70   03/08/22 (!) 120/54       NPO Status:                                                                                 BMI:   Wt Readings from Last 3 Encounters:   03/22/22 197 lb (89.4 kg)   03/10/22 192 lb 14.4 oz (87.5 kg)   03/08/22 193 lb (87.5 kg)     There is no height or weight on file to calculate BMI.    CBC:   Lab Results   Component Value Date    WBC 6.7 03/21/2022    RBC 3.68 03/21/2022    HGB 9.6 03/21/2022    HCT 36.1 03/21/2022    MCV 98.1 03/21/2022    RDW 15.0 03/21/2022     03/21/2022       CMP:   Lab Results   Component Value Date     03/21/2022    K 5.5 03/21/2022     03/21/2022    CO2 20 03/21/2022    BUN 65 03/21/2022    CREATININE 6.1 03/21/2022    GFRAA 8 03/21/2022    AGRATIO 1.2 09/28/2021    LABGLOM 7 03/21/2022    LABGLOM 52 07/09/2013    GLUCOSE 106 03/21/2022    PROT 6.7 03/21/2022    PROT 7.0 10/15/2012    CALCIUM 9.1 03/21/2022    BILITOT 0.2 03/21/2022    ALKPHOS 86 03/21/2022    AST 25 03/21/2022    ALT 23 03/21/2022       POC Tests:   No results for input(s): POCGLU, POCNA, POCK, POCCL, POCBUN, POCHEMO, POCHCT in the last 72 hours.     Coags:   Lab Results   Component Value Date    PROTIME 15.0 05/08/2021    PROTIME 10.8 04/22/2012    INR 1.24 05/08/2021    APTT 63.9 05/08/2021       HCG (If Applicable): No results found for: PREGTESTUR, PREGSERUM, HCG, HCGQUANT     ABGs: No results found for: PHART, PO2ART, FZG2TBZ, KBM7VGY, BEART, U2EYFJRF     Type & Screen (If Applicable):  No results found for: LABABO, [address]    Anesthesia Evaluation  Patient summary reviewed and Nursing notes reviewed no history of anesthetic complications:   Airway: Mallampati: II  TM distance: >3 FB   Neck ROM: full  Mouth opening: > = 3 FB Dental: normal exam         Pulmonary:normal exam  breath sounds clear to auscultation  (+) sleep apnea:      (-) not a current smoker                           Cardiovascular:  Exercise tolerance: poor (<4 METS),   (+) hypertension:, CAD:, dysrhythmias: atrial fibrillation and SVT, CHF: diastolic, hyperlipidemia      NYHA Classification: II  ECG reviewed  Rhythm: regular    Echocardiogram reviewed  Stress test reviewed  Cleared by cardiology     Beta Blocker:  Not on Beta Blocker      ROS comment:  Summary   Normal EF 61 % with normal ventricular contractility. No infarct or ischemia   noted.   Normal stress myocardial perfusion.   This is a normal study.   ECG portion of stress test is negative for ischemia by diagnostic criteria.      Signatures      ------------------------------------------------------------------   Electronically signed by Elise Coleman MD (Interpreting   cardiologist) on 05/10/2021 at 13:42   ------------------------------------------------------------------        Summary   Left ventricular systolic function is normal.   Ejection fraction is visually estimated at 50-55%. Sclerotic, but non-stenotic aortic valve. No evidence of any pericardial effusion.       Signature      ------------------------------------------------------------------   Electronically signed by Elise Coleman MD (Interpreting   physician) on 05/10/2021 at 02:51 PM  Sinus  Bradycardia   WITHIN NORMAL LIMITS  Babak Velasquez was evaluated from a cardiac standpoint for her surgery or procedure and based on her history, diagnosis, recent cardiac testing, she is considered a medium risk candidate for any pedro-operative cardiac complications.     Patients with known CAD with moderate or high risk should have surgical procedures done where they have access to invasive cardiology services if emergently needed.     Antiplatelet/anticoagulant therapy can be held prior to the surgery or procedure at the discretion of the surgeon to be resumed as soon as possible if held.     Please call with any further questions.     Respectfully,      La CUEVAS-CNP  Mk/bl     This cardiac clearance is good for 6 months from 3/14/2022 unless new cardiac symptoms arise. Neuro/Psych:   Negative Neuro/Psych ROS     (-) neuromuscular disease           GI/Hepatic/Renal:   (+) GERD:, renal disease: CRI,          ROS comment: Bladder cancer  . Endo/Other:    (+) DiabetesType II DM, poorly controlled, , blood dyscrasia: anticoagulation therapy and anemia, arthritis: OA., electrolyte abnormalities, malignancy/cancer. ROS comment: Breast cancer Abdominal:   (+) obese,           Vascular:   + PVD, aortic or cerebral, . Other Findings:             Anesthesia Plan      general     ASA 4     (Hx prolonged emergence )  Induction: intravenous. MIPS: Postoperative opioids intended and Prophylactic antiemetics administered. Anesthetic plan and risks discussed with patient. Plan discussed with CRNA. Pre Anesthesia Evaluation complete. Anesthesia plan, risks, benefits, alternatives, and personal involved discussed with patient. Patients and/or legal guardian verbalized an understanding  and agreed to proceed.   Leslie Martinez DO  3/30/2022

## 2022-03-29 ASSESSMENT — ENCOUNTER SYMPTOMS
EYE ITCHING: 0
PHOTOPHOBIA: 0
ANAL BLEEDING: 0
BACK PAIN: 0
SORE THROAT: 0
COLOR CHANGE: 0
APNEA: 0
CONSTIPATION: 0
RECTAL PAIN: 0
CHOKING: 0
STRIDOR: 0
EYE REDNESS: 0

## 2022-03-29 NOTE — PROGRESS NOTES
.Surgery @ [facility] on 3/30/22 at 1100, arrival 0900                 1. Do not eat or drink anything after midnight - unless instructed by your doctor prior to surgery. This includes                   no water, chewing gum or mints. 2. Follow your directions as prescribed by the doctor for your procedure and medications. Wear your clonidine patch, take Diltiazem, Doxazosin, Isosorbide                  Metoprolol and Rythmol in am with a sips of water. 3. Check with your Doctor regarding stopping vitamins, supplements, blood thinners (Plavix, Coumadin, Lovenox, Effient, Pradaxa, Xarelto, Fragmin or                   other blood thinners) and follow their instructions. Last dose of Eliquis 3/24/22 and Aspirin 3/28/22 per patient. 4. Do not smoke, and do not drink any alcoholic beverages 24 hours prior to surgery. This includes NA Beer. 5. You may brush your teeth and gargle the morning of surgery. DO NOT SWALLOW WATER   6. You MUST make arrangements for a responsible adult to take you home after your surgery and be able to check on you every couple                   hours for the day. You will not be allowed to leave alone or drive yourself home. It is strongly suggested someone stay with you the first 24                   hrs. Your surgery will be cancelled if you do not have a ride home. 7. Please wear simple, loose fitting clothing to the hospital.  Nely Ducking not bring valuables (money, credit cards, checkbooks, etc.) Do not wear any                   makeup (including no eye makeup) or nail polish on your fingers or toes. 8. DO NOT wear any jewelry or piercings on day of surgery. All body piercing jewelry must be removed. 9. If you have dentures, they will be removed before going to the OR; we will provide you a container. If you wear contact lenses or glasses,                  they will be removed; please bring a case for them.            10. If you  have a Living Will and Durable Power of  for Healthcare, please bring in a copy. 11. Please bring picture ID,  insurance card, paperwork from the doctors office    (H & P, Consent, & card for implantable devices). 12. Take a shower the morning of your procedure with Hibiclens or an anti-bacterial soap. Do not apply any deodorant, lotion, oil or powder.

## 2022-03-29 NOTE — PROGRESS NOTES
3/29/22 -  for patient: surgery @ [facility] on 3/30/22 time changed to 1030, arrival 0800. Please call with any questions.

## 2022-03-29 NOTE — H&P
Thomas Roldan MD      General Surgery       Subjective:     Patient is a 68 y. o.female scheduled for PD cath placement. Indications for procedure are ESRD, requiring dialysis. She would like to home dialysis as opposed to hemodialysis.        Patient Active Problem List    Diagnosis Date Noted    Hyperpotassemia 02/15/2022    Angina pectoris, unspecified 01/18/2022    Atherosclerotic heart disease of native coronary artery with unspecified angina pectoris 01/18/2022    JESSI (acute kidney injury) ([address] 12/14/2021    Syncope and collapse 05/08/2021    Chronic kidney disease, stage V ([address] 03/10/2021    Acquired cyst of kidney 12/01/2020    Malignant neoplasm of upper-outer quadrant of right female breast ([address] 11/17/2020    Intestinal malabsorption 11/17/2020    Obesity (BMI 30-39.9) 06/20/2019    Excessive daytime sleepiness 06/20/2019    Ex-cigarette smoker 06/20/2019    Acute blood loss anemia 11/08/2018    Carpal tunnel syndrome on left 08/14/2018    Trigger finger, left ring finger 08/14/2018    Type 2 diabetes mellitus with nephropathy ([address] 08/10/2016    Hypertension secondary to other renal disorders 12/17/2015    Abnormal EKG 12/17/2015    Mixed hyperlipidemia 11/13/2015    Iron deficiency anemia 11/13/2015    Gastroesophageal reflux disease without esophagitis 11/13/2015    Edema of both legs 06/15/2015    Urge incontinence     Bilateral leg edema 10/21/2013    Colon polyps 08/22/2013    Osteopenia     Osteoarthritis     ADOLFO (obstructive sleep apnea)     Menopause     Paroxysmal atrial fibrillation ([address]      Past Medical History:   Diagnosis Date    14 day event monitor 11/05/2018    Sinus rhythm    Atrial fibrillation with RVR ([address] 11/25/2013    Breast cancer ([address]     Edema     2/85 TTE diastolic dysfxn, EF 26%; 05/76 - TTE diastolic dysfxn, EF 54%; Stress myoview  WNl, EF 70%    Family history of cardiovascular disease     GERD (gastroes[facility] tachycardia [facility]     Type 2 diabetes mellitus ([facility] Dx 2000's    Urge incontinence     Wears partial dentures     upper partial      Past Surgical History:   Procedure Laterality Date    BREAST BIOPSY  10-12    Right Breast Biopsy, Cancer    BREAST BIOPSY  1970's    Right Breast Biopsy, Benign    BREAST LUMPECTOMY  11/6/12    Breast cancer - Right with sentinal node    BUNIONECTOMY  1990's    Right Yonathan Sessions Dr. Lorena Perry    8/10/09- The patient has no significant CAD. The elevated troponin is probably secondary to SVT. , 10/03- no significant CAD    CARPAL TUNNEL RELEASE Left 01/2019    CATARACT REMOVAL Bilateral 03/2020, 5/20    CATARACT REMOVAL Left 05/2020    COLONOSCOPY  \"X 2 Last One 2008 \"    Polpy Removed During Last Colonoscopy    DENTAL SURGERY      Teeth Extracted In Past    ENDOSCOPY, COLON, DIAGNOSTIC  07/18/2016    savary dilatation to 17 mm    HYSTERECTOMY, TOTAL ABDOMINAL  1980's    JOINT REPLACEMENT  2009    Total Left Knee    JOINT REPLACEMENT  2010    Total Right Knee    SOFI STEROTACTIC LOC BREAST BIOPSY RIGHT Right 2/18/2021    SOFI STEROTACTIC LOC BREAST BIOPSY RIGHT [facility]    OVARY REMOVAL      UPPER GASTROINTESTINAL ENDOSCOPY N/A 11/8/2018    EGD DIAGNOSTIC ONLY performed by Ruben Leyva MD at [facility]      Prior to Admission medications    Medication Sig Start Date End Date Taking?  Authorizing Provider   apixaban (ELIQUIS) 5 MG TABS tablet Take 1 tablet by mouth 2 times daily 3/11/22   FAITH Roque - CNP   atorvastatin (LIPITOR) 80 MG tablet Take 1 tab by mouth once daily 3/7/22   Gwendolyn Corral MD   metoprolol tartrate (LOPRESSOR) 50 MG tablet Take 1 tablet by mouth 2 times daily take 1 tablet by mouth twice a day 3/7/22   Gwendolyn Corral MD   propafenone (RYTHMOL) 150 MG tablet Take 1 tablet by mouth every 8 hours 2/11/22   Jayne Dacosta MD   doxazosin (CARDURA) 4 MG tablet Take 1 tablet by mouth daily 1/18/22 Wendi Fletcher MD   isosorbide mononitrate (IMDUR) 30 MG extended release tablet Take 1 tablet by mouth 2 times daily 1/18/22 2/17/22  Wendi Fletcher MD   chlorthalidone (HYGROTON) 25 MG tablet Take 1 tablet by mouth daily 1/18/22   Wendi Fletcehr MD   cloNIDine (CATAPRES-TTS-3) 0.3 MG/24HR PTWK Place 1 patch onto the skin once a week 11/9/21 1/19/22  Wendi Fletcher MD   furosemide (LASIX) 20 MG tablet take 1 tablet by mouth every other day 8/2/21   Juanito Hawkins MD   oxybutynin (DITROPAN) 5 MG tablet take 1 tablet by mouth twice a day 7/9/21   Juanito Hawkins MD   dilTIAZem (CARDIZEM CD) 120 MG extended release capsule take 1 capsule by mouth once daily 5/17/21   Juanito Hawkins MD   Ascorbic Acid (VITAMIN C) 250 MG tablet Take by mouth daily    Historical Provider, MD   Omega-3 Fatty Acids (FISH OIL PO) Take 1,200 mg by mouth    Historical Provider, MD   FOLIC ACID PO Take 400 mg by mouth daily    Historical Provider, MD   Multiple Vitamins-Minerals (MULTIVITAMIN ADULT PO) Take by mouth    Historical Provider, MD   aspirin 81 MG EC tablet Take 1 tablet by mouth daily 11/13/18   Juanito Hawkins MD   Lancets MISC 1 each by In Vitro route 2 times daily 12/1/17   Juanito Hawkins MD   Glucose Blood (BLOOD GLUCOSE TEST STRIPS) STRP 1 each by In Vitro route 2 times daily 12/1/17   Juanito Hawkins MD   Blood Glucose Monitoring Suppl AV 1 kit by Does not apply route daily 12/1/17   Juanito Hawkins MD   oxybutynin (DITROPAN) 5 MG tablet Take 1 tablet by mouth 2 times daily. 1/7/13 1/21/14  Juanito Hawkins MD   Calcium Carbonate (CALTRATE 600 PO) Take  by mouth 2 times daily.     Historical Provider, MD     Allergies   Allergen Reactions    Latex      \"Blisters\"    Tape Ta Rogers Beliaashlee Love      \"Turn Red\"      Social History     Tobacco Use    Smoking status: Former Smoker     Packs/day: 0.50     Years: 10.00     Pack years: 5.00     Quit date: 1/5/1987     Years since quittin.2    Smokeless tobacco: Never Used   Substance Use Topics    Alcohol use: No     Alcohol/week: 0.0 standard drinks     Comment: 3 cups coffee daily      Family History   Problem Relation Age of Onset    Diabetes Mother     Early Death Mother 61    Cancer Father         \"Lung Cancer\"    Heart Disease Father     Diabetes Father     Stroke Sister     Diabetes Sister     Diabetes Brother     Cancer Brother         \"Prostate Cancer\"    Cancer Brother         \"Lung Cancer\"    Thyroid Disease Daughter           Review of Systems  Review of Systems   Constitutional: Negative for chills and fever. HENT: Negative for ear pain, mouth sores, sore throat and tinnitus. Eyes: Negative for photophobia, redness and itching. Respiratory: Negative for apnea, choking and stridor. Cardiovascular: Negative for chest pain and palpitations. Gastrointestinal: Negative for anal bleeding, constipation and rectal pain. Endocrine: Negative for polydipsia. Genitourinary: Negative for enuresis, flank pain and hematuria. Musculoskeletal: Negative for back pain, joint swelling and myalgias. Skin: Negative for color change and pallor. Allergic/Immunologic: Negative for environmental allergies. Neurological: Negative for syncope and speech difficulty. Psychiatric/Behavioral: Negative for confusion and hallucinations. Objective:     No data found. Physical Exam  Constitutional:       Appearance: She is well-developed. HENT:      Head: Normocephalic. Eyes:      Pupils: Pupils are equal, round, and reactive to light. Cardiovascular:      Rate and Rhythm: Normal rate. Pulmonary:      Effort: Pulmonary effort is normal.   Abdominal:      General: There is no distension. Palpations: Abdomen is soft. There is no mass. Tenderness: There is no abdominal tenderness. There is no guarding or rebound. Musculoskeletal:         General: Normal range of motion.       Cervical back: Normal range of motion and neck supple. Skin:     General: Skin is warm. Neurological:      Mental Status: She is alert and oriented to person, place, and time. Data Review  CBC:   Lab Results   Component Value Date    WBC 6.7 03/21/2022    RBC 3.68 03/21/2022     BMP:   Lab Results   Component Value Date    GLUCOSE 106 03/21/2022    CO2 20 03/21/2022    BUN 65 03/21/2022    CREATININE 6.1 03/21/2022    CALCIUM 9.1 03/21/2022       Assessment:     ESRD    Plan: Will proceed with laparoscopic peritoneal dialysis catheter placement. Risks, benefits and alternatives to the procedure have been discussed with the pt, who has elected to proceed. The patient was counseled at length about the risks of dorene Covid-19 during their perioperative period and any recovery window from their procedure. The patient was made aware that dorene Covid-19  may worsen their prognosis for recovering from their procedure  and lend to a higher morbidity and/or mortality risk. All material risks, benefits, and reasonable alternatives including postponing the procedure were discussed. The patient does wish to proceed with the procedure at this time.     Nuvia Montaño PA-C

## 2022-03-30 ENCOUNTER — HOSPITAL ENCOUNTER (OUTPATIENT)
Age: 77
Setting detail: OUTPATIENT SURGERY
Discharge: HOME | End: 2022-03-30
Attending: SURGERY | Admitting: SURGERY
Payer: COMMERCIAL

## 2022-03-30 ENCOUNTER — ANESTHESIA (OUTPATIENT)
Dept: OPERATING ROOM | Age: 77
End: 2022-03-30
Payer: COMMERCIAL

## 2022-03-30 VITALS
SYSTOLIC BLOOD PRESSURE: 109 MMHG | OXYGEN SATURATION: 100 % | TEMPERATURE: 96.5 F | DIASTOLIC BLOOD PRESSURE: 62 MMHG | RESPIRATION RATE: 2 BRPM

## 2022-03-30 VITALS
HEART RATE: 63 BPM | RESPIRATION RATE: 16 BRPM | DIASTOLIC BLOOD PRESSURE: 67 MMHG | OXYGEN SATURATION: 92 % | TEMPERATURE: 97 F | HEIGHT: 69 IN | WEIGHT: 193 LBS | BODY MASS INDEX: 28.58 KG/M2 | SYSTOLIC BLOOD PRESSURE: 133 MMHG

## 2022-03-30 DIAGNOSIS — N18.5 CHRONIC KIDNEY DISEASE, STAGE V (HCC): Primary | ICD-10-CM

## 2022-03-30 LAB
GLUCOSE BLD-MCNC: 114 MG/DL (ref 70–99)
GLUCOSE BLD-MCNC: 125 MG/DL (ref 70–99)

## 2022-03-30 PROCEDURE — 80053 COMPREHEN METABOLIC PANEL: CPT

## 2022-03-30 PROCEDURE — 82962 GLUCOSE BLOOD TEST: CPT

## 2022-03-30 PROCEDURE — 6360000002 HC RX W HCPCS

## 2022-03-30 PROCEDURE — C2628 CATHETER, OCCLUSION: HCPCS | Performed by: SURGERY

## 2022-03-30 PROCEDURE — 6360000002 HC RX W HCPCS: Performed by: ANESTHESIOLOGY

## 2022-03-30 PROCEDURE — 3700000001 HC ADD 15 MINUTES (ANESTHESIA): Performed by: SURGERY

## 2022-03-30 PROCEDURE — 3600000014 HC SURGERY LEVEL 4 ADDTL 15MIN: Performed by: SURGERY

## 2022-03-30 PROCEDURE — C1750 CATH, HEMODIALYSIS,LONG-TERM: HCPCS | Performed by: SURGERY

## 2022-03-30 PROCEDURE — 2709999900 HC NON-CHARGEABLE SUPPLY: Performed by: SURGERY

## 2022-03-30 PROCEDURE — 49324 LAP INSERT TUNNEL IP CATH: CPT | Performed by: SURGERY

## 2022-03-30 PROCEDURE — 6360000002 HC RX W HCPCS: Performed by: PHYSICIAN ASSISTANT

## 2022-03-30 PROCEDURE — 2580000003 HC RX 258: Performed by: PHYSICIAN ASSISTANT

## 2022-03-30 PROCEDURE — APPNB180 APP NON BILLABLE TIME > 60 MINS: Performed by: PHYSICIAN ASSISTANT

## 2022-03-30 PROCEDURE — 7100000011 HC PHASE II RECOVERY - ADDTL 15 MIN: Performed by: SURGERY

## 2022-03-30 PROCEDURE — 3600000004 HC SURGERY LEVEL 4 BASE: Performed by: SURGERY

## 2022-03-30 PROCEDURE — 7100000000 HC PACU RECOVERY - FIRST 15 MIN: Performed by: SURGERY

## 2022-03-30 PROCEDURE — 2500000003 HC RX 250 WO HCPCS

## 2022-03-30 PROCEDURE — 2580000003 HC RX 258: Performed by: ANESTHESIOLOGY

## 2022-03-30 PROCEDURE — 3700000000 HC ANESTHESIA ATTENDED CARE: Performed by: SURGERY

## 2022-03-30 PROCEDURE — 7100000010 HC PHASE II RECOVERY - FIRST 15 MIN: Performed by: SURGERY

## 2022-03-30 PROCEDURE — 49324 LAP INSERT TUNNEL IP CATH: CPT | Performed by: PHYSICIAN ASSISTANT

## 2022-03-30 PROCEDURE — 2500000003 HC RX 250 WO HCPCS: Performed by: SURGERY

## 2022-03-30 PROCEDURE — 7100000001 HC PACU RECOVERY - ADDTL 15 MIN: Performed by: SURGERY

## 2022-03-30 RX ORDER — FENTANYL CITRATE 50 UG/ML
INJECTION, SOLUTION INTRAMUSCULAR; INTRAVENOUS PRN
Status: DISCONTINUED | OUTPATIENT
Start: 2022-03-30 | End: 2022-03-30 | Stop reason: SDUPTHER

## 2022-03-30 RX ORDER — SODIUM CHLORIDE, SODIUM LACTATE, POTASSIUM CHLORIDE, CALCIUM CHLORIDE 600; 310; 30; 20 MG/100ML; MG/100ML; MG/100ML; MG/100ML
INJECTION, SOLUTION INTRAVENOUS CONTINUOUS
Status: DISCONTINUED | OUTPATIENT
Start: 2022-03-30 | End: 2022-03-30 | Stop reason: HOSPADM

## 2022-03-30 RX ORDER — DEXAMETHASONE SODIUM PHOSPHATE 4 MG/ML
INJECTION, SOLUTION INTRA-ARTICULAR; INTRALESIONAL; INTRAMUSCULAR; INTRAVENOUS; SOFT TISSUE PRN
Status: DISCONTINUED | OUTPATIENT
Start: 2022-03-30 | End: 2022-03-30 | Stop reason: SDUPTHER

## 2022-03-30 RX ORDER — ROCURONIUM BROMIDE 10 MG/ML
INJECTION, SOLUTION INTRAVENOUS PRN
Status: DISCONTINUED | OUTPATIENT
Start: 2022-03-30 | End: 2022-03-30 | Stop reason: SDUPTHER

## 2022-03-30 RX ORDER — LABETALOL HYDROCHLORIDE 5 MG/ML
10 INJECTION, SOLUTION INTRAVENOUS
Status: DISCONTINUED | OUTPATIENT
Start: 2022-03-30 | End: 2022-03-30 | Stop reason: HOSPADM

## 2022-03-30 RX ORDER — HYDRALAZINE HYDROCHLORIDE 20 MG/ML
10 INJECTION INTRAMUSCULAR; INTRAVENOUS
Status: DISCONTINUED | OUTPATIENT
Start: 2022-03-30 | End: 2022-03-30 | Stop reason: HOSPADM

## 2022-03-30 RX ORDER — SODIUM CHLORIDE 0.9 % (FLUSH) 0.9 %
5-40 SYRINGE (ML) INJECTION EVERY 12 HOURS SCHEDULED
Status: DISCONTINUED | OUTPATIENT
Start: 2022-03-30 | End: 2022-03-30 | Stop reason: HOSPADM

## 2022-03-30 RX ORDER — MEPERIDINE HYDROCHLORIDE 25 MG/ML
12.5 INJECTION INTRAMUSCULAR; INTRAVENOUS; SUBCUTANEOUS EVERY 5 MIN PRN
Status: DISCONTINUED | OUTPATIENT
Start: 2022-03-30 | End: 2022-03-30 | Stop reason: HOSPADM

## 2022-03-30 RX ORDER — DIPHENHYDRAMINE HYDROCHLORIDE 50 MG/ML
12.5 INJECTION INTRAMUSCULAR; INTRAVENOUS
Status: DISCONTINUED | OUTPATIENT
Start: 2022-03-30 | End: 2022-03-30 | Stop reason: HOSPADM

## 2022-03-30 RX ORDER — HYDROCODONE BITARTRATE AND ACETAMINOPHEN 5; 325 MG/1; MG/1
1 TABLET ORAL EVERY 6 HOURS PRN
Qty: 10 TABLET | Refills: 0 | Status: SHIPPED | OUTPATIENT
Start: 2022-03-30 | End: 2022-04-02

## 2022-03-30 RX ORDER — ONDANSETRON 2 MG/ML
INJECTION INTRAMUSCULAR; INTRAVENOUS PRN
Status: DISCONTINUED | OUTPATIENT
Start: 2022-03-30 | End: 2022-03-30 | Stop reason: SDUPTHER

## 2022-03-30 RX ORDER — SODIUM CHLORIDE 0.9 % (FLUSH) 0.9 %
5-40 SYRINGE (ML) INJECTION PRN
Status: DISCONTINUED | OUTPATIENT
Start: 2022-03-30 | End: 2022-03-30 | Stop reason: HOSPADM

## 2022-03-30 RX ORDER — LIDOCAINE HYDROCHLORIDE 20 MG/ML
INJECTION, SOLUTION INTRAVENOUS PRN
Status: DISCONTINUED | OUTPATIENT
Start: 2022-03-30 | End: 2022-03-30 | Stop reason: SDUPTHER

## 2022-03-30 RX ORDER — IPRATROPIUM BROMIDE AND ALBUTEROL SULFATE 2.5; .5 MG/3ML; MG/3ML
1 SOLUTION RESPIRATORY (INHALATION)
Status: DISCONTINUED | OUTPATIENT
Start: 2022-03-30 | End: 2022-03-30 | Stop reason: HOSPADM

## 2022-03-30 RX ORDER — METOCLOPRAMIDE HYDROCHLORIDE 5 MG/ML
10 INJECTION INTRAMUSCULAR; INTRAVENOUS
Status: COMPLETED | OUTPATIENT
Start: 2022-03-30 | End: 2022-03-30

## 2022-03-30 RX ORDER — SODIUM CHLORIDE 9 MG/ML
25 INJECTION, SOLUTION INTRAVENOUS PRN
Status: DISCONTINUED | OUTPATIENT
Start: 2022-03-30 | End: 2022-03-30 | Stop reason: HOSPADM

## 2022-03-30 RX ORDER — FENTANYL CITRATE 50 UG/ML
50 INJECTION, SOLUTION INTRAMUSCULAR; INTRAVENOUS EVERY 5 MIN PRN
Status: DISCONTINUED | OUTPATIENT
Start: 2022-03-30 | End: 2022-03-30 | Stop reason: HOSPADM

## 2022-03-30 RX ORDER — PROPOFOL 10 MG/ML
INJECTION, EMULSION INTRAVENOUS PRN
Status: DISCONTINUED | OUTPATIENT
Start: 2022-03-30 | End: 2022-03-30 | Stop reason: SDUPTHER

## 2022-03-30 RX ORDER — GLYCOPYRROLATE 1 MG/5 ML
SYRINGE (ML) INTRAVENOUS PRN
Status: DISCONTINUED | OUTPATIENT
Start: 2022-03-30 | End: 2022-03-30 | Stop reason: SDUPTHER

## 2022-03-30 RX ORDER — BUPIVACAINE HYDROCHLORIDE 5 MG/ML
INJECTION, SOLUTION EPIDURAL; INTRACAUDAL
Status: COMPLETED | OUTPATIENT
Start: 2022-03-30 | End: 2022-03-30

## 2022-03-30 RX ADMIN — SODIUM CHLORIDE, POTASSIUM CHLORIDE, SODIUM LACTATE AND CALCIUM CHLORIDE: 600; 310; 30; 20 INJECTION, SOLUTION INTRAVENOUS at 08:47

## 2022-03-30 RX ADMIN — METOCLOPRAMIDE 10 MG: 5 INJECTION, SOLUTION INTRAMUSCULAR; INTRAVENOUS at 12:02

## 2022-03-30 RX ADMIN — DEXAMETHASONE SODIUM PHOSPHATE 4 MG: 4 INJECTION, SOLUTION INTRA-ARTICULAR; INTRALESIONAL; INTRAMUSCULAR; INTRAVENOUS; SOFT TISSUE at 11:17

## 2022-03-30 RX ADMIN — ROCURONIUM BROMIDE 50 MG: 50 INJECTION, SOLUTION INTRAVENOUS at 11:07

## 2022-03-30 RX ADMIN — ONDANSETRON 4 MG: 2 INJECTION INTRAMUSCULAR; INTRAVENOUS at 11:40

## 2022-03-30 RX ADMIN — CEFAZOLIN 1000 MG: 1 INJECTION, POWDER, FOR SOLUTION INTRAMUSCULAR; INTRAVENOUS; PARENTERAL at 11:11

## 2022-03-30 RX ADMIN — FENTANYL CITRATE 50 MCG: 50 INJECTION, SOLUTION INTRAMUSCULAR; INTRAVENOUS at 11:05

## 2022-03-30 RX ADMIN — PROPOFOL 130 MG: 10 INJECTION, EMULSION INTRAVENOUS at 11:07

## 2022-03-30 RX ADMIN — Medication 0.3 MG: at 11:19

## 2022-03-30 RX ADMIN — LIDOCAINE HYDROCHLORIDE 100 MG: 20 INJECTION, SOLUTION INTRAVENOUS at 11:07

## 2022-03-30 RX ADMIN — SUGAMMADEX 200 MG: 100 INJECTION, SOLUTION INTRAVENOUS at 11:41

## 2022-03-30 RX ADMIN — SUGAMMADEX 200 MG: 100 INJECTION, SOLUTION INTRAVENOUS at 11:39

## 2022-03-30 ASSESSMENT — PULMONARY FUNCTION TESTS
PIF_VALUE: 29
PIF_VALUE: 3
PIF_VALUE: 20
PIF_VALUE: 21
PIF_VALUE: 17
PIF_VALUE: 0
PIF_VALUE: 20
PIF_VALUE: 16
PIF_VALUE: 15
PIF_VALUE: 29
PIF_VALUE: 28
PIF_VALUE: 2
PIF_VALUE: 20
PIF_VALUE: 20
PIF_VALUE: 25
PIF_VALUE: 20
PIF_VALUE: 30
PIF_VALUE: 21
PIF_VALUE: 6
PIF_VALUE: 30
PIF_VALUE: 8
PIF_VALUE: 21
PIF_VALUE: 21
PIF_VALUE: 20
PIF_VALUE: 31
PIF_VALUE: 20
PIF_VALUE: 39
PIF_VALUE: 30
PIF_VALUE: 20
PIF_VALUE: 1
PIF_VALUE: 21
PIF_VALUE: 5
PIF_VALUE: 1
PIF_VALUE: 20
PIF_VALUE: 1
PIF_VALUE: 20
PIF_VALUE: 20
PIF_VALUE: 0
PIF_VALUE: 21
PIF_VALUE: 20
PIF_VALUE: 0
PIF_VALUE: 22
PIF_VALUE: 21
PIF_VALUE: 1
PIF_VALUE: 21
PIF_VALUE: 21
PIF_VALUE: 0
PIF_VALUE: 0
PIF_VALUE: 17
PIF_VALUE: 1

## 2022-03-30 ASSESSMENT — PAIN SCALES - GENERAL
PAINLEVEL_OUTOF10: 0
PAINLEVEL_OUTOF10: 0

## 2022-03-30 ASSESSMENT — PAIN - FUNCTIONAL ASSESSMENT: PAIN_FUNCTIONAL_ASSESSMENT: 0-10

## 2022-03-30 NOTE — PROGRESS NOTES
1155: Arrived to PACU from OR. Monitors applied, alarms on. Report obtained from Amanda Ngo and [facility].  2351; Medicated for nausea. 1225: Dozes, arouses easily. Denies discomfort and states nausea was gone. Repositioned in bed, warm blankets on. 1233: Transported to [facility]. Daughter at bedside.

## 2022-03-30 NOTE — BRIEF OP NOTE
Brief Postoperative Note      Patient:  Erik Ernst  YOB: 1945  MRN: [id number]    Date of Procedure: 3/30/2022    Pre-Op Diagnosis: Chronic kidney disease, stage V ([facility] [N18.5]    Post-Op Diagnosis: Same       Procedure(s):  CATHETER INSERTION PERITONEAL DIALYSIS LAPAROSCOPIC    Surgeon(s):  Adriana Colon MD    Assistant:  Physician Assistant: nAuel Gallardo PA-C    Anesthesia: General    Estimated Blood Loss (mL): Minimal    Complications: None    Specimens:   * No specimens in log *    Implants:  * No implants in log *      Drains:   [REMOVED] Urethral Catheter Non-latex 16 fr (Removed)       Findings: as Above    Electronically signed by Anuel Gallardo PA-C on 3/30/2022 at 11:58 AM

## 2022-03-30 NOTE — PROGRESS NOTES
1237:  Pt received from PACU alert and oriented with no c/o abd discomfort. Abd incisions x2 well approx with surgical glue in place, no bleeding noted. PD cath covered with opsite drsg. PT tolerating PO, VSS.

## 2022-03-30 NOTE — PROGRESS NOTES
1345:  Pt discharged to home alert and oriented with minimal c/o lower abd discomfort. Abd incisions x2 well approx with surgical glue in place, no bleeding or drainage noted. PD cath site covered with opsite drsg, no bleeding noted. Pt tolerating PO, VSS.

## 2022-03-30 NOTE — ANESTHESIA POSTPROCEDURE EVALUATION
Department of Anesthesiology  Postprocedure Note    Patient: Ellen Garvin  MRN: [id number]  YOB: 1945  Date of evaluation: 3/30/2022  Time:  12:01 PM     Procedure Summary     Date: 03/30/22 Room / Location: [address] [place] [address]    Anesthesia Start: 1100 Anesthesia Stop: 1159    Procedure: CATHETER INSERTION PERITONEAL DIALYSIS LAPAROSCOPIC (N/A Abdomen) Diagnosis:       Chronic kidney disease, stage V ([address]      (Chronic kidney disease, stage V ([address] [N18.5])    Surgeons: Aminah Bear MD Responsible Provider: Kishan Miranda DO    Anesthesia Type: general ASA Status: 4          Anesthesia Type: general    Dhiraj Phase I:      Dhiraj Phase II:      Last vitals: Reviewed and per EMR flowsheets.        Anesthesia Post Evaluation    Patient location during evaluation: PACU  Patient participation: complete - patient participated  Level of consciousness: sleepy but conscious  Airway patency: patent  Nausea & Vomiting: no nausea  Complications: no  Cardiovascular status: hemodynamically stable  Respiratory status: acceptable  Hydration status: euvolemic

## 2022-03-31 ENCOUNTER — TELEPHONE (OUTPATIENT)
Dept: INTERNAL MEDICINE CLINIC | Age: 77
End: 2022-03-31

## 2022-03-31 RX ORDER — PROPAFENONE HYDROCHLORIDE 150 MG/1
150 TABLET, FILM COATED ORAL EVERY 8 HOURS
Qty: 30 TABLET | Refills: 5 | Status: SHIPPED | OUTPATIENT
Start: 2022-03-31 | End: 2022-07-12

## 2022-03-31 NOTE — TELEPHONE ENCOUNTER
----- Message from [address] [facility] sent at 3/31/2022 12:04 PM EDT -----  Subject: Refill Request    QUESTIONS  Name of Medication? propafenone (RYTHMOL) 150 MG tablet  Patient-reported dosage and instructions? 150mg, 1 po q8hrs  How many days do you have left? 4  Preferred Pharmacy? [address] phone number (if available)? [phone]  ---------------------------------------------------------------------------  --------------  CALL BACK INFO  What is the best way for the office to contact you? OK to leave message on   voicemail  Preferred Call Back Phone Number?  [phone]

## 2022-04-01 NOTE — OP NOTE
Operative Report:    Patient ID:  Ishmael Mejia  [id number]  89 y.o.  1945    Indications: ESRD    Pre-operative Diagnosis: ESRD    Post-operative Diagnosis: same    Procedure:   1. Laparoscopic PD cath placement       Surgeon: Zara Felton MD ,    First Assistant: Neema Smith PA-C  The  Use of a first assistant was necessary for the proper positioning, prepping, and draping of the patient, as well as the safe and expeditious execution of the case and closure of skin and subcutaneous tissues. Findings:  same    Estimated Blood Loss:  Minimal           Total IV Fluids: 500 ml            Complications:  None; patient tolerated the procedure well. Disposition: PACU - hemodynamically stable. Condition: stable      Procedure Details: The patient was seen again in the Holding Room. The risks, benefits, complications, treatment options, and expected outcomes were discussed with the patient. The possibilities of reaction to medication, pulmonary aspiration, perforation of viscus, bleeding, recurrent infection, the need for additional procedures, and development of a complication requiring transfusion or further operation were discussed with the patient and/or family. There was concurrence with the proposed plan, and informed consent was obtained. The PD catheter exit site was marked in the preoperative area. The patient was taken to the Operating Room, identified as Ishmael Mejia, and the procedure verified. A Time Out was held and the above information confirmed. DESCRIPTION OF PROCEDURE: The patient was brought into the operating room and placed supine. The abdomen was prepped and draped in the usual sterile fashion. Using a 5-mm optical trocar, the right upper quadrant was entered without incidence. CO2 insufflation was tolerated, and the patient was positioned in Trendelenburg. A small incision was made just lateral to the umbilicus over the rectus abdominis muscle.  The incision was deepened through the subcutaneous tissue, and the anterior rectus sheath was incised, opened and using the tunneler and the PD catheter combination, the PD catheter was tunneled under direct vision into the posterior peritoneal wall through the rectus abdominis muscle and then entering the peritoneum in the suprapubic area. The tunneler was removed, and the cuff of the PD catheter was left directly underneath the rectus muscle to enhance the scarring. The PD catheter was in good position. At this point, a separate incision was made superior right over the exit site at the marked site, and at this nora a tunneler was passed from where the   catheter was inserted cephalad to the PD catheter marked site, and the PD catheter was attached to the tunneler and pulled through, securing the PD catheter underneath the skin bridge. The PD catheter appliance was attached and 200 cc of heparinized saline solution was inserted under gravity through the PD catheter, and this flowed in with ease, and then the bag was brought down to the floor, removing some of the saline solution in the peritoneum in good order. The fascia was closed around the PD catheter, and the deep dermal tissue was closed using 3-0 Vicryl and the skin using 4-0 Vicryl. Steri-Strips and 4 x 4 dressings were applied. The PD catheter exit site was dressed using a drain dressing and 4 x 4s wrapped around the cap of the PD catheter. The port sites were also approximated using 4-0 Vicryl in subcuticular fashion. Patient remained stable and was subsequently transferred to recovery room in stable condition. Instrument and lap counts were correct at the end of the case.        Micky Higuera MD

## 2022-04-06 ENCOUNTER — HOSPITAL ENCOUNTER (OUTPATIENT)
Age: 77
Discharge: HOME | End: 2022-04-06
Payer: COMMERCIAL

## 2022-04-06 DIAGNOSIS — N18.6 END STAGE RENAL DISEASE (HCC): ICD-10-CM

## 2022-04-06 DIAGNOSIS — N18.5 CHRONIC KIDNEY DISEASE, STAGE V (HCC): ICD-10-CM

## 2022-04-06 LAB
HBV SURFACE AB TITR SER: <3.5 {TITER}
HEPATITIS B SURFACE ANTIGEN: NON REACTIVE

## 2022-04-06 PROCEDURE — 87340 HEPATITIS B SURFACE AG IA: CPT

## 2022-04-06 PROCEDURE — 86706 HEP B SURFACE ANTIBODY: CPT

## 2022-04-06 PROCEDURE — 36415 COLL VENOUS BLD VENIPUNCTURE: CPT

## 2022-04-06 PROCEDURE — 86704 HEP B CORE ANTIBODY TOTAL: CPT

## 2022-04-08 LAB — HEPATITIS B CORE TOTAL ANTIBODY: NEGATIVE

## 2022-04-09 ENCOUNTER — HOSPITAL ENCOUNTER (OUTPATIENT)
Age: 77
Discharge: HOME | End: 2022-04-09
Payer: COMMERCIAL

## 2022-04-09 ENCOUNTER — HOSPITAL ENCOUNTER (OUTPATIENT)
Dept: GENERAL RADIOLOGY | Age: 77
Discharge: HOME | End: 2022-04-09
Payer: COMMERCIAL

## 2022-04-09 DIAGNOSIS — N18.1 CHRONIC KIDNEY DISEASE, STAGE I: ICD-10-CM

## 2022-04-09 PROCEDURE — 71045 X-RAY EXAM CHEST 1 VIEW: CPT

## 2022-04-10 ASSESSMENT — ENCOUNTER SYMPTOMS
RECTAL PAIN: 0
COLOR CHANGE: 0
PHOTOPHOBIA: 0
ANAL BLEEDING: 0
CHOKING: 0
SORE THROAT: 0
EYE ITCHING: 0
BACK PAIN: 0
EYE REDNESS: 0
APNEA: 0
STRIDOR: 0
CONSTIPATION: 0

## 2022-04-10 NOTE — PROGRESS NOTES
Chief Complaint   Patient presents with    New Patient     CONSULT PD CATH PLACEMENT DR. Germaine Thomas:  HPI: Patient is herewith complaints of end-stage renal disease. Patient has been contemplating about dialysis. She has researched peritoneal dialysis extensively and would like to discuss this further. Patient believes that her active lifestyle is more in line with peritoneal dialysis. Patient has not had any abdominal surgery. .    I have reviewed the patient's(pertinent information to this visit) medical history, family history(scanned in  the [address] [place] [address] under \"patient questioner\"), social history and review of systems with the patient today in the office. Past Surgical History:   Procedure Laterality Date    BREAST BIOPSY  10-12    Right Breast Biopsy, Cancer    BREAST BIOPSY  1970's    Right Breast Biopsy, Benign    BREAST LUMPECTOMY  11/6/12    Breast cancer - Right with sentinal node    BUNIONECTOMY  0219'J    Right Dory Bound Dr. Verónica Fischer    8/10/09- The patient has no significant CAD. The elevated troponin is probably secondary to SVT. , 10/03- no significant CAD    CARPAL TUNNEL RELEASE Left 01/2019    CATARACT REMOVAL Bilateral 03/2020, 5/20    CATARACT REMOVAL Left 05/2020    COLONOSCOPY  \"X 2 Last One 2008 \"    Polpy Removed During Last Colonoscopy    DENTAL SURGERY      Teeth Extracted In Past    DIALYSIS CATHETER INSERTION N/A 3/30/2022    CATHETER INSERTION PERITONEAL DIALYSIS LAPAROSCOPIC performed by Diego Stallworth MD at [facility], COLON, DIAGNOSTIC  07/18/2016    savary dilatation to 17 mm    HYSTERECTOMY, TOTAL ABDOMINAL  1980's    JOINT REPLACEMENT  2009    Total Left Knee    JOINT REPLACEMENT  2010    Total Right Knee    SOFI STEROTACTIC LOC BREAST BIOPSY RIGHT Right 2/18/2021    SOFI STEROTACTIC LOC BREAST BIOPSY RIGHT [facility]    OVARY REMOVAL      UPPER GASTROINTESTINAL ENDOSCOPY N/A 11/8/2018 EGD DIAGNOSTIC ONLY performed by Robin Rivero MD at [facility] ENDOSCOPY     Past Medical History:   Diagnosis Date    14 day event monitor 11/05/2018    Sinus rhythm    Atrial fibrillation with RVR ([facility] 11/25/2013    Breast cancer ([facility]     Edema     1/81 TTE diastolic dysfxn, EF 97%; 19/10 - TTE diastolic dysfxn, EF 34%; Stress myoview  WNl, EF 70%    Family history of cardiovascular disease     GERD (gastroesophageal reflux disease)     H/O 24 hour EKG monitoring 4/23/12    [facility]    H/O cardiovascular stress test     4/23/12-[facility], Probably norm perfusion Lexiscan cardiolite study except for diaphramatic attenuation artifact, otherwise perfusion is normal, norm LV fxn by gated scan. 11/10-EF70%, 9/15/08-EF70%, 1/15/07-EF70%, 9/03    H/O echocardiogram     4/23/12-[facility]- Norm chamber sizes. LVH with norm LV systolic but abn diastolic fxn, mild MR and TR, minimal pulm HTN. 11/10 -EF>55%; 3/05, 9/23/03    History of cardiac catheterization     11/15/2013-EF 55%, No signif CAD -Dr Asya Stout;;    History of cardiovascular stress test     11/14/2013-EF 70%. Normal Lexiscan Cardiolite, Uniform wall motion;    History of echocardiogram     24/41/0982-[id number] global systolic  function. No wall motion abnormalities. EF 55%. Mild MR/TR;    History of Holter monitoring 11/17/14 11/14-48 hour Holter: Predominant rhythm was sinus, no ventricular ectopy noted, supraventricular ectopics were noted in single beat forms.  History of therapeutic radiation     Hx of 24 hour EKG monitoring     12/17/13 48 hr holter monitor. Sinus rhythm with intermittent sinus arrhythmia.     Hyperlipidemia     Hypertension     11/13 Cath WNL, EF 55%; 11/13 Stress WNL : 11/13 TTE mild MR and TR, EF 55%; 4/12 Stress WNL; 8/9 - Cath WNL    Iron deficiency anemia 7/9/2013 9/13 EGD WNL    Lumbar radiculopathy 11/25/2013    Menopause     ADOLFO (obstructive sleep apnea)     sleep study 10/3 CPAP 6cm     Osteoarthritis     both knees  Osteopenia      DEXA T-score -1.5    Other activity(E029.9)     48 hr holter, the 48 hr holter monitor reveals the patient in the sinus rhythm with occasional supraventricular ectopic beats. There is a rare short atrial run.  Paroxysmal atrial fibrillation ([address]      Holter WL    Prolonged emergence from general anesthesia     Proteinuria     Right Breast Cancer Dx 10-    Supraventricular tachycardia (HCC)     Type 2 diabetes mellitus ([address] Dx 's    Urge incontinence     Wears partial dentures     upper partial     Family History   Problem Relation Age of Onset    Diabetes Mother     Early Death Mother 61    Cancer Father         \"Lung Cancer\"    Heart Disease Father     Diabetes Father     Stroke Sister     Diabetes Sister     Diabetes Brother     Cancer Brother         \"Prostate Cancer\"    Cancer Brother         \"Lung Cancer\"    Thyroid Disease Daughter      Social History     Socioeconomic History    Marital status:       Spouse name: Not on file    Number of children: Not on file    Years of education: Not on file    Highest education level: Not on file   Occupational History    Not on file   Tobacco Use    Smoking status: Former Smoker     Packs/day: 0.50     Years: 10.00     Pack years: 5.00     Quit date: 1987     Years since quittin.2    Smokeless tobacco: Never Used   Vaping Use    Vaping Use: Never used   Substance and Sexual Activity    Alcohol use: No     Alcohol/week: 0.0 standard drinks     Comment: 3 cups coffee daily    Drug use: No    Sexual activity: Never   Other Topics Concern    Not on file   Social History Narrative    Wears glasses     Social Determinants of Health     Financial Resource Strain:     Difficulty of Paying Living Expenses: Not on file   Food Insecurity:     Worried About Running Out of Food in the Last Year: Not on file    Lucrecia of Food in the Last Year: Not on file   Transportation Needs:     Lack of Transportation (Medical): Not on file    Lack of Transportation (Non-Medical):  Not on file   Physical Activity:     Days of Exercise per Week: Not on file    Minutes of Exercise per Session: Not on file   Stress:     Feeling of Stress : Not on file   Social Connections:     Frequency of Communication with Friends and Family: Not on file    Frequency of Social Gatherings with Friends and Family: Not on file    Attends Catholic Services: Not on file    Active Member of [address] [place] [address] or Organizations: Not on file    Attends Club or Organization Meetings: Not on file    Marital Status: Not on file   Intimate Partner Violence:     Fear of Current or Ex-Partner: Not on file    Emotionally Abused: Not on file    Physically Abused: Not on file    Sexually Abused: Not on file   Housing Stability:     Unable to Pay for Housing in the Last Year: Not on file    Number of [place] in the Last Year: Not on file    Unstable Housing in the Last Year: Not on file       Current Outpatient Medications   Medication Sig Dispense Refill    atorvastatin (LIPITOR) 80 MG tablet Take 1 tab by mouth once daily 90 tablet 3    metoprolol tartrate (LOPRESSOR) 50 MG tablet Take 1 tablet by mouth 2 times daily take 1 tablet by mouth twice a day 180 tablet 3    doxazosin (CARDURA) 4 MG tablet Take 1 tablet by mouth daily 30 tablet 3    chlorthalidone (HYGROTON) 25 MG tablet Take 1 tablet by mouth daily 30 tablet 3    furosemide (LASIX) 20 MG tablet take 1 tablet by mouth every other day 90 tablet 3    oxybutynin (DITROPAN) 5 MG tablet take 1 tablet by mouth twice a day 180 tablet 3    dilTIAZem (CARDIZEM CD) 120 MG extended release capsule take 1 capsule by mouth once daily 30 capsule 11    Ascorbic Acid (VITAMIN C) 250 MG tablet Take by mouth daily      Omega-3 Fatty Acids (FISH OIL PO) Take 1,200 mg by mouth      FOLIC ACID PO Take 349 mg by mouth daily      Multiple Vitamins-Minerals (MULTIVITAMIN ADULT PO) Take by mouth Position: Sitting, Cuff Size: Medium Adult)   Pulse 66   Ht 5' 8.5\" (1.74 m)   Wt 192 lb 14.4 oz (87.5 kg)   LMP  (LMP Unknown)   SpO2 98%   BMI 28.90 kg/m²      Physical Exam      ASSESSMENT:  1. JESSI (acute kidney injury) ([facility] [address] [facility]    2. Chronic kidney disease, stage V (HCC)          PLAN:  Treatment: Proceed with laparoscopic assisted peritoneal dialysis catheter placement. Patient counseled on risks, benefits, and alternatives of treatment plan at length today. Patient states an understanding and willingness to proceed with plan. No orders of the defined types were placed in this encounter. No orders of the defined types were placed in this encounter. Follow Up:  No follow-ups on file.       Aleksandra Dyson MD

## 2022-04-11 ENCOUNTER — OFFICE VISIT (OUTPATIENT)
Dept: SURGERY | Age: 77
End: 2022-04-11

## 2022-04-11 VITALS
HEIGHT: 68 IN | HEART RATE: 68 BPM | WEIGHT: 189 LBS | DIASTOLIC BLOOD PRESSURE: 82 MMHG | SYSTOLIC BLOOD PRESSURE: 152 MMHG | OXYGEN SATURATION: 98 % | BODY MASS INDEX: 28.64 KG/M2

## 2022-04-11 DIAGNOSIS — Z48.89 ENCOUNTER FOR POSTOPERATIVE CARE: Primary | ICD-10-CM

## 2022-04-11 PROCEDURE — APPNB30 APP NON BILLABLE TIME 0-30 MINS: Performed by: PHYSICIAN ASSISTANT

## 2022-04-11 PROCEDURE — 99024 POSTOP FOLLOW-UP VISIT: CPT | Performed by: PHYSICIAN ASSISTANT

## 2022-04-12 NOTE — PROGRESS NOTES
Chief Complaint   Patient presents with    Post-Op Check     1ST P/O PD Cath Placement, 3/30/22         SUBJECTIVE:  Patient presents today for her 1st post op visit following laparoscopic PD cath placement. Pt reports that pain is none. Pt is  tolerating a regular diet. BMs are WNL. Pt's first training is scheduled for tomorrow. Has not changed the dressings as recommended. Has noticed some small drainage on the dressings. Incisions: minbruising and no discharge. Some glue intact. Past Surgical History:   Procedure Laterality Date    BREAST BIOPSY  10-12    Right Breast Biopsy, Cancer    BREAST BIOPSY  1970's    Right Breast Biopsy, Benign    BREAST LUMPECTOMY  11/6/12    Breast cancer - Right with sentinal node    BUNIONECTOMY  4715'R    Right Anabel Perry    8/10/09- The patient has no significant CAD. The elevated troponin is probably secondary to SVT. , 10/03- no significant CAD    CARPAL TUNNEL RELEASE Left 01/2019    CATARACT REMOVAL Bilateral 03/2020, 5/20    CATARACT REMOVAL Left 05/2020    COLONOSCOPY  \"X 2 Last One 2008 \"    Polpy Removed During Last Colonoscopy    DENTAL SURGERY      Teeth Extracted In Past    DIALYSIS CATHETER INSERTION N/A 3/30/2022    CATHETER INSERTION PERITONEAL DIALYSIS LAPAROSCOPIC performed by Benjamin Garrett MD at [address] [place] [address], COLON, DIAGNOSTIC  07/18/2016    savary dilatation to 17 mm    HYSTERECTOMY, TOTAL ABDOMINAL  1980's    JOINT REPLACEMENT  2009    Total Left Knee    JOINT REPLACEMENT  2010    Total Right Knee    SOFI STEROTACTIC LOC BREAST BIOPSY RIGHT Right 2/18/2021    SOFI STEROTACTIC LOC BREAST BIOPSY RIGHT [facility]    OVARY REMOVAL      UPPER GASTROINTESTINAL ENDOSCOPY N/A 11/8/2018    EGD DIAGNOSTIC ONLY performed by General Teena MD at [facility] ENDOSCOPY     Past Medical History:   Diagnosis Date    14 day event monitor 11/05/2018    Sinus rhythm    Atrial fibrillation with RVR ([address] 11/25/2013    Breast cancer ([facility] [address] [facility]     Edema     6/99 TTE diastolic dysfxn, EF 81%; 00/81 - TTE diastolic dysfxn, EF 07%; Stress myoview  WNl, EF 70%    Family history of cardiovascular disease     GERD (gastroesophageal reflux disease)     H/O 24 hour EKG monitoring 4/23/12    [facility]    H/O cardiovascular stress test     4/23/12-[facility], Probably norm perfusion Lexiscan cardiolite study except for diaphramatic attenuation artifact, otherwise perfusion is normal, norm LV fxn by gated scan. 11/10-EF70%, 9/15/08-EF70%, 1/15/07-EF70%, 9/03    H/O echocardiogram     4/23/12-[facility]- Norm chamber sizes. LVH with norm LV systolic but abn diastolic fxn, mild MR and TR, minimal pulm HTN. 11/10 -EF>55%; 3/05, 9/23/03    History of cardiac catheterization     11/15/2013-EF 55%, No signif CAD -Dr Johann Wood;;    History of cardiovascular stress test     11/14/2013-EF 70%. Normal Lexiscan Cardiolite, Uniform wall motion;    History of echocardiogram     72/87/9392-[id number] global systolic  function. No wall motion abnormalities. EF 55%. Mild MR/TR;    History of Holter monitoring 11/17/14 11/14-48 hour Holter: Predominant rhythm was sinus, no ventricular ectopy noted, supraventricular ectopics were noted in single beat forms.  History of therapeutic radiation     Hx of 24 hour EKG monitoring     12/17/13 48 hr holter monitor. Sinus rhythm with intermittent sinus arrhythmia.  Hyperlipidemia     Hypertension     11/13 Cath WNL, EF 55%; 11/13 Stress WNL : 11/13 TTE mild MR and TR, EF 55%; 4/12 Stress WNL; 8/9 - Cath WNL    Iron deficiency anemia 7/9/2013 9/13 EGD WNL    Lumbar radiculopathy 11/25/2013    Menopause     ADOLFO (obstructive sleep apnea)     sleep study 10/3 CPAP 6cm     Osteoarthritis     both knees    Osteopenia     9/12 DEXA T-score -1.5    Other activity(E029.9)     48 hr holter, the 48 hr holter monitor reveals the patient in the sinus rhythm with occasional supraventricular ectopic beats. There is a rare short atrial run.  Paroxysmal atrial fibrillation ([address]      Holter WL    Prolonged emergence from general anesthesia     Proteinuria     Right Breast Cancer Dx 10-12    Supraventricular tachycardia (HCC)     Type 2 diabetes mellitus ([address] Dx 's    Urge incontinence     Wears partial dentures     upper partial     Family History   Problem Relation Age of Onset    Diabetes Mother     Early Death Mother 61    Cancer Father         \"Lung Cancer\"    Heart Disease Father     Diabetes Father     Stroke Sister     Diabetes Sister     Diabetes Brother     Cancer Brother         \"Prostate Cancer\"    Cancer Brother         \"Lung Cancer\"    Thyroid Disease Daughter      Social History     Socioeconomic History    Marital status:      Spouse name: Not on file    Number of children: Not on file    Years of education: Not on file    Highest education level: Not on file   Occupational History    Not on file   Tobacco Use    Smoking status: Former Smoker     Packs/day: 0.50     Years: 10.00     Pack years: 5.00     Quit date: 1987     Years since quittin.2    Smokeless tobacco: Never Used   Vaping Use    Vaping Use: Never used   Substance and Sexual Activity    Alcohol use: No     Alcohol/week: 0.0 standard drinks     Comment: 3 cups coffee daily    Drug use: No    Sexual activity: Never   Other Topics Concern    Not on file   Social History Narrative    Wears glasses     Social Determinants of Health     Financial Resource Strain:     Difficulty of Paying Living Expenses: Not on file   Food Insecurity:     Worried About Running Out of Food in the Last Year: Not on file    Lucrecia of Food in the Last Year: Not on file   Transportation Needs:     Lack of Transportation (Medical): Not on file    Lack of Transportation (Non-Medical):  Not on file   Physical Activity:     Days of Exercise per Week: Not on file    Minutes of Exercise per Session: Not on file Stress:     Feeling of Stress : Not on file   Social Connections:     Frequency of Communication with Friends and Family: Not on file    Frequency of Social Gatherings with Friends and Family: Not on file    Attends [employer] Services: Not on file    Active Member of Clubs or Organizations: Not on file    Attends Club or Organization Meetings: Not on file    Marital Status: Not on file   Intimate Partner Violence:     Fear of Current or Ex-Partner: Not on file    Emotionally Abused: Not on file    Physically Abused: Not on file    Sexually Abused: Not on file   Housing Stability:     Unable to Pay for Housing in the Last Year: Not on file    Number of [place] in the Last Year: Not on file    Unstable Housing in the Last Year: Not on file       OBJECTIVE:  Physical Exam  Constitutional:       Appearance: She is well-developed. HENT:      Head: Normocephalic. Eyes:      Pupils: Pupils are equal, round, and reactive to light. Cardiovascular:      Rate and Rhythm: Normal rate. Pulmonary:      Effort: Pulmonary effort is normal.   Abdominal:      General: There is no distension. Palpations: Abdomen is soft. There is no mass. Tenderness: There is no abdominal tenderness. There is no guarding or rebound. Comments: Lap incisions well-healed. L sided PD catheter in place. Small amount of redness at the catheter site, which is not unexpected. No evidence of infection. No drainage or swelling. Musculoskeletal:         General: Normal range of motion. Cervical back: Normal range of motion and neck supple. Skin:     General: Skin is warm. Neurological:      Mental Status: She is alert and oriented to person, place, and time. ASSESSMENT:  Patient doing well on this post operative check. Wounds well healed. 1. Encounter for postoperative care        PLAN:  OK for training per protocol. Pt is to increase activities as tolerated.         No orders of the defined types were placed in this encounter. No orders of the defined types were placed in this encounter. Follow Up: Return if symptoms worsen or fail to improve.     Harjeet Arriaga PA-C

## 2022-04-18 ENCOUNTER — OFFICE VISIT (OUTPATIENT)
Dept: INTERNAL MEDICINE CLINIC | Age: 77
End: 2022-04-18
Payer: COMMERCIAL

## 2022-04-18 VITALS
WEIGHT: 194.8 LBS | DIASTOLIC BLOOD PRESSURE: 70 MMHG | RESPIRATION RATE: 16 BRPM | SYSTOLIC BLOOD PRESSURE: 138 MMHG | OXYGEN SATURATION: 98 % | BODY MASS INDEX: 29.62 KG/M2 | HEART RATE: 68 BPM

## 2022-04-18 DIAGNOSIS — E11.21 TYPE 2 DIABETES MELLITUS WITH NEPHROPATHY (HCC): Primary | ICD-10-CM

## 2022-04-18 DIAGNOSIS — N18.5 CHRONIC KIDNEY DISEASE, STAGE V (HCC): ICD-10-CM

## 2022-04-18 PROCEDURE — 3044F HG A1C LEVEL LT 7.0%: CPT | Performed by: INTERNAL MEDICINE

## 2022-04-18 PROCEDURE — 99213 OFFICE O/P EST LOW 20 MIN: CPT | Performed by: INTERNAL MEDICINE

## 2022-05-16 ENCOUNTER — TELEPHONE (OUTPATIENT)
Dept: INTERNAL MEDICINE CLINIC | Age: 77
End: 2022-05-16

## 2022-05-16 DIAGNOSIS — M10.072 ACUTE IDIOPATHIC GOUT INVOLVING TOE OF LEFT FOOT: ICD-10-CM

## 2022-05-16 RX ORDER — COLCHICINE 0.6 MG/1
0.6 TABLET ORAL 2 TIMES DAILY PRN
Qty: 30 TABLET | Refills: 0 | Status: SHIPPED | OUTPATIENT
Start: 2022-05-16 | End: 2022-06-08

## 2022-05-16 NOTE — TELEPHONE ENCOUNTER
Pt notified , thank you !! What Type Of Note Output Would You Prefer (Optional)?: Standard Output Hpi Title: Evaluation of Skin Lesions How Severe Are Your Spot(S)?: mild Have Your Spot(S) Been Treated In The Past?: has not been treated Additional History: Pt has had sores on the head for 6 months and had biopsies performed at a separate dermatologist and results read as precancers; pt was given clobetasol solution to apply to the scalp as well as mupirocin. The pt’s  has applied aloe Vera to the scalp everyday and believes the sores appear to reduce in size.

## 2022-05-17 ENCOUNTER — TELEPHONE (OUTPATIENT)
Dept: PULMONOLOGY | Age: 77
End: 2022-05-17

## 2022-05-23 RX ORDER — DILTIAZEM HYDROCHLORIDE 120 MG/1
CAPSULE, COATED, EXTENDED RELEASE ORAL
Qty: 90 CAPSULE | Refills: 3 | Status: SHIPPED | OUTPATIENT
Start: 2022-05-23

## 2022-05-25 ENCOUNTER — TELEPHONE (OUTPATIENT)
Dept: CARDIOLOGY CLINIC | Age: 77
End: 2022-05-25

## 2022-05-31 ENCOUNTER — TELEPHONE (OUTPATIENT)
Dept: INTERNAL MEDICINE CLINIC | Age: 77
End: 2022-05-31

## 2022-06-05 ENCOUNTER — HOSPITAL ENCOUNTER (EMERGENCY)
Age: 77
Discharge: HOME | End: 2022-06-06
Attending: EMERGENCY MEDICINE
Payer: COMMERCIAL

## 2022-06-05 ENCOUNTER — APPOINTMENT (OUTPATIENT)
Dept: GENERAL RADIOLOGY | Age: 77
End: 2022-06-05
Payer: COMMERCIAL

## 2022-06-05 VITALS
HEART RATE: 86 BPM | RESPIRATION RATE: 17 BRPM | TEMPERATURE: 98.4 F | BODY MASS INDEX: 28.34 KG/M2 | WEIGHT: 187 LBS | HEIGHT: 68 IN | SYSTOLIC BLOOD PRESSURE: 124 MMHG | DIASTOLIC BLOOD PRESSURE: 86 MMHG | OXYGEN SATURATION: 96 %

## 2022-06-05 DIAGNOSIS — M70.52 SUPRAPATELLAR BURSITIS OF LEFT KNEE: ICD-10-CM

## 2022-06-05 DIAGNOSIS — M25.562 LEFT KNEE PAIN, UNSPECIFIED CHRONICITY: Primary | ICD-10-CM

## 2022-06-05 PROCEDURE — 99284 EMERGENCY DEPT VISIT MOD MDM: CPT

## 2022-06-05 RX ORDER — HYDROCODONE BITARTRATE AND ACETAMINOPHEN 5; 325 MG/1; MG/1
1 TABLET ORAL ONCE
Status: COMPLETED | OUTPATIENT
Start: 2022-06-06 | End: 2022-06-06

## 2022-06-06 ENCOUNTER — APPOINTMENT (OUTPATIENT)
Dept: GENERAL RADIOLOGY | Age: 77
End: 2022-06-06
Payer: COMMERCIAL

## 2022-06-06 ENCOUNTER — APPOINTMENT (OUTPATIENT)
Dept: ULTRASOUND IMAGING | Age: 77
End: 2022-06-06
Payer: COMMERCIAL

## 2022-06-06 PROCEDURE — 73564 X-RAY EXAM KNEE 4 OR MORE: CPT

## 2022-06-06 PROCEDURE — 6370000000 HC RX 637 (ALT 250 FOR IP): Performed by: EMERGENCY MEDICINE

## 2022-06-06 PROCEDURE — 93971 EXTREMITY STUDY: CPT

## 2022-06-06 RX ORDER — HYDROCODONE BITARTRATE AND ACETAMINOPHEN 5; 325 MG/1; MG/1
1 TABLET ORAL EVERY 8 HOURS PRN
Qty: 6 TABLET | Refills: 0 | Status: SHIPPED | OUTPATIENT
Start: 2022-06-06 | End: 2022-06-08

## 2022-06-06 RX ADMIN — HYDROCODONE BITARTRATE AND ACETAMINOPHEN 1 TABLET: 5; 325 TABLET ORAL at 00:23

## 2022-06-06 ASSESSMENT — ENCOUNTER SYMPTOMS
NAUSEA: 0
BACK PAIN: 0
ABDOMINAL PAIN: 0
SORE THROAT: 0
SHORTNESS OF BREATH: 0
VOMITING: 0
EYE DISCHARGE: 0
EYE PAIN: 0
COUGH: 0
RHINORRHEA: 0

## 2022-06-06 ASSESSMENT — PAIN SCALES - GENERAL: PAINLEVEL_OUTOF10: 3

## 2022-06-06 ASSESSMENT — PAIN DESCRIPTION - LOCATION: LOCATION: KNEE

## 2022-06-06 ASSESSMENT — PAIN - FUNCTIONAL ASSESSMENT
PAIN_FUNCTIONAL_ASSESSMENT: PREVENTS OR INTERFERES WITH MANY ACTIVE NOT PASSIVE ACTIVITIES
PAIN_FUNCTIONAL_ASSESSMENT: 0-10

## 2022-06-06 ASSESSMENT — PAIN DESCRIPTION - DESCRIPTORS: DESCRIPTORS: JABBING;STABBING

## 2022-06-06 ASSESSMENT — PAIN DESCRIPTION - ONSET: ONSET: ON-GOING

## 2022-06-06 ASSESSMENT — PAIN DESCRIPTION - ORIENTATION: ORIENTATION: LEFT

## 2022-06-06 ASSESSMENT — PAIN DESCRIPTION - FREQUENCY: FREQUENCY: CONTINUOUS

## 2022-06-06 NOTE — ED NOTES
PT denies any trauma to knee via fall but \"could have twisted it\". Pt states she has been taking tylenol to help with pain.  Pt having difficulty ambulating      Saima Stinson RN  06/05/22 1111

## 2022-06-06 NOTE — ED NOTES
VL DUP LOWER EXTREMITY VENOUS LEFT [[id number]]    Status: Final result       Order Providers    Authorizing Billing   MD Nicole Treviño MD            Signed by    Signed Date/Time Phone Pager   MAICOL Matt 6/06/2022  1:35 [phone]      Reading Providers    Read Date Phone Pager   MADDI MAICOL S Mon Jun 6, 2022  1:35 [phone]        VL DUP LOWER EXTREMITY VENOUS LEFT: Patient Communication     Released  Not seen     Radiation Dose Estimates    No radiation information found for this patient  Narrative   EXAMINATION:   DUPLEX VENOUS ULTRASOUND OF THE LEFT LOWER EXTREMITY       6/6/2022 12:26 am       TECHNIQUE:   Duplex ultrasound using B-mode/gray scaled imaging and Doppler spectral   analysis and color flow was obtained of the deep venous structures of the   left extremity.       COMPARISON:   11/28/2019       HISTORY:   ORDERING SYSTEM PROVIDED HISTORY: left lower leg swelling and pain; concern   for dvt   TECHNOLOGIST PROVIDED HISTORY:   Reason for exam:->left lower leg swelling and pain; concern for dvt   Reason for Exam: Lt knee pain       FINDINGS:   The visualized veins of the left lower extremity are patent and free of   echogenic thrombus. The veins demonstrate good compressibility with normal   color flow study and spectral analysis.  Small Baker's cyst.           Impression   No evidence of DVT in the left lower extremity.           Nils Gomez RN  06/06/22 [id number]

## 2022-06-06 NOTE — ED NOTES
XR KNEE LEFT (MIN 4 VIEWS) [[id number]]    Status: Final result       Order Providers    Authorizing Billing   Brutus Scheuermann, MD Burnetta Fraction, MD   Replaced: XR KNEE LEFT (3 VIEWS)            Signed by    Signed Date/Time Phone Pager   Navid Rolf 6/06/2022 12:49 [phone]      Reading Providers    Read Date Phone Pager   Navid Bansal Mon Jun 6, 2022 12:49 [phone]        XR KNEE LEFT (MIN 4 VIEWS): Patient Communication     Released  Not seen     Radiation Dose Estimates    No radiation information found for this patient  Narrative   EXAMINATION:   FOUR XRAY VIEWS OF THE LEFT KNEE       6/6/2022 12:23 am       COMPARISON:   Chest radiograph dated November 17, 2009       HISTORY:   ORDERING SYSTEM PROVIDED HISTORY: left knee pain and swelling   TECHNOLOGIST PROVIDED HISTORY:   Reason for exam:->left knee pain and swelling   Reason for Exam: left knee pain and swelling   Additional signs and symptoms: left knee pain and swelling       FINDINGS:   Left knee arthroplasty is noted.  The hardware is intact.  There is no acute   fracture or dislocation. [place] is a large suprapatellar joint effusion.    Vascular calcifications are seen.           Impression   Large suprapatellar joint effusion.       Left knee arthroplasty.  No acute hardware complication          Shyla Jean RN  06/06/22 [id number]

## 2022-06-08 ENCOUNTER — OFFICE VISIT (OUTPATIENT)
Dept: INTERNAL MEDICINE CLINIC | Age: 77
End: 2022-06-08
Payer: COMMERCIAL

## 2022-06-08 VITALS
WEIGHT: 188.5 LBS | DIASTOLIC BLOOD PRESSURE: 60 MMHG | OXYGEN SATURATION: 98 % | HEIGHT: 68 IN | HEART RATE: 70 BPM | BODY MASS INDEX: 28.57 KG/M2 | SYSTOLIC BLOOD PRESSURE: 122 MMHG | RESPIRATION RATE: 16 BRPM

## 2022-06-08 DIAGNOSIS — Z91.81 AT HIGH RISK FOR FALLS: ICD-10-CM

## 2022-06-08 DIAGNOSIS — M10.362 ACUTE GOUT DUE TO RENAL IMPAIRMENT INVOLVING LEFT KNEE: Primary | ICD-10-CM

## 2022-06-08 PROBLEM — I25.119 ATHEROSCLEROTIC HEART DISEASE OF NATIVE CORONARY ARTERY WITH UNSPECIFIED ANGINA PECTORIS (HCC): Status: RESOLVED | Noted: 2022-01-18 | Resolved: 2022-06-08

## 2022-06-08 PROBLEM — I20.9 ANGINA PECTORIS, UNSPECIFIED (HCC): Status: RESOLVED | Noted: 2022-01-18 | Resolved: 2022-06-08

## 2022-06-08 PROCEDURE — 99214 OFFICE O/P EST MOD 30 MIN: CPT | Performed by: INTERNAL MEDICINE

## 2022-06-08 PROCEDURE — 1123F ACP DISCUSS/DSCN MKR DOCD: CPT | Performed by: INTERNAL MEDICINE

## 2022-06-08 RX ORDER — PREDNISONE 10 MG/1
TABLET ORAL
Qty: 20 TABLET | Refills: 0 | Status: SHIPPED | OUTPATIENT
Start: 2022-06-08 | End: 2022-07-20

## 2022-06-08 SDOH — ECONOMIC STABILITY: FOOD INSECURITY: WITHIN THE PAST 12 MONTHS, THE FOOD YOU BOUGHT JUST DIDN'T LAST AND YOU DIDN'T HAVE MONEY TO GET MORE.: PATIENT DECLINED

## 2022-06-08 SDOH — ECONOMIC STABILITY: FOOD INSECURITY: WITHIN THE PAST 12 MONTHS, YOU WORRIED THAT YOUR FOOD WOULD RUN OUT BEFORE YOU GOT MONEY TO BUY MORE.: PATIENT DECLINED

## 2022-06-08 ASSESSMENT — SOCIAL DETERMINANTS OF HEALTH (SDOH): HOW HARD IS IT FOR YOU TO PAY FOR THE VERY BASICS LIKE FOOD, HOUSING, MEDICAL CARE, AND HEATING?: PATIENT DECLINED

## 2022-06-08 NOTE — PROGRESS NOTES
Nicci Emmanuel  1945 06/08/22    SUBJECTIVE:    Pt with pain of the left knee. She woke 4-5 days ago with pain in the knee, then the next day this was severe. This involves the entire circumference of the knee, isadora medially and laterally. She has had swelling. She has had chills but no fever. She denies any trauma. She went to the ER, xray showed a large suprapatellar effusion and arthroplasty; US was (-). She was given norco with benefit. She started PD 1 month ago. OBJECTIVE:    /60   Pulse 70   Resp 16   Ht 5' 8\" (1.727 m)   Wt 188 lb 8 oz (85.5 kg)   LMP  (LMP Unknown)   SpO2 98%   Breastfeeding No   BMI 28.66 kg/m²     Physical Exam  Knee: Pt exhibits normal range of motion, mild swelling, no effusion, no erythema. (+) warmth    There is no LCL laxity, (+) lateral joint line tenderness, no LCL tenderness. There is no  MCL laxity, (+) medial joint line tenderness, no MCL tenderness. ASSESSMENT:    1. Acute gout due to renal impairment involving left knee    2. At high risk for falls        PLAN:    Alberto Silverman was seen today for follow-up and knee pain. Diagnoses and all orders for this visit:    Acute gout due to renal impairment involving left knee - I suspect this is gout, though there is a remote change of infection. Will not give colchicine given her PD. Also will defer to Dr Keila roth to prevent gout knowing she is on PD. I will Tx with prednisone taper. If not resolving pt will contact the office.   -     predniSONE (DELTASONE) 10 MG tablet; Take 4 tablets daily for 2 days, then 3 tablets daily for 2 days, then two tablets daily for 2 days, then one tablet daily for 2 days    At high risk for falls - will impprove with improvement of knee pain        On the basis of positive falls risk screening, assessment and plan is as follows: see above.

## 2022-06-20 ENCOUNTER — OFFICE VISIT (OUTPATIENT)
Dept: PULMONOLOGY | Age: 77
End: 2022-06-20
Payer: COMMERCIAL

## 2022-06-20 VITALS
WEIGHT: 193 LBS | SYSTOLIC BLOOD PRESSURE: 142 MMHG | BODY MASS INDEX: 29.25 KG/M2 | DIASTOLIC BLOOD PRESSURE: 72 MMHG | OXYGEN SATURATION: 95 % | HEART RATE: 74 BPM | HEIGHT: 68 IN

## 2022-06-20 DIAGNOSIS — G47.33 OSA (OBSTRUCTIVE SLEEP APNEA): ICD-10-CM

## 2022-06-20 DIAGNOSIS — G47.19 EXCESSIVE DAYTIME SLEEPINESS: ICD-10-CM

## 2022-06-20 DIAGNOSIS — E66.9 OBESITY (BMI 30-39.9): ICD-10-CM

## 2022-06-20 DIAGNOSIS — Z87.891 EX-CIGARETTE SMOKER: ICD-10-CM

## 2022-06-20 PROCEDURE — 99203 OFFICE O/P NEW LOW 30 MIN: CPT | Performed by: INTERNAL MEDICINE

## 2022-06-20 RX ORDER — ALLOPURINOL 100 MG/1
TABLET ORAL
COMMUNITY
Start: 2022-06-09

## 2022-06-20 ASSESSMENT — ENCOUNTER SYMPTOMS
EYE ITCHING: 0
ABDOMINAL DISTENTION: 0
ABDOMINAL PAIN: 0
BACK PAIN: 0
SHORTNESS OF BREATH: 0
EYE DISCHARGE: 0
COUGH: 0

## 2022-06-20 NOTE — PROGRESS NOTES
Luis Ac  1945  Referring Provider: Maykel Tejada MD    Subjective:     Chief Complaint   Patient presents with    Sleep Apnea     compliance       HPI  Lupe Muir is a 68 y.o. female has come back as a follow up. She has mild ADOLFO with EDS. She is on a CPAP of 8 cm h20 which she has used it for 6/30 days. She has no loss of weight. She has a nasal. She says that she is keeping her mouth open. She has no loss of weight. She is still sleepy during the day time. Her 2 week download data showed a residual AHi is 1.6 and leak is 30.7 L/min.     Current Outpatient Medications   Medication Sig Dispense Refill    allopurinol (ZYLOPRIM) 100 MG tablet take 1 tablet by mouth once daily      predniSONE (DELTASONE) 10 MG tablet Take 4 tablets daily for 2 days, then 3 tablets daily for 2 days, then two tablets daily for 2 days, then one tablet daily for 2 days 20 tablet 0    apixaban (ELIQUIS) 2.5 MG TABS tablet Take 1 tablet by mouth 2 times daily 42 tablet 0    dilTIAZem (CARDIZEM CD) 120 MG extended release capsule take 1 capsule by mouth once daily 90 capsule 3    apixaban (ELIQUIS) 2.5 MG TABS tablet Take 1 tablet by mouth 2 times daily 180 tablet 3    propafenone (RYTHMOL) 150 MG tablet Take 1 tablet by mouth every 8 hours 30 tablet 5    atorvastatin (LIPITOR) 80 MG tablet Take 1 tab by mouth once daily 90 tablet 3    metoprolol tartrate (LOPRESSOR) 50 MG tablet Take 1 tablet by mouth 2 times daily take 1 tablet by mouth twice a day 180 tablet 3    doxazosin (CARDURA) 4 MG tablet Take 1 tablet by mouth daily 30 tablet 3    isosorbide mononitrate (IMDUR) 30 MG extended release tablet Take 1 tablet by mouth 2 times daily 60 tablet 3    chlorthalidone (HYGROTON) 25 MG tablet Take 1 tablet by mouth daily 30 tablet 3    cloNIDine (CATAPRES-TTS-3) 0.3 MG/24HR PTWK Place 1 patch onto the skin once a week 4 patch 5    furosemide (LASIX) 20 MG tablet take 1 tablet by mouth every other day 90 tablet 3    oxybutynin (DITROPAN) 5 MG tablet take 1 tablet by mouth twice a day 180 tablet 3    Ascorbic Acid (VITAMIN C) 250 MG tablet Take by mouth daily      Omega-3 Fatty Acids (FISH OIL PO) Take 1,200 mg by mouth      FOLIC ACID PO Take 903 mg by mouth daily      Multiple Vitamins-Minerals (MULTIVITAMIN ADULT PO) Take by mouth      aspirin 81 MG EC tablet Take 1 tablet by mouth daily 30 tablet     Lancets MISC 1 each by In Vitro route 2 times daily 100 each 3    Glucose Blood (BLOOD GLUCOSE TEST STRIPS) STRP 1 each by In Vitro route 2 times daily 100 strip 3    Blood Glucose Monitoring Suppl AV 1 kit by Does not apply route daily 1 Device 0    Calcium Carbonate (CALTRATE 600 PO) Take  by mouth 2 times daily. No current facility-administered medications for this visit. Allergies   Allergen Reactions    Latex      \"Blisters\"    Tape Antoine Lord Tape]      \"Turn Red\"       Past Medical History:   Diagnosis Date    14 day event monitor 11/05/2018    Sinus rhythm    Atrial fibrillation with RVR ([facility] [address] 11/25/2013    Breast cancer ([facility] [address]     Edema     3/80 TTE diastolic dysfxn, EF 05%; 91/54 - TTE diastolic dysfxn, EF 32%; Stress myoview  WNl, EF 70%    Family history of cardiovascular disease     GERD (gastroesophageal reflux disease)     H/O 24 hour EKG monitoring 4/23/12    [facility]    H/O cardiovascular stress test     4/23/12-[facility], Probably norm perfusion Lexiscan cardiolite study except for diaphramatic attenuation artifact, otherwise perfusion is normal, norm LV fxn by gated scan. 11/10-EF70%, 9/15/08-EF70%, 1/15/07-EF70%, 9/03    H/O echocardiogram     4/23/12-[facility]- Norm chamber sizes. LVH with norm LV systolic but abn diastolic fxn, mild MR and TR, minimal pulm HTN. 11/10 -EF>55%; 3/05, 9/23/03    History of cardiac catheterization     11/15/2013-EF 55%, No signif CAD -Dr Candice Stallworth;;    History of cardiovascular stress test     11/14/2013-EF 70%.  Normal Lexiscan Cardiolite, Uniform wall motion;  History of echocardiogram     10/22/4770-[facility] [place] global systolic  function. No wall motion abnormalities. EF 55%. Mild MR/TR;    History of Holter monitoring 11/17/14 11/14-48 hour Holter: Predominant rhythm was sinus, no ventricular ectopy noted, supraventricular ectopics were noted in single beat forms.  History of therapeutic radiation     Hx of 24 hour EKG monitoring     12/17/13 48 hr holter monitor. Sinus rhythm with intermittent sinus arrhythmia.  Hyperlipidemia     Hypertension     11/13 Cath WNL, EF 55%; 11/13 Stress WNL : 11/13 TTE mild MR and TR, EF 55%; 4/12 Stress WNL; 8/9 - Cath WNL    Iron deficiency anemia 7/9/2013 9/13 EGD WNL    Lumbar radiculopathy 11/25/2013    Menopause     ADOLFO (obstructive sleep apnea)     sleep study 10/3 CPAP 6cm     Osteoarthritis     both knees    Osteopenia     9/12 DEXA T-score -1.5    Other activity(E029.9)     48 hr holter, the 48 hr holter monitor reveals the patient in the sinus rhythm with occasional supraventricular ectopic beats. There is a rare short atrial run.  Paroxysmal atrial fibrillation (HCC)     4/12 Holter [facility]    Prolonged emergence from general anesthesia     Proteinuria     Right Breast Cancer Dx 10-12    Supraventricular tachycardia (HCC)     Type 2 diabetes mellitus (HCC) Dx 2000's    Urge incontinence     Wears partial dentures     upper partial       Past Surgical History:   Procedure Laterality Date    BREAST BIOPSY  10-12    Right Breast Biopsy, Cancer    BREAST BIOPSY  1970's    Right Breast Biopsy, Benign    BREAST LUMPECTOMY  11/6/12    Breast cancer - Right with sentinal node    BUNIONECTOMY  1990's    Right [place] Dr. Harrington Seal    8/10/09- The patient has no significant CAD. The elevated troponin is probably secondary to SVT. , 10/03- no significant CAD    CARPAL TUNNEL RELEASE Left 01/2019    CATARACT REMOVAL Bilateral 03/2020, 5/20    CATARACT REMOVAL Left 2020    COLONOSCOPY  \"X 2 Last One  \"    Polpy Removed During Last Colonoscopy    DENTAL SURGERY      Teeth Extracted In Past    DIALYSIS CATHETER INSERTION N/A 3/30/2022    CATHETER INSERTION PERITONEAL DIALYSIS LAPAROSCOPIC performed by Mauro Alexander MD at [facility], COLON, DIAGNOSTIC  2016    savary dilatation to 17 mm    HYSTERECTOMY, TOTAL ABDOMINAL (CERVIX REMOVED)      JOINT REPLACEMENT      Total Left Knee    JOINT REPLACEMENT      Total Right Knee    SOFI STEROTACTIC LOC BREAST BIOPSY RIGHT Right 2021    SOFI STEROTACTIC LOC [facility]    OVARY REMOVAL      UPPER GASTROINTESTINAL ENDOSCOPY N/A 2018    EGD DIAGNOSTIC ONLY performed by Lexus Haley MD at [address] [place] [address] History     Socioeconomic History    Marital status:      Spouse name: None    Number of children: None    Years of education: None    Highest education level: None   Occupational History    None   Tobacco Use    Smoking status: Former Smoker     Packs/day: 0.50     Years: 10.00     Pack years: 5.00     Quit date: 1987     Years since quittin.4    Smokeless tobacco: Never Used   Vaping Use    Vaping Use: Never used   Substance and Sexual Activity    Alcohol use: No     Alcohol/week: 0.0 standard drinks     Comment: 3 cups coffee daily    Drug use: No    Sexual activity: Never   Other Topics Concern    None   Social History Narrative    Wears glasses     Social Determinants of Health     Financial Resource Strain: Unknown    Difficulty of Paying Living Expenses: Patient refused   Food Insecurity: Unknown    Worried About Running Out of Food in the Last Year: Patient refused    [address] in the Last Year: Patient refused   Transportation Needs:     Lack of Transportation (Medical): Not on file    Lack of Transportation (Non-Medical):  Not on file   Physical Activity:     Days of Exercise per Week: Not on file   [place] of Exercise per Session: Not on file   Stress:     Feeling of Stress : Not on file   Social Connections:     Frequency of Communication with Friends and Family: Not on file    Frequency of Social Gatherings with Friends and Family: Not on file    Attends [employer] Services: Not on file    Active Member of Clubs or Organizations: Not on file    Attends Club or Organization Meetings: Not on file    Marital Status: Not on file   Intimate Partner Violence:     Fear of Current or Ex-Partner: Not on file    Emotionally Abused: Not on file    Physically Abused: Not on file    Sexually Abused: Not on file   Housing Stability:     Unable to Pay for Housing in the Last Year: Not on file    Number of [place] in the Last Year: Not on file    Unstable Housing in the Last Year: Not on file       Review of Systems   Constitutional: Positive for fatigue. HENT: Negative for congestion and postnasal drip. Eyes: Negative for discharge and itching. Respiratory: Negative for cough and shortness of breath. Cardiovascular: Negative for chest pain and leg swelling. Gastrointestinal: Negative for abdominal distention and abdominal pain. Endocrine: Negative for cold intolerance and heat intolerance. Genitourinary: Negative for enuresis and frequency. Musculoskeletal: Negative for arthralgias and back pain. Allergic/Immunologic: Negative for environmental allergies and food allergies. Neurological: Negative for light-headedness and headaches. Hematological: Negative for adenopathy. Psychiatric/Behavioral: Negative for agitation and behavioral problems. Objective:   BP (!) 142/72   Pulse 74   Ht 5' 8\" (1.727 m)   Wt 193 lb (87.5 kg)   LMP  (LMP Unknown)   SpO2 95%   BMI 29.35 kg/m²   Body mass index is 29.35 kg/m².   Sleep Medicine 9/4/2019 6/20/2019   Sitting and reading 3 0   Watching TV 3 2   Sitting, inactive in a public place (e.g. a theatre or a meeting) 0 0   As a passenger in a car for an hour without a break 3 3   Lying down to rest in the afternoon when circumstances permit 3 3   Sitting and talking to someone 0 0   Sitting quietly after a lunch without alcohol 3 1   In a car, while stopped for a few minutes in traffic 0 0   Total score 15 9   Neck circumference (Inches) - 16.5     Mallampati 3    Physical Exam  Vitals reviewed. Constitutional:       Appearance: Normal appearance. HENT:      Head: Normocephalic and atraumatic. Nose: Nose normal.      Mouth/Throat:      Mouth: Mucous membranes are moist.   Eyes:      Extraocular Movements: Extraocular movements intact. Pupils: Pupils are equal, round, and reactive to light. Cardiovascular:      Rate and Rhythm: Normal rate and regular rhythm. Pulses: Normal pulses. Heart sounds: Normal heart sounds. Pulmonary:      Effort: Pulmonary effort is normal.      Breath sounds: Normal breath sounds. Abdominal:      General: Abdomen is flat. Palpations: Abdomen is soft. Musculoskeletal:         General: Normal range of motion. Cervical back: Normal range of motion and neck supple. Skin:     General: Skin is warm and dry. Neurological:      General: No focal deficit present. Mental Status: She is alert and oriented to person, place, and time. Psychiatric:         Mood and Affect: Mood normal.         Behavior: Behavior normal.         Radiology: None    Assessment and Plan     Problem List        Respiratory    ADOLFO (obstructive sleep apnea)      Advised to be compliant with the CPAP  Need Chin strap  Loose weight  Need 2 week download data            Other    Obesity (BMI 30-39. 9)      Advised to loose weight with diet and exercise           Excessive daytime sleepiness      Advised to be compliant with the CPAP  Loose weight         Ex-cigarette smoker      Advised to c.w quitting smoking                    Follow-Up:    Return in about 3 months (around 9/20/2022) for 2 week download data.      Progress notes sent to the referring Provider    Dena Adam MD MD  6/20/2022  12:28 PM

## 2022-06-21 ENCOUNTER — TELEPHONE (OUTPATIENT)
Dept: CARDIOLOGY CLINIC | Age: 77
End: 2022-06-21

## 2022-06-21 NOTE — TELEPHONE ENCOUNTER
Patient called requesting samples of Eliquis, patient was advised that samples should be ready for  by tomorrow afternoon, please call with any problems at ph# [phone].

## 2022-07-05 ENCOUNTER — OFFICE VISIT (OUTPATIENT)
Dept: INTERNAL MEDICINE CLINIC | Age: 77
End: 2022-07-05
Payer: COMMERCIAL

## 2022-07-05 VITALS
OXYGEN SATURATION: 98 % | WEIGHT: 196.8 LBS | HEART RATE: 60 BPM | BODY MASS INDEX: 30.89 KG/M2 | SYSTOLIC BLOOD PRESSURE: 132 MMHG | DIASTOLIC BLOOD PRESSURE: 70 MMHG | HEIGHT: 67 IN

## 2022-07-05 DIAGNOSIS — Z00.00 INITIAL MEDICARE ANNUAL WELLNESS VISIT: Primary | ICD-10-CM

## 2022-07-05 PROCEDURE — 1123F ACP DISCUSS/DSCN MKR DOCD: CPT | Performed by: INTERNAL MEDICINE

## 2022-07-05 PROCEDURE — G0438 PPPS, INITIAL VISIT: HCPCS | Performed by: INTERNAL MEDICINE

## 2022-07-05 ASSESSMENT — PATIENT HEALTH QUESTIONNAIRE - PHQ9
SUM OF ALL RESPONSES TO PHQ QUESTIONS 1-9: 0
1. LITTLE INTEREST OR PLEASURE IN DOING THINGS: 0
SUM OF ALL RESPONSES TO PHQ QUESTIONS 1-9: 0
SUM OF ALL RESPONSES TO PHQ9 QUESTIONS 1 & 2: 0
2. FEELING DOWN, DEPRESSED OR HOPELESS: 0
SUM OF ALL RESPONSES TO PHQ QUESTIONS 1-9: 0
SUM OF ALL RESPONSES TO PHQ QUESTIONS 1-9: 0

## 2022-07-05 ASSESSMENT — LIFESTYLE VARIABLES: HOW OFTEN DO YOU HAVE A DRINK CONTAINING ALCOHOL: NEVER

## 2022-07-05 NOTE — PATIENT INSTRUCTIONS
Personalized Preventive Plan for Homa Mann - 7/5/2022  Medicare offers a range of preventive health benefits. Some of the tests and screenings are paid in full while other may be subject to a deductible, co-insurance, and/or copay. Some of these benefits include a comprehensive review of your medical history including lifestyle, illnesses that may run in your family, and various assessments and screenings as appropriate. After reviewing your medical record and screening and assessments performed today your provider may have ordered immunizations, labs, imaging, and/or referrals for you. A list of these orders (if applicable) as well as your Preventive Care list are included within your After Visit Summary for your review. Other Preventive Recommendations:    · A preventive eye exam performed by an eye specialist is recommended every 1-2 years to screen for glaucoma; cataracts, macular degeneration, and other eye disorders. · A preventive dental visit is recommended every 6 months. · Try to get at least 150 minutes of exercise per week or 10,000 steps per day on a pedometer . · Order or download the FREE \"Exercise & Physical Activity: Your Everyday Guide\" from [employer]. Call 4-605.504.7657 or search [employer] online. · You need 0317-7913 mg of calcium and 8070-7330 IU of vitamin D per day. It is possible to meet your calcium requirement with diet alone, but a vitamin D supplement is usually necessary to meet this goal.  · When exposed to the sun, use a sunscreen that protects against both UVA and UVB radiation with an SPF of 30 or greater. Reapply every 2 to 3 hours or after sweating, drying off with a towel, or swimming. · Always wear a seat belt when traveling in a car. Always wear a helmet when riding a bicycle or motorcycle. Heart-Healthy Diet   Sodium, Fat, and Cholesterol Controlled Diet       What Is a Heart Healthy Diet?    A heart-healthy diet is one that limits sodium , certain types of fat , and cholesterol . This type of diet is recommended for:   People with any form of cardiovascular disease (eg, coronary heart disease , peripheral vascular disease , previous heart attack , previous stroke )   People with risk factors for cardiovascular disease, such as high blood pressure , high cholesterol , or diabetes   Anyone who wants to lower their risk of developing cardiovascular disease   Sodium    Sodium is a mineral found in many foods. In general, most people consume much more sodium than they need. Diets high in sodium can increase blood pressure and lead to edema (water retention). On a heart-healthy diet, you should consume no more than 2,300 mg (milligrams) of sodium per dayabout the amount in one teaspoon of table salt. The foods highest in sodium include table salt (about 50% sodium), processed foods, convenience foods, and preserved foods. Cholesterol    Cholesterol is a fat-like, waxy substance in your blood. Our bodies make some cholesterol. It is also found in animal products, with the highest amounts in fatty meat, egg yolks, whole milk, cheese, shellfish, and organ meats. On a heart-healthy diet, you should limit your cholesterol intake to less than 200 mg per day. It is normal and important to have some cholesterol in your bloodstream. But too much cholesterol can cause plaque to build up within your arteries, which can eventually lead to a heart attack or stroke. The two types of cholesterol that are most commonly referred to are:   Low-density lipoprotein (LDL) cholesterol  Also known as bad cholesterol, this is the cholesterol that tends to build up along your arteries. Bad cholesterol levels are increased by eating fats that are saturated or hydrogenated. Optimal level of this cholesterol is less than 100. Over 130 starts to get risky for heart disease.    High-density lipoprotein (HDL) cholesterol  Also known as good cholesterol, this type of cholesterol actually carries cholesterol away from your arteries and may, therefore, help lower your risk of having a heart attack. You want this level to be high (ideally greater than 60). It is a risk to have a level less than 40. You can raise this good cholesterol by eating olive oil, canola oil, avocados, or nuts. Exercise raises this level, too. Fat    Fat is calorie dense and packs a lot of calories into a small amount of food. Even though fats should be limited due to their high calorie content, not all fats are bad. In fact, some fats are quite healthful. Fat can be broken down into four main types. The good-for-you fats are:   Monounsaturated fat  found in oils such as olive and canola, avocados, and nuts and natural nut butters; can decrease cholesterol levels, while keeping levels of HDL cholesterol high   Polyunsaturated fat  found in oils such as safflower, sunflower, soybean, corn, and sesame; can decrease total cholesterol and LDL cholesterol   Omega-3 fatty acids  particularly those found in fatty fish (such as salmon, trout, tuna, mackerel, herring, and sardines); can decrease risk of arrhythmias, decrease triglyceride levels, and slightly lower blood pressure   The fats that you want to limit are:   Saturated fat  found in animal products, many fast foods, and a few vegetables; increases total blood cholesterol, including LDL levels   Animal fats that are saturated include: butter, lard, whole-milk dairy products, meat fat, and poultry skin   Vegetable fats that are saturated include: hydrogenated shortening, palm oil, coconut oil, cocoa butter   Hydrogenated or trans fat  found in margarine and vegetable shortening, most shelf stable snack foods, and fried foods; increases LDL and decreases HDL     It is generally recommended that you limit your total fat for the day to less than 30% of your total calories.  If you follow an 1800-calorie heart healthy diet, for example, this would mean 60 grams of fat or less per day. Saturated fat and trans fat in your diet raises your blood cholesterol the most, much more than dietary cholesterol does. For this reason, on a heart-healthy diet, less than 7% of your calories should come from saturated fat and ideally 0% from trans fat. On an 1800-calorie diet, this translates into less than 14 grams of saturated fat per day, leaving 46 grams of fat to come from mono- and polyunsaturated fats.    Food Choices on a Heart Healthy Diet   Food Category   Foods Recommended   Foods to Avoid   Grains   Breads and rolls without salted tops Most dry and cooked cereals Unsalted crackers and breadsticks Low-sodium or homemade breadcrumbs or stuffing All rice and pastas   Breads, rolls, and crackers with salted tops High-fat baked goods (eg, muffins, donuts, pastries) Quick breads, self-rising flour, and biscuit mixes Regular bread crumbs Instant hot cereals Commercially prepared rice, pasta, or stuffing mixes   Vegetables   Most fresh, frozen, and low-sodium canned vegetables Low-sodium and salt-free vegetable juices Canned vegetables if unsalted or rinsed   Regular canned vegetables and juices, including sauerkraut and pickled vegetables Frozen vegetables with sauces Commercially prepared potato and vegetable mixes   Fruits   Most fresh, frozen, and canned fruits All fruit juices   Fruits processed with salt or sodium   Milk   Nonfat or low-fat (1%) milk Nonfat or low-fat yogurt Cottage cheese, low-fat ricotta, cheeses labeled as low-fat and low-sodium   Whole milk Reduced-fat (2%) milk Malted and chocolate milk Full fat yogurt Most cheeses (unless low-fat and low salt) Buttermilk (no more than 1 cup per week)   Meats and Beans   Lean cuts of fresh or frozen beef, veal, lamb, or pork (look for the word loin) Fresh or frozen poultry without the skin Fresh or frozen fish and some shellfish Egg whites and egg substitutes (Limit whole eggs to three per week) Tofu Nuts or seeds (unsalted, dry-roasted), low-sodium peanut butter Dried peas, beans, and lentils   Any smoked, cured, salted, or canned meat, fish, or poultry (including rowley, chipped beef, cold cuts, hot dogs, sausages, sardines, and anchovies) Poultry skins Breaded and/or fried fish or meats Canned peas, beans, and lentils Salted nuts   Fats and Oils   Olive oil and canola oil Low-sodium, low-fat salad dressings and mayonnaise   Butter, margarine, coconut and palm oils, rowley fat   Snacks, Sweets, and Condiments   Low-sodium or unsalted versions of broths, soups, soy sauce, and condiments Pepper, herbs, and spices; vinegar, lemon, or lime juice Low-fat frozen desserts (yogurt, sherbet, fruit bars) Sugar, cocoa powder, honey, syrup, jam, and preserves Low-fat, trans-fat free cookies, cakes, and pies Iam and animal crackers, fig bars, juan snaps   High-fat desserts Broth, soups, gravies, and sauces, made from instant mixes or other high-sodium ingredients Salted snack foods Canned olives Meat tenderizers, seasoning salt, and most flavored vinegars   Beverages   Low-sodium carbonated beverages Tea and coffee in moderation Soy milk   Commercially softened water   Suggestions   Make whole grains, fruits, and vegetables the base of your diet. Choose heart-healthy fats such as canola, olive, and flaxseed oil, and foods high in heart-healthy fats, such as nuts, seeds, soybeans, tofu, and fish. Eat fish at least twice per week; the fish highest in omega-3 fatty acids and lowest in mercury include salmon, herring, mackerel, sardines, and canned chunk light tuna. If you eat fish less than twice per week or have high triglycerides, talk to your doctor about taking fish oil supplements. Read food labels.    For products low in fat and cholesterol, look for fat free, low-fat, cholesterol free, saturated fat free, and trans fat freeAlso scan the Nutrition Facts Label, which lists saturated fat, trans fat, substance in your blood. Our bodies make some cholesterol. It is also found in animal products, with the highest amounts in fatty meat, egg yolks, whole milk, cheese, shellfish, and organ meats. On a heart-healthy diet, you should limit your cholesterol intake to less than 200 mg per day. It is normal and important to have some cholesterol in your bloodstream. But too much cholesterol can cause plaque to build up within your arteries, which can eventually lead to a heart attack or stroke. The two types of cholesterol that are most commonly referred to are:   Low-density lipoprotein (LDL) cholesterol  Also known as bad cholesterol, this is the cholesterol that tends to build up along your arteries. Bad cholesterol levels are increased by eating fats that are saturated or hydrogenated. Optimal level of this cholesterol is less than 100. Over 130 starts to get risky for heart disease. High-density lipoprotein (HDL) cholesterol  Also known as good cholesterol, this type of cholesterol actually carries cholesterol away from your arteries and may, therefore, help lower your risk of having a heart attack. You want this level to be high (ideally greater than 60). It is a risk to have a level less than 40. You can raise this good cholesterol by eating olive oil, canola oil, avocados, or nuts. Exercise raises this level, too. Fat    Fat is calorie dense and packs a lot of calories into a small amount of food. Even though fats should be limited due to their high calorie content, not all fats are bad. In fact, some fats are quite healthful. Fat can be broken down into four main types.    The good-for-you fats are:   Monounsaturated fat  found in oils such as olive and canola, avocados, and nuts and natural nut butters; can decrease cholesterol levels, while keeping levels of HDL cholesterol high   Polyunsaturated fat  found in oils such as safflower, sunflower, soybean, corn, and sesame; can decrease total cholesterol and LDL cholesterol   Omega-3 fatty acids  particularly those found in fatty fish (such as salmon, trout, tuna, mackerel, herring, and sardines); can decrease risk of arrhythmias, decrease triglyceride levels, and slightly lower blood pressure   The fats that you want to limit are:   Saturated fat  found in animal products, many fast foods, and a few vegetables; increases total blood cholesterol, including LDL levels   Animal fats that are saturated include: butter, lard, whole-milk dairy products, meat fat, and poultry skin   Vegetable fats that are saturated include: hydrogenated shortening, palm oil, coconut oil, cocoa butter   Hydrogenated or trans fat  found in margarine and vegetable shortening, most shelf stable snack foods, and fried foods; increases LDL and decreases HDL     It is generally recommended that you limit your total fat for the day to less than 30% of your total calories. If you follow an 1800-calorie heart healthy diet, for example, this would mean 60 grams of fat or less per day. Saturated fat and trans fat in your diet raises your blood cholesterol the most, much more than dietary cholesterol does. For this reason, on a heart-healthy diet, less than 7% of your calories should come from saturated fat and ideally 0% from trans fat. On an 1800-calorie diet, this translates into less than 14 grams of saturated fat per day, leaving 46 grams of fat to come from mono- and polyunsaturated fats.    Food Choices on a Heart Healthy Diet   Food Category   Foods Recommended   Foods to Avoid   Grains   Breads and rolls without salted tops Most dry and cooked cereals Unsalted crackers and breadsticks Low-sodium or homemade breadcrumbs or stuffing All rice and pastas   Breads, rolls, and crackers with salted tops High-fat baked goods (eg, muffins, donuts, pastries) Quick breads, self-rising flour, and biscuit mixes Regular bread crumbs Instant hot cereals Commercially prepared rice, pasta, or stuffing mixes Vegetables   Most fresh, frozen, and low-sodium canned vegetables Low-sodium and salt-free vegetable juices Canned vegetables if unsalted or rinsed   Regular canned vegetables and juices, including sauerkraut and pickled vegetables Frozen vegetables with sauces Commercially prepared potato and vegetable mixes   Fruits   Most fresh, frozen, and canned fruits All fruit juices   Fruits processed with salt or sodium   Milk   Nonfat or low-fat (1%) milk Nonfat or low-fat yogurt Cottage cheese, low-fat ricotta, cheeses labeled as low-fat and low-sodium   Whole milk Reduced-fat (2%) milk Malted and chocolate milk Full fat yogurt Most cheeses (unless low-fat and low salt) Buttermilk (no more than 1 cup per week)   Meats and Beans   Lean cuts of fresh or frozen beef, veal, lamb, or pork (look for the word loin) Fresh or frozen poultry without the skin Fresh or frozen fish and some shellfish Egg whites and egg substitutes (Limit whole eggs to three per week) Tofu Nuts or seeds (unsalted, dry-roasted), low-sodium peanut butter Dried peas, beans, and lentils   Any smoked, cured, salted, or canned meat, fish, or poultry (including rowley, chipped beef, cold cuts, hot dogs, sausages, sardines, and anchovies) Poultry skins Breaded and/or fried fish or meats Canned peas, beans, and lentils Salted nuts   Fats and Oils   Olive oil and canola oil Low-sodium, low-fat salad dressings and mayonnaise   Butter, margarine, coconut and palm oils, rowley fat   Snacks, Sweets, and Condiments   Low-sodium or unsalted versions of broths, soups, soy sauce, and condiments Pepper, herbs, and spices; vinegar, lemon, or lime juice Low-fat frozen desserts (yogurt, sherbet, fruit bars) Sugar, cocoa powder, honey, syrup, jam, and preserves Low-fat, trans-fat free cookies, cakes, and pies Iam and animal crackers, fig bars, juan snaps   High-fat desserts Broth, soups, gravies, and sauces, made from instant mixes or other high-sodium ingredients Salted snack foods Canned olives Meat tenderizers, seasoning salt, and most flavored vinegars   Beverages   Low-sodium carbonated beverages Tea and coffee in moderation Soy milk   Commercially softened water   Suggestions   Make whole grains, fruits, and vegetables the base of your diet. Choose heart-healthy fats such as canola, olive, and flaxseed oil, and foods high in heart-healthy fats, such as nuts, seeds, soybeans, tofu, and fish. Eat fish at least twice per week; the fish highest in omega-3 fatty acids and lowest in mercury include salmon, herring, mackerel, sardines, and canned chunk light tuna. If you eat fish less than twice per week or have high triglycerides, talk to your doctor about taking fish oil supplements. Read food labels. For products low in fat and cholesterol, look for fat free, low-fat, cholesterol free, saturated fat free, and trans fat freeAlso scan the Nutrition Facts Label, which lists saturated fat, trans fat, and cholesterol amounts. For products low in sodium, look for sodium free, very low sodium, low sodium, no added salt, and unsalted   Skip the salt when cooking or at the table; if food needs more flavor, get creative and try out different herbs and spices. Garlic and onion also add substantial flavor to foods. Trim any visible fat off meat and poultry before cooking, and drain the fat off after murillo. Use cooking methods that require little or no added fat, such as grilling, boiling, baking, poaching, broiling, roasting, steaming, stir-frying, and sauting. Avoid fast food and convenience food. They tend to be high in saturated and trans fat and have a lot of added salt. Talk to a registered dietitian for individualized diet advice. Last Reviewed: March 2011 Mary Melendez MS, MPH, RD   Updated: 3/29/2011   ·     Heart-Healthy Diet   Sodium, Fat, and Cholesterol Controlled Diet       What Is a Heart Healthy Diet?    A heart-healthy diet is one that limits sodium , certain types of fat , and cholesterol . This type of diet is recommended for:   People with any form of cardiovascular disease (eg, coronary heart disease , peripheral vascular disease , previous heart attack , previous stroke )   People with risk factors for cardiovascular disease, such as high blood pressure , high cholesterol , or diabetes   Anyone who wants to lower their risk of developing cardiovascular disease   Sodium    Sodium is a mineral found in many foods. In general, most people consume much more sodium than they need. Diets high in sodium can increase blood pressure and lead to edema (water retention). On a heart-healthy diet, you should consume no more than 2,300 mg (milligrams) of sodium per dayabout the amount in one teaspoon of table salt. The foods highest in sodium include table salt (about 50% sodium), processed foods, convenience foods, and preserved foods. Cholesterol    Cholesterol is a fat-like, waxy substance in your blood. Our bodies make some cholesterol. It is also found in animal products, with the highest amounts in fatty meat, egg yolks, whole milk, cheese, shellfish, and organ meats. On a heart-healthy diet, you should limit your cholesterol intake to less than 200 mg per day. It is normal and important to have some cholesterol in your bloodstream. But too much cholesterol can cause plaque to build up within your arteries, which can eventually lead to a heart attack or stroke. The two types of cholesterol that are most commonly referred to are:   Low-density lipoprotein (LDL) cholesterol  Also known as bad cholesterol, this is the cholesterol that tends to build up along your arteries. Bad cholesterol levels are increased by eating fats that are saturated or hydrogenated. Optimal level of this cholesterol is less than 100. Over 130 starts to get risky for heart disease.    High-density lipoprotein (HDL) cholesterol  Also known as good cholesterol, this type of cholesterol actually carries cholesterol away from your arteries and may, therefore, help lower your risk of having a heart attack. You want this level to be high (ideally greater than 60). It is a risk to have a level less than 40. You can raise this good cholesterol by eating olive oil, canola oil, avocados, or nuts. Exercise raises this level, too. Fat    Fat is calorie dense and packs a lot of calories into a small amount of food. Even though fats should be limited due to their high calorie content, not all fats are bad. In fact, some fats are quite healthful. Fat can be broken down into four main types. The good-for-you fats are:   Monounsaturated fat  found in oils such as olive and canola, avocados, and nuts and natural nut butters; can decrease cholesterol levels, while keeping levels of HDL cholesterol high   Polyunsaturated fat  found in oils such as safflower, sunflower, soybean, corn, and sesame; can decrease total cholesterol and LDL cholesterol   Omega-3 fatty acids  particularly those found in fatty fish (such as salmon, trout, tuna, mackerel, herring, and sardines); can decrease risk of arrhythmias, decrease triglyceride levels, and slightly lower blood pressure   The fats that you want to limit are:   Saturated fat  found in animal products, many fast foods, and a few vegetables; increases total blood cholesterol, including LDL levels   Animal fats that are saturated include: butter, lard, whole-milk dairy products, meat fat, and poultry skin   Vegetable fats that are saturated include: hydrogenated shortening, palm oil, coconut oil, cocoa butter   Hydrogenated or trans fat  found in margarine and vegetable shortening, most shelf stable snack foods, and fried foods; increases LDL and decreases HDL     It is generally recommended that you limit your total fat for the day to less than 30% of your total calories.  If you follow an 1800-calorie heart healthy diet, for example, this would mean 60 grams of fat or less per day. Saturated fat and trans fat in your diet raises your blood cholesterol the most, much more than dietary cholesterol does. For this reason, on a heart-healthy diet, less than 7% of your calories should come from saturated fat and ideally 0% from trans fat. On an 1800-calorie diet, this translates into less than 14 grams of saturated fat per day, leaving 46 grams of fat to come from mono- and polyunsaturated fats.    Food Choices on a Heart Healthy Diet   Food Category   Foods Recommended   Foods to Avoid   Grains   Breads and rolls without salted tops Most dry and cooked cereals Unsalted crackers and breadsticks Low-sodium or homemade breadcrumbs or stuffing All rice and pastas   Breads, rolls, and crackers with salted tops High-fat baked goods (eg, muffins, donuts, pastries) Quick breads, self-rising flour, and biscuit mixes Regular bread crumbs Instant hot cereals Commercially prepared rice, pasta, or stuffing mixes   Vegetables   Most fresh, frozen, and low-sodium canned vegetables Low-sodium and salt-free vegetable juices Canned vegetables if unsalted or rinsed   Regular canned vegetables and juices, including sauerkraut and pickled vegetables Frozen vegetables with sauces Commercially prepared potato and vegetable mixes   Fruits   Most fresh, frozen, and canned fruits All fruit juices   Fruits processed with salt or sodium   Milk   Nonfat or low-fat (1%) milk Nonfat or low-fat yogurt Cottage cheese, low-fat ricotta, cheeses labeled as low-fat and low-sodium   Whole milk Reduced-fat (2%) milk Malted and chocolate milk Full fat yogurt Most cheeses (unless low-fat and low salt) Buttermilk (no more than 1 cup per week)   Meats and Beans   Lean cuts of fresh or frozen beef, veal, lamb, or pork (look for the word loin) Fresh or frozen poultry without the skin Fresh or frozen fish and some shellfish Egg whites and egg substitutes (Limit whole eggs to three per week) Tofu Nuts or seeds (unsalted, dry-roasted), low-sodium peanut butter Dried peas, beans, and lentils   Any smoked, cured, salted, or canned meat, fish, or poultry (including rowley, chipped beef, cold cuts, hot dogs, sausages, sardines, and anchovies) Poultry skins Breaded and/or fried fish or meats Canned peas, beans, and lentils Salted nuts   Fats and Oils   Olive oil and canola oil Low-sodium, low-fat salad dressings and mayonnaise   Butter, margarine, coconut and palm oils, rowley fat   Snacks, Sweets, and Condiments   Low-sodium or unsalted versions of broths, soups, soy sauce, and condiments Pepper, herbs, and spices; vinegar, lemon, or lime juice Low-fat frozen desserts (yogurt, sherbet, fruit bars) Sugar, cocoa powder, honey, syrup, jam, and preserves Low-fat, trans-fat free cookies, cakes, and pies Iam and animal crackers, fig bars, juan snaps   High-fat desserts Broth, soups, gravies, and sauces, made from instant mixes or other high-sodium ingredients Salted snack foods Canned olives Meat tenderizers, seasoning salt, and most flavored vinegars   Beverages   Low-sodium carbonated beverages Tea and coffee in moderation Soy milk   Commercially softened water   Suggestions   Make whole grains, fruits, and vegetables the base of your diet. Choose heart-healthy fats such as canola, olive, and flaxseed oil, and foods high in heart-healthy fats, such as nuts, seeds, soybeans, tofu, and fish. Eat fish at least twice per week; the fish highest in omega-3 fatty acids and lowest in mercury include salmon, herring, mackerel, sardines, and canned chunk light tuna. If you eat fish less than twice per week or have high triglycerides, talk to your doctor about taking fish oil supplements. Read food labels. For products low in fat and cholesterol, look for fat free, low-fat, cholesterol free, saturated fat free, and trans fat freeAlso scan the Nutrition Facts Label, which lists saturated fat, trans fat, and cholesterol amounts. For products low in sodium, look for sodium free, very low sodium, low sodium, no added salt, and unsalted   Skip the salt when cooking or at the table; if food needs more flavor, get creative and try out different herbs and spices. Garlic and onion also add substantial flavor to foods. Trim any visible fat off meat and poultry before cooking, and drain the fat off after murillo. Use cooking methods that require little or no added fat, such as grilling, boiling, baking, poaching, broiling, roasting, steaming, stir-frying, and sauting. Avoid fast food and convenience food. They tend to be high in saturated and trans fat and have a lot of added salt. Talk to a registered dietitian for individualized diet advice. Last Reviewed: March 2011 Jose Mercado MS, MPH, RD   Updated: 3/29/2011   ·   Patient information: Weight loss treatments    INTRODUCTION  Obesity is a major international problem, and Americans are among the heaviest people in the world. The percentage of obese people in the [place] has risen steadily. Many people find that although they initially lose weight by dieting, they quickly regain the weight after the diet ends. Because it so hard to keep weight off over time, it is important to have as much information and support as possible before starting a diet. You are most likely to be successful in losing weight and keeping it off when you believe that your body weight can be controlled. STARTING A WEIGHT LOSS PROGRAM  Some people like to talk to their doctor or nurse to get help choosing the best plan, monitoring progress, and getting advice and support along the way. To know what treatment (or combination of treatments) will work best, determine your body mass index (BMI) and waist circumference (measurement). The BMI is calculated from your height and weight.   A person with a BMI between 25 and 29.9 is considered overweight   A person with a BMI of 30 or greater is considered to be obese  A waist circumference greater than 35 inches (88 cm) in women and 40 inches (102 cm) in men increases the risk of obesity-related complications, such as heart disease and diabetes. People who are obese and who have a larger waist size may need more aggressive weight loss treatment than others. Talk to your doctor or nurse for advice. Types of treatment  Based on your measurements and your medical history, your doctor or nurse can determine what combination of weight loss treatments would work best for you. Treatments may include changes in lifestyle, exercise, dieting, and, in some cases, weight loss medicines or weight loss surgery. Weight loss surgery, also called bariatric surgery, is reserved for people with severe obesity who have not responded to other weight loss treatments. SETTING A WEIGHT LOSS GOAL  It is important to set a realistic weight loss goal. Your first goal should be to avoid gaining more weight and staying at your current weight (or within 5 percent). Many people have a \"dream\" weight that is difficult or impossible to achieve. People at high risk of developing diabetes who are able to lose 5 percent of their body weight and maintain this weight will reduce their risk of developing diabetes by about 50 percent and reduce their blood pressure. This is a success. Losing more than 15 percent of your body weight and staying at this weight is an extremely good result, even if you never reach your \"dream\" or \"ideal\" weight. LIFESTYLE CHANGES  Programs that help you to change your lifestyle are usually run by psychologists or other professionals. The goals of lifestyle changes are to help you change your eating habits, become more active, and be more aware of how much you eat and exercise, helping you to make healthier choices. This type of treatment can be broken down into three steps:   The triggers that make you want to eat   Eating   What happens after you person needs to understand your goals. Learn to be strong  Learning to be strong when tempted by food is an important part of losing weight. As an example, you will need to learn how to say \"no\" and continue to say no when urged to eat at parties and social gatherings. Develop strategies for events before you go, such as eating before you go or taking low-calorie snacks and drinks with you. Develop a support system  Having a support system is helpful when losing weight. This is why many commercial groups are successful. Family support is also essential; if your family does not support your efforts to lose weight, this can slow your progress or even keep you from losing weight. Positive thinking  People often have conversations with themselves in their head; these conversations can be positive or negative. If you eat a piece of cake that was not planned, you may respond by thinking, \"Oh, you stupid idiot, you've blown your diet! \" and as a result, you may eat more cake. A positive thought for the same event could be, \"Well, I ate cake when it was not on my plan. Now I should do something to get back on track. \" A positive approach is much more likely to be successful than a negative one. Reduce stress  Although stress is a part of everyday life, it can trigger uncontrolled eating in some people. It is important to find a way to get through these difficult times without eating or by eating low-calorie food, like raw vegetables. It may be helpful to imagine a relaxing place that allows you to temporarily escape from stress. With deep breaths and closed eyes, you can imagine this relaxing place for a few minutes. Self-help programs  Self-help programs like [place] Watchers®, Overeaters Anonymous®, and Take Off Jd (TOPS)© work for some people. As with all weight loss programs, you are most likely to be successful with these plans if you make long-term changes in how you eat.   CHOOSING A Mansi Cagey calorie is a unit of energy found in food. Your body needs calories to function. The goal of any diet is to burn up more calories than you eat. How quickly you lose weight depends upon several factors, such as your age, gender, and starting weight. Older people have a slower metabolism than young people, so they lose weight more slowly. Men lose more weight than women of similar height and weight when dieting because they use more energy. People who are extremely overweight lose weight more quickly than those who are only mildly overweight. Try not to drink alcohol or drinks with added sugar, and most sweets (candy, cakes, cookies), since they rarely contain important nutrients. Portion-controlled diets  One simple way to diet is to buy packaged foods, like frozen low-calorie meals or meal-replacement canned drinks. A typical meal plan for one day may include:  A meal-replacement drink or breakfast bar for breakfast   A meal-replacement drink or a frozen low-calorie (250 to 350 calories) meal for lunch   A frozen low-calorie meal or other prepackaged, calorie-controlled meal, along with extra vegetables for dinner  This would give you 1000 to 1500 calories per day. Low-fat diet  To reduce the amount of fat in your diet, you can:  Eat low-fat foods. Low-fat foods are those that contain less than 30 percent of calories from fat. Fat is listed on the food facts label (figure 1). Count fat grams. For a 1500 calorie diet, this would mean about 45 g or fewer of fat per day. Low-carbohydrate diet  Low- and very-low-carbohydrate diets (eg, Atkins diet, [employer]) have become popular ways to lose weight quickly.   With a very-low-carbohydrate diet, you eat between 0 and 60 grams of carbohydrates per day (a standard diet contains 200 to 300 grams of carbohydrates)   With a low-carbohydrate diet, you eat between 60 and 130 grams of carbohydrates per day  Carbohydrates are found in fruits, vegetables, and grains (including breads, rice, pasta, and cereal), alcoholic beverages, and in dairy products. Meat and fish do not contain carbohydrates. Side effects of very-low-carbohydrate diets can include constipation, headache, bad breath, muscle cramps, diarrhea, and weakness. Mediterranean diet  The term \"Mediterranean diet\" refers to a way of eating that is common in olive[place]growing regions around the [facility]. Although there is some variation in Mediterranean diets, there are some similarities. Most Mediterranean diets include:  A high level of monounsaturated fats (from olive or canola oil, walnuts, pecans, almonds) and a low level of saturated fats (from butter)   A high amount of vegetables, fruits, legumes, and grains (7 to 10 servings of fruits and vegetables per day)   A moderate amount of milk and dairy products, mostly in the form of cheese. Use low-fat dairy products (skim milk, fat-free yogurt, low-fat cheese). A relatively low amount of red meat and meat products. Substitute fish or poultry for red meat. For those who drink alcohol, a modest amount (mainly as red wine) may help to protect against cardiovascular disease. A modest amount is up to one (4 ounce) glass per day for women and up to two glasses per day for men. Which diet is best?  Studies have compared different diets, including:  Very-low-carbohydrate (Atkins)   Macronutrient balance controlling glycemic load (Zone®)   Reduced-calorie (Weight Watchers®)   Very-low-fat (Ornish)  No one diet is \"best\" for weight loss. Any diet will help you to lose weight if you stick with the diet. Therefore, it is important to choose a diet that includes foods you like. Fad diets  Fad diets often promise quick weight loss (more than 1 to 2 pounds per week) and may claim that you do not need to exercise or give up favorite foods. Some fad diets cost a lot of money, because you have to pay for seminars or pills.  Fad diets generally lack any uncontrolled hypertension, stroke, irregular heart rhythms, or peripheral vascular disease (poor circulation in the legs). Orlistat  Orlistat (Xenical® 120 mg capsules) is a medicine that reduces the amount of fat your body absorbs from the foods you eat. A lower-dose version is now available without a prescription (Christophe® 60 mg capsules) in many countries, including the [place]. The medicine is recommended three times per day, taken with a meal; you can skip a dose if you skip a meal or if the meal contains no fat. After one year of treatment with orlistat, the average weight loss is approximately 8 to 10 percent of initial body weight (4 percent more than in those who took a placebo). Cholesterol levels often improve, and blood pressure sometimes falls. In people with diabetes, orlistat may help control blood sugar levels. Side effects occur in 15 to 10 percent of people and may include stomach cramps, gas, diarrhea, leakage of stool, or oily stools. These problems are more likely when you take orlistat with a high-fat meal (if more than 30 percent of calories in the meal are from fat). Side effects usually improve as you learn to avoid high-fat foods. Severe liver injury has been reported rarely in patients taking orlistat, but it is not known if orlistat caused the liver problems. Diet supplements  Diet supplements are widely used by people who are trying to lose weight, although the safety and efficacy of these supplements are often unproven. A few of the more common diet supplements are discussed below; none of these are recommended because they have not been studied carefully, and there is no proof they are safe or effective. Chitosan and wheat dextrin are ineffective for weight loss, and their use is not recommended. Ephedra, a compound related to ephedrine, is no longer available in the [place] due to safety concerns. Many nonprescription diet pills previously contained ephedra.  Although some studies have shown that ephedra helps with weight loss, there can be serious side effects (psychiatric symptoms, palpitations, and stomach upset), including death. There are not enough data about the safety and efficacy of chromium, ginseng, glucomannan, green tea, hydroxycitric acid, L carnitine, psyllium, pyruvate supplements, [place]Minn wort, and conjugated linoleic acid. Two supplements from [facility], [address] [place] (also known as the Vanantonia Mace 15 pill) and Herbathin dietary supplement, have been shown to contain prescription drugs. Hoodia gordonii is a dietary supplement derived from a plant in [place]. It is not recommended because there is no proof that it is safe or effective. Bitter orange (Citrus aurantium) can increase your heart rate and blood pressure and is not recommended. SHOULD I HAVE SURGERY TO LOSE WEIGHT?  Weight loss surgery is recommended ONLY for people with one of the following:  Severe obesity (body mass index above 40) (calculator 1 and calculator 2) who have not responded to diet, exercise, or weight loss medicines   Body mass index between 35 and 40, along with a serious medical problem (including diabetes, severe joint pain, or sleep apnea) that would improve with weight loss  You should be sure that you understand the potential risks and benefits of weight loss surgery. You must be motivated and willing to make lifelong changes in how you eat to reach and maintain a healthier weight after surgery. You must also be realistic about weight loss after surgery (see 'Effectiveness of weight loss surgery' below). PREPARING FOR WEIGHT LOSS SURGERY  Most people who have weight loss surgery will meet with several specialists before surgery is scheduled. This often includes a dietitian, mental health counselor, a doctor who specializes in care of obese people, and a surgeon who performs weight loss surgery (bariatric surgeon).  You may need to work with these providers for several weeks or months before surgery. The nutritionist will explain what and how much you will be able to eat after surgery. You may also need to lose a small amount of weight before surgery. The mental health specialist will help you to cope with stress and other factors that can make it harder to lose weight or trigger you to eat   The medical doctor will determine whether you need other tests, counseling, or treatment before surgery. He or she might also help you begin a medical weight loss program so that you can lose some weight before surgery. The bariatric surgeon will meet with you to discuss the surgeries available to treat obesity. He or she will also make sure you are a good candidate for surgery. TYPES OF WEIGHT LOSS SURGERY  There are several types of weight loss surgeries, the most common being lap banding, gastric bypass, and gastric sleeve. Lap banding  Laparoscopic adjustable gastric banding (LAGB), or lap banding, is a surgery that uses an adjustable band around the opening to the stomach (figure 1). This reduces the amount of food that you can eat at one time. Lap banding is done through small incisions, with a laparoscope. The band can be adjusted after surgery, allowing you to eat more or less food. Adjustments to the size and tightness of the band are made by using a needle to add or remove fluid from a port (a small container under the skin that is connected to the band). Adding fluid to the band makes it tighter which restricts the amount of food you can eat and may help you to lose more weight. Lap banding is a popular choice because it is relatively simple to perform, can be adjusted or removed, and has a low risk of serious complications immediately after surgery. However, weight loss with the lap band depends on your ability to follow the program closely. You will need to prepare nutritious meals that [facility] with\" the band, not against it.  For example, the lap band will not work well if you eat or drink a large amount of liquid calories (like ice cream). The band will not help you to feel full when you eat/drink liquid calories. Weight loss ranges from 45 to 75 percent after two years. As an example, a person who is 120 pounds overweight could expect to lose approximately 54 to 90 pounds in the two years after lap banding. Gastric bypass  Isai-en-Y gastric bypass, also called gastric bypass, helps you to lose weight by reducing the amount of food you can eat and reducing the number of calories and nutrients you absorb from the food you eat. To perform gastric bypass, a surgeon creates a small stomach pouch by dividing the stomach and attaching it to the small intestine. This helps you to lose weight in two ways: The smaller stomach can hold less food than before surgery. This causes you to feel full after eating a very small amount of food or liquid. Over time, the pouch might stretch, allowing you to eat more food. The body absorbs fewer calories, since food bypasses most of the stomach as well as the upper small intestine. This new arrangement seems to decrease your appetite and change how you break down foods by changing the release of various hormones. Gastric bypass can be performed as open surgery (through an incision on the abdomen) or laparoscopically, which uses smaller incisions and smaller instruments. Both the laparoscopic and open techniques have risks and benefits. You and your surgeon should work together to decide which surgery, if any, is right for you. Gastric bypass has a high success rate, and people lose an average of 62 to 68 percent of their excess body weight in the first year. Weight loss typically levels off after one to two years, with an overall excess weight loss between 50 and 75 percent. For a person who is 120 pounds overweight, an average of 60 to 90 pounds of weight loss would be expected.   Gastric sleeve  Gastric sleeve, also known as sleeve gastrectomy, is a surgery that reduces the size of the stomach and makes it into a narrow tube (figure 3). The new stomach is much smaller and produces less of the hormone (ghrelin) that causes hunger, helping you feel satisfied with less food. Sleeve gastrectomy is safer than gastric bypass because the intestines are not rearranged, and there is less chance of malnutrition. It also appears to control hunger better than lap banding. It might be safer than the lap banding because no foreign materials are used. The gastric sleeve has a good success rate, and people lose an average of 33 percent of their excess body weight in the first year. For a person who is 120 pounds overweight, this would mean losing about 40 pounds in the first year. WEIGHT LOSS SURGERY COMPLICATIONS  A variety of complications can occur with weight loss surgery. The risks of surgery depend upon which surgery you have and any medical problems you had before surgery. Some of the more common early surgical complications (one to six weeks after surgery) include:  Bleeding   Infection   Blockage or tear in the bowels   Need for further surgery  Important medical complications after surgery can include blood clots in the legs or lungs, heart attack, pneumonia, and urinary tract infection. Complications are less likely when surgery is performed in centers that are experienced in weight loss surgery. In general, centers with experience in weight loss surgery have:  Board-certified doctors and surgeons   A team of support staff (dietitians, counselors, nurses)   Long-term follow-up after surgery   Hospital staff experienced with the care of weight loss patients. This includes nurses who are trained in the care of patients immediately after surgery and anesthesiologists who are experienced in caring for the morbidly obese.   EFFECTIVENESS OF WEIGHT LOSS SURGERY  The goal of weight loss surgery is to reduce the risk of illness or death associated with obesity. Weight loss surgery can also help you to feel and look better, reduce the amount of money you spend on medicines, and cut down on sick days. As an example, weight loss surgery can improve health problems related to obesity (diabetes, high blood pressure, high cholesterol, sleep apnea) to the point that you need less or no medicine. Finally, weight loss surgery might reduce your risk of developing heart disease, cancer, and certain infections. AFTER WEIGHT LOSS SURGERY  You will need to stay in the hospital until your team feels that it is safe for you to leave (on average, one to three days). Do not drive if you are taking prescription pain medicine. Begin exercising as soon as possible once you have healed; most weight loss centers will design an exercise program for you. Once you are home, it is important to eat and drink exactly what your doctor and dietitian recommend. You will see your doctor, nurse, and dietitian on a regular basis after surgery to monitor your health, diet, and weight loss. You will be able to slowly increase how much you eat over time, although it will always be important to:  Eat small, frequent meals and not skip meals   Chew your food slowly and completely   Avoid eating while \"distracted\" (such as eating while watching TV)   Stop eating when you feel full   Drink liquids at least 30 minutes before or after eating   Avoid foods high in fat or sugar   Take vitamin supplements, as recommended  It can take several months to learn to listen to your body so that you know when you are hungry and when you are full. You may dislike foods you previously loved, and you may begin to prefer new foods. This can be a frustrating process for some people, so talk to your dietitian if you are having trouble. It usually takes between one and two years to lose weight after surgery.  After reaching their goal weight, some people have plastic surgery (called \"body contouring\") to remove excess skin from the body, particularly in the abdominal area. Before you decide to have weight loss surgery, you must commit to staying healthy for life. This includes following up with your healthcare team, exercising most days of the week, and eating a sensible diet every day. It can be difficult to develop new eating and exercise habits after weight loss surgery, and you will have to work hard to stick to your goals. Recovering from surgery and losing weight can be stressful and emotional, and it is important to have the support of family and friends. Working with a , therapist, or support group can help you through the ups and downs. WHERE TO GET MORE INFORMATION  Your healthcare provider is the best source of information for questions and concerns related to your medical problem. This article will be updated as needed every four months on our Web site ([email or web address])  ·     High-Fiber Diet     What Is Fiber? Dietary fiber is a form of carbohydrate found in plants that cannot be digested by humans. All plants contain fiber, including fruits, vegetables, grains, and legumes. Fiber is often classified into two categories: soluble and insoluble. Soluble fiber draws water into the bowel and can help slow digestion. Examples of foods that are high in soluble fiber include oatmeal, oat bran, barley, legumes (eg, beans and peas), apples, and strawberries. Insoluble fiber speeds digestion and can add bulk to the stool. Examples of foods that are high in insoluble fiber include whole-wheat products, wheat bran, cauliflower, green beans, and potatoes. Why Follow a High-Fiber Diet? A high-fiber diet is often recommended to prevent and treat constipation , hemorrhoids , diverticulitis , and irritable bowel syndrome .  Eating a high-fiber diet can also help improve your cholesterol levels, lower your risk of coronary heart disease , reduce your risk of type 2 diabetes , and lower your weight. For people with type 1 or 2 diabetes, a high-fiber diet can also help stabilize blood sugar levels. How Much Fiber Should I Eat? A high-fiber diet should contain  20-35 grams  of fiber a day. This is actually the amount recommended for the general adult population; however, most Americans eat only 15 grams of fiber per day. Digestion of Fiber   Eating a higher fiber diet than usual can take some getting used to by your body's digestive system. To avoid the side effects of sudden increases in dietary fiber (eg, gas, cramping, bloating, and diarrhea), increase fiber gradually and be sure to drink plenty of fluids every day. Tips for Increasing Fiber Intake   Whenever possible, choose whole grains over refined grains (eg, brown rice instead of white rice, whole-wheat bread instead of white bread). Include a variety of grains in your diet, such as wheat, rye, barley, oats, quinoa, and bulgur. Eat more vegetarian-based meals. Here are some ideas: black bean burgers, eggplant lasagna, and veggie tofu stir-smith. Choose high-fiber snacks, such as fruits, popcorn, whole-grain crackers, and nuts. Make whole-grain cereal or whole-grain toast part of your daily breakfast regime. When eating out, whether ordering a sandwich or dinner, ask for extra vegetables. When baking, replace part of the white flour with whole-wheat flour. Whole-wheat flour is particularly easy to incorporate into a recipe. High-Fiber Diet Eating Guide   Food Category   Foods Recommended   Notes   Grains   Whole-grain breads, muffins, bagels, or hoa bread Rye bread Whole-wheat crackers or crisp breads Whole-grain or bran cereals Oatmeal, oat bran, or grits Wheat germ Whole-wheat pasta and brown rice   Read the ingredients list on food labels. Look for products that list \"whole\" as the first ingredient (eg, whole-wheat, whole oats). Choose cereals with at least 2 grams of fiber per serving.    Vegetables   All vegetables, especially asparagus, bean sprouts, broccoli, [place] sprouts, cabbage, carrots, cauliflower, celery, corn, greens, green beans, green pepper, onions, peas, potatoes (with skin), snow peas, spinach, squash, sweet potatoes, tomatoes, zucchini   For maximum fiber intake, eat the peels of fruits and vegetablesjust be sure to wash them well first.   Fruits   All fruits, especially apples, berries, grapefruits, mangoes, nectarines, oranges, peaches, pears, dried fruits (figs, dates, prunes, raisins)   Choose raw fruits and vegetables over juice, cooked, or cannedraw fruit has more fiber. Dried fruit is also a good source of fiber. Milk   With the exception of yogurt containing inulin (a type of fiber), dairy foods provide little fiber. Add more fiber by topping your yogurt or cottage cheese with fresh fruit, whole grain or bran cereals, nuts, or seeds. Meats and Beans   All beans and peas, especially Garbanzo beans, kidney beans, lentils, lima beans, split peas, and bro beans All nuts and seeds, especially almonds, peanuts, [place] nuts, cashews, peanut butter, walnuts, sesame and sunflower seeds All meat, poultry, fish, and eggs   Increase fiber in meat dishes by adding bro beans, kidney beans, black-eyed peas, bran, or oatmeal. If you are following a low-fat diet, use nuts and seeds only in moderation. Fats and Oils   All in moderation   Fats and oils do not provide fiber   Snacks, Sweets, and Condiments   Fruit Nuts Popcorn, whole-wheat pretzels, or trail mix made with dried fruits, nuts, and seeds Cakes, breads, and cookies made with oatmeal or whole-wheat flour   Most snack foods do not provide much fiber. Choose snacks with at least 2 grams of fiber per serving. Last Reviewed: March 2011 Bubba Otero MS, MPH, RD   Updated: 3/29/2011   ·     Keep Your Memory Jose Cornelius       Many factors can affect your ability to remembera hectic lifestyle, aging, stress, chronic disease, and certain medicines.  But, there are steps you can take to sharpen your mind and help preserve your memory. Challenge Your Brain   Regularly challenging your mind may help keeps it in top shape. Good mental exercises include:   Crossword puzzlesUse a dictionary if you need it; you will learn more that way. Brainteasers Try some! Crafts, such as wood working and sewing   Hobbies, such as gardening and building model airplanes   SocializingVisit old friends or join groups to meet new ones. Reading   Learning a new language   Taking a class, whether it be art history or сергей chi   TravelingExperience the food, history, and culture of your destination   Learning to use a computer   Going to museums, the theater, or thought-provoking movies   Changing things in your daily life, such as reversing your pattern in the grocery store or brushing your teeth using your nondominant hand   Use Memory Aids   There is no need to remember every detail on your own. These memory aids can help:   Calendars and day planners   Electronic organizers to store all sorts of helpful informationThese devices can \"beep\" to remind you of appointments. A book of days to record birthdays, anniversaries, and other occasions that occur on the same date every year   Detailed \"to-do\" lists and strategically placed sticky notes   Quick \"study\" sessionsBefore a gathering, review who will be there so their names will be fresh in your mind. Establish routinesFor example, keep your keys, wallet, and umbrella in the same place all the time or take medicine with your 8:00 AM glass of juice   Live a Healthy Life   Many actions that will keep your body strong will do the same for your mind. For example:   Talk to Your Doctor About Herbs and Supplements    Malnutrition and vitamin deficiencies can impair your mental function. For example, vitamin B12 deficiency can cause a range of symptoms, including confusion. But, what if your nutritional needs are being met?  Can herbs and supplements still offer a benefit? Researchers have investigated a range of natural remedies, such as ginkgo , ginseng , and the supplement phosphatidylserine (PS). So far, though, the evidence is inconsistent as to whether these products can improve memory or thinking. If you are interested in taking herbs and supplements, talk to your doctor first because they may interact with other medicines that you are taking. Exercise Regularly    Among the many benefits of regular exercise are increased blood flow to the brain and decreased risk of certain diseases that can interfere with memory function. One study found that even moderate exercise has a beneficial effect. Examples of \"moderate\" exercise include:   Playing 18 holes of golf once a week, without a cart   Playing tennis twice a week   Walking one mile per day   Manage Stress    It can be tough to remember what is important when your mind is cluttered. Make time for relaxation. Choose activities that calm you down, and make it routine. Manage Chronic Conditions    Side effects of high blood pressure , diabetes, and heart disease can interfere with mental function. Many of the lifestyle steps discussed here can help manage these conditions. Strive to eat a healthy diet, exercise regularly, get stress under control, and follow your doctor's advice for your condition. Minimize Medications    Talk to your doctor about the medicines that you take. Some may be unnecessary. Also, healthy lifestyle habits may lower the need for certain drugs. Last Reviewed: April 2010 Maki Sanders MD   Updated: 4/13/2010   ·     [address] [place] [address]       As we get older, changes in balance, gait, strength, vision, hearing, and cognition make even the most youthful senior more prone to accidents. Falls are one of the leading health risks for older people.  This increased risk of falling is related to:   Aging process (eg, decreased muscle strength, slowed reflexes) senior citizens are one of the two highest risk groups for death and serious injuries due to residential fires. When cooking, wear short-sleeved items, never a bulky long-sleeved robe. [facility]'s Safety Checklist for Older Consumers emphasizes the importance of checking basements, garages, workshops and storage areas for fire hazards, such as volatile liquids, piles of old rags or clothing and overloaded circuits. Never smoke in bed or when lying down on a couch or recliner chair. Small portable electric or kerosene heaters are responsible for many home fires and should be used cautiously if at all. If you do use one, be sure to keep them away from flammable materials. In case of fire, make sure you have a pre-established emergency exit plan. Have a professional check your fireplace and other fuel-burning appliances yearly. Helping Hands   Baby boomers entering the gr years will continue to see the development of new products to help older adults live safely and independently in spite of age-related changes. Making Life More Livable  , by [employer], lists over 1,000 products for \"living well in the mature years,\" such as bathing and mobility aids, household security devices, ergonomically designed knives and peelers, and faucet valves and knobs for temperature control. Medical supply stores and organizations are good sources of information about products that improve your quality of life and insure your safety.      Last Reviewed: November 2009 Mushtaq Leahy MD   Updated: 3/7/2011     ·

## 2022-07-05 NOTE — PROGRESS NOTES
Medicare Annual Wellness Visit    Robyn Kaplan is here for Medicare AWV    Assessment & Plan   Initial Medicare annual wellness visit      Recommendations for Preventive Services Due: see orders and patient instructions/AVS.  Recommended screening schedule for the next 5-10 years is provided to the patient in written form: see Patient Instructions/AVS.     No follow-ups on file. Subjective       Patient's complete Health Risk Assessment and screening values have been reviewed and are found in Flowsheets. The following problems were reviewed today and where indicated follow up appointments were made and/or referrals ordered.     Positive Risk Factor Screenings with Interventions:    Fall Risk:  Do you feel unsteady or are you worried about falling? : no  2 or more falls in past year?: (!) yes  Fall with injury in past year?: no     Fall Risk Interventions:    · Home safety tips provided            General Health and ACP:  General  In general, how would you say your health is?: Fair  In the past 7 days, have you experienced any of the following: New or Increased Pain, New or Increased Fatigue, Loneliness, Social Isolation, Stress or Anger?: No  Do you get the social and emotional support that you need?: Yes  Do you have a Living Will?: Yes    Advance Directives     Power of  Living Will ACP-Advance Directive ACP-Power of     Not on File Not on File Not on File Not on File      General Health Risk Interventions:  · No Living Will: ACP documents already completed- patient asked to provide copy to the office    Health Habits/Nutrition:     Physical Activity: Inactive    Days of Exercise per Week: 0 days    Minutes of Exercise per Session: 0 min     Have you lost any weight without trying in the past 3 months?: No  Body mass index: (!) 30.59  Have you seen the dentist within the past year?: N/A - wear dentures    Health Habits/Nutrition Interventions:  · Inadequate physical activity:  educational materials provided to promote increased physical activity  · Nutritional issues:  educational materials for healthy, well-balanced diet provided, educational materials to promote weight loss provided             Objective   Vitals:    07/05/22 1011   BP: 132/70   Site: Left Upper Arm   Position: Sitting   Pulse: 60   SpO2: 98%   Weight: 196 lb 12.8 oz (89.3 kg)   Height: 5' 7.25\" (1.708 m)      Body mass index is 30.59 kg/m². Allergies   Allergen Reactions    Latex      \"Blisters\"    Tape [Adhesive Tape]      \"Turn Red\"     Prior to Visit Medications    Medication Sig Taking?  Authorizing Provider   allopurinol (ZYLOPRIM) 100 MG tablet take 1 tablet by mouth once daily Yes Historical Provider, MD   dilTIAZem (CARDIZEM CD) 120 MG extended release capsule take 1 capsule by mouth once daily Yes Alphonse Correa MD   apixaban (ELIQUIS) 2.5 MG TABS tablet Take 1 tablet by mouth 2 times daily Yes Alphonse Correa MD   propafenone (RYTHMOL) 150 MG tablet Take 1 tablet by mouth every 8 hours Yes nAant Karimi MD   atorvastatin (LIPITOR) 80 MG tablet Take 1 tab by mouth once daily Yes Carolyn Watkins MD   metoprolol tartrate (LOPRESSOR) 50 MG tablet Take 1 tablet by mouth 2 times daily take 1 tablet by mouth twice a day Yes Carolyn Watkins MD   doxazosin (CARDURA) 4 MG tablet Take 1 tablet by mouth daily Yes Carolyn Watkins MD   chlorthalidone (HYGROTON) 25 MG tablet Take 1 tablet by mouth daily Yes Carolyn Watkins MD   furosemide (LASIX) 20 MG tablet take 1 tablet by mouth every other day Yes Alphonse Correa MD   Ascorbic Acid (VITAMIN C) 250 MG tablet Take by mouth daily Yes Historical Provider, MD   Omega-3 Fatty Acids (FISH OIL PO) Take 1,200 mg by mouth Yes Historical Provider, MD   FOLIC ACID PO Take 955 mg by mouth daily Yes Historical Provider, MD   Multiple Vitamins-Minerals (MULTIVITAMIN ADULT PO) Take by mouth Yes Historical Provider, MD   aspirin 81 MG EC tablet Take 1 tablet by mouth daily Yes Maxine Reed MD   Lancets MISC 1 each by In Vitro route 2 times daily Yes Maxine Reed MD   Glucose Blood (BLOOD GLUCOSE TEST STRIPS) STRP 1 each by In Vitro route 2 times daily Yes Maxine Reed MD   Blood Glucose Monitoring Suppl AV 1 kit by Does not apply route daily Yes Maxine Reed MD   Calcium Carbonate (CALTRATE 600 PO) Take  by mouth 2 times daily. Yes Historical Provider, MD   apixaban (ELIQUIS) 2.5 MG TABS tablet Take 1 tablet by mouth 2 times daily  Patient not taking: Reported on 7/5/2022  Dorie Almanza MD   predniSONE (DELTASONE) 10 MG tablet Take 4 tablets daily for 2 days, then 3 tablets daily for 2 days, then two tablets daily for 2 days, then one tablet daily for 2 days  Patient not taking: Reported on 7/5/2022  Maxine Reed MD   isosorbide mononitrate (IMDUR) 30 MG extended release tablet Take 1 tablet by mouth 2 times daily  Jeanne Lopez MD   cloNIDine (CATAPRES-TTS-3) 0.3 MG/24HR PTWK Place 1 patch onto the skin once a week  Jeanne Lopez MD   oxybutynin (DITROPAN) 5 MG tablet take 1 tablet by mouth twice a day  Patient not taking: Reported on 7/5/2022  Maxine Reed MD   oxybutynin (DITROPAN) 5 MG tablet Take 1 tablet by mouth 2 times daily.   Maxine Reed MD       [facility] (Including outside providers/suppliers regularly involved in providing care):   Patient Care Team:  Maxine Reed MD as PCP - General (Internal Medicine)  Maxine Reed MD as P[facility] - [facility]  Riley Caro MD as Consulting Physician (Cardiology)  Margo Chnug MD as Consulting Physician (Hematology and Oncology)  Mitzy Anna MD as Consulting Physician (Orthopedic Surgery)     Reviewed and updated this visit:  Tobacco  Allergies  Meds  Med Hx  Surg Hx  Soc Hx  Fam Hx              I, Nova Shaper, LPN, [id number]/2/8227, performed the documented evaluation under the direct supervision of the attending physician. This encounter was performed under Matthew vasquez MDs, direct supervision, 7/5/2022.

## 2022-07-12 ENCOUNTER — OFFICE VISIT (OUTPATIENT)
Dept: CARDIOLOGY CLINIC | Age: 77
End: 2022-07-12
Payer: COMMERCIAL

## 2022-07-12 VITALS
DIASTOLIC BLOOD PRESSURE: 70 MMHG | HEIGHT: 68 IN | WEIGHT: 194 LBS | SYSTOLIC BLOOD PRESSURE: 124 MMHG | BODY MASS INDEX: 29.4 KG/M2 | HEART RATE: 74 BPM

## 2022-07-12 DIAGNOSIS — G47.33 OSA (OBSTRUCTIVE SLEEP APNEA): ICD-10-CM

## 2022-07-12 DIAGNOSIS — I49.9 IRREGULAR HEART BEAT: ICD-10-CM

## 2022-07-12 DIAGNOSIS — N28.89 HYPERTENSION SECONDARY TO OTHER RENAL DISORDERS: ICD-10-CM

## 2022-07-12 DIAGNOSIS — I15.1 HYPERTENSION SECONDARY TO OTHER RENAL DISORDERS: ICD-10-CM

## 2022-07-12 DIAGNOSIS — I48.0 PAROXYSMAL ATRIAL FIBRILLATION (HCC): ICD-10-CM

## 2022-07-12 PROCEDURE — 99214 OFFICE O/P EST MOD 30 MIN: CPT | Performed by: NURSE PRACTITIONER

## 2022-07-12 PROCEDURE — 93000 ELECTROCARDIOGRAM COMPLETE: CPT | Performed by: NURSE PRACTITIONER

## 2022-07-12 PROCEDURE — 1123F ACP DISCUSS/DSCN MKR DOCD: CPT | Performed by: NURSE PRACTITIONER

## 2022-07-12 RX ORDER — PROPAFENONE HYDROCHLORIDE 150 MG/1
150 TABLET, FILM COATED ORAL EVERY 8 HOURS
Qty: 90 TABLET | Refills: 5 | Status: SHIPPED | OUTPATIENT
Start: 2022-07-12

## 2022-07-12 ASSESSMENT — ENCOUNTER SYMPTOMS
ORTHOPNEA: 0
SHORTNESS OF BREATH: 0

## 2022-07-12 NOTE — PROGRESS NOTES
7/12/2022  Primary cardiologist: Dr. Hortencia Javier    CC: Bill Coto  is an established 68 y.o.  female here for a 6 month follow up on   1. Irregular heart beat    2. Paroxysmal atrial fibrillation (HCC)    3. Hypertension secondary to other renal disorders    4. ADOLFO (obstructive sleep apnea)            SUBJECTIVE/OBJECTIVE:  Bill Coto is a 68 y.o. female with a history of PAF, hypertension, hyperlipidemia, obstructive sleep apnea, syncope, diabetes mellitus type 2 and CKD. HPI :   Bill Coto reports she noticed 2 episodes of palpitations while she was resting yesterday. Episodes were brief in nature- lasting seconds. Denies associated symptoms. Denies falls or bleeding. She denies chest pain. She started PD a few months ago. Using CPAP nightly with an average of 5 hours of sleep per night. Review of Systems   Constitutional: Negative for diaphoresis and malaise/fatigue. Cardiovascular: Positive for leg swelling and palpitations. Negative for chest pain, claudication, dyspnea on exertion, irregular heartbeat, near-syncope, orthopnea and paroxysmal nocturnal dyspnea. Respiratory: Negative for shortness of breath. Neurological: Negative for dizziness and light-headedness. Vitals:    07/12/22 1009   BP: 124/70   Pulse: 74   Weight: 194 lb (88 kg)   Height: 5' 8\" (1.727 m)     Patient-Reported Vitals 12/10/2020   Patient-Reported Weight 194   Patient-Reported Height -   Patient-Reported Systolic 599   Patient-Reported Diastolic 70   Patient-Reported Pulse 67   Patient-Reported SpO2 -     Wt Readings from Last 3 Encounters:   07/12/22 194 lb (88 kg)   07/05/22 196 lb 12.8 oz (89.3 kg)   06/20/22 193 lb (87.5 kg)     Body mass index is 29.5 kg/m². Physical Exam  HENT:      Head: Normocephalic and atraumatic. Neck:      Vascular: No carotid bruit. Cardiovascular:      Rate and Rhythm: Normal rate and regular rhythm. Pulses: Normal pulses. Heart sounds: Normal heart sounds.    Pulmonary:      Effort: Pulmonary effort is normal.      Breath sounds: Normal breath sounds. Abdominal:      General: There is no distension. Tenderness: There is no abdominal tenderness. Comments: PD catheter noted   Musculoskeletal:         General: No tenderness. Cervical back: No tenderness. Right lower leg: No edema. Left lower leg: Edema present. Skin:     General: Skin is warm and dry. Capillary Refill: Capillary refill takes less than 2 seconds. Neurological:      General: No focal deficit present. Mental Status: She is alert.    Psychiatric:         Mood and Affect: Mood normal.                Current Outpatient Medications   Medication Sig Dispense Refill    propafenone (RYTHMOL) 150 MG tablet Take 1 tablet by mouth every 8 hours 90 tablet 5    apixaban (ELIQUIS) 2.5 MG TABS tablet Take 1 tablet by mouth 2 times daily 42 tablet 0    allopurinol (ZYLOPRIM) 100 MG tablet take 1 tablet by mouth once daily      predniSONE (DELTASONE) 10 MG tablet Take 4 tablets daily for 2 days, then 3 tablets daily for 2 days, then two tablets daily for 2 days, then one tablet daily for 2 days 20 tablet 0    dilTIAZem (CARDIZEM CD) 120 MG extended release capsule take 1 capsule by mouth once daily 90 capsule 3    apixaban (ELIQUIS) 2.5 MG TABS tablet Take 1 tablet by mouth 2 times daily 180 tablet 3    atorvastatin (LIPITOR) 80 MG tablet Take 1 tab by mouth once daily 90 tablet 3    metoprolol tartrate (LOPRESSOR) 50 MG tablet Take 1 tablet by mouth 2 times daily take 1 tablet by mouth twice a day 180 tablet 3    doxazosin (CARDURA) 4 MG tablet Take 1 tablet by mouth daily 30 tablet 3    chlorthalidone (HYGROTON) 25 MG tablet Take 1 tablet by mouth daily 30 tablet 3    furosemide (LASIX) 20 MG tablet take 1 tablet by mouth every other day 90 tablet 3    oxybutynin (DITROPAN) 5 MG tablet take 1 tablet by mouth twice a day 180 tablet 3    Ascorbic Acid (VITAMIN C) 250 MG tablet Take by mouth daily  Omega-3 Fatty Acids (FISH OIL PO) Take 1,200 mg by mouth      FOLIC ACID PO Take 907 mg by mouth daily      Multiple Vitamins-Minerals (MULTIVITAMIN ADULT PO) Take by mouth      aspirin 81 MG EC tablet Take 1 tablet by mouth daily 30 tablet     Lancets MISC 1 each by In Vitro route 2 times daily 100 each 3    Glucose Blood (BLOOD GLUCOSE TEST STRIPS) STRP 1 each by In Vitro route 2 times daily 100 strip 3    Blood Glucose Monitoring Suppl AV 1 kit by Does not apply route daily 1 Device 0    Calcium Carbonate (CALTRATE 600 PO) Take  by mouth 2 times daily.  isosorbide mononitrate (IMDUR) 30 MG extended release tablet Take 1 tablet by mouth 2 times daily 60 tablet 3    cloNIDine (CATAPRES-TTS-3) 0.3 MG/24HR PTWK Place 1 patch onto the skin once a week 4 patch 5     No current facility-administered medications for this visit. All pertinent data reviewed and discussed with patient       ASSESSMENT/PLAN:      Paroxysmal atrial fibrillation  Noted episode of palpitation yesterday- brief in nature-states last less than 1-2 beats. EKG today shows sinus rhythm rate 74, QTc 434 msec: Continue with Rythmol for rhythm control: cardiazem and metoprolol for rate control  Recommend continue with low-dose anticoagulation for stroke prevention-  Atrial Fibrillation CHADSVASC2 Score Stroke Risk:   68 y.o. > 76 - 2    female Female - 1   CHF HX: No - 0   HTN HX: Yes - 1   Stroke/TIA/Thromboembolism No - 0   Vascular Disease HX: No - 0   Diabetes Mellitus Yes - 1   CHADSVASC 2 Score 5      Annual Stroke Risk 7.2% - moderate-high              Hypertension  Blood pressure is well controlled with use of Hygroton, Catapres, Imdur and cardura : recommend to continue current dose       Hyperlipidemia  Most recent lipids noted from January 2022 which are near goal recommend he continue omega-3 and atorvastatin.   Tolerating medications without side effects      Obstructive sleep apnea  Using CPAP nightly: Sleeping 5 hours per night    CKD  Now on PD       Tests ordered: None  Follow-up 6 months    Signed:  FAITH Abdi CNP, 7/12/2022, 10:19 AM    An electronic signature was used to authenticate this note. Please note this report has been partially produced using speech recognition software and may contain errors related to that system including errors in grammar, punctuation, and spelling, as well as words and phrases that may be inappropriate. If there are any questions or concerns please feel free to contact the dictating provider for clarification.

## 2022-07-19 ENCOUNTER — OFFICE VISIT (OUTPATIENT)
Dept: ONCOLOGY | Age: 77
End: 2022-07-19
Payer: COMMERCIAL

## 2022-07-19 ENCOUNTER — HOSPITAL ENCOUNTER (OUTPATIENT)
Dept: INFUSION THERAPY | Age: 77
Discharge: HOME | End: 2022-07-19

## 2022-07-19 VITALS
TEMPERATURE: 97.5 F | HEIGHT: 68 IN | HEART RATE: 71 BPM | WEIGHT: 192.8 LBS | OXYGEN SATURATION: 97 % | SYSTOLIC BLOOD PRESSURE: 148 MMHG | BODY MASS INDEX: 29.22 KG/M2 | DIASTOLIC BLOOD PRESSURE: 62 MMHG

## 2022-07-19 DIAGNOSIS — Z17.0 MALIGNANT NEOPLASM OF UPPER-OUTER QUADRANT OF RIGHT BREAST IN FEMALE, ESTROGEN RECEPTOR POSITIVE (HCC): Primary | ICD-10-CM

## 2022-07-19 DIAGNOSIS — C50.411 MALIGNANT NEOPLASM OF UPPER-OUTER QUADRANT OF RIGHT BREAST IN FEMALE, ESTROGEN RECEPTOR POSITIVE (HCC): Primary | ICD-10-CM

## 2022-07-19 PROCEDURE — 99213 OFFICE O/P EST LOW 20 MIN: CPT | Performed by: INTERNAL MEDICINE

## 2022-07-19 PROCEDURE — 1123F ACP DISCUSS/DSCN MKR DOCD: CPT | Performed by: INTERNAL MEDICINE

## 2022-07-19 NOTE — PROGRESS NOTES
Patient Name: Joon Garza  Patient : 1945  Patient MRN: [id number]     Primary Oncologist: Toya Cancino MD  Referring Provider: Kalie Albert MD     Date of Service: 2022      Chief Complaint:   Chief Complaint   Patient presents with    Follow-up     Patient Active Problem List:     Osteoarthritis     ADOLFO (obstructive sleep apnea)     Menopause     Paroxysmal atrial fibrillation (HCC)     Osteopenia     Colon polyps     Bilateral leg edema     Urge incontinence     Breast cancer ([address]     Edema of both legs     Mixed hyperlipidemia     Iron deficiency anemia     Gastroesophageal reflux disease without esophagitis     Essential hypertension     Abnormal EKG     Type 2 diabetes mellitus with nephropathy (HCC)     Chronic fatigue     Dizzy spells     Carpal tunnel syndrome on left     Trigger finger, left ring finger     Chest pain     Acute blood loss anemia     Obesity (BMI 30-39. 9)     Excessive daytime sleepiness     Ex-cigarette smoker     Malignant neoplasm of upper-outer quadrant of right female breast ([address]     Intestinal malabsorption     Other acute kidney failure ([address]     Acquired cyst of kidney    HPI:   Ms. Tiffani Gonzales is a 68-year-old very pleasant woman with a past medical history significant for hypertension, diabetes, hyperlipidemia, history of SVT and atrial fibrillation, initially referred to me on 10/2012 for evaluation of right breast cancer. She stated that she felt a lump in the right breast and had mammogram on 2012. The patient was found to have a suspicious lesion in the right breast.  She subsequently had a sonogram-guided biopsy of the right breast.  Pathology report is consistent with invasive ductal carcinoma, ER/KY positive, HER-2/nitish negative. The patient was evaluated by a general surgeon, Dr. Barbara Agustin, and had a right lumpectomy on 2012. OncotypeDx was sent and she is low risk for recurrence (Recurrence score 11).  She then underwent for adjuvant radiation therapy and it was finished on March 4, 2013. She started adjuvant hormonal therapy with Femara on March 5, 2013 and she completed it on March 5, 2018. She had colonoscopy on 12/6/18 and it showed 5 mm polyp in distal sigmoid colon, diverticulosis and hemorrhoids. Final pathology from sigmoid polyp was tubular adenoma. Mammogram done on February 3, 2020 showed no evidence of malignancy. Radiologist recommend normal interval follow-up in 12 months. Mammogram done on 12/16/2021 showed stable cluster of microcalcifications in the inferior medial right breast.  Radiologist recommended short interval diagnostic right mammogram in 6 months. On July 19, 2022, she presented to me for followup. I have been following Ms. 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 She has been followed periodically since then. She does not have any sign or symptom suggestive for recurrent or metastatic breast cancer on today visit. Reviewed findings of mammogram done on 12/16/2021 and I recommended to have repeat diagnostic right mammogram in next few weeks. I will follow up with the results. She also has an iron deficiency anemia and I believe it is secondary to occult gastrointestinal bleeding from the antiplatelet agents and anticoagulation therapy. She is currently on eliquis. I reviewed with her findings on laboratory test done on 3/21/2022. Her serum ferritin was 107 ng/ml on 1/26/22. Chronic kidney disease - her serum creatinine is 6.1 mg/dl and I recommend her to continue to follow up with Dr. Elo Dubon. Diabetes mellitus - on metformin. Recommend to follow up with her PCP. Educate her about diet and exercise. She does not have any significant symptoms at today visit. PAST MEDICAL HISTORY:  Past medical history is significant for the following.   1. 01/23/2017    POCGLU 125 (H) 03/30/2022     Lab Results   Component Value Date     (H) 01/13/2022     No components found for: LD  Lab Results   Component Value Date    TSHHS 1.010 05/08/2021    T4FREE 1.33 11/30/2017    FT3 2.4 04/01/2014     Immunology:  Lab Results   Component Value Date    PROT 6.7 03/21/2022    ALBUMINELP 3.5 11/30/2017    LABALPH 0.3 11/30/2017    LABALPH 3.7 11/30/2017    LABBETA 1.2 11/30/2017    GAMGLOB 1.0 11/30/2017     No results found for: Joy Moreland, KLFLCR  No results found for: B2M  Coagulation Panel:  Lab Results   Component Value Date    PROTIME 15.0 (H) 05/08/2021    INR 1.24 05/08/2021    APTT 63.9 (H) 05/08/2021     Anemia Panel:  Lab Results   Component Value Date    CZHBTHIN37 866.3 01/13/2022    FOLATE 16.8 01/13/2022     Tumor Markers:  Lab Results   Component Value Date    CEA <0.5 04/01/2014    LABCA2 54.8 (H) 01/13/2022     Observations:  No data recorded     Assessment & Plan:   Stage IA right breast invasive ductal carcinoma  Mild iron deficiency anemia     PLAN:   Ms. Rita Yepez has been followed for stage IA right breast invasive ductal carcinoma, iron deficiency anemia superimposed by anemia due to chronic kidney disease. Mammogram done on February 3, 2020 showed no evidence of malignancy. Radiologist recommend normal interval follow-up in 12 months. Mammogram done on 12/16/2021 showed stable cluster of microcalcifications in the inferior medial right breast.  Radiologist recommended short interval diagnostic right mammogram in 6 months. On July 19, 2022, she presented to me for followup. I have been following Ms. 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 She has been followed periodically since then. She does not have any sign or symptom suggestive for recurrent or metastatic breast cancer on today visit.     Reviewed findings of mammogram done on 12/16/2021 and I recommended to have repeat diagnostic right mammogram in next few weeks. I will follow up with the results. She also has an iron deficiency anemia and I believe it is secondary to occult gastrointestinal bleeding from the antiplatelet agents and anticoagulation therapy. She is currently on eliquis. I reviewed with her findings on laboratory test done on 3/21/2022. Her serum ferritin was 107 ng/ml on 1/26/22. Chronic kidney disease - her serum creatinine is 6.1 mg/dl and I recommend her to continue to follow up with Dr. Jens Morocho. Diabetes mellitus - on metformin. Recommend to follow up with her PCP. Educate her about diet and exercise. I answered all her questions and concerns for today. Recent imaging and labs were reviewed and discussed with the patient.

## 2022-07-19 NOTE — PROGRESS NOTES
MA Rooming Questions  Patient: Mk Collado  MRN: [id number]    Date: 7/19/2022        1. Do you have any new issues?   no         2. Do you need any refills on medications?    no    3. Have you had any imaging done since your last visit? Yes - Xray and dup u/s on 6/5    4. Have you been hospitalized or seen in the emergency room since your last visit here?   yes - [facility] ER    5. Did the patient have a depression screening completed today?  NO    No data recorded     PHQ-9 Given to (if applicable):               PHQ-9 Score (if applicable):                     [] Positive     []  Negative              Does question #9 need addressed (if applicable)                     [] Yes    []  No               Mickey Presume, CMA

## 2022-07-20 ENCOUNTER — OFFICE VISIT (OUTPATIENT)
Dept: INTERNAL MEDICINE CLINIC | Age: 77
End: 2022-07-20
Payer: COMMERCIAL

## 2022-07-20 VITALS
RESPIRATION RATE: 18 BRPM | DIASTOLIC BLOOD PRESSURE: 70 MMHG | SYSTOLIC BLOOD PRESSURE: 142 MMHG | HEART RATE: 81 BPM | OXYGEN SATURATION: 96 %

## 2022-07-20 DIAGNOSIS — I15.1 HYPERTENSION SECONDARY TO OTHER RENAL DISORDERS: ICD-10-CM

## 2022-07-20 DIAGNOSIS — E11.21 TYPE 2 DIABETES MELLITUS WITH NEPHROPATHY (HCC): Primary | ICD-10-CM

## 2022-07-20 DIAGNOSIS — I48.0 PAROXYSMAL ATRIAL FIBRILLATION (HCC): ICD-10-CM

## 2022-07-20 DIAGNOSIS — N28.89 HYPERTENSION SECONDARY TO OTHER RENAL DISORDERS: ICD-10-CM

## 2022-07-20 DIAGNOSIS — E78.2 MIXED HYPERLIPIDEMIA: ICD-10-CM

## 2022-07-20 PROCEDURE — 99214 OFFICE O/P EST MOD 30 MIN: CPT | Performed by: INTERNAL MEDICINE

## 2022-07-20 PROCEDURE — 1123F ACP DISCUSS/DSCN MKR DOCD: CPT | Performed by: INTERNAL MEDICINE

## 2022-07-20 PROCEDURE — 3044F HG A1C LEVEL LT 7.0%: CPT | Performed by: INTERNAL MEDICINE

## 2022-07-20 RX ORDER — FUROSEMIDE 20 MG/1
TABLET ORAL
Qty: 90 TABLET | Refills: 3 | Status: CANCELLED | OUTPATIENT
Start: 2022-07-20

## 2022-07-20 RX ORDER — TORSEMIDE 20 MG/1
20 TABLET ORAL 2 TIMES DAILY
COMMUNITY

## 2022-07-20 NOTE — PROGRESS NOTES
Radha Macias  1945 07/20/22    SUBJECTIVE:    Pt has started peritoneal dialysis once at night. She is getting iron infusions. She has had intermittent palpitations. She continues on rythmol and eliquis. She denies any blood in the stool, black stools. Sugars 111-135. At times this has been elevated after PD. Patient denies any chest pain, shortness of breath, myalgias, Patient is tolerating cholesterol medications without difficulty. The patient is taking hypertensive medications compliantly without side effects. Denies chest pain, dyspnea,  or TIA's. Blood pressure has been 200-854 systolic. She has a small amount of edema. OBJECTIVE:    BP (!) 142/70   Pulse 81   Resp 18   LMP  (LMP Unknown)   SpO2 96%     Physical Exam  Constitutional:       Appearance: She is well-developed. Eyes:      General: No scleral icterus. Conjunctiva/sclera: Conjunctivae normal.   Cardiovascular:      Rate and Rhythm: Normal rate and regular rhythm. Heart sounds: Normal heart sounds. No murmur heard. Pulmonary:      Effort: Pulmonary effort is normal. No respiratory distress. Breath sounds: Normal breath sounds. No wheezing. ASSESSMENT:    1. Type 2 diabetes mellitus with nephropathy ([address]    2. Hypertension secondary to other renal disorders    3. Mixed hyperlipidemia    4. Paroxysmal atrial fibrillation (HCC)        PLAN:    [facility] was seen today for 3 month follow-up. Diagnoses and all orders for this visit:    Type 2 diabetes mellitus with nephropathy ([address]- check a1c  -     Hemoglobin A1C; Future    Hypertension secondary to other renal disorders - slighlty elevated today, but followed closely with renal; no change in mgmt  -     Lipid Panel; Future    Mixed hyperlipidemia - check labs, cont lipitor  -     Lipid Panel;  Future    Paroxysmal atrial fibrillation (HCC) - cont current meds

## 2022-07-28 ENCOUNTER — OFFICE VISIT (OUTPATIENT)
Dept: CARDIOLOGY CLINIC | Age: 77
End: 2022-07-28
Payer: COMMERCIAL

## 2022-07-28 VITALS
SYSTOLIC BLOOD PRESSURE: 132 MMHG | HEIGHT: 68 IN | BODY MASS INDEX: 29.86 KG/M2 | DIASTOLIC BLOOD PRESSURE: 70 MMHG | WEIGHT: 197 LBS | OXYGEN SATURATION: 98 % | HEART RATE: 69 BPM

## 2022-07-28 DIAGNOSIS — I48.0 PAROXYSMAL ATRIAL FIBRILLATION (HCC): ICD-10-CM

## 2022-07-28 DIAGNOSIS — R06.02 SOBOE (SHORTNESS OF BREATH ON EXERTION): Primary | ICD-10-CM

## 2022-07-28 PROCEDURE — 1123F ACP DISCUSS/DSCN MKR DOCD: CPT | Performed by: INTERNAL MEDICINE

## 2022-07-28 PROCEDURE — 99214 OFFICE O/P EST MOD 30 MIN: CPT | Performed by: INTERNAL MEDICINE

## 2022-07-28 PROCEDURE — 93000 ELECTROCARDIOGRAM COMPLETE: CPT | Performed by: INTERNAL MEDICINE

## 2022-07-28 RX ORDER — METOLAZONE 5 MG/1
TABLET ORAL
COMMUNITY
Start: 2022-07-21

## 2022-07-28 RX ORDER — CLONIDINE HYDROCHLORIDE 0.1 MG/1
TABLET ORAL
COMMUNITY
Start: 2022-07-21

## 2022-07-28 NOTE — PROGRESS NOTES
Alphonso Salinas  is a  Established patient  ,68 y.o.   female here for evaluation of the following chief complaint(s):    Here for fu sent by nephrology Dr. Denisse Serna for possible CHF        SUBJECTIVE/OBJECTIVE:  HPI : h/o  Htn, hyperlipidimea, dm, paf now here  Has SOB and swelling  Patient is on peritoneal dialysis has been having swelling in the lower extremity has been more short of breath and has abdominal swelling as well      Vitals:    07/28/22 1048   BP: 132/70   Site: Left Upper Arm   Position: Sitting   Cuff Size: Medium Adult   Pulse: 69   SpO2: 98%   Weight: 197 lb (89.4 kg)   Height: 5' 8\" (1.727 m)       /70 (Site: Left Upper Arm, Position: Sitting, Cuff Size: Medium Adult)   Pulse 69   Ht 5' 8\" (1.727 m)   Wt 197 lb (89.4 kg)   LMP  (LMP Unknown)   SpO2 98%   BMI 29.95 kg/m²   Patient-Reported Vitals 12/10/2020   Patient-Reported Weight 194   Patient-Reported Height -   Patient-Reported Systolic 351   Patient-Reported Diastolic 70   Patient-Reported Pulse 67   Patient-Reported SpO2 -     Wt Readings from Last 3 Encounters:   07/28/22 197 lb (89.4 kg)   07/19/22 192 lb 12.8 oz (87.5 kg)   07/12/22 194 lb (88 kg)     Body mass index is 29.95 kg/m². Physical Exam     Neck: JVD  no    Lungs : clear    Cardio : Si and S2 audilble      Ext: edema      All pertinent data reviewed  y    Meds : reviewed   y      Tests ordered    y    ASSESSMENT/PLAN:    - HFpEF we will check an echo today to see if she has HFpEF  We will also check Lexiscan to evaluate for coronary artery disease        - Atrial fibrillation, pt is  compliant with meds. Patient does not have symptoms from atrial fibrillation  Has aflutter on Eliquis and Rythmol compliant with meds    -  Hypertension: Patients blood pressure is normal. Patient is advised about low sodium diet. Present medical regimen will not be changed.       On lisinopril compliant we will continue         - anemia  from CKD on procrit and iron  Labs monitored at dialysis

## 2022-08-05 ENCOUNTER — PROCEDURE VISIT (OUTPATIENT)
Dept: CARDIOLOGY CLINIC | Age: 77
End: 2022-08-05
Payer: COMMERCIAL

## 2022-08-05 DIAGNOSIS — R06.02 SOBOE (SHORTNESS OF BREATH ON EXERTION): ICD-10-CM

## 2022-08-05 DIAGNOSIS — R94.31 ABNORMAL EKG: Primary | ICD-10-CM

## 2022-08-05 LAB
LV EF: 53 %
LV EF: 54 %
LVEF MODALITY: NORMAL
LVEF MODALITY: NORMAL

## 2022-08-05 PROCEDURE — 78452 HT MUSCLE IMAGE SPECT MULT: CPT | Performed by: INTERNAL MEDICINE

## 2022-08-05 PROCEDURE — 93018 CV STRESS TEST I&R ONLY: CPT | Performed by: INTERNAL MEDICINE

## 2022-08-05 PROCEDURE — 93017 CV STRESS TEST TRACING ONLY: CPT | Performed by: INTERNAL MEDICINE

## 2022-08-05 PROCEDURE — 93306 TTE W/DOPPLER COMPLETE: CPT | Performed by: INTERNAL MEDICINE

## 2022-08-05 PROCEDURE — A9500 TC99M SESTAMIBI: HCPCS | Performed by: INTERNAL MEDICINE

## 2022-08-05 PROCEDURE — 93016 CV STRESS TEST SUPVJ ONLY: CPT | Performed by: INTERNAL MEDICINE

## 2022-08-10 ENCOUNTER — HOSPITAL ENCOUNTER (OUTPATIENT)
Dept: WOMENS IMAGING | Age: 77
Discharge: HOME | End: 2022-08-10
Payer: COMMERCIAL

## 2022-08-10 ENCOUNTER — APPOINTMENT (OUTPATIENT)
Dept: ULTRASOUND IMAGING | Age: 77
End: 2022-08-10
Payer: COMMERCIAL

## 2022-08-10 DIAGNOSIS — C50.411 MALIGNANT NEOPLASM OF UPPER-OUTER QUADRANT OF RIGHT BREAST IN FEMALE, ESTROGEN RECEPTOR POSITIVE (HCC): ICD-10-CM

## 2022-08-10 DIAGNOSIS — Z17.0 MALIGNANT NEOPLASM OF UPPER-OUTER QUADRANT OF RIGHT BREAST IN FEMALE, ESTROGEN RECEPTOR POSITIVE (HCC): ICD-10-CM

## 2022-08-10 PROCEDURE — G0279 TOMOSYNTHESIS, MAMMO: HCPCS

## 2022-08-12 ENCOUNTER — TELEPHONE (OUTPATIENT)
Dept: CARDIOLOGY CLINIC | Age: 77
End: 2022-08-12

## 2022-08-12 NOTE — TELEPHONE ENCOUNTER
Left ventricular function is low normal, EF is estimated at 50-55%. Left ventricle size is normal.   Moderate left ventricular hypertrophy. Normal diastolic filling pattern for age. No regional wall motion abnormalities were detected. Mildly dilated left atrium. Sclerotic, but non-stenotic aortic valve. Moderate pulmonic and mild tricuspid regurgitation present. Mild Pulmonary hypertension noted with RVSP of 43mmHg. No evidence of pericardial effusion. Unable to [facility] phone kept ringing.

## 2022-08-12 NOTE — TELEPHONE ENCOUNTER
I spoke to patient about scheduling [facility] per Dr. Luci jeffries. Patient states she would rather come in and talk to Dr. Venkat Felton before scheduling. OK per Dr. Venkat Felton.

## 2022-08-15 ENCOUNTER — OFFICE VISIT (OUTPATIENT)
Dept: CARDIOLOGY CLINIC | Age: 77
End: 2022-08-15
Payer: COMMERCIAL

## 2022-08-15 VITALS
HEIGHT: 68 IN | DIASTOLIC BLOOD PRESSURE: 60 MMHG | WEIGHT: 200.4 LBS | HEART RATE: 56 BPM | RESPIRATION RATE: 18 BRPM | BODY MASS INDEX: 30.37 KG/M2 | SYSTOLIC BLOOD PRESSURE: 122 MMHG

## 2022-08-15 DIAGNOSIS — R06.02 SOBOE (SHORTNESS OF BREATH ON EXERTION): Primary | ICD-10-CM

## 2022-08-15 PROCEDURE — 99214 OFFICE O/P EST MOD 30 MIN: CPT | Performed by: INTERNAL MEDICINE

## 2022-08-15 PROCEDURE — 1123F ACP DISCUSS/DSCN MKR DOCD: CPT | Performed by: INTERNAL MEDICINE

## 2022-08-15 NOTE — PROGRESS NOTES
Carmen Maza  is a  Established patient  ,68 y.o.   female here for evaluation of the following chief complaint(s):    Here for fu       SUBJECTIVE/OBJECTIVE:  HPI : h/o  Htn, hyperlipidimea, dm, paf now here  Has SOB getting worse had a Cardiolite stress test which was normal however continues to be symptomatic hence referred back by Dr. Batsheva Bloom for possible cardiac catheterization which I discussed with her today    Vitals:    08/15/22 1459   BP: 122/60   Site: Right Upper Arm   Position: Sitting   Cuff Size: Medium Adult   Pulse: 56   Resp: 18   Weight: 200 lb 6.4 oz (90.9 kg)   Height: 5' 8\" (1.727 m)         /60 (Site: Right Upper Arm, Position: Sitting, Cuff Size: Medium Adult)   Pulse 56   Resp 18   Ht 5' 8\" (1.727 m)   Wt 200 lb 6.4 oz (90.9 kg)   LMP  (LMP Unknown)   BMI 30.47 kg/m²   Patient-Reported Vitals 12/10/2020   Patient-Reported Weight 194   Patient-Reported Height -   Patient-Reported Systolic 017   Patient-Reported Diastolic 70   Patient-Reported Pulse 67   Patient-Reported SpO2 -     Wt Readings from Last 3 Encounters:   08/15/22 200 lb 6.4 oz (90.9 kg)   07/28/22 197 lb (89.4 kg)   07/19/22 192 lb 12.8 oz (87.5 kg)     Body mass index is 30.47 kg/m². Physical Exam     Neck: JVD  no    Lungs : clear    Cardio : Si and S2 audilble      Ext: edema      All pertinent data reviewed  y    Meds : reviewed   y      Tests ordered    y    ASSESSMENT/PLAN:    - HFpEF we will check an echo today to see if she has HFpEF  We will also check Lexiscan to evaluate for coronary artery disease  Patient stress test was normal however the patient continues to be symptomatic and so will proceed with a cardiac catheterization I have discussed this in detail with Dr. Tracy Dotson      - Atrial fibrillation, pt is  compliant with meds.  Patient does not have symptoms from atrial fibrillation  Has aflutter on Eliquis and Rythmol compliant with meds    -  Hypertension: Patients blood pressure is normal. Patient is advised about low sodium diet. Present medical regimen will not be changed. On lisinopril compliant we will continue         - anemia  from CKD on procrit and iron  Labs monitored at dialysis center        -  LIPID MANAGEMENT:  Importance of lipid levels discussed with patient   and patient was given dietary advice. NCEP- ATP III guidelines reviewed with patient. -   Changes  in medicines made: No     On lipitor compliant we will check the lipid profile                           -   DIABETES MELLITUS: Available pertinent lab data reviewed   and  patient was given dietary advice . Advised to check blood glucose level on a regular basis. -   Changes  in medicines made: No  Per PCP           -Chronic kidney disease on peritoneal dialysis since May  Felt better initially however now she is feeling worse again         An electronic signature was used to authenticate this note.     --Geremias Maier MD

## 2022-08-23 ENCOUNTER — NURSE ONLY (OUTPATIENT)
Dept: CARDIOLOGY CLINIC | Age: 77
End: 2022-08-23
Payer: COMMERCIAL

## 2022-08-23 ENCOUNTER — HOSPITAL ENCOUNTER (OUTPATIENT)
Age: 77
Discharge: HOME | End: 2022-08-23
Payer: COMMERCIAL

## 2022-08-23 LAB
ABO/RH: NORMAL
ANION GAP SERPL CALCULATED.3IONS-SCNC: 13 MMOL/L (ref 4–16)
ANTIBODY SCREEN: NEGATIVE
BUN BLDV-MCNC: 46 MG/DL (ref 6–23)
CALCIUM SERPL-MCNC: 8.4 MG/DL (ref 8.3–10.6)
CHLORIDE BLD-SCNC: 105 MMOL/L (ref 99–110)
CO2: 25 MMOL/L (ref 21–32)
COMMENT: NORMAL
CREAT SERPL-MCNC: 5.3 MG/DL (ref 0.6–1.1)
GFR AFRICAN AMERICAN: 9 ML/MIN/1.73M2
GFR NON-AFRICAN AMERICAN: 8 ML/MIN/1.73M2
GLUCOSE BLD-MCNC: 79 MG/DL (ref 70–99)
HCT VFR BLD CALC: 40.9 % (ref 37–47)
HEMOGLOBIN: 11.4 GM/DL (ref 12.5–16)
MCH RBC QN AUTO: 25.5 PG (ref 27–31)
MCHC RBC AUTO-ENTMCNC: 27.9 % (ref 32–36)
MCV RBC AUTO: 91.5 FL (ref 78–100)
PDW BLD-RTO: 17.5 % (ref 11.7–14.9)
PLATELET # BLD: 295 K/CU MM (ref 140–440)
PMV BLD AUTO: 11 FL (ref 7.5–11.1)
POTASSIUM SERPL-SCNC: 4.5 MMOL/L (ref 3.5–5.1)
RBC # BLD: 4.47 M/CU MM (ref 4.2–5.4)
SODIUM BLD-SCNC: 143 MMOL/L (ref 135–145)
WBC # BLD: 7.3 K/CU MM (ref 4–10.5)

## 2022-08-23 PROCEDURE — 80048 BASIC METABOLIC PNL TOTAL CA: CPT

## 2022-08-23 PROCEDURE — 85027 COMPLETE CBC AUTOMATED: CPT

## 2022-08-23 PROCEDURE — 99211 OFF/OP EST MAY X REQ PHY/QHP: CPT | Performed by: INTERNAL MEDICINE

## 2022-08-23 PROCEDURE — 36415 COLL VENOUS BLD VENIPUNCTURE: CPT

## 2022-08-23 PROCEDURE — 86850 RBC ANTIBODY SCREEN: CPT

## 2022-08-23 PROCEDURE — 86901 BLOOD TYPING SEROLOGIC RH(D): CPT

## 2022-08-23 PROCEDURE — 86900 BLOOD TYPING SEROLOGIC ABO: CPT

## 2022-08-25 ENCOUNTER — TELEPHONE (OUTPATIENT)
Dept: CARDIOLOGY CLINIC | Age: 77
End: 2022-08-25

## 2022-08-25 NOTE — TELEPHONE ENCOUNTER
Reminder call. Left message on voicemail Instructions reviewed.   Reminded of F/U appointment 9:12 @ 4:00

## 2022-08-26 ENCOUNTER — HOSPITAL ENCOUNTER (OUTPATIENT)
Dept: CARDIAC CATH/INVASIVE PROCEDURES | Age: 77
Discharge: HOME | End: 2022-08-26
Attending: INTERNAL MEDICINE | Admitting: INTERNAL MEDICINE
Payer: COMMERCIAL

## 2022-08-26 VITALS
BODY MASS INDEX: 30.31 KG/M2 | WEIGHT: 200 LBS | RESPIRATION RATE: 18 BRPM | HEIGHT: 68 IN | HEART RATE: 68 BPM | TEMPERATURE: 96.3 F | SYSTOLIC BLOOD PRESSURE: 140 MMHG | OXYGEN SATURATION: 98 % | DIASTOLIC BLOOD PRESSURE: 67 MMHG

## 2022-08-26 LAB
ACTIVATED CLOTTING TIME, LOW RANGE: 358 SEC
GLUCOSE BLD-MCNC: 114 MG/DL (ref 70–99)

## 2022-08-26 PROCEDURE — 6360000002 HC RX W HCPCS

## 2022-08-26 PROCEDURE — 93460 R&L HRT ART/VENTRICLE ANGIO: CPT

## 2022-08-26 PROCEDURE — C1769 GUIDE WIRE: HCPCS

## 2022-08-26 PROCEDURE — 6370000000 HC RX 637 (ALT 250 FOR IP): Performed by: INTERNAL MEDICINE

## 2022-08-26 PROCEDURE — 2500000003 HC RX 250 WO HCPCS

## 2022-08-26 PROCEDURE — 6360000004 HC RX CONTRAST MEDICATION

## 2022-08-26 PROCEDURE — 93571 IV DOP VEL&/PRESS C FLO 1ST: CPT

## 2022-08-26 PROCEDURE — 82962 GLUCOSE BLOOD TEST: CPT

## 2022-08-26 PROCEDURE — 93571 IV DOP VEL&/PRESS C FLO 1ST: CPT | Performed by: INTERNAL MEDICINE

## 2022-08-26 PROCEDURE — 85347 COAGULATION TIME ACTIVATED: CPT

## 2022-08-26 PROCEDURE — 93456 R HRT CORONARY ARTERY ANGIO: CPT | Performed by: INTERNAL MEDICINE

## 2022-08-26 PROCEDURE — C1887 CATHETER, GUIDING: HCPCS

## 2022-08-26 PROCEDURE — C1760 CLOSURE DEV, VASC: HCPCS

## 2022-08-26 RX ORDER — ACETAMINOPHEN 325 MG/1
650 TABLET ORAL EVERY 4 HOURS PRN
Status: DISCONTINUED | OUTPATIENT
Start: 2022-08-26 | End: 2022-08-26 | Stop reason: HOSPADM

## 2022-08-26 RX ORDER — DIAZEPAM 5 MG/1
5 TABLET ORAL ONCE
Status: COMPLETED | OUTPATIENT
Start: 2022-08-26 | End: 2022-08-26

## 2022-08-26 RX ORDER — SODIUM CHLORIDE 9 MG/ML
INJECTION, SOLUTION INTRAVENOUS CONTINUOUS
Status: DISCONTINUED | OUTPATIENT
Start: 2022-08-26 | End: 2022-08-26 | Stop reason: HOSPADM

## 2022-08-26 RX ORDER — CLOPIDOGREL BISULFATE 75 MG/1
75 TABLET ORAL DAILY
Qty: 30 TABLET | Refills: 3 | Status: SHIPPED | OUTPATIENT
Start: 2022-08-26 | End: 2022-09-16 | Stop reason: SDUPTHER

## 2022-08-26 RX ORDER — SODIUM CHLORIDE 9 MG/ML
INJECTION, SOLUTION INTRAVENOUS PRN
Status: DISCONTINUED | OUTPATIENT
Start: 2022-08-26 | End: 2022-08-26 | Stop reason: HOSPADM

## 2022-08-26 RX ORDER — SODIUM CHLORIDE 0.9 % (FLUSH) 0.9 %
5-40 SYRINGE (ML) INJECTION EVERY 12 HOURS SCHEDULED
Status: DISCONTINUED | OUTPATIENT
Start: 2022-08-26 | End: 2022-08-26 | Stop reason: HOSPADM

## 2022-08-26 RX ORDER — DIPHENHYDRAMINE HCL 25 MG
25 TABLET ORAL ONCE
Status: COMPLETED | OUTPATIENT
Start: 2022-08-26 | End: 2022-08-26

## 2022-08-26 RX ORDER — SODIUM CHLORIDE 0.9 % (FLUSH) 0.9 %
5-40 SYRINGE (ML) INJECTION PRN
Status: DISCONTINUED | OUTPATIENT
Start: 2022-08-26 | End: 2022-08-26 | Stop reason: HOSPADM

## 2022-08-26 RX ADMIN — DIAZEPAM 5 MG: 5 TABLET ORAL at 08:38

## 2022-08-26 RX ADMIN — DIPHENHYDRAMINE HCL 25 MG: 25 TABLET ORAL at 08:38

## 2022-08-26 NOTE — PROGRESS NOTES
Outpatient Pharmacy Progress Note for Meds-to-Beds    Total number of Prescriptions Filled: 2  The following medications were dispensed to the patient during the discharge process:  Clopidogrel 75mg  Eliquis 2.5mg    Additional Documentation:  Medication(s) were delivered to the patient's room prior to discharge      Thank you for letting us serve your patients.   [facility]    [address], [address] [facility]    Phone: [phone]    Fax: [phone]

## 2022-08-26 NOTE — H&P
[facility]  is a  Established patient  ,68 y.o.   female here for evaluation of the following chief complaint(s):    Here for [facility]        SUBJECTIVE/OBJECTIVE:  HPI : h/o  Htn, hyperlipidimea, dm, paf now here  Has SOB getting worse had a Cardiolite stress test which was normal however continues to be symptomatic hence referred back by Dr. Russell Grady for possible cardiac catheterization which I discussed with her today     Vitals       Vitals:     08/15/22 1459   BP: 122/60   Site: Right Upper Arm   Position: Sitting   Cuff Size: Medium Adult   Pulse: 56   Resp: 18   Weight: 200 lb 6.4 oz (90.9 kg)   Height: 5' 8\" (1.727 m)               /60 (Site: Right Upper Arm, Position: Sitting, Cuff Size: Medium Adult)   Pulse 56   Resp 18   Ht 5' 8\" (1.727 m)   Wt 200 lb 6.4 oz (90.9 kg)   LMP  (LMP Unknown)   BMI 30.47 kg/m²   Patient-Reported Vitals 12/10/2020   Patient-Reported Weight 194   Patient-Reported Height -   Patient-Reported Systolic 148   Patient-Reported Diastolic 70   Patient-Reported Pulse 67   Patient-Reported SpO2 -          Wt Readings from Last 3 Encounters:   08/15/22 200 lb 6.4 oz (90.9 kg)   07/28/22 197 lb (89.4 kg)   07/19/22 192 lb 12.8 oz (87.5 kg)      Body mass index is 30.47 kg/m². Physical Exam     Neck: JVD  no    Lungs : clear    Cardio : Si and S2 audilble      Ext: edema       All pertinent data reviewed  y     Meds : reviewed   y        Tests ordered    y     ASSESSMENT/PLAN:     - HFpEF we will check an echo today to see if she has HFpEF  We will also check Lexiscan to evaluate for coronary artery disease  Patient stress test was normal however the patient continues to be symptomatic and so will proceed with a cardiac catheterization I have discussed this in detail with Dr. Gisella Sullivan        - Atrial fibrillation, pt is  compliant with meds.  Patient does not have symptoms from atrial fibrillation  Has aflutter on Eliquis and Rythmol compliant with meds     -  Hypertension: Patients blood pressure is normal. Patient is advised about low sodium diet. Present medical regimen will not be changed. On lisinopril compliant we will continue            - anemia  from CKD on procrit and iron  Labs monitored at dialysis center           -  LIPID MANAGEMENT:  Importance of lipid levels discussed with patient   and patient was given dietary advice. NCEP- ATP III guidelines reviewed with patient. -   Changes  in medicines made: No     On lipitor compliant we will check the lipid profile                             -   DIABETES MELLITUS: Available pertinent lab data reviewed   and  patient was given dietary advice . Advised to check blood glucose level on a regular basis.       -   Changes  in medicines made: No  Per PCP           -Chronic kidney disease on peritoneal dialysis since May  Felt better initially however now she is feeling worse again

## 2022-08-26 NOTE — PROGRESS NOTES
5f venous sheath pulled per Renetta RN and manual pressure held for 5 minutes. Site wnl, no bleeding or hematoma noted, tegaderm dressing applied. Post sheath activity restrictions explained to patient, stated understanding. Call light within reach.

## 2022-08-26 NOTE — PLAN OF CARE
Assisted patient on and off the bedpan for urination. Patient had no difficulty utilizing the bedpan at this time.  [address] 8/26/2022
To holding area. Assessment completed and questions answered. Call light in reach.
40-59 yrs

## 2022-08-26 NOTE — DISCHARGE INSTRUCTIONS
Discharge Home Instuctions  Name:Krupa Sanchez  :1945    Your instructions:    Shower only for the first 5 days, NO TUB BATHS. Wash procedure site with mild soap, rinse and pat dry. Do not rub over the procedure site for two (2) days. Remove dressing in 2 days. Site observations-Observe the area for bleeding or hematoma (lump under the skin caused by bleeding.)      A. Painless bruising is normal.  B. If a firmness/swelling seems to be increasing or there is bright red bleeding from site       (hold pressure over procedure site) and  CALL 911 IMMEDIATELY. What to do after you leave the hospital:    Recommended activity: no lifting, Driving, or Strenuous exercise for 3 days. DO NOT lift more than 10lbs. For your procedure you may have been given a sedative to help you relax. This drug will make you sleepy. It is usually given in a vein (by IV). Don't do anything for 24 hours that requires attention to detail. It takes time for the medicine effects to completely wear off. Do not sign any legally binding contracts or documents over the next 24 hours. For your safety, you should not drive or operate any machinery that could be dangerous until the medicine wears off and you can think clearly and react easily. Follow-up with physician in 7-10 days. Take your medication as ordered.       If you have any questions, you may call [phone] or your physicians office

## 2022-08-29 ENCOUNTER — TELEPHONE (OUTPATIENT)
Dept: CARDIOLOGY CLINIC | Age: 77
End: 2022-08-29

## 2022-08-29 NOTE — TELEPHONE ENCOUNTER
Pt. Asked if she was to take aspirin while taking plavix or to stop it. Advised pt. That she is to continue taking aspirin.

## 2022-09-09 ENCOUNTER — OFFICE VISIT (OUTPATIENT)
Dept: INTERNAL MEDICINE CLINIC | Age: 77
End: 2022-09-09
Payer: COMMERCIAL

## 2022-09-09 VITALS
HEIGHT: 68 IN | OXYGEN SATURATION: 97 % | RESPIRATION RATE: 18 BRPM | DIASTOLIC BLOOD PRESSURE: 79 MMHG | HEART RATE: 69 BPM | WEIGHT: 200.4 LBS | BODY MASS INDEX: 30.37 KG/M2 | SYSTOLIC BLOOD PRESSURE: 129 MMHG

## 2022-09-09 DIAGNOSIS — S16.1XXA ACUTE STRAIN OF NECK MUSCLE, INITIAL ENCOUNTER: Primary | ICD-10-CM

## 2022-09-09 PROCEDURE — 1123F ACP DISCUSS/DSCN MKR DOCD: CPT | Performed by: NURSE PRACTITIONER

## 2022-09-09 PROCEDURE — 99213 OFFICE O/P EST LOW 20 MIN: CPT | Performed by: NURSE PRACTITIONER

## 2022-09-09 RX ORDER — TIZANIDINE 2 MG/1
2 TABLET ORAL 3 TIMES DAILY PRN
Qty: 30 TABLET | Refills: 0 | Status: SHIPPED | OUTPATIENT
Start: 2022-09-09 | End: 2022-09-22

## 2022-09-09 RX ORDER — HYDROCODONE BITARTRATE AND ACETAMINOPHEN 5; 325 MG/1; MG/1
1 TABLET ORAL EVERY 8 HOURS PRN
Qty: 15 TABLET | Refills: 0 | Status: SHIPPED | OUTPATIENT
Start: 2022-09-09 | End: 2022-09-14

## 2022-09-09 ASSESSMENT — ENCOUNTER SYMPTOMS
SINUS PRESSURE: 0
APNEA: 0
SINUS PAIN: 0
COLOR CHANGE: 0
NAUSEA: 0
DIARRHEA: 0
CHEST TIGHTNESS: 0
ABDOMINAL PAIN: 0
SHORTNESS OF BREATH: 0
VOMITING: 0
COUGH: 0

## 2022-09-09 NOTE — PROGRESS NOTES
Garnette Boast  1945 09/09/22    Chief Complaint   Patient presents with    Neck Pain     Left side neck pain that radiates down her shoulder sx started Monday. PT states that she woke up with a sharp burning pain across her neck. Pt reports that she believes she laid wrong        SUBJECTIVE:      Mrs Kevin Khan is a current patient of Dr Viktoria Garcia who presents for c/o neck pain. In brief, she states that 4 days ago she woke up with a sharp left sided neck pain described as a burning that radiates down into her shoulders. She believes that she may have slept wrong on this side the evening prior. No other injuries reported. She has been using some old [employer] [place] which has been easing the pain moderately for her. Review of Systems   Constitutional:  Negative for activity change, appetite change, fatigue and fever. HENT:  Negative for congestion, nosebleeds, sinus pressure and sinus pain. Respiratory:  Negative for apnea, cough, chest tightness and shortness of breath. Cardiovascular:  Negative for chest pain and palpitations. Gastrointestinal:  Negative for abdominal pain, diarrhea, nausea and vomiting. Genitourinary:  Negative for difficulty urinating, flank pain and hematuria. Musculoskeletal:  Positive for neck pain and neck stiffness. Negative for arthralgias, joint swelling and myalgias. Skin:  Negative for color change and rash. Neurological:  Negative for dizziness, light-headedness and headaches. Psychiatric/Behavioral: Negative. Negative for behavioral problems. OBJECTIVE:    /79   Pulse 69   Resp 18   Ht 5' 8\" (1.727 m)   Wt 200 lb 6.4 oz (90.9 kg)   LMP  (LMP Unknown)   SpO2 97%   BMI 30.47 kg/m²     Physical Exam  Constitutional:       General: She is not in acute distress. Appearance: She is well-developed. She is not diaphoretic. HENT:      Head: Normocephalic and atraumatic. Neck:     Cardiovascular:      Rate and Rhythm: Normal rate and regular rhythm. Pulmonary:      Effort: Pulmonary effort is normal. No respiratory distress. Breath sounds: Normal breath sounds. Abdominal:      General: Bowel sounds are normal.      Palpations: Abdomen is soft. Tenderness: There is no abdominal tenderness. Musculoskeletal:         General: Normal range of motion. Cervical back: Normal range of motion and neck supple. No edema or erythema. Pain with movement and muscular tenderness present. No spinous process tenderness. Skin:     General: Skin is warm and dry. Neurological:      Mental Status: She is alert and oriented to person, place, and time. Coordination: Coordination normal.   Psychiatric:         Thought Content: Thought content normal.       ASSESSMENT:    1. Acute strain of neck muscle, initial encounter        PLAN:    She Wright was seen today for neck pain. Diagnoses and all orders for this visit:    Acute strain of neck muscle, initial encounter-no injury or bony tenderness to warrant imaging at this time. Suspect muscular strain and will provide short course of tizanidine and Norco for pain control at this time. Encouraged heat and gentle stretching as tolerated. Return office in 1 week if no improvement.  -     HYDROcodone-acetaminophen (NORCO) 5-325 MG per tablet; Take 1 tablet by mouth every 8 hours as needed for Pain for up to 5 days. Intended supply: 5 days. Take lowest dose possible to manage pain  -     tiZANidine (ZANAFLEX) 2 MG tablet; Take 1 tablet by mouth 3 times daily as needed (neck pain)      No flowsheet data found. Return if symptoms worsen or fail to improve. Harish note this reports has been produced speech recognition software and may contain errors related to that system including errors in grammar, punctuation, and spelling, as well as words and phrases that may be appropriate. If there are any questions or concerns please feel free to contact the dictating provider for clarification.

## 2022-09-16 ENCOUNTER — OFFICE VISIT (OUTPATIENT)
Dept: CARDIOLOGY CLINIC | Age: 77
End: 2022-09-16
Payer: COMMERCIAL

## 2022-09-16 VITALS
DIASTOLIC BLOOD PRESSURE: 46 MMHG | HEART RATE: 84 BPM | BODY MASS INDEX: 30.19 KG/M2 | SYSTOLIC BLOOD PRESSURE: 100 MMHG | HEIGHT: 68 IN | WEIGHT: 199.2 LBS

## 2022-09-16 DIAGNOSIS — I15.1 HYPERTENSION SECONDARY TO OTHER RENAL DISORDERS: ICD-10-CM

## 2022-09-16 DIAGNOSIS — R06.02 SOBOE (SHORTNESS OF BREATH ON EXERTION): Primary | ICD-10-CM

## 2022-09-16 DIAGNOSIS — N28.89 HYPERTENSION SECONDARY TO OTHER RENAL DISORDERS: ICD-10-CM

## 2022-09-16 PROCEDURE — 1123F ACP DISCUSS/DSCN MKR DOCD: CPT | Performed by: INTERNAL MEDICINE

## 2022-09-16 PROCEDURE — 99214 OFFICE O/P EST MOD 30 MIN: CPT | Performed by: INTERNAL MEDICINE

## 2022-09-16 RX ORDER — CLOPIDOGREL BISULFATE 75 MG/1
75 TABLET ORAL DAILY
Qty: 30 TABLET | Refills: 3 | Status: SHIPPED | OUTPATIENT
Start: 2022-09-16

## 2022-09-16 NOTE — PROGRESS NOTES
Bill Coto  is a  Established patient  ,68 y.o.   female here for evaluation of the following chief complaint(s):    Here for fu on cardiac catheterization      SUBJECTIVE/OBJECTIVE:  HPI : h/o  Htn, hyperlipidimea, dm, paf now here for follow-up on the results of cardiac cath  Vitals:    09/16/22 1240   BP: (!) 100/46   Site: Right Upper Arm   Position: Sitting   Cuff Size: Large Adult   Pulse: 84   Weight: 199 lb 3.2 oz (90.4 kg)   Height: 5' 8\" (1.727 m)           BP (!) 100/46 (Site: Right Upper Arm, Position: Sitting, Cuff Size: Large Adult)   Pulse 84   Ht 5' 8\" (1.727 m)   Wt 199 lb 3.2 oz (90.4 kg)   LMP  (LMP Unknown)   BMI 30.29 kg/m²   Patient-Reported Vitals 12/10/2020   Patient-Reported Weight 194   Patient-Reported Height -   Patient-Reported Systolic 028   Patient-Reported Diastolic 70   Patient-Reported Pulse 67   Patient-Reported SpO2 -     Wt Readings from Last 3 Encounters:   09/16/22 199 lb 3.2 oz (90.4 kg)   09/09/22 200 lb 6.4 oz (90.9 kg)   08/26/22 200 lb (90.7 kg)     Body mass index is 30.29 kg/m². Physical Exam     Neck: JVD  no    Lungs : clear    Cardio : Si and S2 audilble      Ext: edema      All pertinent data reviewed  y    Meds : reviewed   y      Tests ordered    y    ASSESSMENT/PLAN:    - HFpEF we will check an echo today to see if she has HFpEF  We will also check Lexiscan to evaluate for coronary artery disease  Patient stress test was normal however the patient continues to be symptomatic and so will proceed with a cardiac catheterization I have discussed this in detail with Dr. Jacey Jackson cath results are as follows   Procedure Summary   LM NORMAL   50% MID LAD STENOSIS CALCIFIED ARTERY   NORMAL IFR . 95   CX MILD DISEASE   RCA MILD DISEAE   MILD PUL HTN      MEDICAL THERAPY      Recommendations   MEDICAL THERAPY   CHECK PULM ETIOLOGY      Signatures      --------------------------------------------------------   Electronically signed by Adenike Alfonso MD (Performing Physician) on 08/26/2022 at 19:25   --------------------------------------------------------    - Atrial fibrillation, pt is  compliant with meds. Patient does not have symptoms from atrial fibrillation  Has aflutter on Eliquis and Rythmol compliant with meds    -  Hypertension: Patients blood pressure is normal. Patient is advised about low sodium diet. Present medical regimen will not be changed. On lisinopril compliant we will continue         - anemia  from CKD on procrit and iron  Labs monitored at dialysis center        -  LIPID MANAGEMENT:  Importance of lipid levels discussed with patient   and patient was given dietary advice. NCEP- ATP III guidelines reviewed with patient. -   Changes  in medicines made: No     On lipitor compliant we will check the lipid profile                           -   DIABETES MELLITUS: Available pertinent lab data reviewed   and  patient was given dietary advice . Advised to check blood glucose level on a regular basis. -   Changes  in medicines made: No  Per PCP           -Chronic kidney disease on peritoneal dialysis since May  Felt better initially however now she is feeling worse again         An electronic signature was used to authenticate this note.     --Vicky Shah MD

## 2022-09-16 NOTE — PATIENT INSTRUCTIONS
Please be informed that if you contact our office outside of normal business hours the physician on call cannot help with any scheduling or rescheduling issues, procedure instruction questions or any type of medication issue. We advise you for any urgent/emergency that you go to the nearest emergency room! PLEASE CALL OUR OFFICE DURING NORMAL BUSINESS HOURS    Monday - Friday   8 am to 5 pm    [place]: [place] [address]: [phone]    [place]:  [phone]      **It is YOUR responsibilty to bring medication bottles and/or updated medication list to [address] [place] [address].  This will allow us to better serve you and all your healthcare needs**

## 2022-09-20 ENCOUNTER — SCHEDULED TELEPHONE ENCOUNTER (OUTPATIENT)
Dept: PULMONOLOGY | Age: 77
End: 2022-09-20
Payer: COMMERCIAL

## 2022-09-20 DIAGNOSIS — Z87.891 EX-CIGARETTE SMOKER: ICD-10-CM

## 2022-09-20 DIAGNOSIS — E66.9 OBESITY (BMI 30-39.9): ICD-10-CM

## 2022-09-20 DIAGNOSIS — G47.19 EXCESSIVE DAYTIME SLEEPINESS: ICD-10-CM

## 2022-09-20 DIAGNOSIS — G47.33 OSA (OBSTRUCTIVE SLEEP APNEA): ICD-10-CM

## 2022-09-20 PROCEDURE — 99213 OFFICE O/P EST LOW 20 MIN: CPT | Performed by: INTERNAL MEDICINE

## 2022-09-20 ASSESSMENT — ENCOUNTER SYMPTOMS
EYE DISCHARGE: 0
SHORTNESS OF BREATH: 0
BACK PAIN: 0
EYE ITCHING: 0
ABDOMINAL DISTENTION: 0
ABDOMINAL PAIN: 0
COUGH: 0

## 2022-09-20 NOTE — PROGRESS NOTES
Ellis Bagley  1945  Referring Provider: Tennille Burch MD    Subjective:   No chief complaint on file. HPI  Bill Coto is a 68 y.o. female is doing a telephone follow up visit. She has mild ADOLFO with EDS. She is on a CPAP of 8 cm h20 which she has used 15/30 days for more than 4 hours (50%). She has no loss of weight. She has a nasal mask. She is not sleepy or tired during the day time. Her week download data showed a residual AHI is 2.2 and  leak is 34.3 L/min.     Current Outpatient Medications   Medication Sig Dispense Refill    clopidogrel (PLAVIX) 75 MG tablet Take 1 tablet by mouth daily 30 tablet 3    tiZANidine (ZANAFLEX) 2 MG tablet Take 1 tablet by mouth 3 times daily as needed (neck pain) 30 tablet 0    apixaban (ELIQUIS) 2.5 MG TABS tablet Take 1 tablet by mouth 2 times daily Hold for 48 hours 180 tablet 3    cloNIDine (CATAPRES) 0.1 MG tablet take 1 tablet by mouth three times a day      metOLazone (ZAROXOLYN) 5 MG tablet take 1 tablet by mouth once daily      torsemide (DEMADEX) 20 MG tablet Take 20 mg by mouth in the morning and at bedtime      propafenone (RYTHMOL) 150 MG tablet Take 1 tablet by mouth every 8 hours 90 tablet 5    allopurinol (ZYLOPRIM) 100 MG tablet take 1 tablet by mouth once daily      dilTIAZem (CARDIZEM CD) 120 MG extended release capsule take 1 capsule by mouth once daily 90 capsule 3    atorvastatin (LIPITOR) 80 MG tablet Take 1 tab by mouth once daily 90 tablet 3    metoprolol tartrate (LOPRESSOR) 50 MG tablet Take 1 tablet by mouth 2 times daily take 1 tablet by mouth twice a day 180 tablet 3    doxazosin (CARDURA) 4 MG tablet Take 1 tablet by mouth daily 30 tablet 3    chlorthalidone (HYGROTON) 25 MG tablet Take 1 tablet by mouth daily 30 tablet 3    cloNIDine (CATAPRES-TTS-3) 0.3 MG/24HR PTWK Place 1 patch onto the skin once a week (Patient not taking: No sig reported) 4 patch 5    Ascorbic Acid (VITAMIN C) 250 MG tablet Take by mouth daily      Omega-3 Fatty Acids (FISH OIL PO) Take 1,200 mg by mouth      FOLIC ACID PO Take 764 mg by mouth daily      Multiple Vitamins-Minerals (MULTIVITAMIN ADULT PO) Take by mouth      Lancets MISC 1 each by In Vitro route 2 times daily 100 each 3    Glucose Blood (BLOOD GLUCOSE TEST STRIPS) STRP 1 each by In Vitro route 2 times daily 100 strip 3    Blood Glucose Monitoring Suppl AV 1 kit by Does not apply route daily 1 Device 0    Calcium Carbonate (CALTRATE 600 PO) Take  by mouth 2 times daily. No current facility-administered medications for this visit. Allergies   Allergen Reactions    Latex      \"Blisters\"    Tape Clemetine Bonner Tape]      \"Turn Red\"       Past Medical History:   Diagnosis Date    14 day event monitor 11/05/2018    Sinus rhythm    Atrial fibrillation with RVR ([facility] 11/25/2013    Breast cancer ([facility] [address]     Edema     5/09 TTE diastolic dysfxn, EF 43%; 57/91 - TTE diastolic dysfxn, EF 18%; Stress myoview  WNl, EF 70%    Family history of cardiovascular disease     GERD (gastroesophageal reflux disease)     H/O 24 hour EKG monitoring 4/23/12    [facility]    H/O cardiovascular stress test     4/23/12-[facility], Probably norm perfusion Lexiscan cardiolite study except for diaphramatic attenuation artifact, otherwise perfusion is normal, norm LV fxn by gated scan. 11/10-EF70%, 9/15/08-EF70%, 1/15/07-EF70%, 9/03    H/O echocardiogram     4/23/12-[facility]- Norm chamber sizes. LVH with norm LV systolic but abn diastolic fxn, mild MR and TR, minimal pulm HTN. 11/10 -EF>55%; 3/05, 9/23/03    History of cardiac catheterization     11/15/2013-EF 55%, No signif CAD -Dr Tanisha Fischer;;    History of cardiovascular stress test     11/14/2013-EF 70%. Normal Lexiscan Cardiolite, Uniform wall motion; History of echocardiogram     05/70/4720-[id number] global systolic  function. No wall motion abnormalities. EF 55%. Mild MR/TR;     History of Holter monitoring 11/17/14 11/14-48 hour Holter: Predominant rhythm was sinus, no ventricular ectopy noted, supraventricular ectopics were noted in single beat forms. History of therapeutic radiation     Hx of 24 hour EKG monitoring     12/17/13 48 hr holter monitor. Sinus rhythm with intermittent sinus arrhythmia. Hyperlipidemia     Hypertension     11/13 Cath WNL, EF 55%; 11/13 Stress WNL : 11/13 TTE mild MR and TR, EF 55%; 4/12 Stress WNL; 8/9 - Cath WNL    Iron deficiency anemia 7/9/2013 9/13 EGD WNL    Lumbar radiculopathy 11/25/2013    Menopause     ADOLFO (obstructive sleep apnea)     sleep study 10/3 CPAP 6cm     Osteoarthritis     both knees    Osteopenia     9/12 DEXA T-score -1.5    Other activity(E029.9)     48 hr holter, the 48 hr holter monitor reveals the patient in the sinus rhythm with occasional supraventricular ectopic beats. There is a rare short atrial run. Paroxysmal atrial fibrillation (HCC)     4/12 Holter [facility]    Prolonged emergence from general anesthesia     Proteinuria     Right Breast Cancer Dx 10-12    Supraventricular tachycardia (HCC)     Type 2 diabetes mellitus (HCC) Dx 2000's    Urge incontinence     Wears partial dentures     upper partial       Past Surgical History:   Procedure Laterality Date    BREAST BIOPSY  10-12    Right Breast Biopsy, Cancer    BREAST BIOPSY  1970's    Right Breast Biopsy, Benign    BREAST LUMPECTOMY  11/6/12    Breast cancer - Right with sentinal node    BUNIONECTOMY  1990's    Right [place]    8/10/09- The patient has no significant CAD. The elevated troponin is probably secondary to SVT. , 10/03- no significant CAD    CARPAL TUNNEL RELEASE Left 01/2019    CATARACT REMOVAL Bilateral 03/2020, 5/20    CATARACT REMOVAL Left 05/2020    COLONOSCOPY  \"X 2 Last One 2008 \"    Polpy Removed During Last Colonoscopy    DENTAL SURGERY      Teeth Extracted In Past    DIALYSIS CATHETER INSERTION N/A 3/30/2022    CATHETER INSERTION PERITONEAL DIALYSIS LAPAROSCOPIC performed by Alisson Sprague MD at [facility] ENDOSCOPY, COLON, DIAGNOSTIC  2016    savary dilatation to 17 mm    HYSTERECTOMY, TOTAL ABDOMINAL (CERVIX REMOVED)      JOINT REPLACEMENT      Total Left Knee    JOINT REPLACEMENT      Total Right Knee    SOFI STEROTACTIC LOC BREAST BIOPSY RIGHT Right 2021    SOFI STEROTACTIC LOC BREAST BIOPSY RIGHT [facility]    OVARY REMOVAL      UPPER GASTROINTESTINAL ENDOSCOPY N/A 2018    EGD DIAGNOSTIC ONLY performed by Emily Vargas MD at [facility] ENDOSCOPY       Social History     Socioeconomic History    Marital status:    Tobacco Use    Smoking status: Former     Packs/day: 0.50     Years: 10.00     Pack years: 5.00     Types: Cigarettes     Quit date: 1987     Years since quittin.7    Smokeless tobacco: Never   Vaping Use    Vaping Use: Never used   Substance and Sexual Activity    Alcohol use: No     Alcohol/week: 0.0 standard drinks     Comment: 3 cups coffee daily    Drug use: No    Sexual activity: Never   Social History Narrative    Wears glasses     Social Determinants of Health     Financial Resource Strain: Unknown    Difficulty of Paying Living Expenses: Patient refused   Food Insecurity: Unknown    Worried About Running Out of Food in the Last Year: Patient refused    Ran Out of Food in the Last Year: Patient refused   Physical Activity: Inactive    Days of Exercise per Week: 0 days    Minutes of Exercise per Session: 0 min       Review of Systems   Constitutional:  Negative for fatigue. HENT:  Negative for congestion and postnasal drip. Eyes:  Negative for discharge and itching. Respiratory:  Negative for cough and shortness of breath. Cardiovascular:  Negative for chest pain and leg swelling. Gastrointestinal:  Negative for abdominal distention and abdominal pain. Endocrine: Negative for cold intolerance and heat intolerance. Genitourinary:  Negative for dysuria and frequency. Musculoskeletal:  Negative for arthralgias and back pain. Allergic/Immunologic: Negative for environmental allergies and food allergies. Neurological:  Negative for light-headedness and headaches. Hematological:  Negative for adenopathy. Psychiatric/Behavioral:  Negative for agitation and behavioral problems. Objective:   LMP  (LMP Unknown)   There is no height or weight on file to calculate BMI. Sleep Medicine 9/4/2019 6/20/2019   Sitting and reading 3 0   Watching TV 3 2   Sitting, inactive in a public place (e.g. a theatre or a meeting) 0 0   As a passenger in a car for an hour without a break 3 3   Lying down to rest in the afternoon when circumstances permit 3 3   Sitting and talking to someone 0 0   Sitting quietly after a lunch without alcohol 3 1   In a car, while stopped for a few minutes in traffic 0 0   [place] Sleepiness Score 15 9   Neck circumference (Inches) - 16.5       Radiology: none    Assessment and Plan     Problem List          Respiratory    ADOLFO (obstructive sleep apnea)      Advised to be compliant with the CPAP  Loose weight              Other    Obesity (BMI 30-39. 9)      Advised to loose weight with diet and exercise           Excessive daytime sleepiness       Advised to be compliant with the CPAP  Loose weight             Ex-cigarette smoker      Advised to c.w quitting smoking                 Return in about 1 year (around 9/20/2023) for 2 week download data. Follow-Up:    Return in about 1 year (around 9/20/2023) for 2 week download data. Progress notes sent to the referring Provider    Van Conklin is a 68 y.o. female being evaluated by a Virtual Visit (video visit) encounter to address concerns as mentioned above. A caregiver was present when appropriate. Due to this being a TeleHealth encounter (During EGP-76 public health emergency), evaluation of the following organ systems was limited: Vitals/Constitutional/EENT/Resp/CV/GI//MS/Neuro/Skin/Heme-Lymph-Imm.   Pursuant to the emergency declaration under the [facility]3] Act and the [address] waiver authority and the [employer] Act, this Virtual Visit was conducted with patient's (and/or legal guardian's) consent, to reduce the patient's risk of exposure to COVID-19 and provide necessary medical care. The patient (and/or legal guardian) has also been advised to contact this office for worsening conditions or problems, and seek emergency medical treatment and/or call 911 if deemed necessary. Patient identification was verified at the start of the visit: Yes    Total time spent for this encounter:     Services were provided through a video synchronous discussion virtually to substitute for in-person clinic visit. Patient and provider were located at their individual homes. --Weston Garcia MD on 9/20/2022 at 11:21 AM    An electronic signature was used to authenticate this note.      Weston Garcia MD MD  9/20/2022  11:21 AM

## 2022-09-22 DIAGNOSIS — S16.1XXA ACUTE STRAIN OF NECK MUSCLE, INITIAL ENCOUNTER: ICD-10-CM

## 2022-09-22 RX ORDER — TIZANIDINE 2 MG/1
TABLET ORAL
Qty: 30 TABLET | Refills: 0 | Status: SHIPPED | OUTPATIENT
Start: 2022-09-22 | End: 2022-10-19

## 2022-10-19 DIAGNOSIS — S16.1XXA ACUTE STRAIN OF NECK MUSCLE, INITIAL ENCOUNTER: ICD-10-CM

## 2022-10-19 RX ORDER — TIZANIDINE 2 MG/1
TABLET ORAL
Qty: 30 TABLET | Refills: 3 | Status: SHIPPED | OUTPATIENT
Start: 2022-10-19 | End: 2022-12-21 | Stop reason: SDUPTHER

## 2022-12-21 DIAGNOSIS — S16.1XXA ACUTE STRAIN OF NECK MUSCLE, INITIAL ENCOUNTER: ICD-10-CM

## 2022-12-21 RX ORDER — TIZANIDINE 2 MG/1
TABLET ORAL
Qty: 30 TABLET | Refills: 3 | Status: SHIPPED | OUTPATIENT
Start: 2022-12-21

## 2023-01-08 PROBLEM — Z17.0 MALIGNANT NEOPLASM OF UPPER-OUTER QUADRANT OF RIGHT BREAST IN FEMALE, ESTROGEN RECEPTOR POSITIVE (HCC): Status: ACTIVE | Noted: 2020-11-17

## 2023-01-23 ENCOUNTER — OFFICE VISIT (OUTPATIENT)
Dept: INTERNAL MEDICINE CLINIC | Age: 78
End: 2023-01-23
Payer: COMMERCIAL

## 2023-01-23 VITALS
SYSTOLIC BLOOD PRESSURE: 118 MMHG | WEIGHT: 210.4 LBS | DIASTOLIC BLOOD PRESSURE: 68 MMHG | OXYGEN SATURATION: 98 % | BODY MASS INDEX: 31.99 KG/M2 | HEART RATE: 70 BPM | RESPIRATION RATE: 14 BRPM

## 2023-01-23 DIAGNOSIS — I10 ESSENTIAL HYPERTENSION: ICD-10-CM

## 2023-01-23 DIAGNOSIS — I48.0 PAROXYSMAL ATRIAL FIBRILLATION (HCC): ICD-10-CM

## 2023-01-23 DIAGNOSIS — Z17.0 MALIGNANT NEOPLASM OF UPPER-OUTER QUADRANT OF RIGHT BREAST IN FEMALE, ESTROGEN RECEPTOR POSITIVE (HCC): ICD-10-CM

## 2023-01-23 DIAGNOSIS — M10.072 ACUTE IDIOPATHIC GOUT INVOLVING TOE OF LEFT FOOT: ICD-10-CM

## 2023-01-23 DIAGNOSIS — Z99.2 DEPENDENCE ON RENAL DIALYSIS (HCC): ICD-10-CM

## 2023-01-23 DIAGNOSIS — E11.21 TYPE 2 DIABETES MELLITUS WITH NEPHROPATHY (HCC): Primary | ICD-10-CM

## 2023-01-23 DIAGNOSIS — I27.20 PULMONARY HYPERTENSION, UNSPECIFIED (HCC): ICD-10-CM

## 2023-01-23 DIAGNOSIS — C50.411 MALIGNANT NEOPLASM OF UPPER-OUTER QUADRANT OF RIGHT BREAST IN FEMALE, ESTROGEN RECEPTOR POSITIVE (HCC): ICD-10-CM

## 2023-01-23 PROCEDURE — 1123F ACP DISCUSS/DSCN MKR DOCD: CPT | Performed by: INTERNAL MEDICINE

## 2023-01-23 PROCEDURE — 99214 OFFICE O/P EST MOD 30 MIN: CPT | Performed by: INTERNAL MEDICINE

## 2023-01-23 PROCEDURE — 3078F DIAST BP <80 MM HG: CPT | Performed by: INTERNAL MEDICINE

## 2023-01-23 PROCEDURE — 3074F SYST BP LT 130 MM HG: CPT | Performed by: INTERNAL MEDICINE

## 2023-01-23 RX ORDER — METOPROLOL TARTRATE 50 MG/1
50 TABLET, FILM COATED ORAL 2 TIMES DAILY
Qty: 180 TABLET | Refills: 3 | Status: SHIPPED | OUTPATIENT
Start: 2023-01-23

## 2023-01-23 RX ORDER — ACETAMINOPHEN 160 MG
TABLET,DISINTEGRATING ORAL
COMMUNITY

## 2023-01-23 RX ORDER — COLCHICINE 0.6 MG/1
0.6 TABLET ORAL 2 TIMES DAILY PRN
Qty: 30 TABLET | Refills: 0 | Status: SHIPPED | OUTPATIENT
Start: 2023-01-23

## 2023-01-23 RX ORDER — IPRATROPIUM BROMIDE 42 UG/1
2 SPRAY, METERED NASAL 4 TIMES DAILY
Qty: 15 ML | Refills: 3 | Status: SHIPPED | OUTPATIENT
Start: 2023-01-23

## 2023-01-23 RX ORDER — GLIMEPIRIDE 1 MG/1
TABLET ORAL
COMMUNITY
Start: 2022-12-24

## 2023-01-23 RX ORDER — CLOPIDOGREL BISULFATE 75 MG/1
75 TABLET ORAL DAILY
Qty: 30 TABLET | Refills: 3 | Status: SHIPPED | OUTPATIENT
Start: 2023-01-23

## 2023-01-23 ASSESSMENT — PATIENT HEALTH QUESTIONNAIRE - PHQ9
SUM OF ALL RESPONSES TO PHQ QUESTIONS 1-9: 0
SUM OF ALL RESPONSES TO PHQ QUESTIONS 1-9: 0
SUM OF ALL RESPONSES TO PHQ9 QUESTIONS 1 & 2: 0
SUM OF ALL RESPONSES TO PHQ QUESTIONS 1-9: 0
1. LITTLE INTEREST OR PLEASURE IN DOING THINGS: 0
2. FEELING DOWN, DEPRESSED OR HOPELESS: 0
SUM OF ALL RESPONSES TO PHQ QUESTIONS 1-9: 0

## 2023-01-23 NOTE — PROGRESS NOTES
Ulysses Linsey  1945 01/23/23    SUBJECTIVE:    Pt continues to have chronic rhinorrhea productive of clear mucous. She uses flonase with little benefit. Gout doing well. She rarely uses colchicine for flare ups of gout. She continues on allopurinol. Pt is doing home peritoneal dialysis nightly. She continues to follow with Dr Marquita Anguiano     Patient compliant with medications for diabetes. Blood sugars have averaged around 137-147, with pt checking blood sugar 1 time daily. Patient has had no episodes of significant hypoglycemia. Sugars have been more labile since she started peritoneal dialysis. A1c was 6.0. She denies on blood in the stool, black stools, palpitation. She continues on eliquis and rhythmol. She will see Dr Yung Torrez Wednesday for the beast cancer. She is on no meds for the breast cancer - she completed letrozole x 5 yrs,   OBJECTIVE:    /68   Pulse 70   Resp 14   Wt 210 lb 6.4 oz (95.4 kg)   LMP  (LMP Unknown)   SpO2 98%   BMI 31.99 kg/m²     Physical Exam  Constitutional:       Appearance: She is well-developed. Eyes:      General: No scleral icterus. Conjunctiva/sclera: Conjunctivae normal.   Neck:      Thyroid: No thyromegaly. Trachea: No tracheal deviation. Cardiovascular:      Rate and Rhythm: Normal rate and regular rhythm. Heart sounds: No murmur heard. No friction rub. No gallop. Pulmonary:      Effort: No respiratory distress. Breath sounds: No wheezing or rales. Abdominal:      General: Bowel sounds are normal. There is no distension. Palpations: Abdomen is soft. There is no hepatomegaly or mass. Tenderness: There is no abdominal tenderness. There is no guarding or rebound. Musculoskeletal:      Cervical back: Neck supple. Lymphadenopathy:      Cervical: No cervical adenopathy. Skin:     Nails: There is no clubbing. Neurological:      Mental Status: She is alert and oriented to person, place, and time.    Psychiatric: Behavior: Behavior normal.         Judgment: Judgment normal.       ASSESSMENT:    1. Type 2 diabetes mellitus with nephropathy ([address]    2. Essential hypertension    3. Acute idiopathic gout involving toe of left foot    4. Dependence on renal dialysis ([address]    5. Pulmonary hypertension, unspecified ([address]    6. Paroxysmal atrial fibrillation ([address]    7. Malignant neoplasm of upper-outer quadrant of right breast in female, estrogen receptor positive ([address]        PLAN:    Chelsie Blevins was seen today for 6 month follow-up. Diagnoses and all orders for this visit:    Type 2 diabetes mellitus with nephropathy ([address] - last a1c 6.0; no change in mgmt    Essential hypertension -at goal; no change  -     metoprolol tartrate (LOPRESSOR) 50 MG tablet; Take 1 tablet by mouth 2 times daily take 1 tablet by mouth twice a day    Acute idiopathic gout involving toe of left foot - cont rare colchicine  -     colchicine (COLCRYS) 0.6 MG tablet; Take 1 tablet by mouth 2 times daily as needed for Pain (gout)    Dependence on renal dialysis [facility]) - doing very well with PD: no change    Pulmonary hypertension, unspecified ([address] - on demedex; has mild pulm HTN based on echo 7/22    Paroxysmal atrial fibrillation (HCC) - in NSR, cont meds    Malignant neoplasm of upper-outer quadrant of right breast in female, estrogen receptor positive (HCC) - no change in mgmt    Other orders - Tx what sounds like vasomotor rhinitis with ipratropium   -     ipratropium (ATROVENT) 0.06 % nasal spray; 2 sprays by Each Nostril route 4 times daily  -     clopidogrel (PLAVIX) 75 MG tablet;  Take 1 tablet by mouth daily

## 2023-02-01 ENCOUNTER — OFFICE VISIT (OUTPATIENT)
Dept: CARDIOLOGY CLINIC | Age: 78
End: 2023-02-01

## 2023-02-01 VITALS
WEIGHT: 214.4 LBS | DIASTOLIC BLOOD PRESSURE: 74 MMHG | BODY MASS INDEX: 32.49 KG/M2 | HEIGHT: 68 IN | SYSTOLIC BLOOD PRESSURE: 130 MMHG | HEART RATE: 66 BPM

## 2023-02-01 DIAGNOSIS — I48.0 PAROXYSMAL ATRIAL FIBRILLATION (HCC): Primary | ICD-10-CM

## 2023-02-01 NOTE — PROGRESS NOTES
Ross Amaral  is a  Established patient  ,68 y.o.   female here for evaluation of the following chief complaint(s):    Here for fu       SUBJECTIVE/OBJECTIVE:  HPI : h/o  Htn, hyperlipidimea, dm, paf now here for follow-up   Vitals:    02/01/23 1506   BP: 130/74   Pulse: 66   Weight: 214 lb 6.4 oz (97.3 kg)   Height: 5' 8\" (1.727 m)             /74   Pulse 66   Ht 5' 8\" (1.727 m)   Wt 214 lb 6.4 oz (97.3 kg)   LMP  (LMP Unknown)   BMI 32.60 kg/m²   Patient-Reported Vitals 12/10/2020   Patient-Reported Weight 194   Patient-Reported Height -   Patient-Reported Systolic 456   Patient-Reported Diastolic 70   Patient-Reported Pulse 67   Patient-Reported SpO2 -     Wt Readings from Last 3 Encounters:   02/01/23 214 lb 6.4 oz (97.3 kg)   01/23/23 210 lb 6.4 oz (95.4 kg)   09/16/22 199 lb 3.2 oz (90.4 kg)     Body mass index is 32.6 kg/m². Physical Exam     Neck: JVD  no    Lungs : clear    Cardio : Si and S2 audilble      Ext: edema      All pertinent data reviewed  y    Meds : reviewed   y      Tests ordered    y    ASSESSMENT/PLAN:    - HFpEF we will check an echo today to see if she has HFpEF  We will also check Lexiscan to evaluate for coronary artery disease  Patient stress test was normal however the patient continues to be symptomatic and so will proceed with a cardiac catheterization I have discussed this in detail with Dr. Cesar Oh cath results are as follows   Procedure Summary   LM NORMAL   50% MID LAD STENOSIS CALCIFIED ARTERY   NORMAL IFR . 95   CX MILD DISEASE   RCA MILD DISEAE   MILD PUL HTN      MEDICAL THERAPY      Recommendations   MEDICAL THERAPY   CHECK PULM ETIOLOGY      Signatures      --------------------------------------------------------   Electronically signed by Elsy Oliveros MD   (Performing Physician) on 08/26/2022 at 19:25   --------------------------------------------------------    - Atrial fibrillation, pt is  compliant with meds.  Patient does not have symptoms from atrial fibrillation  Has aflutter on Eliquis and Rythmol compliant with meds    -  Hypertension: Patients blood pressure is normal. Patient is advised about low sodium diet. Present medical regimen will not be changed. On lisinopril compliant we will continue         - anemia  from CKD on procrit and iron  Labs monitored at dialysis center        -  LIPID MANAGEMENT:  Importance of lipid levels discussed with patient   and patient was given dietary advice. NCEP- ATP III guidelines reviewed with patient. -   Changes  in medicines made: No     On lipitor compliant we will check the lipid profile                           -   DIABETES MELLITUS: Available pertinent lab data reviewed   and  patient was given dietary advice . Advised to check blood glucose level on a regular basis. -   Changes  in medicines made: No  Per PCP           -Chronic kidney disease on peritoneal dialysis since May  Felt better initially however now she is feeling worse again         An electronic signature was used to authenticate this note.     --Cecile Wadsworth MD

## 2023-02-04 NOTE — PROGRESS NOTES
Patient Name: Kel Webster  Patient : 1945  Patient MRN: [id number]     Primary Oncologist: Panchito Campbell MD  Referring Provider: Tyrel Dowling MD     Date of Service: 2/10/2023      Chief Complaint:   Chief Complaint   Patient presents with    Follow-up     Patient Active Problem List:     Osteoarthritis     ADOLFO (obstructive sleep apnea)     Menopause     Paroxysmal atrial fibrillation (HCC)     Osteopenia     Colon polyps     Bilateral leg edema     Urge incontinence     Breast cancer ([address]     Edema of both legs     Mixed hyperlipidemia     Iron deficiency anemia     Gastroesophageal reflux disease without esophagitis     Essential hypertension     Abnormal EKG     Type 2 diabetes mellitus with nephropathy (HCC)     Chronic fatigue     Dizzy spells     Carpal tunnel syndrome on left     Trigger finger, left ring finger     Chest pain     Acute blood loss anemia     Obesity (BMI 30-39. 9)     Excessive daytime sleepiness     Ex-cigarette smoker     Malignant neoplasm of upper-outer quadrant of right female breast ([address]     Intestinal malabsorption     Other acute kidney failure ([address]     Acquired cyst of kidney    HPI:   Ms. Edwardo Arevalo is a 68-year-old very pleasant woman with a past medical history significant for hypertension, diabetes, hyperlipidemia, history of SVT and atrial fibrillation, initially referred to me on 10/2012 for evaluation of right breast cancer. She stated that she felt a lump in the right breast and had mammogram on 2012. The patient was found to have a suspicious lesion in the right breast.  She subsequently had a sonogram-guided biopsy of the right breast.  Pathology report is consistent with invasive ductal carcinoma, ER/RI positive, HER-2/nitish negative. The patient was evaluated by a general surgeon, Dr. Derrick Sterling, and had a right lumpectomy on 2012. OncotypeDx was sent and she is low risk for recurrence (Recurrence score 11).  She then underwent for adjuvant radiation therapy and it was finished on March 4, 2013. She started adjuvant hormonal therapy with Femara on March 5, 2013 and she completed it on March 5, 2018. She had colonoscopy on 12/6/18 and it showed 5 mm polyp in distal sigmoid colon, diverticulosis and hemorrhoids. Final pathology from sigmoid polyp was tubular adenoma. Mammogram done on 8/10/2022 showed stable right breast mammogram. Radiologist recommend to continue follow up right breast calcifications in 6 months. On February 10, 2023, she presented to me for followup. I have been following Ms. 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 She has been followed periodically since then. She does not have any sign or symptom suggestive for recurrent or metastatic breast cancer on today visit. Reviewed findings of mammogram done on 8/10/2022 and I recommended to have repeat diagnostic right mammogram in next few weeks. I will follow up with the results. She also has an iron deficiency anemia and I believe it is secondary to occult gastrointestinal bleeding from the antiplatelet agents and anticoagulation therapy. She is currently on eliquis 2.5 mg BID and plavix. She is receiving iron and epogen with HD.     ESRD - required HD since 5/2022 and I recommend to follow up with Dr. Munira Lindsay regularly. Diabetes mellitus - on metformin. Recommend to follow up with her PCP. Educate her about diet and exercise. She does not have any significant symptoms at today visit. PAST MEDICAL HISTORY:  Past medical history is significant for the following. 1. Hypertension.,  2. Diabetes. ,  3. Hyperlipidemia.,  4. Atrial fibrillation. ,  5. History of SVT.,  6. Chronic arthritis. PAST SURGICAL HISTORY:  Past surgical history is significant for the following. 1. Hysterectomy. ,  2. Previous left and right breast biopsy. FAMILY HISTORY:  Family history is significant for lung cancer in her brother and father. The patients father has heart disease and mother has diabetes. SOCIAL HISTORY:  She is a former smoker; however, she quit smoking in the 1980s. She denies alcohol drinking or illicit drug abuse. She is a  and has three children. She is a retiree. Oncology History    No history exists. Review of Systems: \"Per interval history; otherwise 10 point ROS is negative. \"  Her energy level is fair and her sleep is fine. She denies fever, chills, night sweats, cough, shortness of breath, chest pain, hemoptysis or palpitations. Her bowel and bladder functions are normal. She doesn't have nausea, vomiting, abdominal pain, diarrhea, constipation, dysuria, loss of appetite or weight loss. She denies neuropathy and she doesn't have bleeding or clotting issues. She denies any pain in her body. No anxiety or depression. The rest of the systems are unremarkable.      Vital Signs:  BP (!) 113/55 (Site: Right Upper Arm, Position: Sitting, Cuff Size: Large Adult)   Pulse 72   Temp 98 °F (36.7 °C) (Temporal)   Ht 5' 8\" (1.727 m)   Wt 211 lb (95.7 kg)   LMP  (LMP Unknown)   SpO2 96%   BMI 32.08 kg/m²     Physical Exam:  CONSTITUTIONAL: alert, cooperative, awake, no apparent distress   EYES: pupils equal, round and reactive to light, sclera clear and conjunctiva normal  ENT: Normocephalic, without obvious abnormality, atraumatic  NECK: supple, symmetrical, no jugular venous distension and no carotid bruits   HEMATOLOGIC/LYMPHATIC: no cervical, supraclavicular or axillary lymphadenopathy   LUNGS: VBS, no wheezes, clear to auscultation, no crackles, no increased work of breathing, no rhonchi,   CARDIOVASCULAR: regular rate and rhythm, normal S1 and S2, no murmur noted  ABDOMEN: normal bowel sounds x 4, soft, non-distended, non-tender, no masses palpated, no hepatosplenomegaly   MUSCULOSKELETAL: full range of motion noted, tone is normal  NEUROLOGIC: awake, alert, oriented to name, place and time. Motor skills grossly intact. SKIN: Normal skin color, texture, turgor and no jaundice. appears intact   EXTREMITIES: no LE edema, no clubbing, no leg swelling, no cyanosis,   BREASTS: Post surgical and radiation therapy changes noted in her right breast. No palpable mass in bilateral breasts and axilla.       Labs:  Hematology:  Lab Results   Component Value Date    WBC 7.3 08/23/2022    RBC 4.47 08/23/2022    HGB 11.4 (L) 08/23/2022    HCT 40.9 08/23/2022    MCV 91.5 08/23/2022    MCH 25.5 (L) 08/23/2022    MCHC 27.9 (L) 08/23/2022    RDW 17.5 (H) 08/23/2022     08/23/2022    MPV 11.0 08/23/2022    SEGSPCT 67.6 (H) 03/21/2022    EOSRELPCT 3.4 (H) 03/21/2022    BASOPCT 0.6 03/21/2022    LYMPHOPCT 19.6 (L) 03/21/2022    MONOPCT 8.2 (H) 03/21/2022    SEGSABS 4.6 03/21/2022    EOSABS 0.2 03/21/2022    BASOSABS 0.0 03/21/2022    LYMPHSABS 1.3 03/21/2022    MONOSABS 0.6 03/21/2022    DIFFTYPE AUTOMATED DIFFERENTIAL 03/21/2022     Lab Results   Component Value Date    ESR 47 (H) 01/13/2022     Chemistry:  Lab Results   Component Value Date     08/23/2022    K 4.5 08/23/2022     08/23/2022    CO2 25 08/23/2022    BUN 46 (H) 08/23/2022    CREATININE 5.3 (H) 08/23/2022    GLUCOSE 79 08/23/2022    CALCIUM 8.4 08/23/2022    PROT 6.7 03/21/2022    LABALBU 4.1 03/21/2022    BILITOT 0.2 03/21/2022    ALKPHOS 86 03/21/2022    AST 25 03/21/2022    ALT 23 03/21/2022    LABGLOM 8 (L) 08/23/2022    GFRAA 9 (L) 08/23/2022    AGRATIO 1.2 09/28/2021    GLOB 3.4 09/28/2021    PHOS 4.2 02/12/2022    MG 2.2 05/08/2021    POCCA 1.10 (L) 01/23/2017    POCGLU 114 (H) 08/26/2022     Lab Results   Component Value Date     (H) 01/13/2022     No components found for: LD  Lab Results   Component Value Date    TSHHS 1.010 05/08/2021    T4FREE 1.33 11/30/2017    FT3 2.4 04/01/2014     Immunology:  Lab Results   Component Value Date    PROT 6.7 03/21/2022    ALBUMINELP 3.5 11/30/2017    LABALPH 0.3 11/30/2017    LABALPH 3.7 11/30/2017    LABBETA 1.2 11/30/2017    GAMGLOB 1.0 11/30/2017     No results found for: CAROLINE EcholsLCR  No results found for: B2M  Coagulation Panel:  Lab Results   Component Value Date    PROTIME 15.0 (H) 05/08/2021    INR 1.24 05/08/2021    APTT 63.9 (H) 05/08/2021     Anemia Panel:  Lab Results   Component Value Date    UEDFBROQ02 866.3 01/13/2022    FOLATE 16.8 01/13/2022     Tumor Markers:  Lab Results   Component Value Date    CEA <0.5 04/01/2014    LABCA2 54.8 (H) 01/13/2022     Observations:  PHQ-9 Total Score: 0 (2/10/2023 10:49 AM)     Assessment:   Stage IA right breast invasive ductal carcinoma  Mild iron deficiency anemia     Plan:   Ms. 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 Mammogram done on 8/10/2022 showed stable right breast mammogram. Radiologist recommend to continue follow up right breast calcifications in 6 months. On February 10, 2023, she presented to me for followup. I have been following Ms. 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 She has been followed periodically since then. She does not have any sign or symptom suggestive for recurrent or metastatic breast cancer on today visit. Reviewed findings of mammogram done on 8/10/2022 and I recommended to have repeat diagnostic right mammogram in next few weeks. I will follow up with the results. She also has an iron deficiency anemia and I believe it is secondary to occult gastrointestinal bleeding from the antiplatelet agents and anticoagulation therapy. She is currently on eliquis 2.5 mg BID and plavix.  She is receiving iron and epogen with HD.     ESRD - required HD since 5/2022 and I recommend to follow up with Dr. Abby Baez regularly. Diabetes mellitus - on metformin. Recommend to follow up with her PCP. Educate her about diet and exercise. I answered all her questions and concerns for today. Recent imaging and labs were reviewed and discussed with the patient.

## 2023-02-10 ENCOUNTER — HOSPITAL ENCOUNTER (OUTPATIENT)
Dept: INFUSION THERAPY | Age: 78
Discharge: HOME | End: 2023-02-10
Payer: COMMERCIAL

## 2023-02-10 ENCOUNTER — OFFICE VISIT (OUTPATIENT)
Dept: ONCOLOGY | Age: 78
End: 2023-02-10
Payer: COMMERCIAL

## 2023-02-10 VITALS
HEART RATE: 72 BPM | SYSTOLIC BLOOD PRESSURE: 113 MMHG | BODY MASS INDEX: 31.98 KG/M2 | WEIGHT: 211 LBS | DIASTOLIC BLOOD PRESSURE: 55 MMHG | OXYGEN SATURATION: 96 % | TEMPERATURE: 98 F | HEIGHT: 68 IN

## 2023-02-10 DIAGNOSIS — Z17.0 MALIGNANT NEOPLASM OF UPPER-OUTER QUADRANT OF RIGHT BREAST IN FEMALE, ESTROGEN RECEPTOR POSITIVE (HCC): Primary | ICD-10-CM

## 2023-02-10 DIAGNOSIS — C50.411 MALIGNANT NEOPLASM OF UPPER-OUTER QUADRANT OF RIGHT BREAST IN FEMALE, ESTROGEN RECEPTOR POSITIVE (HCC): Primary | ICD-10-CM

## 2023-02-10 PROCEDURE — 99211 OFF/OP EST MAY X REQ PHY/QHP: CPT

## 2023-02-10 PROCEDURE — 1123F ACP DISCUSS/DSCN MKR DOCD: CPT | Performed by: INTERNAL MEDICINE

## 2023-02-10 PROCEDURE — 99213 OFFICE O/P EST LOW 20 MIN: CPT | Performed by: INTERNAL MEDICINE

## 2023-02-10 RX ORDER — NEOMYCIN SULFATE, POLYMYXIN B SULFATE AND DEXAMETHASONE 3.5; 10000; 1 MG/ML; [USP'U]/ML; MG/ML
SUSPENSION/ DROPS OPHTHALMIC
COMMUNITY
Start: 2023-02-08

## 2023-02-10 ASSESSMENT — PATIENT HEALTH QUESTIONNAIRE - PHQ9
2. FEELING DOWN, DEPRESSED OR HOPELESS: 0
SUM OF ALL RESPONSES TO PHQ QUESTIONS 1-9: 0
SUM OF ALL RESPONSES TO PHQ9 QUESTIONS 1 & 2: 0
SUM OF ALL RESPONSES TO PHQ QUESTIONS 1-9: 0
1. LITTLE INTEREST OR PLEASURE IN DOING THINGS: 0

## 2023-02-10 NOTE — PROGRESS NOTES
MA Rooming Questions  Patient: Radha Nagy  MRN: [id number]    Date: 2/10/2023        1. Do you have any new issues?   no         2. Do you need any refills on medications?    no    3. Have you had any imaging done since your last visit?   no    4. Have you been hospitalized or seen in the emergency room since your last visit here?   no    5. Did the patient have a depression screening completed today?  Yes    PHQ-9 Total Score: 0 (2/10/2023 10:49 AM)       PHQ-9 Given to (if applicable):               PHQ-9 Score (if applicable):                     [] Positive     []  Negative              Does question #9 need addressed (if applicable)                     [] Yes    []  No               Javier Stephenson CMA

## 2023-02-14 ENCOUNTER — TELEPHONE (OUTPATIENT)
Dept: ONCOLOGY | Age: 78
End: 2023-02-14

## 2023-02-14 NOTE — TELEPHONE ENCOUNTER
2/14/23 - left pt vm for 3/2/23 Mammo and US at [facility] arrival time of 2:00.  No deodorants/perfumes day of the test.

## 2023-02-17 DIAGNOSIS — Z17.0 MALIGNANT NEOPLASM OF UPPER-OUTER QUADRANT OF RIGHT BREAST IN FEMALE, ESTROGEN RECEPTOR POSITIVE (HCC): Primary | ICD-10-CM

## 2023-02-17 DIAGNOSIS — C50.411 MALIGNANT NEOPLASM OF UPPER-OUTER QUADRANT OF RIGHT BREAST IN FEMALE, ESTROGEN RECEPTOR POSITIVE (HCC): Primary | ICD-10-CM

## 2023-03-02 ENCOUNTER — HOSPITAL ENCOUNTER (OUTPATIENT)
Dept: ULTRASOUND IMAGING | Age: 78
End: 2023-03-02
Payer: COMMERCIAL

## 2023-03-02 ENCOUNTER — HOSPITAL ENCOUNTER (OUTPATIENT)
Dept: WOMENS IMAGING | Age: 78
Discharge: HOME | End: 2023-03-02
Payer: COMMERCIAL

## 2023-03-02 DIAGNOSIS — C50.411 MALIGNANT NEOPLASM OF UPPER-OUTER QUADRANT OF RIGHT BREAST IN FEMALE, ESTROGEN RECEPTOR POSITIVE (HCC): ICD-10-CM

## 2023-03-02 DIAGNOSIS — Z17.0 MALIGNANT NEOPLASM OF UPPER-OUTER QUADRANT OF RIGHT BREAST IN FEMALE, ESTROGEN RECEPTOR POSITIVE (HCC): ICD-10-CM

## 2023-03-02 PROCEDURE — G0279 TOMOSYNTHESIS, MAMMO: HCPCS

## 2023-03-27 RX ORDER — DILTIAZEM HYDROCHLORIDE 120 MG/1
CAPSULE, EXTENDED RELEASE ORAL
Qty: 90 CAPSULE | Refills: 3 | Status: SHIPPED | OUTPATIENT
Start: 2023-03-27

## 2023-04-24 ENCOUNTER — OFFICE VISIT (OUTPATIENT)
Dept: INTERNAL MEDICINE CLINIC | Age: 78
End: 2023-04-24
Payer: COMMERCIAL

## 2023-04-24 VITALS
OXYGEN SATURATION: 99 % | RESPIRATION RATE: 16 BRPM | WEIGHT: 212 LBS | DIASTOLIC BLOOD PRESSURE: 72 MMHG | SYSTOLIC BLOOD PRESSURE: 134 MMHG | BODY MASS INDEX: 32.23 KG/M2 | HEART RATE: 72 BPM

## 2023-04-24 DIAGNOSIS — E78.2 MIXED HYPERLIPIDEMIA: ICD-10-CM

## 2023-04-24 DIAGNOSIS — R06.02 SOB (SHORTNESS OF BREATH): Primary | ICD-10-CM

## 2023-04-24 DIAGNOSIS — N28.89 HYPERTENSION SECONDARY TO OTHER RENAL DISORDERS: ICD-10-CM

## 2023-04-24 DIAGNOSIS — I15.1 HYPERTENSION SECONDARY TO OTHER RENAL DISORDERS: ICD-10-CM

## 2023-04-24 DIAGNOSIS — N18.5 CHRONIC KIDNEY DISEASE, STAGE V (HCC): ICD-10-CM

## 2023-04-24 DIAGNOSIS — E11.21 TYPE 2 DIABETES MELLITUS WITH NEPHROPATHY (HCC): ICD-10-CM

## 2023-04-24 PROCEDURE — 1123F ACP DISCUSS/DSCN MKR DOCD: CPT | Performed by: INTERNAL MEDICINE

## 2023-04-24 PROCEDURE — 99214 OFFICE O/P EST MOD 30 MIN: CPT | Performed by: INTERNAL MEDICINE

## 2023-04-24 RX ORDER — DILTIAZEM HYDROCHLORIDE 120 MG/1
120 CAPSULE, COATED, EXTENDED RELEASE ORAL DAILY
Qty: 90 CAPSULE | Refills: 3 | Status: SHIPPED | OUTPATIENT
Start: 2023-04-24

## 2023-04-24 NOTE — PROGRESS NOTES
Yonis Rubio  1945 04/24/23    SUBJECTIVE:      Pt complains of SOB. This can occur with as little activity as walking from her bedroom to her kitchen. This has been present since she has been on PD. She has had rare palpitations. She did have swelling but this resolved with a change in her dialysate. She continues to make urine, but less volume. She denies CP, blood in the stool, black stools, wheezing. Cath 8/22 showed 50% mid LAD in calcified artery, with normal IFR, mild pulm HTN. Patient compliant with medications for diabetes. Blood sugars have averaged around 130, with pt checking blood sugar 1 time daily. Patient has had no episodes of significant hypoglycemia. Sugars were higher when she had more dextrose in the dialysate. Patient denies any chest pain,myalgias, Patient is tolerating cholesterol medications without difficulty. OBJECTIVE:    /72   Pulse 72   Resp 16   Wt 212 lb (96.2 kg)   LMP  (LMP Unknown)   SpO2 99%   BMI 32.23 kg/m²     Physical Exam  Constitutional:       Appearance: She is well-developed. Eyes:      General: No scleral icterus. Conjunctiva/sclera: Conjunctivae normal.   Neck:      Thyroid: No thyromegaly. Trachea: No tracheal deviation. Cardiovascular:      Rate and Rhythm: Normal rate and regular rhythm. Heart sounds: No murmur heard. No friction rub. No gallop. Pulmonary:      Effort: No respiratory distress. Breath sounds: No wheezing or rales. Abdominal:      General: Bowel sounds are normal. There is no distension. Palpations: Abdomen is soft. There is no hepatomegaly or mass. Tenderness: There is no abdominal tenderness. There is no guarding or rebound. Musculoskeletal:      Cervical back: Neck supple. Lymphadenopathy:      Cervical: No cervical adenopathy. Skin:     Nails: There is no clubbing. Neurological:      Mental Status: She is alert and oriented to person, place, and time.    Psychiatric:

## 2023-05-02 ENCOUNTER — HOSPITAL ENCOUNTER (OUTPATIENT)
Dept: GENERAL RADIOLOGY | Age: 78
Discharge: HOME | End: 2023-05-02
Payer: COMMERCIAL

## 2023-05-02 ENCOUNTER — HOSPITAL ENCOUNTER (OUTPATIENT)
Age: 78
Discharge: HOME | End: 2023-05-02
Payer: COMMERCIAL

## 2023-05-02 DIAGNOSIS — R06.02 SOB (SHORTNESS OF BREATH): ICD-10-CM

## 2023-05-02 DIAGNOSIS — N28.89 HYPERTENSION SECONDARY TO OTHER RENAL DISORDERS: ICD-10-CM

## 2023-05-02 DIAGNOSIS — E11.21 TYPE 2 DIABETES MELLITUS WITH NEPHROPATHY (HCC): ICD-10-CM

## 2023-05-02 DIAGNOSIS — E78.2 MIXED HYPERLIPIDEMIA: ICD-10-CM

## 2023-05-02 DIAGNOSIS — I15.1 HYPERTENSION SECONDARY TO OTHER RENAL DISORDERS: ICD-10-CM

## 2023-05-02 LAB
ALBUMIN SERPL-MCNC: 3.8 GM/DL (ref 3.4–5)
ALP BLD-CCNC: 115 IU/L (ref 40–128)
ALT SERPL-CCNC: 22 U/L (ref 10–40)
ANION GAP SERPL CALCULATED.3IONS-SCNC: 18 MMOL/L (ref 4–16)
AST SERPL-CCNC: 14 IU/L (ref 15–37)
BILIRUB SERPL-MCNC: 0.2 MG/DL (ref 0–1)
BUN SERPL-MCNC: 74 MG/DL (ref 6–23)
CALCIUM SERPL-MCNC: 8.4 MG/DL (ref 8.3–10.6)
CHLORIDE BLD-SCNC: 96 MMOL/L (ref 99–110)
CHOLEST SERPL-MCNC: 197 MG/DL
CO2: 25 MMOL/L (ref 21–32)
CREAT SERPL-MCNC: 8.1 MG/DL (ref 0.6–1.1)
ESTIMATED AVERAGE GLUCOSE: 197 MG/DL
GFR SERPL CREATININE-BSD FRML MDRD: 5 ML/MIN/1.73M2
GLUCOSE SERPL-MCNC: 142 MG/DL (ref 70–99)
HBA1C MFR BLD: 8.5 % (ref 4.2–6.3)
HCT VFR BLD CALC: 34 % (ref 37–47)
HDLC SERPL-MCNC: 39 MG/DL
HEMOGLOBIN: 10.1 GM/DL (ref 12.5–16)
LDLC SERPL CALC-MCNC: 117 MG/DL
MCH RBC QN AUTO: 27.3 PG (ref 27–31)
MCHC RBC AUTO-ENTMCNC: 29.7 % (ref 32–36)
MCV RBC AUTO: 91.9 FL (ref 78–100)
PDW BLD-RTO: 14.2 % (ref 11.7–14.9)
PLATELET # BLD: 260 K/CU MM (ref 140–440)
PMV BLD AUTO: 10.3 FL (ref 7.5–11.1)
POTASSIUM SERPL-SCNC: 3.7 MMOL/L (ref 3.5–5.1)
PRO-BNP: 6921 PG/ML
RBC # BLD: 3.7 M/CU MM (ref 4.2–5.4)
SODIUM BLD-SCNC: 139 MMOL/L (ref 135–145)
TOTAL PROTEIN: 6.3 GM/DL (ref 6.4–8.2)
TRIGL SERPL-MCNC: 204 MG/DL
WBC # BLD: 6.9 K/CU MM (ref 4–10.5)

## 2023-05-02 PROCEDURE — 71046 X-RAY EXAM CHEST 2 VIEWS: CPT

## 2023-05-02 PROCEDURE — 83036 HEMOGLOBIN GLYCOSYLATED A1C: CPT

## 2023-05-02 PROCEDURE — 80061 LIPID PANEL: CPT

## 2023-05-02 PROCEDURE — 83880 ASSAY OF NATRIURETIC PEPTIDE: CPT

## 2023-05-02 PROCEDURE — 85027 COMPLETE CBC AUTOMATED: CPT

## 2023-05-02 PROCEDURE — 36415 COLL VENOUS BLD VENIPUNCTURE: CPT

## 2023-05-02 PROCEDURE — 80053 COMPREHEN METABOLIC PANEL: CPT

## 2023-05-08 RX ORDER — PROPAFENONE HYDROCHLORIDE 150 MG/1
150 TABLET, COATED ORAL EVERY 8 HOURS
Qty: 90 TABLET | Refills: 5 | Status: SHIPPED | OUTPATIENT
Start: 2023-05-08

## 2023-05-09 ENCOUNTER — OFFICE VISIT (OUTPATIENT)
Dept: INTERNAL MEDICINE CLINIC | Age: 78
End: 2023-05-09
Payer: COMMERCIAL

## 2023-05-09 VITALS
BODY MASS INDEX: 32.05 KG/M2 | OXYGEN SATURATION: 98 % | WEIGHT: 210.8 LBS | HEART RATE: 92 BPM | DIASTOLIC BLOOD PRESSURE: 72 MMHG | RESPIRATION RATE: 18 BRPM | SYSTOLIC BLOOD PRESSURE: 130 MMHG

## 2023-05-09 DIAGNOSIS — N18.5 CHRONIC KIDNEY DISEASE, STAGE V (HCC): Primary | ICD-10-CM

## 2023-05-09 DIAGNOSIS — E11.21 TYPE 2 DIABETES MELLITUS WITH NEPHROPATHY (HCC): ICD-10-CM

## 2023-05-09 PROCEDURE — 99214 OFFICE O/P EST MOD 30 MIN: CPT | Performed by: INTERNAL MEDICINE

## 2023-05-09 PROCEDURE — 3052F HG A1C>EQUAL 8.0%<EQUAL 9.0%: CPT | Performed by: INTERNAL MEDICINE

## 2023-05-09 PROCEDURE — 1123F ACP DISCUSS/DSCN MKR DOCD: CPT | Performed by: INTERNAL MEDICINE

## 2023-05-09 NOTE — PROGRESS NOTES
Melanie Godfrey  1945 05/09/23    SUBJECTIVE:    Pt continues to have fatigue and SOB. She had an elevated BNP, but increase in the demedex did not cause any increased urine output. She will see Dr Rowan Kim next week. She feels that the increase in the demedex did not help or worsen her SOB. Sugars have been 130-200    OBJECTIVE:    /72   Pulse 92   Resp 18   Wt 210 lb 12.8 oz (95.6 kg)   LMP  (LMP Unknown)   SpO2 98%   BMI 32.05 kg/m²     Physical Exam    ASSESSMENT:    1. Chronic kidney disease, stage V ([address]    2. Type 2 diabetes mellitus with nephropathy (HCC)        PLAN:    Deidre Reed was seen today for follow-up, shortness of breath and chest pain. Diagnoses and all orders for this visit:    Chronic kidney disease, stage V ([address] - I think pt is fluid overloaded. She did not respond to diuretics.  I have spoken with Dr Rowan Kim, and he will adjust her dialystae to help remove more fluid and decrease sugars    Type 2 diabetes mellitus with nephropathy ([address] - will not adjust meds (including adding insulin) now

## 2023-05-23 ENCOUNTER — OFFICE VISIT (OUTPATIENT)
Dept: INTERNAL MEDICINE CLINIC | Age: 78
End: 2023-05-23
Payer: COMMERCIAL

## 2023-05-23 VITALS
SYSTOLIC BLOOD PRESSURE: 138 MMHG | DIASTOLIC BLOOD PRESSURE: 76 MMHG | WEIGHT: 213 LBS | HEART RATE: 72 BPM | BODY MASS INDEX: 32.39 KG/M2 | RESPIRATION RATE: 14 BRPM | OXYGEN SATURATION: 96 %

## 2023-05-23 DIAGNOSIS — E11.21 TYPE 2 DIABETES MELLITUS WITH NEPHROPATHY (HCC): ICD-10-CM

## 2023-05-23 DIAGNOSIS — N18.5 CHRONIC KIDNEY DISEASE, STAGE V (HCC): Primary | ICD-10-CM

## 2023-05-23 PROCEDURE — 99213 OFFICE O/P EST LOW 20 MIN: CPT | Performed by: INTERNAL MEDICINE

## 2023-05-23 PROCEDURE — 3052F HG A1C>EQUAL 8.0%<EQUAL 9.0%: CPT | Performed by: INTERNAL MEDICINE

## 2023-05-23 PROCEDURE — 1123F ACP DISCUSS/DSCN MKR DOCD: CPT | Performed by: INTERNAL MEDICINE

## 2023-05-23 NOTE — PROGRESS NOTES
Cesilia Rizvi  1945 05/23/23    SUBJECTIVE:      Pt was seen by Dr Munira Lindsay. She will be started on Farziga; diuretics were increased. Breathing has improved. Blood sugars ~150 consistently. Dialysate was changed to increase fluid removal. She was started on erythropoeitin. OBJECTIVE:    /76   Pulse 72   Resp 14   Wt 213 lb (96.6 kg)   LMP  (LMP Unknown)   SpO2 96%   BMI 32.39 kg/m²     Physical Exam    ASSESSMENT:    1. Chronic kidney disease, stage V ([address]    2. Type 2 diabetes mellitus with nephropathy (HCC)        PLAN:    [facility] was seen today for 2 week follow-up. Diagnoses and all orders for this visit:    Chronic kidney disease, stage V ([address] - adjustment to dialysate and diuretics improving pts symptoms significantly.  No change in mgmt    Type 2 diabetes mellitus with nephropathy ([address] - await farxiga

## 2023-05-26 RX ORDER — CLOPIDOGREL BISULFATE 75 MG/1
TABLET ORAL
Qty: 30 TABLET | Refills: 3 | Status: SHIPPED | OUTPATIENT
Start: 2023-05-26

## 2023-07-01 ENCOUNTER — HOSPITAL ENCOUNTER (INPATIENT)
Age: 78
LOS: 2 days | Discharge: HOME HEALTH | DRG: 682 | End: 2023-07-03
Attending: INTERNAL MEDICINE | Admitting: INTERNAL MEDICINE
Payer: COMMERCIAL

## 2023-07-01 ENCOUNTER — APPOINTMENT (OUTPATIENT)
Dept: CT IMAGING | Age: 78
DRG: 682 | End: 2023-07-01
Payer: COMMERCIAL

## 2023-07-01 ENCOUNTER — APPOINTMENT (OUTPATIENT)
Dept: GENERAL RADIOLOGY | Age: 78
DRG: 682 | End: 2023-07-01
Payer: COMMERCIAL

## 2023-07-01 DIAGNOSIS — R94.31 PROLONGED Q-T INTERVAL ON ECG: ICD-10-CM

## 2023-07-01 DIAGNOSIS — R77.8 ELEVATED TROPONIN: ICD-10-CM

## 2023-07-01 DIAGNOSIS — R06.89 DYSPNEA AND RESPIRATORY ABNORMALITIES: Primary | ICD-10-CM

## 2023-07-01 DIAGNOSIS — R06.00 DYSPNEA AND RESPIRATORY ABNORMALITIES: Primary | ICD-10-CM

## 2023-07-01 PROBLEM — N18.6 ESRD (END STAGE RENAL DISEASE) (HCC): Status: ACTIVE | Noted: 2023-07-01

## 2023-07-01 LAB
ALBUMIN SERPL-MCNC: 3.6 GM/DL (ref 3.4–5)
ALP BLD-CCNC: 99 IU/L (ref 40–129)
ALT SERPL-CCNC: 19 U/L (ref 10–40)
ANION GAP SERPL CALCULATED.3IONS-SCNC: 19 MMOL/L (ref 4–16)
AST SERPL-CCNC: 13 IU/L (ref 15–37)
BASOPHILS ABSOLUTE: 0.1 K/CU MM
BASOPHILS RELATIVE PERCENT: 0.4 % (ref 0–1)
BILIRUB SERPL-MCNC: 0.3 MG/DL (ref 0–1)
BUN SERPL-MCNC: 46 MG/DL (ref 6–23)
CALCIUM SERPL-MCNC: 9 MG/DL (ref 8.3–10.6)
CHLORIDE BLD-SCNC: 94 MMOL/L (ref 99–110)
CO2: 24 MMOL/L (ref 21–32)
CREAT SERPL-MCNC: 11.9 MG/DL (ref 0.6–1.1)
DIFFERENTIAL TYPE: ABNORMAL
EOSINOPHILS ABSOLUTE: 0.2 K/CU MM
EOSINOPHILS RELATIVE PERCENT: 1.4 % (ref 0–3)
GFR SERPL CREATININE-BSD FRML MDRD: 3 ML/MIN/1.73M2
GLUCOSE SERPL-MCNC: 94 MG/DL (ref 70–99)
HCT VFR BLD CALC: 36.8 % (ref 37–47)
HEMOGLOBIN: 11 GM/DL (ref 12.5–16)
IMMATURE NEUTROPHIL %: 0.3 % (ref 0–0.43)
LACTATE: 1.7 MMOL/L (ref 0.5–1.9)
LYMPHOCYTES ABSOLUTE: 1.5 K/CU MM
LYMPHOCYTES RELATIVE PERCENT: 12 % (ref 24–44)
MAGNESIUM: 1.7 MG/DL (ref 1.8–2.4)
MCH RBC QN AUTO: 27.4 PG (ref 27–31)
MCHC RBC AUTO-ENTMCNC: 29.9 % (ref 32–36)
MCV RBC AUTO: 91.5 FL (ref 78–100)
MONOCYTES ABSOLUTE: 1.2 K/CU MM
MONOCYTES RELATIVE PERCENT: 9.8 % (ref 0–4)
NUCLEATED RBC %: 0 %
PDW BLD-RTO: 15.4 % (ref 11.7–14.9)
PLATELET # BLD: 287 K/CU MM (ref 140–440)
PMV BLD AUTO: 10 FL (ref 7.5–11.1)
POTASSIUM SERPL-SCNC: 3.3 MMOL/L (ref 3.5–5.1)
PRO-BNP: ABNORMAL PG/ML
RBC # BLD: 4.02 M/CU MM (ref 4.2–5.4)
SEGMENTED NEUTROPHILS ABSOLUTE COUNT: 9.7 K/CU MM
SEGMENTED NEUTROPHILS RELATIVE PERCENT: 76.1 % (ref 36–66)
SODIUM BLD-SCNC: 137 MMOL/L (ref 135–145)
TOTAL IMMATURE NEUTOROPHIL: 0.04 K/CU MM
TOTAL NUCLEATED RBC: 0 K/CU MM
TOTAL PROTEIN: 7.4 GM/DL (ref 6.4–8.2)
TROPONIN T: 0.34 NG/ML
WBC # BLD: 12.7 K/CU MM (ref 4–10.5)

## 2023-07-01 PROCEDURE — 80053 COMPREHEN METABOLIC PANEL: CPT

## 2023-07-01 PROCEDURE — 2140000000 HC CCU INTERMEDIATE R&B

## 2023-07-01 PROCEDURE — 83735 ASSAY OF MAGNESIUM: CPT

## 2023-07-01 PROCEDURE — 85025 COMPLETE CBC W/AUTO DIFF WBC: CPT

## 2023-07-01 PROCEDURE — 74176 CT ABD & PELVIS W/O CONTRAST: CPT

## 2023-07-01 PROCEDURE — 71045 X-RAY EXAM CHEST 1 VIEW: CPT

## 2023-07-01 PROCEDURE — 84484 ASSAY OF TROPONIN QUANT: CPT

## 2023-07-01 PROCEDURE — 99285 EMERGENCY DEPT VISIT HI MDM: CPT

## 2023-07-01 PROCEDURE — 83880 ASSAY OF NATRIURETIC PEPTIDE: CPT

## 2023-07-01 PROCEDURE — 6370000000 HC RX 637 (ALT 250 FOR IP): Performed by: INTERNAL MEDICINE

## 2023-07-01 PROCEDURE — 83605 ASSAY OF LACTIC ACID: CPT

## 2023-07-01 PROCEDURE — 71250 CT THORAX DX C-: CPT

## 2023-07-01 PROCEDURE — 6360000002 HC RX W HCPCS: Performed by: NURSE PRACTITIONER

## 2023-07-01 PROCEDURE — 93005 ELECTROCARDIOGRAM TRACING: CPT | Performed by: INTERNAL MEDICINE

## 2023-07-01 RX ORDER — POLYETHYLENE GLYCOL 3350 17 G/17G
17 POWDER, FOR SOLUTION ORAL DAILY PRN
Status: DISCONTINUED | OUTPATIENT
Start: 2023-07-01 | End: 2023-07-03 | Stop reason: HOSPADM

## 2023-07-01 RX ORDER — SITAGLIPTIN 25 MG/1
25 TABLET, FILM COATED ORAL DAILY
COMMUNITY
Start: 2023-06-19

## 2023-07-01 RX ORDER — FUROSEMIDE 10 MG/ML
40 INJECTION INTRAMUSCULAR; INTRAVENOUS ONCE
Status: DISCONTINUED | OUTPATIENT
Start: 2023-07-01 | End: 2023-07-02

## 2023-07-01 RX ORDER — CLONIDINE HYDROCHLORIDE 0.1 MG/1
0.1 TABLET ORAL 3 TIMES DAILY
Status: DISCONTINUED | OUTPATIENT
Start: 2023-07-02 | End: 2023-07-03

## 2023-07-01 RX ORDER — CALCIUM CARBONATE 500(1250)
1000 TABLET ORAL 2 TIMES DAILY
Status: DISCONTINUED | OUTPATIENT
Start: 2023-07-02 | End: 2023-07-03 | Stop reason: HOSPADM

## 2023-07-01 RX ORDER — ALLOPURINOL 100 MG/1
100 TABLET ORAL DAILY
Status: DISCONTINUED | OUTPATIENT
Start: 2023-07-02 | End: 2023-07-03 | Stop reason: HOSPADM

## 2023-07-01 RX ORDER — ACETAMINOPHEN 650 MG/1
650 SUPPOSITORY RECTAL EVERY 6 HOURS PRN
Status: DISCONTINUED | OUTPATIENT
Start: 2023-07-01 | End: 2023-07-03 | Stop reason: HOSPADM

## 2023-07-01 RX ORDER — DILTIAZEM HYDROCHLORIDE 120 MG/1
120 CAPSULE, COATED, EXTENDED RELEASE ORAL DAILY
Status: DISCONTINUED | OUTPATIENT
Start: 2023-07-02 | End: 2023-07-02

## 2023-07-01 RX ORDER — SODIUM CHLORIDE 0.9 % (FLUSH) 0.9 %
5-40 SYRINGE (ML) INJECTION PRN
Status: DISCONTINUED | OUTPATIENT
Start: 2023-07-01 | End: 2023-07-03 | Stop reason: HOSPADM

## 2023-07-01 RX ORDER — ACETAMINOPHEN 325 MG/1
650 TABLET ORAL EVERY 6 HOURS PRN
Status: DISCONTINUED | OUTPATIENT
Start: 2023-07-01 | End: 2023-07-03 | Stop reason: HOSPADM

## 2023-07-01 RX ORDER — POTASSIUM CHLORIDE 20 MEQ/1
40 TABLET, EXTENDED RELEASE ORAL ONCE
Status: DISCONTINUED | OUTPATIENT
Start: 2023-07-01 | End: 2023-07-01

## 2023-07-01 RX ORDER — METOPROLOL TARTRATE 50 MG/1
50 TABLET, FILM COATED ORAL 2 TIMES DAILY
Status: DISCONTINUED | OUTPATIENT
Start: 2023-07-02 | End: 2023-07-03 | Stop reason: HOSPADM

## 2023-07-01 RX ORDER — CLOPIDOGREL BISULFATE 75 MG/1
75 TABLET ORAL DAILY
Status: DISCONTINUED | OUTPATIENT
Start: 2023-07-02 | End: 2023-07-03 | Stop reason: HOSPADM

## 2023-07-01 RX ORDER — POTASSIUM CHLORIDE 20 MEQ/1
40 TABLET, EXTENDED RELEASE ORAL EVERY 4 HOURS
Status: COMPLETED | OUTPATIENT
Start: 2023-07-01 | End: 2023-07-02

## 2023-07-01 RX ORDER — ATORVASTATIN CALCIUM 40 MG/1
80 TABLET, FILM COATED ORAL NIGHTLY
Status: DISCONTINUED | OUTPATIENT
Start: 2023-07-02 | End: 2023-07-03 | Stop reason: HOSPADM

## 2023-07-01 RX ORDER — GLIMEPIRIDE 2 MG/1
2 TABLET ORAL DAILY
COMMUNITY
Start: 2023-06-17

## 2023-07-01 RX ORDER — SODIUM CHLORIDE 9 MG/ML
INJECTION, SOLUTION INTRAVENOUS PRN
Status: DISCONTINUED | OUTPATIENT
Start: 2023-07-01 | End: 2023-07-03 | Stop reason: HOSPADM

## 2023-07-01 RX ORDER — ONDANSETRON 4 MG/1
4 TABLET, ORALLY DISINTEGRATING ORAL EVERY 8 HOURS PRN
Status: DISCONTINUED | OUTPATIENT
Start: 2023-07-01 | End: 2023-07-02

## 2023-07-01 RX ORDER — SODIUM CHLORIDE 0.9 % (FLUSH) 0.9 %
5-40 SYRINGE (ML) INJECTION 2 TIMES DAILY
Status: DISCONTINUED | OUTPATIENT
Start: 2023-07-02 | End: 2023-07-03 | Stop reason: HOSPADM

## 2023-07-01 RX ORDER — VALSARTAN 40 MG/1
40 TABLET ORAL DAILY
Status: ON HOLD | COMMUNITY
Start: 2023-06-21 | End: 2023-07-03 | Stop reason: HOSPADM

## 2023-07-01 RX ORDER — ONDANSETRON 2 MG/ML
4 INJECTION INTRAMUSCULAR; INTRAVENOUS EVERY 6 HOURS PRN
Status: DISCONTINUED | OUTPATIENT
Start: 2023-07-01 | End: 2023-07-02

## 2023-07-01 RX ADMIN — POTASSIUM CHLORIDE 40 MEQ: 1500 TABLET, EXTENDED RELEASE ORAL at 19:42

## 2023-07-01 ASSESSMENT — LIFESTYLE VARIABLES
HOW MANY STANDARD DRINKS CONTAINING ALCOHOL DO YOU HAVE ON A TYPICAL DAY: PATIENT DOES NOT DRINK
HOW MANY STANDARD DRINKS CONTAINING ALCOHOL DO YOU HAVE ON A TYPICAL DAY: PATIENT DOES NOT DRINK
HOW OFTEN DO YOU HAVE A DRINK CONTAINING ALCOHOL: NEVER
HOW OFTEN DO YOU HAVE A DRINK CONTAINING ALCOHOL: NEVER

## 2023-07-01 ASSESSMENT — PAIN DESCRIPTION - LOCATION: LOCATION: SHOULDER

## 2023-07-01 ASSESSMENT — PAIN SCALES - GENERAL: PAINLEVEL_OUTOF10: 4

## 2023-07-01 ASSESSMENT — PAIN DESCRIPTION - ORIENTATION: ORIENTATION: RIGHT

## 2023-07-01 ASSESSMENT — PAIN DESCRIPTION - DESCRIPTORS: DESCRIPTORS: ACHING

## 2023-07-02 LAB
ANION GAP SERPL CALCULATED.3IONS-SCNC: 19 MMOL/L (ref 4–16)
BASOPHILS ABSOLUTE: 0 K/CU MM
BASOPHILS RELATIVE PERCENT: 0.3 % (ref 0–1)
BUN SERPL-MCNC: 56 MG/DL (ref 6–23)
CALCIUM SERPL-MCNC: 8.4 MG/DL (ref 8.3–10.6)
CHLORIDE BLD-SCNC: 95 MMOL/L (ref 99–110)
CO2: 22 MMOL/L (ref 21–32)
CREAT SERPL-MCNC: 12.6 MG/DL (ref 0.6–1.1)
CRP SERPL HS-MCNC: 9.8 MG/L
DIFFERENTIAL TYPE: ABNORMAL
EOSINOPHILS ABSOLUTE: 0.2 K/CU MM
EOSINOPHILS RELATIVE PERCENT: 2.6 % (ref 0–3)
FLUID TYPE: NORMAL INDEX
GFR SERPL CREATININE-BSD FRML MDRD: 3 ML/MIN/1.73M2
GLUCOSE SERPL-MCNC: 98 MG/DL (ref 70–99)
HCT VFR BLD CALC: 32.3 % (ref 37–47)
HEMOGLOBIN: 9.8 GM/DL (ref 12.5–16)
IMMATURE NEUTROPHIL %: 0.2 % (ref 0–0.43)
LYMPHOCYTES ABSOLUTE: 1.2 K/CU MM
LYMPHOCYTES RELATIVE PERCENT: 13.4 % (ref 24–44)
LYMPHOCYTES, BODY FLUID: 39 %
MCH RBC QN AUTO: 27.4 PG (ref 27–31)
MCHC RBC AUTO-ENTMCNC: 30.3 % (ref 32–36)
MCV RBC AUTO: 90.2 FL (ref 78–100)
MESOTHELIAL FLUID: 40 /100 WBC
MONOCYTE, FLUID: 12 %
MONOCYTES ABSOLUTE: 0.8 K/CU MM
MONOCYTES RELATIVE PERCENT: 8.9 % (ref 0–4)
NEUTROPHIL, FLUID: 0 %
NUCLEATED RBC %: 0 %
OTHER CELLS FLUID: NORMAL
PDW BLD-RTO: 15.4 % (ref 11.7–14.9)
PHOSPHORUS: 5.7 MG/DL (ref 2.5–4.9)
PLATELET # BLD: 260 K/CU MM (ref 140–440)
PMV BLD AUTO: 10.4 FL (ref 7.5–11.1)
POTASSIUM SERPL-SCNC: 4.3 MMOL/L (ref 3.5–5.1)
PROCALCITONIN SERPL-MCNC: 0.53 NG/ML
RBC # BLD: 3.58 M/CU MM (ref 4.2–5.4)
RBC FLUID: 7 /CU MM
SEGMENTED NEUTROPHILS ABSOLUTE COUNT: 6.6 K/CU MM
SEGMENTED NEUTROPHILS RELATIVE PERCENT: 74.6 % (ref 36–66)
SODIUM BLD-SCNC: 136 MMOL/L (ref 135–145)
TOTAL IMMATURE NEUTOROPHIL: 0.02 K/CU MM
TOTAL NUCLEATED RBC: 0 K/CU MM
TROPONIN T: 0.32 NG/ML
WBC # BLD: 8.8 K/CU MM (ref 4–10.5)
WBC FLUID: 44 /CU MM

## 2023-07-02 PROCEDURE — 2140000000 HC CCU INTERMEDIATE R&B

## 2023-07-02 PROCEDURE — 2580000003 HC RX 258

## 2023-07-02 PROCEDURE — 6370000000 HC RX 637 (ALT 250 FOR IP)

## 2023-07-02 PROCEDURE — 84132 ASSAY OF SERUM POTASSIUM: CPT

## 2023-07-02 PROCEDURE — 6370000000 HC RX 637 (ALT 250 FOR IP): Performed by: INTERNAL MEDICINE

## 2023-07-02 PROCEDURE — 90945 DIALYSIS ONE EVALUATION: CPT

## 2023-07-02 PROCEDURE — 86140 C-REACTIVE PROTEIN: CPT

## 2023-07-02 PROCEDURE — 84100 ASSAY OF PHOSPHORUS: CPT

## 2023-07-02 PROCEDURE — 6360000002 HC RX W HCPCS: Performed by: STUDENT IN AN ORGANIZED HEALTH CARE EDUCATION/TRAINING PROGRAM

## 2023-07-02 PROCEDURE — 6360000002 HC RX W HCPCS: Performed by: INTERNAL MEDICINE

## 2023-07-02 PROCEDURE — 36415 COLL VENOUS BLD VENIPUNCTURE: CPT

## 2023-07-02 PROCEDURE — 80048 BASIC METABOLIC PNL TOTAL CA: CPT

## 2023-07-02 PROCEDURE — 99222 1ST HOSP IP/OBS MODERATE 55: CPT | Performed by: INTERNAL MEDICINE

## 2023-07-02 PROCEDURE — 94761 N-INVAS EAR/PLS OXIMETRY MLT: CPT

## 2023-07-02 PROCEDURE — 87070 CULTURE OTHR SPECIMN AEROBIC: CPT

## 2023-07-02 PROCEDURE — 87205 SMEAR GRAM STAIN: CPT

## 2023-07-02 PROCEDURE — 89051 BODY FLUID CELL COUNT: CPT

## 2023-07-02 PROCEDURE — 84145 PROCALCITONIN (PCT): CPT

## 2023-07-02 PROCEDURE — 84484 ASSAY OF TROPONIN QUANT: CPT

## 2023-07-02 PROCEDURE — 85025 COMPLETE CBC W/AUTO DIFF WBC: CPT

## 2023-07-02 PROCEDURE — 3E1M39Z IRRIGATION OF PERITONEAL CAVITY USING DIALYSATE, PERCUTANEOUS APPROACH: ICD-10-PCS | Performed by: STUDENT IN AN ORGANIZED HEALTH CARE EDUCATION/TRAINING PROGRAM

## 2023-07-02 RX ORDER — MAGNESIUM SULFATE IN WATER 40 MG/ML
2000 INJECTION, SOLUTION INTRAVENOUS ONCE
Status: COMPLETED | OUTPATIENT
Start: 2023-07-02 | End: 2023-07-02

## 2023-07-02 RX ORDER — PROPAFENONE HYDROCHLORIDE 150 MG/1
150 TABLET, COATED ORAL 3 TIMES DAILY
Status: DISCONTINUED | OUTPATIENT
Start: 2023-07-02 | End: 2023-07-03 | Stop reason: HOSPADM

## 2023-07-02 RX ADMIN — SODIUM CHLORIDE, PRESERVATIVE FREE 10 ML: 5 INJECTION INTRAVENOUS at 21:07

## 2023-07-02 RX ADMIN — CLOPIDOGREL BISULFATE 75 MG: 75 TABLET ORAL at 11:25

## 2023-07-02 RX ADMIN — MAGNESIUM SULFATE HEPTAHYDRATE 2000 MG: 40 INJECTION, SOLUTION INTRAVENOUS at 09:52

## 2023-07-02 RX ADMIN — APIXABAN 2.5 MG: 2.5 TABLET, FILM COATED ORAL at 11:25

## 2023-07-02 RX ADMIN — CALCIUM 1000 MG: 500 TABLET ORAL at 01:02

## 2023-07-02 RX ADMIN — ATORVASTATIN CALCIUM 80 MG: 40 TABLET, FILM COATED ORAL at 21:03

## 2023-07-02 RX ADMIN — APIXABAN 2.5 MG: 2.5 TABLET, FILM COATED ORAL at 21:04

## 2023-07-02 RX ADMIN — METOPROLOL TARTRATE 50 MG: 50 TABLET, FILM COATED ORAL at 11:30

## 2023-07-02 RX ADMIN — CALCIUM 1000 MG: 500 TABLET ORAL at 21:03

## 2023-07-02 RX ADMIN — ATORVASTATIN CALCIUM 80 MG: 40 TABLET, FILM COATED ORAL at 01:02

## 2023-07-02 RX ADMIN — METOPROLOL TARTRATE 50 MG: 50 TABLET, FILM COATED ORAL at 01:02

## 2023-07-02 RX ADMIN — METOPROLOL TARTRATE 50 MG: 50 TABLET, FILM COATED ORAL at 21:03

## 2023-07-02 RX ADMIN — PROPAFENONE HYDROCHLORIDE 150 MG: 150 TABLET, FILM COATED ORAL at 21:03

## 2023-07-02 RX ADMIN — CALCIUM 1000 MG: 500 TABLET ORAL at 11:25

## 2023-07-02 RX ADMIN — CLONIDINE HYDROCHLORIDE 0.1 MG: 0.1 TABLET ORAL at 01:02

## 2023-07-02 RX ADMIN — PROPAFENONE HYDROCHLORIDE 150 MG: 150 TABLET, FILM COATED ORAL at 14:12

## 2023-07-02 RX ADMIN — POTASSIUM CHLORIDE 40 MEQ: 1500 TABLET, EXTENDED RELEASE ORAL at 01:02

## 2023-07-02 RX ADMIN — EPOETIN ALFA-EPBX 10000 UNITS: 10000 INJECTION, SOLUTION INTRAVENOUS; SUBCUTANEOUS at 14:12

## 2023-07-02 RX ADMIN — PROPAFENONE HYDROCHLORIDE 150 MG: 150 TABLET, FILM COATED ORAL at 11:25

## 2023-07-02 RX ADMIN — APIXABAN 2.5 MG: 2.5 TABLET, FILM COATED ORAL at 01:02

## 2023-07-02 RX ADMIN — ALLOPURINOL 100 MG: 100 TABLET ORAL at 11:25

## 2023-07-03 ENCOUNTER — APPOINTMENT (OUTPATIENT)
Dept: NUCLEAR MEDICINE | Age: 78
DRG: 682 | End: 2023-07-03
Attending: INTERNAL MEDICINE
Payer: COMMERCIAL

## 2023-07-03 VITALS
OXYGEN SATURATION: 97 % | HEART RATE: 77 BPM | TEMPERATURE: 97.5 F | WEIGHT: 203.71 LBS | HEIGHT: 68 IN | SYSTOLIC BLOOD PRESSURE: 114 MMHG | DIASTOLIC BLOOD PRESSURE: 58 MMHG | RESPIRATION RATE: 20 BRPM | BODY MASS INDEX: 30.87 KG/M2

## 2023-07-03 LAB
ALBUMIN SERPL-MCNC: 3.2 GM/DL (ref 3.4–5)
ALP BLD-CCNC: 88 IU/L (ref 40–128)
ALT SERPL-CCNC: 15 U/L (ref 10–40)
ANION GAP SERPL CALCULATED.3IONS-SCNC: 15 MMOL/L (ref 4–16)
AST SERPL-CCNC: 12 IU/L (ref 15–37)
BASOPHILS ABSOLUTE: 0 K/CU MM
BASOPHILS RELATIVE PERCENT: 0.3 % (ref 0–1)
BILIRUB SERPL-MCNC: 0.2 MG/DL (ref 0–1)
BUN SERPL-MCNC: 55 MG/DL (ref 6–23)
CALCIUM SERPL-MCNC: 8.4 MG/DL (ref 8.3–10.6)
CHLORIDE BLD-SCNC: 98 MMOL/L (ref 99–110)
CO2: 25 MMOL/L (ref 21–32)
CREAT SERPL-MCNC: 12.7 MG/DL (ref 0.6–1.1)
DIFFERENTIAL TYPE: ABNORMAL
EKG ATRIAL RATE: 84 BPM
EKG DIAGNOSIS: NORMAL
EKG P AXIS: 68 DEGREES
EKG P-R INTERVAL: 200 MS
EKG Q-T INTERVAL: 428 MS
EKG QRS DURATION: 100 MS
EKG QTC CALCULATION (BAZETT): 505 MS
EKG R AXIS: -34 DEGREES
EKG T AXIS: 94 DEGREES
EKG VENTRICULAR RATE: 84 BPM
EOSINOPHILS ABSOLUTE: 0.2 K/CU MM
EOSINOPHILS RELATIVE PERCENT: 2.4 % (ref 0–3)
FERRITIN: 1140 NG/ML (ref 15–150)
FOLATE SERPL-MCNC: 3.1 NG/ML (ref 3.1–17.5)
GFR SERPL CREATININE-BSD FRML MDRD: 3 ML/MIN/1.73M2
GLUCOSE SERPL-MCNC: 112 MG/DL (ref 70–99)
HCT VFR BLD CALC: 30.7 % (ref 37–47)
HEMOGLOBIN: 9.2 GM/DL (ref 12.5–16)
IMMATURE NEUTROPHIL %: 0.4 % (ref 0–0.43)
IRON: 47 UG/DL (ref 37–145)
LV EF: 55 %
LV EF: 58 %
LVEF MODALITY: NORMAL
LVEF MODALITY: NORMAL
LYMPHOCYTES ABSOLUTE: 1.3 K/CU MM
LYMPHOCYTES RELATIVE PERCENT: 13.5 % (ref 24–44)
MAGNESIUM: 1.9 MG/DL (ref 1.8–2.4)
MCH RBC QN AUTO: 27.4 PG (ref 27–31)
MCHC RBC AUTO-ENTMCNC: 30 % (ref 32–36)
MCV RBC AUTO: 91.4 FL (ref 78–100)
MONOCYTES ABSOLUTE: 0.8 K/CU MM
MONOCYTES RELATIVE PERCENT: 8 % (ref 0–4)
NUCLEATED RBC %: 0 %
PCT TRANSFERRIN: 30 % (ref 10–44)
PDW BLD-RTO: 15.5 % (ref 11.7–14.9)
PLATELET # BLD: 218 K/CU MM (ref 140–440)
PMV BLD AUTO: 10 FL (ref 7.5–11.1)
POTASSIUM SERPL-SCNC: 4.1 MMOL/L (ref 3.5–5.1)
RBC # BLD: 3.36 M/CU MM (ref 4.2–5.4)
RETICULOCYTE COUNT PCT: 1.7 % (ref 0.2–2.2)
SEGMENTED NEUTROPHILS ABSOLUTE COUNT: 7.5 K/CU MM
SEGMENTED NEUTROPHILS RELATIVE PERCENT: 75.4 % (ref 36–66)
SODIUM BLD-SCNC: 138 MMOL/L (ref 135–145)
TOTAL IMMATURE NEUTOROPHIL: 0.04 K/CU MM
TOTAL IRON BINDING CAPACITY: 158 UG/DL (ref 250–450)
TOTAL NUCLEATED RBC: 0 K/CU MM
TOTAL PROTEIN: 5.5 GM/DL (ref 6.4–8.2)
UNSATURATED IRON BINDING CAPACITY: 111 UG/DL (ref 110–370)
VITAMIN B-12: 1328 PG/ML (ref 211–911)
WBC # BLD: 9.9 K/CU MM (ref 4–10.5)

## 2023-07-03 PROCEDURE — 83735 ASSAY OF MAGNESIUM: CPT

## 2023-07-03 PROCEDURE — 82728 ASSAY OF FERRITIN: CPT

## 2023-07-03 PROCEDURE — A9500 TC99M SESTAMIBI: HCPCS | Performed by: INTERNAL MEDICINE

## 2023-07-03 PROCEDURE — 85025 COMPLETE CBC W/AUTO DIFF WBC: CPT

## 2023-07-03 PROCEDURE — 78452 HT MUSCLE IMAGE SPECT MULT: CPT

## 2023-07-03 PROCEDURE — 83540 ASSAY OF IRON: CPT

## 2023-07-03 PROCEDURE — 93306 TTE W/DOPPLER COMPLETE: CPT

## 2023-07-03 PROCEDURE — 6370000000 HC RX 637 (ALT 250 FOR IP): Performed by: INTERNAL MEDICINE

## 2023-07-03 PROCEDURE — 6370000000 HC RX 637 (ALT 250 FOR IP)

## 2023-07-03 PROCEDURE — 83550 IRON BINDING TEST: CPT

## 2023-07-03 PROCEDURE — 93017 CV STRESS TEST TRACING ONLY: CPT

## 2023-07-03 PROCEDURE — 80053 COMPREHEN METABOLIC PANEL: CPT

## 2023-07-03 PROCEDURE — 6360000002 HC RX W HCPCS: Performed by: INTERNAL MEDICINE

## 2023-07-03 PROCEDURE — 76937 US GUIDE VASCULAR ACCESS: CPT

## 2023-07-03 PROCEDURE — 3430000000 HC RX DIAGNOSTIC RADIOPHARMACEUTICAL: Performed by: INTERNAL MEDICINE

## 2023-07-03 PROCEDURE — 93010 ELECTROCARDIOGRAM REPORT: CPT | Performed by: INTERNAL MEDICINE

## 2023-07-03 PROCEDURE — 36415 COLL VENOUS BLD VENIPUNCTURE: CPT

## 2023-07-03 PROCEDURE — APPSS30 APP SPLIT SHARED TIME 16-30 MINUTES

## 2023-07-03 PROCEDURE — 99232 SBSQ HOSP IP/OBS MODERATE 35: CPT | Performed by: INTERNAL MEDICINE

## 2023-07-03 PROCEDURE — 82607 VITAMIN B-12: CPT

## 2023-07-03 PROCEDURE — 85045 AUTOMATED RETICULOCYTE COUNT: CPT

## 2023-07-03 PROCEDURE — 82746 ASSAY OF FOLIC ACID SERUM: CPT

## 2023-07-03 RX ORDER — REGADENOSON 0.08 MG/ML
0.4 INJECTION, SOLUTION INTRAVENOUS
Status: COMPLETED | OUTPATIENT
Start: 2023-07-03 | End: 2023-07-03

## 2023-07-03 RX ORDER — TETRAKIS(2-METHOXYISOBUTYLISOCYANIDE)COPPER(I) TETRAFLUOROBORATE 1 MG/ML
30 INJECTION, POWDER, LYOPHILIZED, FOR SOLUTION INTRAVENOUS
Status: COMPLETED | OUTPATIENT
Start: 2023-07-03 | End: 2023-07-03

## 2023-07-03 RX ORDER — TETRAKIS(2-METHOXYISOBUTYLISOCYANIDE)COPPER(I) TETRAFLUOROBORATE 1 MG/ML
10 INJECTION, POWDER, LYOPHILIZED, FOR SOLUTION INTRAVENOUS
Status: COMPLETED | OUTPATIENT
Start: 2023-07-03 | End: 2023-07-03

## 2023-07-03 RX ADMIN — CALCIUM 1000 MG: 500 TABLET ORAL at 10:07

## 2023-07-03 RX ADMIN — KIT FOR THE PREPARATION OF TECHNETIUM TC99M SESTAMIBI 30 MILLICURIE: 1 INJECTION, POWDER, LYOPHILIZED, FOR SOLUTION PARENTERAL at 15:00

## 2023-07-03 RX ADMIN — APIXABAN 2.5 MG: 2.5 TABLET, FILM COATED ORAL at 10:07

## 2023-07-03 RX ADMIN — ALLOPURINOL 100 MG: 100 TABLET ORAL at 10:08

## 2023-07-03 RX ADMIN — PROPAFENONE HYDROCHLORIDE 150 MG: 150 TABLET, FILM COATED ORAL at 16:10

## 2023-07-03 RX ADMIN — REGADENOSON 0.4 MG: 0.08 INJECTION, SOLUTION INTRAVENOUS at 14:53

## 2023-07-03 RX ADMIN — METOPROLOL TARTRATE 50 MG: 50 TABLET, FILM COATED ORAL at 10:07

## 2023-07-03 RX ADMIN — KIT FOR THE PREPARATION OF TECHNETIUM TC99M SESTAMIBI 10 MILLICURIE: 1 INJECTION, POWDER, LYOPHILIZED, FOR SOLUTION PARENTERAL at 13:05

## 2023-07-03 RX ADMIN — PROPAFENONE HYDROCHLORIDE 150 MG: 150 TABLET, FILM COATED ORAL at 06:13

## 2023-07-03 RX ADMIN — CLOPIDOGREL BISULFATE 75 MG: 75 TABLET ORAL at 10:07

## 2023-07-03 NOTE — CONSULTS
Pediatric Well Child Exam: Under 8 days of age    Chief Complaint   Patient presents with   • Well Child      check up 6 days old       SUBJECTIVE:  Leda Quach is a 6 day old female who presents for a well child exam.  Patient presents with Father and Mother.    Preferred method of results communication:     Cell Phone:   Telephone Information:   Mobile [phone]   .  Okay to leave a message containing results? Yes    CONCERNS RAISED TODAY: none and constipation    BIRTH HISTORY:  Birth History   • Birth     Length: 19\" (48.3 cm)     Weight: 2.438 kg     HC 33 cm (13\")   • Apgar     One: 3     Five: 6     Ten: 8   • Delivery Method: , Low Transverse   • Gestation Age: 37 2/7 wks       SLEEP PATTERN:  Hours / Day: feeding every 2-3 hours waking to feed.    NUTRITION/GI:  Feeding: Breast 40 minutes total every 2-3 hours, pumping  No   Water Supply at Home: city and bottled.  Stools:  2-5 / day.    FAMILY/HOME ENVIRONMENT:  Parental Adjustment: no issues  Sibling Adjustment: No Problems  Maternal Depression:  · Little interest or pleasure in doing anything:  []  YES    [x]  NO   · Feeling down, depressed or hopeless:         []  YES    [x]  NO   Parents working outside the home: father  Toxic Exposure:  Tobacco Use: Not Asked         DEVELOPMENT:  Physical  [x]  YES     []  NO     []  UNKNOWN     Can suck, swallow, breath easily?  Communication   [x]  YES     []  NO     []  UNKNOWN    Turns and calms to your voice?  Cognitive  [x]  YES     []  NO     []  UNKNOWN    Follows your face?  Social-Emotional  [x]  YES     []  NO     []  UNKNOWN    Eats well?    FAMILY HISTORY:  No family history on file.  Review of patient's family status indicates:  No family status on file      PAST HISTORIES:  Allergies, Medications, Medical history, Surgical history, Family history reviewed and updated.    REVIEW OF SYSTEMS:    All systems reviewed and negative except as documented in \"Concerns  IV Consult complete. Unable to cannulate vessels in either forearm. Nexiva 20g 1.75\" PIV inserted in SARHA Brachial vessel just proximal to [facility]. Brisk blood return, flushes easy. raised\".    OBJECTIVE:    PHYSICAL EXAM:   VITAL SIGNS:   Visit Vitals   • Pulse 160   • Temp 97.4 °F (36.3 °C) (Tympanic)   • Resp 40   • Ht 18.5\" (47 cm)   • Wt (!) 2.268 kg   • HC 34 cm (13.39\")   • BMI 10.27 kg/m2      GENERAL: Well appearing female infant in no acute distress.  Alert and consolable.  SKIN: Warm, normal turgor. No cyanosis. No rash.   HEAD: Normocephalic, atraumatic. Anterior fontanel open, soft and flat.  EYES: Conjunctivae appear normal, noninjected, nonicteric, positive red reflex.  NOSE:  Appears normal. No flaring.  EARS: Normal pinnae, no preauricular skin tags. Tympanic membranes are transparent with normal landmarks.  THROAT: Oropharynx with moist mucous membranes and no lesions.  NECK: Supple. No lymphadenopathy or masses.  HEART: Regular rate and rhythm. Normal S1, S2. No murmurs.   LUNGS:  Clear to auscultation. No wheezes, rales, rhonchi.  Normal work effort with breathing.  ABDOMEN: Umbilical stump is normal. Soft, nontender.  No organomegaly or masses.  GENITOURINARY:  Ney stage 1 Normal female genitalia.  Rectum/anus patent.  EXTREMITIES: Hips - normal range of motion. Negative Pelayo and Ortolani's.  Equal femoral pulses. Peripheral pulses 2/4  SPINE: Spine appears straight. Normal sacrum.   NEUROLOGIC: Normal tone, bulk, strength.    Assessment:  Normal Well Child Exam -  Infant  1. Health examination for  under 8 days old         Plan:  1. All parental concerns and questions discussed.  2. Anticipatory guidance provided, handout/s given   3. Immunizations are up to date.    Follow up:Return in about 2 weeks (around 2017) for well child check.

## 2023-07-07 LAB
CULTURE: ABNORMAL
GRAM SMEAR: ABNORMAL
Lab: ABNORMAL
SPECIMEN: ABNORMAL

## 2023-07-18 ENCOUNTER — TELEMEDICINE (OUTPATIENT)
Dept: INTERNAL MEDICINE CLINIC | Age: 78
End: 2023-07-18
Payer: COMMERCIAL

## 2023-07-18 DIAGNOSIS — Z00.00 MEDICARE ANNUAL WELLNESS VISIT, SUBSEQUENT: Primary | ICD-10-CM

## 2023-07-18 PROCEDURE — G0439 PPPS, SUBSEQ VISIT: HCPCS | Performed by: INTERNAL MEDICINE

## 2023-07-18 PROCEDURE — 1123F ACP DISCUSS/DSCN MKR DOCD: CPT | Performed by: INTERNAL MEDICINE

## 2023-07-18 SDOH — ECONOMIC STABILITY: INCOME INSECURITY: HOW HARD IS IT FOR YOU TO PAY FOR THE VERY BASICS LIKE FOOD, HOUSING, MEDICAL CARE, AND HEATING?: NOT HARD AT ALL

## 2023-07-18 SDOH — ECONOMIC STABILITY: HOUSING INSECURITY
IN THE LAST 12 MONTHS, WAS THERE A TIME WHEN YOU DID NOT HAVE A STEADY PLACE TO SLEEP OR SLEPT IN A SHELTER (INCLUDING NOW)?: NO

## 2023-07-18 SDOH — ECONOMIC STABILITY: FOOD INSECURITY: WITHIN THE PAST 12 MONTHS, YOU WORRIED THAT YOUR FOOD WOULD RUN OUT BEFORE YOU GOT MONEY TO BUY MORE.: NEVER TRUE

## 2023-07-18 SDOH — ECONOMIC STABILITY: FOOD INSECURITY: WITHIN THE PAST 12 MONTHS, THE FOOD YOU BOUGHT JUST DIDN'T LAST AND YOU DIDN'T HAVE MONEY TO GET MORE.: NEVER TRUE

## 2023-07-18 ASSESSMENT — PATIENT HEALTH QUESTIONNAIRE - PHQ9
1. LITTLE INTEREST OR PLEASURE IN DOING THINGS: 0
2. FEELING DOWN, DEPRESSED OR HOPELESS: 0
SUM OF ALL RESPONSES TO PHQ QUESTIONS 1-9: 0
SUM OF ALL RESPONSES TO PHQ9 QUESTIONS 1 & 2: 0

## 2023-07-18 ASSESSMENT — LIFESTYLE VARIABLES
HOW MANY STANDARD DRINKS CONTAINING ALCOHOL DO YOU HAVE ON A TYPICAL DAY: PATIENT DOES NOT DRINK
HOW OFTEN DO YOU HAVE A DRINK CONTAINING ALCOHOL: NEVER

## 2023-07-18 NOTE — PATIENT INSTRUCTIONS
Personalized Preventive Plan for Lynn Escamilla - 7/18/2023  Medicare offers a range of preventive health benefits. Some of the tests and screenings are paid in full while other may be subject to a deductible, co-insurance, and/or copay. Some of these benefits include a comprehensive review of your medical history including lifestyle, illnesses that may run in your family, and various assessments and screenings as appropriate. After reviewing your medical record and screening and assessments performed today your provider may have ordered immunizations, labs, imaging, and/or referrals for you. A list of these orders (if applicable) as well as your Preventive Care list are included within your After Visit Summary for your review. Other Preventive Recommendations:    A preventive eye exam performed by an eye specialist is recommended every 1-2 years to screen for glaucoma; cataracts, macular degeneration, and other eye disorders. A preventive dental visit is recommended every 6 months. Try to get at least 150 minutes of exercise per week or 10,000 steps per day on a pedometer . Order or download the FREE \"Exercise & Physical Activity: Your Everyday Guide\" from [employer]. Call 3-365.532.7141 or search [employer] online. You need 6086-4564 mg of calcium and 8392-6796 IU of vitamin D per day. It is possible to meet your calcium requirement with diet alone, but a vitamin D supplement is usually necessary to meet this goal.  When exposed to the sun, use a sunscreen that protects against both UVA and UVB radiation with an SPF of 30 or greater. Reapply every 2 to 3 hours or after sweating, drying off with a towel, or swimming. Always wear a seat belt when traveling in a car. Always wear a helmet when riding a bicycle or motorcycle.

## 2023-07-18 NOTE — PROGRESS NOTES
(Cardiology)  Vicky Brown MD as Consulting Physician (Hematology and Oncology)  Paras Garcia MD as Consulting Physician (Orthopedic Surgery)     Reviewed and updated this visit:  Allergies  Meds  Med Hx  Surg Hx  Soc Hx  Fam Hx      I, Phyllis Fitch LPN, [birth year], performed the documented evaluation under the direct supervision of the attending physician. Alejandra Joseph, was evaluated through a synchronous (real-time) audio encounter. The patient (or guardian if applicable) is aware that this is a billable service, which includes applicable co-pays. This Virtual Visit was conducted with patient's (and/or legal guardian's) consent. Patient identification was verified, and a caregiver was present when appropriate. The patient was located at Home: [address] [address]  Provider was located at [facility] (Appt Dept): [address] [place] [address] [address]         Total time spent for this encounter: Not billed by time    --Phyllis Fitch LPN on 3/01/1083 at 35:43 AM    An electronic signature was used to authenticate this note. This encounter was performed under my, Sonja Masterson, direct supervision, 7/18/2023.

## 2023-07-20 ENCOUNTER — TELEPHONE (OUTPATIENT)
Dept: PULMONOLOGY | Age: 78
End: 2023-07-20

## 2023-07-20 NOTE — TELEPHONE ENCOUNTER
Pt called stated that her kidney Dr Tray Bynum wanted to talk to you about her breathing. The phone number to his office is [phone].

## 2023-07-24 ENCOUNTER — OFFICE VISIT (OUTPATIENT)
Dept: INTERNAL MEDICINE CLINIC | Age: 78
End: 2023-07-24
Payer: COMMERCIAL

## 2023-07-24 VITALS
DIASTOLIC BLOOD PRESSURE: 58 MMHG | BODY MASS INDEX: 31.78 KG/M2 | HEART RATE: 91 BPM | OXYGEN SATURATION: 96 % | SYSTOLIC BLOOD PRESSURE: 112 MMHG | WEIGHT: 209 LBS

## 2023-07-24 DIAGNOSIS — E11.21 TYPE 2 DIABETES MELLITUS WITH NEPHROPATHY (HCC): ICD-10-CM

## 2023-07-24 DIAGNOSIS — K76.89 LIVER NODULE: Primary | ICD-10-CM

## 2023-07-24 PROCEDURE — 3052F HG A1C>EQUAL 8.0%<EQUAL 9.0%: CPT | Performed by: INTERNAL MEDICINE

## 2023-07-24 PROCEDURE — 99213 OFFICE O/P EST LOW 20 MIN: CPT | Performed by: INTERNAL MEDICINE

## 2023-07-24 PROCEDURE — 1123F ACP DISCUSS/DSCN MKR DOCD: CPT | Performed by: INTERNAL MEDICINE

## 2023-08-01 ENCOUNTER — OFFICE VISIT (OUTPATIENT)
Dept: CARDIOLOGY CLINIC | Age: 78
End: 2023-08-01
Payer: COMMERCIAL

## 2023-08-01 VITALS
HEIGHT: 68 IN | WEIGHT: 209 LBS | DIASTOLIC BLOOD PRESSURE: 60 MMHG | HEART RATE: 74 BPM | SYSTOLIC BLOOD PRESSURE: 100 MMHG | BODY MASS INDEX: 31.67 KG/M2

## 2023-08-01 DIAGNOSIS — I10 ESSENTIAL HYPERTENSION: ICD-10-CM

## 2023-08-01 DIAGNOSIS — N28.89 HYPERTENSION SECONDARY TO OTHER RENAL DISORDERS: ICD-10-CM

## 2023-08-01 DIAGNOSIS — I48.0 PAROXYSMAL ATRIAL FIBRILLATION (HCC): Primary | ICD-10-CM

## 2023-08-01 DIAGNOSIS — I25.10 ASCVD (ARTERIOSCLEROTIC CARDIOVASCULAR DISEASE): ICD-10-CM

## 2023-08-01 DIAGNOSIS — I15.1 HYPERTENSION SECONDARY TO OTHER RENAL DISORDERS: ICD-10-CM

## 2023-08-01 PROCEDURE — 99214 OFFICE O/P EST MOD 30 MIN: CPT | Performed by: NURSE PRACTITIONER

## 2023-08-01 PROCEDURE — 3074F SYST BP LT 130 MM HG: CPT | Performed by: NURSE PRACTITIONER

## 2023-08-01 PROCEDURE — 1123F ACP DISCUSS/DSCN MKR DOCD: CPT | Performed by: NURSE PRACTITIONER

## 2023-08-01 PROCEDURE — 3078F DIAST BP <80 MM HG: CPT | Performed by: NURSE PRACTITIONER

## 2023-08-01 ASSESSMENT — ENCOUNTER SYMPTOMS
SHORTNESS OF BREATH: 0
ORTHOPNEA: 0

## 2023-08-02 DIAGNOSIS — K76.89 LIVER NODULE: Primary | ICD-10-CM

## 2023-08-07 ENCOUNTER — HOSPITAL ENCOUNTER (OUTPATIENT)
Dept: MRI IMAGING | Age: 78
Discharge: HOME | End: 2023-08-07
Attending: INTERNAL MEDICINE
Payer: COMMERCIAL

## 2023-08-07 DIAGNOSIS — K76.89 LIVER NODULE: ICD-10-CM

## 2023-08-07 LAB
EGFR, POC: 2 ML/MIN/1.73M2
POC CREATININE: 15 MG/DL (ref 0.6–1.1)

## 2023-08-07 PROCEDURE — 74181 MRI ABDOMEN W/O CONTRAST: CPT

## 2023-08-07 PROCEDURE — 82565 ASSAY OF CREATININE: CPT

## 2023-09-21 RX ORDER — CLOPIDOGREL BISULFATE 75 MG/1
TABLET ORAL
Qty: 30 TABLET | Refills: 3 | Status: SHIPPED | OUTPATIENT
Start: 2023-09-21

## 2023-09-25 ENCOUNTER — OFFICE VISIT (OUTPATIENT)
Dept: PULMONOLOGY | Age: 78
End: 2023-09-25
Payer: COMMERCIAL

## 2023-09-25 VITALS
HEART RATE: 84 BPM | SYSTOLIC BLOOD PRESSURE: 96 MMHG | HEIGHT: 68 IN | OXYGEN SATURATION: 94 % | DIASTOLIC BLOOD PRESSURE: 56 MMHG | WEIGHT: 212.25 LBS | BODY MASS INDEX: 32.17 KG/M2

## 2023-09-25 DIAGNOSIS — G47.19 EXCESSIVE DAYTIME SLEEPINESS: ICD-10-CM

## 2023-09-25 DIAGNOSIS — Z87.891 EX-CIGARETTE SMOKER: ICD-10-CM

## 2023-09-25 DIAGNOSIS — E66.9 OBESITY (BMI 30-39.9): ICD-10-CM

## 2023-09-25 DIAGNOSIS — G47.33 OSA (OBSTRUCTIVE SLEEP APNEA): ICD-10-CM

## 2023-09-25 PROCEDURE — 1123F ACP DISCUSS/DSCN MKR DOCD: CPT | Performed by: INTERNAL MEDICINE

## 2023-09-25 PROCEDURE — 99214 OFFICE O/P EST MOD 30 MIN: CPT | Performed by: INTERNAL MEDICINE

## 2023-09-25 ASSESSMENT — ENCOUNTER SYMPTOMS
ABDOMINAL DISTENTION: 0
ABDOMINAL PAIN: 0
EYE DISCHARGE: 0
BACK PAIN: 0
EYE ITCHING: 0
COUGH: 0
SHORTNESS OF BREATH: 0

## 2023-09-25 NOTE — PROGRESS NOTES
Nicki Lot  1945  Referring Provider: Andrzej Guaman MD    Subjective:     Chief Complaint   Patient presents with    1 Year Follow Up    Sleep Apnea     Compliance report Abby Andrea as DME    Discuss Medications     Pt did not bring medication list to appt, will bring to next one        HPI  Tory Arango is a 66 y.o. female has come back as a follow up. She has mild ADOLFO with EDS. She is on a CPAP of 8 cm h20 which she has used 4/30 days for more than 4 hours (13%). She has gained 20 lbs. She has a nasal pillows mask. She is still sleepy and tired during the day time. Her 2 week download data showed a residual AHI is 2.5 and leak ois 13.2 L/min    Current Outpatient Medications   Medication Sig Dispense Refill    clopidogrel (PLAVIX) 75 MG tablet take 1 tablet by mouth once daily 30 tablet 3    apixaban (ELIQUIS) 2.5 MG TABS tablet Take 1 tablet by mouth 2 times daily 28 tablet 0    metoprolol tartrate (LOPRESSOR) 25 MG tablet Take 1 tablet by mouth 2 times daily take 1 tablet by mouth twice a day 90 tablet 3    JANUVIA 25 MG tablet Take 1 tablet by mouth daily      glimepiride (AMARYL) 2 MG tablet Take 1 tablet by mouth daily      propafenone (RYTHMOL) 150 MG tablet Take 1 tablet by mouth in the morning and 1 tablet at noon and 1 tablet in the evening.  90 tablet 5    Cholecalciferol (VITAMIN D3) 50 MCG (2000 UT) CAPS Take by mouth      ipratropium (ATROVENT) 0.06 % nasal spray 2 sprays by Each Nostril route 4 times daily 15 mL 3    colchicine (COLCRYS) 0.6 MG tablet Take 1 tablet by mouth 2 times daily as needed for Pain (gout) 30 tablet 0    allopurinol (ZYLOPRIM) 100 MG tablet take 1 tablet by mouth once daily      atorvastatin (LIPITOR) 80 MG tablet Take 1 tab by mouth once daily 90 tablet 3    Multiple Vitamins-Minerals (MULTIVITAMIN ADULT PO) Take by mouth      Lancets MISC 1 each by In Vitro route 2 times daily 100 each 3    Glucose Blood (BLOOD GLUCOSE TEST STRIPS) STRP 1 each by In Vitro route 2 times

## 2023-09-26 ENCOUNTER — OFFICE VISIT (OUTPATIENT)
Dept: INTERNAL MEDICINE CLINIC | Age: 78
End: 2023-09-26
Payer: COMMERCIAL

## 2023-09-26 VITALS
OXYGEN SATURATION: 95 % | SYSTOLIC BLOOD PRESSURE: 124 MMHG | WEIGHT: 211.2 LBS | RESPIRATION RATE: 14 BRPM | HEART RATE: 88 BPM | BODY MASS INDEX: 32.11 KG/M2 | DIASTOLIC BLOOD PRESSURE: 68 MMHG

## 2023-09-26 DIAGNOSIS — E11.21 TYPE 2 DIABETES MELLITUS WITH NEPHROPATHY (HCC): ICD-10-CM

## 2023-09-26 DIAGNOSIS — M10.072 ACUTE IDIOPATHIC GOUT INVOLVING TOE OF LEFT FOOT: ICD-10-CM

## 2023-09-26 DIAGNOSIS — E78.2 MIXED HYPERLIPIDEMIA: ICD-10-CM

## 2023-09-26 DIAGNOSIS — I48.0 PAROXYSMAL ATRIAL FIBRILLATION (HCC): ICD-10-CM

## 2023-09-26 DIAGNOSIS — I10 ESSENTIAL HYPERTENSION: Primary | ICD-10-CM

## 2023-09-26 PROCEDURE — 3052F HG A1C>EQUAL 8.0%<EQUAL 9.0%: CPT | Performed by: INTERNAL MEDICINE

## 2023-09-26 PROCEDURE — 3074F SYST BP LT 130 MM HG: CPT | Performed by: INTERNAL MEDICINE

## 2023-09-26 PROCEDURE — 3078F DIAST BP <80 MM HG: CPT | Performed by: INTERNAL MEDICINE

## 2023-09-26 PROCEDURE — G0008 ADMIN INFLUENZA VIRUS VAC: HCPCS | Performed by: INTERNAL MEDICINE

## 2023-09-26 PROCEDURE — 90694 VACC AIIV4 NO PRSRV 0.5ML IM: CPT | Performed by: INTERNAL MEDICINE

## 2023-09-26 PROCEDURE — 99214 OFFICE O/P EST MOD 30 MIN: CPT | Performed by: INTERNAL MEDICINE

## 2023-09-26 PROCEDURE — 1123F ACP DISCUSS/DSCN MKR DOCD: CPT | Performed by: INTERNAL MEDICINE

## 2023-09-26 RX ORDER — CLOPIDOGREL BISULFATE 75 MG/1
75 TABLET ORAL DAILY
Qty: 30 TABLET | Refills: 5 | Status: SHIPPED | OUTPATIENT
Start: 2023-09-26

## 2023-10-06 DIAGNOSIS — R92.8 ABNORMAL MAMMOGRAM: Primary | ICD-10-CM

## 2023-10-06 NOTE — PROGRESS NOTES
Patient had dx mammography on 3/2/23 which revealed:    IMPRESSION:  1. Grossly stable cluster of microcalcifications in the right breast for  which 1 additional six-month follow-up diagnostic right mammogram is  recommended to assess for stability given difficult positioning of the right  breast.     BIRADS:  BIRADS - CATEGORY 3     Probably Benign Findings. A short interval follow-up is recommended in 6  months. Per Dr. Keene Plate - order placed for dx right mammogram per protocol. Patient scheduled for 10/11/23 at [facility].

## 2023-10-11 ENCOUNTER — HOSPITAL ENCOUNTER (OUTPATIENT)
Dept: WOMENS IMAGING | Age: 78
Discharge: HOME | End: 2023-10-11
Payer: COMMERCIAL

## 2023-10-11 ENCOUNTER — HOSPITAL ENCOUNTER (OUTPATIENT)
Dept: ULTRASOUND IMAGING | Age: 78
End: 2023-10-11
Payer: COMMERCIAL

## 2023-10-11 VITALS — BODY MASS INDEX: 32.29 KG/M2 | WEIGHT: 218 LBS | HEIGHT: 69 IN

## 2023-10-11 DIAGNOSIS — R92.8 ABNORMAL MAMMOGRAM: ICD-10-CM

## 2023-10-11 PROCEDURE — G0279 TOMOSYNTHESIS, MAMMO: HCPCS

## 2023-10-20 ENCOUNTER — OFFICE VISIT (OUTPATIENT)
Dept: INTERNAL MEDICINE CLINIC | Age: 78
End: 2023-10-20
Payer: COMMERCIAL

## 2023-10-20 VITALS
WEIGHT: 215 LBS | OXYGEN SATURATION: 98 % | BODY MASS INDEX: 32.22 KG/M2 | DIASTOLIC BLOOD PRESSURE: 66 MMHG | SYSTOLIC BLOOD PRESSURE: 104 MMHG | TEMPERATURE: 96.6 F | HEART RATE: 83 BPM

## 2023-10-20 DIAGNOSIS — M79.601 PAIN OF RIGHT UPPER EXTREMITY: Primary | ICD-10-CM

## 2023-10-20 DIAGNOSIS — M48.061 SPINAL STENOSIS OF LUMBAR REGION, UNSPECIFIED WHETHER NEUROGENIC CLAUDICATION PRESENT: ICD-10-CM

## 2023-10-20 PROCEDURE — 99214 OFFICE O/P EST MOD 30 MIN: CPT | Performed by: INTERNAL MEDICINE

## 2023-10-20 PROCEDURE — 1123F ACP DISCUSS/DSCN MKR DOCD: CPT | Performed by: INTERNAL MEDICINE

## 2023-10-23 ENCOUNTER — HOSPITAL ENCOUNTER (OUTPATIENT)
Dept: MRI IMAGING | Age: 78
Discharge: HOME | End: 2023-10-23
Attending: INTERNAL MEDICINE
Payer: COMMERCIAL

## 2023-10-23 DIAGNOSIS — M48.061 SPINAL STENOSIS OF LUMBAR REGION, UNSPECIFIED WHETHER NEUROGENIC CLAUDICATION PRESENT: ICD-10-CM

## 2023-10-23 PROCEDURE — 72148 MRI LUMBAR SPINE W/O DYE: CPT

## 2023-10-26 DIAGNOSIS — M48.061 SPINAL STENOSIS AT L4-L5 LEVEL: Primary | ICD-10-CM

## 2023-11-14 ENCOUNTER — PROCEDURE VISIT (OUTPATIENT)
Dept: PHYSICAL MEDICINE AND REHAB | Age: 78
End: 2023-11-14
Payer: COMMERCIAL

## 2023-11-14 DIAGNOSIS — G56.03 BILATERAL CARPAL TUNNEL SYNDROME: Primary | ICD-10-CM

## 2023-11-14 DIAGNOSIS — M79.601 PARESTHESIA AND PAIN OF BOTH UPPER EXTREMITIES: ICD-10-CM

## 2023-11-14 DIAGNOSIS — R20.2 PARESTHESIA AND PAIN OF BOTH UPPER EXTREMITIES: ICD-10-CM

## 2023-11-14 DIAGNOSIS — E11.42 DIABETIC SENSORIMOTOR POLYNEUROPATHY (HCC): ICD-10-CM

## 2023-11-14 DIAGNOSIS — M79.602 PARESTHESIA AND PAIN OF BOTH UPPER EXTREMITIES: ICD-10-CM

## 2023-11-14 PROCEDURE — 95886 MUSC TEST DONE W/N TEST COMP: CPT | Performed by: PHYSICAL MEDICINE & REHABILITATION

## 2023-11-14 PROCEDURE — 95911 NRV CNDJ TEST 9-10 STUDIES: CPT | Performed by: PHYSICAL MEDICINE & REHABILITATION

## 2023-11-16 DIAGNOSIS — G56.03 BILATERAL CARPAL TUNNEL SYNDROME: Primary | ICD-10-CM

## 2023-11-21 ENCOUNTER — OFFICE VISIT (OUTPATIENT)
Dept: PULMONOLOGY | Age: 78
End: 2023-11-21
Payer: COMMERCIAL

## 2023-11-21 VITALS
WEIGHT: 213.2 LBS | HEART RATE: 85 BPM | DIASTOLIC BLOOD PRESSURE: 64 MMHG | SYSTOLIC BLOOD PRESSURE: 114 MMHG | BODY MASS INDEX: 31.58 KG/M2 | OXYGEN SATURATION: 97 % | HEIGHT: 69 IN

## 2023-11-21 DIAGNOSIS — G47.33 OSA (OBSTRUCTIVE SLEEP APNEA): ICD-10-CM

## 2023-11-21 DIAGNOSIS — E66.9 OBESITY (BMI 30-39.9): ICD-10-CM

## 2023-11-21 DIAGNOSIS — G47.19 EXCESSIVE DAYTIME SLEEPINESS: ICD-10-CM

## 2023-11-21 DIAGNOSIS — Z87.891 EX-CIGARETTE SMOKER: ICD-10-CM

## 2023-11-21 PROCEDURE — 1123F ACP DISCUSS/DSCN MKR DOCD: CPT | Performed by: INTERNAL MEDICINE

## 2023-11-21 PROCEDURE — 99214 OFFICE O/P EST MOD 30 MIN: CPT | Performed by: INTERNAL MEDICINE

## 2023-11-21 ASSESSMENT — ENCOUNTER SYMPTOMS
SHORTNESS OF BREATH: 0
BACK PAIN: 0
COUGH: 0
EYE ITCHING: 0
ABDOMINAL PAIN: 0
EYE DISCHARGE: 0
ABDOMINAL DISTENTION: 0

## 2023-12-05 ENCOUNTER — OFFICE VISIT (OUTPATIENT)
Dept: INTERNAL MEDICINE CLINIC | Age: 78
End: 2023-12-05
Payer: COMMERCIAL

## 2023-12-05 VITALS
HEART RATE: 74 BPM | DIASTOLIC BLOOD PRESSURE: 78 MMHG | BODY MASS INDEX: 32.01 KG/M2 | OXYGEN SATURATION: 96 % | WEIGHT: 213.6 LBS | SYSTOLIC BLOOD PRESSURE: 132 MMHG

## 2023-12-05 DIAGNOSIS — G56.01 CARPAL TUNNEL SYNDROME OF RIGHT WRIST: Primary | ICD-10-CM

## 2023-12-05 DIAGNOSIS — E78.2 MIXED HYPERLIPIDEMIA: ICD-10-CM

## 2023-12-05 DIAGNOSIS — M48.061 SPINAL STENOSIS OF LUMBAR REGION, UNSPECIFIED WHETHER NEUROGENIC CLAUDICATION PRESENT: ICD-10-CM

## 2023-12-05 DIAGNOSIS — I10 ESSENTIAL HYPERTENSION: ICD-10-CM

## 2023-12-05 DIAGNOSIS — I48.0 PAROXYSMAL ATRIAL FIBRILLATION (HCC): ICD-10-CM

## 2023-12-05 DIAGNOSIS — E11.21 TYPE 2 DIABETES MELLITUS WITH NEPHROPATHY (HCC): ICD-10-CM

## 2023-12-05 PROCEDURE — 1123F ACP DISCUSS/DSCN MKR DOCD: CPT | Performed by: INTERNAL MEDICINE

## 2023-12-05 PROCEDURE — 3078F DIAST BP <80 MM HG: CPT | Performed by: INTERNAL MEDICINE

## 2023-12-05 PROCEDURE — 3052F HG A1C>EQUAL 8.0%<EQUAL 9.0%: CPT | Performed by: INTERNAL MEDICINE

## 2023-12-05 PROCEDURE — 3075F SYST BP GE 130 - 139MM HG: CPT | Performed by: INTERNAL MEDICINE

## 2023-12-05 PROCEDURE — 99214 OFFICE O/P EST MOD 30 MIN: CPT | Performed by: INTERNAL MEDICINE

## 2023-12-15 ENCOUNTER — TELEPHONE (OUTPATIENT)
Dept: INTERNAL MEDICINE CLINIC | Age: 78
End: 2023-12-15

## 2023-12-15 NOTE — TELEPHONE ENCOUNTER
----- Message from [address] sent at 12/15/2023 11:38 AM EST -----  Subject: Medication Problem    Medication: JANUVIA 25 MG tablet  Dosage: 1 tablet once a day  Ordering Provider: Tao Ferreira    Question/Problem: Patient said, this medication is too expensive for her   co-o pay, she would like to know if the provider can prescribe something   else for her.        Pharmacy: [facility],[address], [address] [place] [address] [phone] [place] [phone]    ---------------------------------------------------------------------------  --------------  Layla RAMIREZ  [phone]; OK to leave message on voicemail  ---------------------------------------------------------------------------  --------------    SCRIPT ANSWERS  Relationship to Patient: Self

## 2024-01-10 NOTE — TELEPHONE ENCOUNTER
I will fill this today, but this really should come from the cardiologists.  This is not a med that I am real familiar with Render Risk Assessment In Note?: no Detail Level: Simple Additional Notes: Ultrasound was negative for DVT and X-ray showed no fracture at urgent care.

## 2024-01-12 RX ORDER — PROPAFENONE HYDROCHLORIDE 150 MG/1
150 TABLET, COATED ORAL 3 TIMES DAILY
Qty: 90 TABLET | Refills: 5 | Status: SHIPPED | OUTPATIENT
Start: 2024-01-12

## 2024-01-16 RX ORDER — PROPAFENONE HYDROCHLORIDE 150 MG/1
150 TABLET, COATED ORAL 3 TIMES DAILY
Qty: 90 TABLET | Refills: 5 | OUTPATIENT
Start: 2024-01-16

## 2024-02-01 ENCOUNTER — OFFICE VISIT (OUTPATIENT)
Dept: CARDIOLOGY CLINIC | Age: 79
End: 2024-02-01
Payer: COMMERCIAL

## 2024-02-01 VITALS
OXYGEN SATURATION: 96 % | SYSTOLIC BLOOD PRESSURE: 120 MMHG | DIASTOLIC BLOOD PRESSURE: 60 MMHG | BODY MASS INDEX: 31.7 KG/M2 | WEIGHT: 214 LBS | HEART RATE: 76 BPM | HEIGHT: 69 IN

## 2024-02-01 DIAGNOSIS — I48.0 PAROXYSMAL ATRIAL FIBRILLATION (HCC): ICD-10-CM

## 2024-02-01 DIAGNOSIS — E78.2 MIXED HYPERLIPIDEMIA: ICD-10-CM

## 2024-02-01 DIAGNOSIS — I15.1 HYPERTENSION SECONDARY TO OTHER RENAL DISORDERS: ICD-10-CM

## 2024-02-01 DIAGNOSIS — N28.89 HYPERTENSION SECONDARY TO OTHER RENAL DISORDERS: ICD-10-CM

## 2024-02-01 DIAGNOSIS — I25.10 ASCVD (ARTERIOSCLEROTIC CARDIOVASCULAR DISEASE): Primary | ICD-10-CM

## 2024-02-01 PROCEDURE — 1123F ACP DISCUSS/DSCN MKR DOCD: CPT | Performed by: NURSE PRACTITIONER

## 2024-02-01 PROCEDURE — 93000 ELECTROCARDIOGRAM COMPLETE: CPT | Performed by: NURSE PRACTITIONER

## 2024-02-01 PROCEDURE — 99214 OFFICE O/P EST MOD 30 MIN: CPT | Performed by: NURSE PRACTITIONER

## 2024-02-01 ASSESSMENT — ENCOUNTER SYMPTOMS
HEARTBURN: 1
EYES NEGATIVE: 1
SHORTNESS OF BREATH: 1

## 2024-02-01 NOTE — PROGRESS NOTES
tablet Take 1 tablet by mouth daily      Cholecalciferol (VITAMIN D3) 50 MCG (2000 UT) CAPS Take by mouth      ipratropium (ATROVENT) 0.06 % nasal spray 2 sprays by Each Nostril route 4 times daily 15 mL 3    colchicine (COLCRYS) 0.6 MG tablet Take 1 tablet by mouth 2 times daily as needed for Pain (gout) 30 tablet 0    allopurinol (ZYLOPRIM) 100 MG tablet take 1 tablet by mouth once daily      atorvastatin (LIPITOR) 80 MG tablet Take 1 tab by mouth once daily 90 tablet 3    Multiple Vitamins-Minerals (MULTIVITAMIN ADULT PO) Take by mouth      Lancets MISC 1 each by In Vitro route 2 times daily 100 each 3    Glucose Blood (BLOOD GLUCOSE TEST STRIPS) STRP 1 each by In Vitro route 2 times daily 100 strip 3    Blood Glucose Monitoring Suppl AV 1 kit by Does not apply route daily 1 Device 0    Calcium Carbonate (CALTRATE 600 PO) Take  by mouth 2 times daily.       No current facility-administered medications for this visit.          All pertinent data reviewed and discussed with patient       ASSESSMENT/PLAN:  1. ASCVD (arteriosclerotic cardiovascular disease)  Patient on plavix. Denies chest pain, fatigue, or SOB. Continue current medication.        2. Mixed hyperlipidemia  Patient on atorvastatin. Patient had lipids 5/2023. Repeat labs with next visit. Continue current dose of mediation. Will adjust as necessary with lab results. Encouraged diet and exercise.         3. Hypertension secondary to other renal disorders  Patient on metoprolol. Blood pressure at goal. Encouraged diet and exercise. Encouraged low sodium diet.         4. Paroxysmal atrial fibrillation    Infrequent palpitation x1 occurrence. Will continue to monitor. Patient on eliquis, rythmol, and metoprolol. Denies any abnormal bleeding. Continue current medication. EKG in office SR with . Will discuss rhythmol with CAD with EP tomorrow and further discuss with patient. Rhythmol may not be the drug of choice with CAD.          Atrial

## 2024-02-01 NOTE — PATIENT INSTRUCTIONS
Please be informed that if you contact our office outside of normal business hours the physician on call cannot help with any scheduling or rescheduling issues, procedure instruction questions or any type of medication issue.    We advise you for any urgent/emergency that you go to the nearest emergency room!    PLEASE CALL OUR OFFICE DURING NORMAL BUSINESS HOURS    Monday - Friday   8 am to 5 pm    [place]: [phone]    [place]: [phone]    [place]:  [phone]  **It is YOUR responsibilty to bring medication bottles and/or updated medication list to EACH APPOINTMENT. This will allow us to better serve you and all your healthcare needs**  Thank you for allowing us to care for you today!   We want to ensure we can follow your treatment plan and we strive to give you the best outcomes and experience possible.   If you ever have a life threatening emergency and call 911 - for an ambulance (EMS)   Our providers can only care for you at:   [facility] or [facility].   Even if you have someone take you or you drive yourself we can only care for you in a [facility] facility. Our providers are not setup at the other healthcare locations!   We are committed to providing you the best care possible.    If you receive a survey after visiting one of our offices, please take time to share your experience concerning your physician office visit.  These surveys are confidential and no health information about you is shared.    We are eager to improve for you and we are counting on your feedback to help make that happen.

## 2024-02-02 ENCOUNTER — NURSE ONLY (OUTPATIENT)
Dept: CARDIOLOGY CLINIC | Age: 79
End: 2024-02-02

## 2024-02-02 NOTE — PROGRESS NOTES
Cased reviewed with EP:   Has moderate CAD : [facility] 06/2022   50% MID LAD STENOSIS CALCIFIED ARTERY   NORMAL IFR .95   CX MILD DISEASE   RCA MILD DISEAE   MILD PUL HTN    Has h/o atrial fibrillation:on Rythmol  CKD on PD  Consider changing Rythmol to multaq in the setting of CAD: Rythmol may cause arrhythmia : [place]  If patient is comfortable on Rythmol can leave on however is she would like to switch will phone in new RX    Attempted to phone patient - [phone]: no answer

## 2024-02-13 ENCOUNTER — TELEPHONE (OUTPATIENT)
Dept: CARDIOLOGY CLINIC | Age: 79
End: 2024-02-13

## 2024-02-13 ENCOUNTER — HOSPITAL ENCOUNTER (OUTPATIENT)
Dept: INFUSION THERAPY | Age: 79
Discharge: HOME | End: 2024-02-13
Payer: COMMERCIAL

## 2024-02-13 ENCOUNTER — OFFICE VISIT (OUTPATIENT)
Dept: ONCOLOGY | Age: 79
End: 2024-02-13
Payer: COMMERCIAL

## 2024-02-13 VITALS
BODY MASS INDEX: 32.38 KG/M2 | TEMPERATURE: 97.5 F | WEIGHT: 218.6 LBS | HEART RATE: 77 BPM | DIASTOLIC BLOOD PRESSURE: 60 MMHG | HEIGHT: 69 IN | SYSTOLIC BLOOD PRESSURE: 130 MMHG | OXYGEN SATURATION: 97 %

## 2024-02-13 DIAGNOSIS — Z17.0 MALIGNANT NEOPLASM OF UPPER-OUTER QUADRANT OF RIGHT BREAST IN FEMALE, ESTROGEN RECEPTOR POSITIVE (HCC): Primary | ICD-10-CM

## 2024-02-13 DIAGNOSIS — Z12.31 ENCOUNTER FOR SCREENING MAMMOGRAM FOR MALIGNANT NEOPLASM OF BREAST: ICD-10-CM

## 2024-02-13 DIAGNOSIS — C50.411 MALIGNANT NEOPLASM OF UPPER-OUTER QUADRANT OF RIGHT BREAST IN FEMALE, ESTROGEN RECEPTOR POSITIVE (HCC): Primary | ICD-10-CM

## 2024-02-13 PROCEDURE — 99213 OFFICE O/P EST LOW 20 MIN: CPT | Performed by: INTERNAL MEDICINE

## 2024-02-13 PROCEDURE — 1123F ACP DISCUSS/DSCN MKR DOCD: CPT | Performed by: INTERNAL MEDICINE

## 2024-02-13 PROCEDURE — 99211 OFF/OP EST MAY X REQ PHY/QHP: CPT

## 2024-02-13 NOTE — PROGRESS NOTES
MA Rooming Questions  Patient: Krupa Sanchez  MRN: [id number]    Date: 2/13/2024        1. Do you have any new issues?   no         2. Do you need any refills on medications?    no    3. Have you had any imaging done since your last visit?   no    4. Have you been hospitalized or seen in the emergency room since your last visit here?   Yes Carpal Tunnel at [facility]    5. Did the patient have a depression screening completed today? Yes    PHQ-9 Total Score: 0 (2/13/2024 11:14 AM)       PHQ-9 Given to (if applicable):               PHQ-9 Score (if applicable):                     [] Positive     []  Negative              Does question #9 need addressed (if applicable)                     [] Yes    []  No               Maki Gallo CMA      
motion noted, tone is normal  NEUROLOGIC: awake, alert, oriented to name, place and time. Motor skills grossly intact.   SKIN: Normal skin color, texture, turgor and no jaundice. appears intact   EXTREMITIES: no LE edema, no clubbing, no leg swelling, no cyanosis,   BREASTS: Post surgical and radiation therapy changes noted in her right breast. No palpable mass in bilateral breasts and axilla.      Labs:  Hematology:  Lab Results   Component Value Date    WBC 9.9 07/03/2023    RBC 3.36 (L) 07/03/2023    HGB 9.2 (L) 07/03/2023    HCT 30.7 (L) 07/03/2023    MCV 91.4 07/03/2023    MCH 27.4 07/03/2023    MCHC 30.0 (L) 07/03/2023    RDW 15.5 (H) 07/03/2023     07/03/2023    MPV 10.0 07/03/2023    SEGSPCT 75.4 (H) 07/03/2023    EOSRELPCT 2.4 07/03/2023    BASOPCT 0.3 07/03/2023    LYMPHOPCT 13.5 (L) 07/03/2023    MONOPCT 8.0 (H) 07/03/2023    SEGSABS 7.5 07/03/2023    EOSABS 0.2 07/03/2023    BASOSABS 0.0 07/03/2023    LYMPHSABS 1.3 07/03/2023    MONOSABS 0.8 07/03/2023    DIFFTYPE AUTOMATED DIFFERENTIAL 07/03/2023     Lab Results   Component Value Date    ESR 47 (H) 01/13/2022     Chemistry:  Lab Results   Component Value Date     07/03/2023    K 4.1 07/03/2023    CL 98 (L) 07/03/2023    CO2 25 07/03/2023    BUN 55 (H) 07/03/2023    CREATININE 15.0 (H) 08/07/2023    GLUCOSE 112 (H) 07/03/2023    CALCIUM 8.4 07/03/2023    PROT 5.5 (L) 07/03/2023    LABALBU 3.2 (L) 07/03/2023    BILITOT 0.2 07/03/2023    ALKPHOS 88 07/03/2023    AST 12 (L) 07/03/2023    ALT 15 07/03/2023    LABGLOM 3 (L) 07/03/2023    GFRAA 9 (L) 08/23/2022    AGRATIO 1.2 09/28/2021    GLOB 3.4 09/28/2021    PHOS 5.7 (H) 07/02/2023    MG 1.9 07/03/2023    POCCA 1.10 (L) 01/23/2017    POCGLU 114 (H) 08/26/2022     Lab Results   Component Value Date     (H) 01/13/2022     No results found for: \"LD\"  Lab Results   Component Value Date    TSHHS 1.010 05/08/2021    T4FREE 1.33 11/30/2017    FT3 2.4 04/01/2014     Immunology:  Lab Results

## 2024-02-13 NOTE — TELEPHONE ENCOUNTER
Spoke with patient, relayed message from PEYTON Pride NP regarding Rythmol vs Multaq (see 2/2 note). Patient feels fine at this time, will notify office if symptom occur.

## 2024-03-12 DIAGNOSIS — I10 ESSENTIAL HYPERTENSION: ICD-10-CM

## 2024-03-12 RX ORDER — METOPROLOL TARTRATE 50 MG/1
50 TABLET, FILM COATED ORAL 2 TIMES DAILY
Qty: 180 TABLET | Refills: 3 | OUTPATIENT
Start: 2024-03-12

## 2024-03-26 ENCOUNTER — HOSPITAL ENCOUNTER (INPATIENT)
Age: 79
LOS: 3 days | Discharge: HOME | End: 2024-03-29
Attending: EMERGENCY MEDICINE | Admitting: STUDENT IN AN ORGANIZED HEALTH CARE EDUCATION/TRAINING PROGRAM
Payer: COMMERCIAL

## 2024-03-26 ENCOUNTER — APPOINTMENT (OUTPATIENT)
Dept: GENERAL RADIOLOGY | Age: 79
End: 2024-03-26
Payer: COMMERCIAL

## 2024-03-26 ENCOUNTER — APPOINTMENT (OUTPATIENT)
Dept: NON INVASIVE DIAGNOSTICS | Age: 79
End: 2024-03-26
Attending: STUDENT IN AN ORGANIZED HEALTH CARE EDUCATION/TRAINING PROGRAM
Payer: COMMERCIAL

## 2024-03-26 DIAGNOSIS — R79.89 ELEVATED TROPONIN: ICD-10-CM

## 2024-03-26 DIAGNOSIS — N17.9 AKI (ACUTE KIDNEY INJURY) (HCC): ICD-10-CM

## 2024-03-26 DIAGNOSIS — R07.89 CHEST TIGHTNESS: ICD-10-CM

## 2024-03-26 DIAGNOSIS — I48.91 ATRIAL FIBRILLATION WITH RVR (HCC): Primary | ICD-10-CM

## 2024-03-26 DIAGNOSIS — N18.6 ESRD ON HEMODIALYSIS (HCC): ICD-10-CM

## 2024-03-26 DIAGNOSIS — I27.20 PULMONARY HYPERTENSION, UNSPECIFIED (HCC): ICD-10-CM

## 2024-03-26 DIAGNOSIS — Z99.2 ESRD ON HEMODIALYSIS (HCC): ICD-10-CM

## 2024-03-26 DIAGNOSIS — R06.02 SHORTNESS OF BREATH: ICD-10-CM

## 2024-03-26 LAB
ALBUMIN SERPL-MCNC: 3.4 GM/DL (ref 3.4–5)
ALBUMIN SERPL-MCNC: 3.6 GM/DL (ref 3.4–5)
ALP BLD-CCNC: 68 IU/L (ref 40–128)
ALP BLD-CCNC: 71 IU/L (ref 40–128)
ALT SERPL-CCNC: 13 U/L (ref 10–40)
ALT SERPL-CCNC: 14 U/L (ref 10–40)
ANION GAP SERPL CALCULATED.3IONS-SCNC: 17 MMOL/L (ref 7–16)
ANION GAP SERPL CALCULATED.3IONS-SCNC: 19 MMOL/L (ref 7–16)
AST SERPL-CCNC: 12 IU/L (ref 15–37)
AST SERPL-CCNC: 14 IU/L (ref 15–37)
BASOPHILS ABSOLUTE: 0.1 K/CU MM
BASOPHILS RELATIVE PERCENT: 0.7 % (ref 0–1)
BILIRUB SERPL-MCNC: 0.2 MG/DL (ref 0–1)
BILIRUB SERPL-MCNC: 0.3 MG/DL (ref 0–1)
BUN SERPL-MCNC: 48 MG/DL (ref 6–23)
BUN SERPL-MCNC: 49 MG/DL (ref 6–23)
CALCIUM SERPL-MCNC: 8.1 MG/DL (ref 8.3–10.6)
CALCIUM SERPL-MCNC: 8.7 MG/DL (ref 8.3–10.6)
CHLORIDE BLD-SCNC: 93 MMOL/L (ref 99–110)
CHLORIDE BLD-SCNC: 94 MMOL/L (ref 99–110)
CO2: 21 MMOL/L (ref 21–32)
CO2: 23 MMOL/L (ref 21–32)
CREAT SERPL-MCNC: 13 MG/DL (ref 0.6–1.1)
CREAT SERPL-MCNC: 13.4 MG/DL (ref 0.6–1.1)
CRP SERPL HS-MCNC: 23.4 MG/L
DIFFERENTIAL TYPE: ABNORMAL
ECHO AO ROOT DIAM: 3 CM
ECHO AO ROOT INDEX: 1.43 CM/M2
ECHO AV AREA PEAK VELOCITY: 2.4 CM2
ECHO AV AREA VTI: 2.3 CM2
ECHO AV AREA/BSA PEAK VELOCITY: 1.1 CM2/M2
ECHO AV AREA/BSA VTI: 1.1 CM2/M2
ECHO AV MEAN GRADIENT: 5 MMHG
ECHO AV MEAN VELOCITY: 1.1 M/S
ECHO AV PEAK GRADIENT: 8 MMHG
ECHO AV PEAK VELOCITY: 1.4 M/S
ECHO AV VELOCITY RATIO: 0.71
ECHO AV VTI: 24 CM
ECHO BSA: 2.15 M2
ECHO IVC PROX: 1.3 CM
ECHO LA AREA 4C: 13.3 CM2
ECHO LA DIAMETER INDEX: 1.62 CM/M2
ECHO LA DIAMETER: 3.4 CM
ECHO LA MAJOR AXIS: 6.7 CM
ECHO LA TO AORTIC ROOT RATIO: 1.13
ECHO LA VOL MOD A4C: 22 ML (ref 22–52)
ECHO LA VOLUME INDEX MOD A4C: 10 ML/M2 (ref 16–34)
ECHO LV E' LATERAL VELOCITY: 6 CM/S
ECHO LV E' SEPTAL VELOCITY: 7 CM/S
ECHO LV EDV A4C: 53 ML
ECHO LV EDV INDEX A4C: 25 ML/M2
ECHO LV EJECTION FRACTION A4C: 65 %
ECHO LV ESV A4C: 18 ML
ECHO LV ESV INDEX A4C: 9 ML/M2
ECHO LV FRACTIONAL SHORTENING: 40 % (ref 28–44)
ECHO LV INTERNAL DIMENSION DIASTOLE INDEX: 1.43 CM/M2
ECHO LV INTERNAL DIMENSION DIASTOLIC: 3 CM (ref 3.9–5.3)
ECHO LV INTERNAL DIMENSION SYSTOLIC INDEX: 0.86 CM/M2
ECHO LV INTERNAL DIMENSION SYSTOLIC: 1.8 CM
ECHO LV IVSD: 1.2 CM (ref 0.6–0.9)
ECHO LV MASS 2D: 149 G (ref 67–162)
ECHO LV MASS INDEX 2D: 71 G/M2 (ref 43–95)
ECHO LV POSTERIOR WALL DIASTOLIC: 1.7 CM (ref 0.6–0.9)
ECHO LV RELATIVE WALL THICKNESS RATIO: 1.13
ECHO LVOT AREA: 3.1 CM2
ECHO LVOT AV VTI INDEX: 0.74
ECHO LVOT DIAM: 2 CM
ECHO LVOT MEAN GRADIENT: 2 MMHG
ECHO LVOT PEAK GRADIENT: 4 MMHG
ECHO LVOT PEAK VELOCITY: 1 M/S
ECHO LVOT STROKE VOLUME INDEX: 26.6 ML/M2
ECHO LVOT SV: 55.9 ML
ECHO LVOT VTI: 17.8 CM
ECHO RV MID DIMENSION: 2.7 CM
EOSINOPHILS ABSOLUTE: 0.1 K/CU MM
EOSINOPHILS RELATIVE PERCENT: 1.8 % (ref 0–3)
ESTIMATED AVERAGE GLUCOSE: 171 MG/DL
GFR SERPL CREATININE-BSD FRML MDRD: 3 ML/MIN/1.73M2
GFR SERPL CREATININE-BSD FRML MDRD: 3 ML/MIN/1.73M2
GLUCOSE SERPL-MCNC: 195 MG/DL (ref 70–99)
GLUCOSE SERPL-MCNC: 243 MG/DL (ref 70–99)
HBA1C MFR BLD: 7.6 % (ref 4.2–6.3)
HCT VFR BLD CALC: 33.9 % (ref 37–47)
HEMOGLOBIN: 10.9 GM/DL (ref 12.5–16)
IMMATURE NEUTROPHIL %: 0.5 % (ref 0–0.43)
LACTATE: 1.7 MMOL/L (ref 0.5–1.9)
LYMPHOCYTES ABSOLUTE: 1 K/CU MM
LYMPHOCYTES RELATIVE PERCENT: 13.1 % (ref 24–44)
MAGNESIUM: 1.3 MG/DL (ref 1.8–2.4)
MCH RBC QN AUTO: 28.8 PG (ref 27–31)
MCHC RBC AUTO-ENTMCNC: 32.2 % (ref 32–36)
MCV RBC AUTO: 89.7 FL (ref 78–100)
MONOCYTES ABSOLUTE: 0.6 K/CU MM
MONOCYTES RELATIVE PERCENT: 7.3 % (ref 0–4)
NUCLEATED RBC %: 0 %
PDW BLD-RTO: 15.8 % (ref 11.7–14.9)
PLATELET # BLD: 276 K/CU MM (ref 140–440)
PMV BLD AUTO: 10.2 FL (ref 7.5–11.1)
POTASSIUM SERPL-SCNC: 3.1 MMOL/L (ref 3.5–5.1)
POTASSIUM SERPL-SCNC: 4 MMOL/L (ref 3.5–5.1)
PRO-BNP: ABNORMAL PG/ML
PROCALCITONIN SERPL-MCNC: 0.52 NG/ML
RBC # BLD: 3.78 M/CU MM (ref 4.2–5.4)
SEGMENTED NEUTROPHILS ABSOLUTE COUNT: 5.9 K/CU MM
SEGMENTED NEUTROPHILS RELATIVE PERCENT: 76.6 % (ref 36–66)
SODIUM BLD-SCNC: 131 MMOL/L (ref 135–145)
SODIUM BLD-SCNC: 136 MMOL/L (ref 135–145)
TOTAL IMMATURE NEUTOROPHIL: 0.04 K/CU MM
TOTAL NUCLEATED RBC: 0 K/CU MM
TOTAL PROTEIN: 5.8 GM/DL (ref 6.4–8.2)
TOTAL PROTEIN: 6.8 GM/DL (ref 6.4–8.2)
TROPONIN, HIGH SENSITIVITY: 295 NG/L (ref 0–14)
TROPONIN, HIGH SENSITIVITY: 300 NG/L (ref 0–14)
TROPONIN, HIGH SENSITIVITY: 330 NG/L (ref 0–14)
TROPONIN, HIGH SENSITIVITY: 334 NG/L (ref 0–14)
TSH SERPL DL<=0.005 MIU/L-ACNC: 2.44 UIU/ML (ref 0.27–4.2)
WBC # BLD: 7.6 K/CU MM (ref 4–10.5)

## 2024-03-26 PROCEDURE — 2500000003 HC RX 250 WO HCPCS: Performed by: EMERGENCY MEDICINE

## 2024-03-26 PROCEDURE — 93306 TTE W/DOPPLER COMPLETE: CPT

## 2024-03-26 PROCEDURE — 36415 COLL VENOUS BLD VENIPUNCTURE: CPT

## 2024-03-26 PROCEDURE — 85025 COMPLETE CBC W/AUTO DIFF WBC: CPT

## 2024-03-26 PROCEDURE — 86140 C-REACTIVE PROTEIN: CPT

## 2024-03-26 PROCEDURE — 83605 ASSAY OF LACTIC ACID: CPT

## 2024-03-26 PROCEDURE — 93306 TTE W/DOPPLER COMPLETE: CPT | Performed by: INTERNAL MEDICINE

## 2024-03-26 PROCEDURE — 2140000000 HC CCU INTERMEDIATE R&B

## 2024-03-26 PROCEDURE — 3E1M39Z IRRIGATION OF PERITONEAL CAVITY USING DIALYSATE, PERCUTANEOUS APPROACH: ICD-10-PCS | Performed by: INTERNAL MEDICINE

## 2024-03-26 PROCEDURE — 84484 ASSAY OF TROPONIN QUANT: CPT

## 2024-03-26 PROCEDURE — 83735 ASSAY OF MAGNESIUM: CPT

## 2024-03-26 PROCEDURE — 96374 THER/PROPH/DIAG INJ IV PUSH: CPT

## 2024-03-26 PROCEDURE — 71045 X-RAY EXAM CHEST 1 VIEW: CPT

## 2024-03-26 PROCEDURE — 83036 HEMOGLOBIN GLYCOSYLATED A1C: CPT

## 2024-03-26 PROCEDURE — 83880 ASSAY OF NATRIURETIC PEPTIDE: CPT

## 2024-03-26 PROCEDURE — 93005 ELECTROCARDIOGRAM TRACING: CPT | Performed by: EMERGENCY MEDICINE

## 2024-03-26 PROCEDURE — 84443 ASSAY THYROID STIM HORMONE: CPT

## 2024-03-26 PROCEDURE — 80053 COMPREHEN METABOLIC PANEL: CPT

## 2024-03-26 PROCEDURE — 2580000003 HC RX 258: Performed by: EMERGENCY MEDICINE

## 2024-03-26 PROCEDURE — 84145 PROCALCITONIN (PCT): CPT

## 2024-03-26 PROCEDURE — 2580000003 HC RX 258: Performed by: STUDENT IN AN ORGANIZED HEALTH CARE EDUCATION/TRAINING PROGRAM

## 2024-03-26 PROCEDURE — 99285 EMERGENCY DEPT VISIT HI MDM: CPT

## 2024-03-26 PROCEDURE — 90945 DIALYSIS ONE EVALUATION: CPT

## 2024-03-26 PROCEDURE — 6370000000 HC RX 637 (ALT 250 FOR IP): Performed by: STUDENT IN AN ORGANIZED HEALTH CARE EDUCATION/TRAINING PROGRAM

## 2024-03-26 PROCEDURE — 6370000000 HC RX 637 (ALT 250 FOR IP): Performed by: EMERGENCY MEDICINE

## 2024-03-26 RX ORDER — SODIUM CHLORIDE 9 MG/ML
INJECTION, SOLUTION INTRAVENOUS PRN
Status: DISCONTINUED | OUTPATIENT
Start: 2024-03-26 | End: 2024-03-29 | Stop reason: HOSPADM

## 2024-03-26 RX ORDER — VITAMIN B COMPLEX
2000 TABLET ORAL DAILY
Status: DISCONTINUED | OUTPATIENT
Start: 2024-03-26 | End: 2024-03-29 | Stop reason: HOSPADM

## 2024-03-26 RX ORDER — DILTIAZEM HYDROCHLORIDE 5 MG/ML
10 INJECTION INTRAVENOUS ONCE
Status: COMPLETED | OUTPATIENT
Start: 2024-03-26 | End: 2024-03-26

## 2024-03-26 RX ORDER — ALLOPURINOL 100 MG/1
100 TABLET ORAL DAILY
Status: DISCONTINUED | OUTPATIENT
Start: 2024-03-26 | End: 2024-03-29 | Stop reason: HOSPADM

## 2024-03-26 RX ORDER — PROPAFENONE HYDROCHLORIDE 150 MG/1
150 TABLET, COATED ORAL 3 TIMES DAILY
Status: DISCONTINUED | OUTPATIENT
Start: 2024-03-26 | End: 2024-03-27

## 2024-03-26 RX ORDER — ATORVASTATIN CALCIUM 40 MG/1
80 TABLET, FILM COATED ORAL NIGHTLY
Status: DISCONTINUED | OUTPATIENT
Start: 2024-03-26 | End: 2024-03-29 | Stop reason: HOSPADM

## 2024-03-26 RX ORDER — CLOPIDOGREL BISULFATE 75 MG/1
75 TABLET ORAL DAILY
Status: DISCONTINUED | OUTPATIENT
Start: 2024-03-26 | End: 2024-03-29 | Stop reason: HOSPADM

## 2024-03-26 RX ORDER — SODIUM CHLORIDE 0.9 % (FLUSH) 0.9 %
5-40 SYRINGE (ML) INJECTION PRN
Status: DISCONTINUED | OUTPATIENT
Start: 2024-03-26 | End: 2024-03-29 | Stop reason: HOSPADM

## 2024-03-26 RX ORDER — SODIUM CHLORIDE 0.9 % (FLUSH) 0.9 %
5-40 SYRINGE (ML) INJECTION EVERY 12 HOURS SCHEDULED
Status: DISCONTINUED | OUTPATIENT
Start: 2024-03-26 | End: 2024-03-29 | Stop reason: HOSPADM

## 2024-03-26 RX ORDER — IPRATROPIUM BROMIDE 42 UG/1
2 SPRAY, METERED NASAL 4 TIMES DAILY
Status: DISCONTINUED | OUTPATIENT
Start: 2024-03-26 | End: 2024-03-29 | Stop reason: HOSPADM

## 2024-03-26 RX ORDER — INSULIN LISPRO 100 [IU]/ML
0-4 INJECTION, SOLUTION INTRAVENOUS; SUBCUTANEOUS
Status: DISCONTINUED | OUTPATIENT
Start: 2024-03-26 | End: 2024-03-29 | Stop reason: HOSPADM

## 2024-03-26 RX ORDER — DEXTROSE MONOHYDRATE 100 MG/ML
INJECTION, SOLUTION INTRAVENOUS CONTINUOUS PRN
Status: DISCONTINUED | OUTPATIENT
Start: 2024-03-26 | End: 2024-03-29 | Stop reason: HOSPADM

## 2024-03-26 RX ORDER — ONDANSETRON 4 MG/1
4 TABLET, ORALLY DISINTEGRATING ORAL EVERY 8 HOURS PRN
Status: DISCONTINUED | OUTPATIENT
Start: 2024-03-26 | End: 2024-03-29 | Stop reason: HOSPADM

## 2024-03-26 RX ORDER — ACETAMINOPHEN 650 MG/1
650 SUPPOSITORY RECTAL EVERY 6 HOURS PRN
Status: DISCONTINUED | OUTPATIENT
Start: 2024-03-26 | End: 2024-03-29 | Stop reason: HOSPADM

## 2024-03-26 RX ORDER — GLUCAGON 1 MG/ML
1 KIT INJECTION PRN
Status: DISCONTINUED | OUTPATIENT
Start: 2024-03-26 | End: 2024-03-29 | Stop reason: HOSPADM

## 2024-03-26 RX ORDER — COLCHICINE 0.6 MG/1
0.6 TABLET ORAL 2 TIMES DAILY PRN
Status: DISCONTINUED | OUTPATIENT
Start: 2024-03-26 | End: 2024-03-29 | Stop reason: HOSPADM

## 2024-03-26 RX ORDER — ASPIRIN 81 MG/1
81 TABLET, CHEWABLE ORAL ONCE
Status: COMPLETED | OUTPATIENT
Start: 2024-03-26 | End: 2024-03-26

## 2024-03-26 RX ORDER — INSULIN LISPRO 100 [IU]/ML
0-4 INJECTION, SOLUTION INTRAVENOUS; SUBCUTANEOUS NIGHTLY
Status: DISCONTINUED | OUTPATIENT
Start: 2024-03-26 | End: 2024-03-29 | Stop reason: HOSPADM

## 2024-03-26 RX ORDER — POLYETHYLENE GLYCOL 3350 17 G/17G
17 POWDER, FOR SOLUTION ORAL DAILY PRN
Status: DISCONTINUED | OUTPATIENT
Start: 2024-03-26 | End: 2024-03-29 | Stop reason: HOSPADM

## 2024-03-26 RX ORDER — DILTIAZEM HYDROCHLORIDE 60 MG/1
120 CAPSULE, EXTENDED RELEASE ORAL ONCE
Status: COMPLETED | OUTPATIENT
Start: 2024-03-26 | End: 2024-03-26

## 2024-03-26 RX ORDER — CALCIUM CARBONATE 500(1250)
500 TABLET ORAL 2 TIMES DAILY
Status: DISCONTINUED | OUTPATIENT
Start: 2024-03-26 | End: 2024-03-29 | Stop reason: HOSPADM

## 2024-03-26 RX ORDER — POTASSIUM CHLORIDE 20 MEQ/1
20 TABLET, EXTENDED RELEASE ORAL
Status: DISCONTINUED | OUTPATIENT
Start: 2024-03-27 | End: 2024-03-27

## 2024-03-26 RX ORDER — ONDANSETRON 2 MG/ML
4 INJECTION INTRAMUSCULAR; INTRAVENOUS EVERY 6 HOURS PRN
Status: DISCONTINUED | OUTPATIENT
Start: 2024-03-26 | End: 2024-03-29 | Stop reason: HOSPADM

## 2024-03-26 RX ORDER — POTASSIUM CHLORIDE 20 MEQ/1
40 TABLET, EXTENDED RELEASE ORAL ONCE
Status: COMPLETED | OUTPATIENT
Start: 2024-03-26 | End: 2024-03-26

## 2024-03-26 RX ORDER — ACETAMINOPHEN 325 MG/1
650 TABLET ORAL EVERY 6 HOURS PRN
Status: DISCONTINUED | OUTPATIENT
Start: 2024-03-26 | End: 2024-03-29 | Stop reason: HOSPADM

## 2024-03-26 RX ORDER — DILTIAZEM HYDROCHLORIDE 120 MG/1
120 CAPSULE, EXTENDED RELEASE ORAL DAILY
COMMUNITY
Start: 2024-02-13

## 2024-03-26 RX ADMIN — ATORVASTATIN CALCIUM 80 MG: 40 TABLET, FILM COATED ORAL at 21:06

## 2024-03-26 RX ADMIN — DILTIAZEM HYDROCHLORIDE 120 MG: 60 CAPSULE, EXTENDED RELEASE ORAL at 12:30

## 2024-03-26 RX ADMIN — SODIUM CHLORIDE, PRESERVATIVE FREE 10 ML: 5 INJECTION INTRAVENOUS at 21:07

## 2024-03-26 RX ADMIN — PROPAFENONE HYDROCHLORIDE 150 MG: 150 TABLET, FILM COATED ORAL at 16:26

## 2024-03-26 RX ADMIN — PROPAFENONE HYDROCHLORIDE 150 MG: 150 TABLET, FILM COATED ORAL at 21:06

## 2024-03-26 RX ADMIN — APIXABAN 2.5 MG: 2.5 TABLET, FILM COATED ORAL at 21:07

## 2024-03-26 RX ADMIN — ASPIRIN 81 MG: 81 TABLET, CHEWABLE ORAL at 12:30

## 2024-03-26 RX ADMIN — CLOPIDOGREL BISULFATE 75 MG: 75 TABLET ORAL at 16:26

## 2024-03-26 RX ADMIN — ALLOPURINOL 100 MG: 100 TABLET ORAL at 16:26

## 2024-03-26 RX ADMIN — CALCIUM 500 MG: 500 TABLET ORAL at 21:06

## 2024-03-26 RX ADMIN — POTASSIUM CHLORIDE 40 MEQ: 1500 TABLET, EXTENDED RELEASE ORAL at 12:30

## 2024-03-26 RX ADMIN — Medication 2000 UNITS: at 16:26

## 2024-03-26 RX ADMIN — METOPROLOL TARTRATE 25 MG: 25 TABLET, FILM COATED ORAL at 21:06

## 2024-03-26 RX ADMIN — SODIUM CHLORIDE 5 MG/HR: 900 INJECTION, SOLUTION INTRAVENOUS at 10:23

## 2024-03-26 RX ADMIN — DILTIAZEM HYDROCHLORIDE 10 MG: 5 INJECTION INTRAVENOUS at 10:21

## 2024-03-26 ASSESSMENT — PAIN DESCRIPTION - DESCRIPTORS: DESCRIPTORS: DULL

## 2024-03-26 ASSESSMENT — PAIN DESCRIPTION - FREQUENCY: FREQUENCY: CONTINUOUS

## 2024-03-26 ASSESSMENT — PAIN SCALES - GENERAL: PAINLEVEL_OUTOF10: 2

## 2024-03-26 ASSESSMENT — PAIN DESCRIPTION - ORIENTATION: ORIENTATION: MID

## 2024-03-26 ASSESSMENT — PAIN - FUNCTIONAL ASSESSMENT: PAIN_FUNCTIONAL_ASSESSMENT: ACTIVITIES ARE NOT PREVENTED

## 2024-03-26 ASSESSMENT — PAIN DESCRIPTION - PAIN TYPE: TYPE: ACUTE PAIN

## 2024-03-26 ASSESSMENT — PAIN DESCRIPTION - ONSET: ONSET: ON-GOING

## 2024-03-26 ASSESSMENT — PAIN DESCRIPTION - LOCATION: LOCATION: CHEST

## 2024-03-26 ASSESSMENT — PAIN DESCRIPTION - DIRECTION: RADIATING_TOWARDS: LOCALIZED

## 2024-03-26 NOTE — H&P
V2.0  History and Physical      Name:  Krupa Sanchez /Age/Sex: 1945  (78 y.o. female)   MRN & CSN:  [id number] & [id number] Encounter Date/Time: 3/26/2024 12:27 PM EDT   Location:  ED29/ED-29 PCP: Anant Ernandez MD       Hospital Day: 1    Assessment and Plan:   Krupa Sanchez is a 78 y.o. female with a pmh of paroxysmal atrial fibrillation, essential hypertension, type 2 diabetes mellitus with nephropathy, mixed hyperlipidemia who presents with Atrial fibrillation with RVR (HCC)    Hospital Problems             Last Modified POA    * (Principal) Atrial fibrillation with RVR (HCC) 3/26/2024 Yes       Plan:  Atrial fibrillation with rapid ventricular response:  [id number] Score: 5  -Possibly in the setting of hypokalemia  -Admit to stepdown unit  -Telemetry  -O2 as needed, keep oxygen saturation above 92%  -Patient's status post diltiazem IV push 10 mg in the ED  -Patient ordered for diltiazem 120 mg p.o. in ED  -Will continue patient's home medications with Rythmol as well as metoprolol  -Patient status post aspirin in the ED  -Patient currently on Plavix and Eliquis, will continue  -Cardiology consulted from the ED  -Check TSH  -Replete electrolytes, keep magnesium above 2, potassium above 4, calcium above 8.4  -TTE    Hypokalemia:  -Patient status post KCl 40 MEQ p.o. in ED  -Recheck potassium level and replete as needed to keep potassium level above 4    ESRD:  -Hemodialysis as per nephro  -Strict intake and output record  -Avoid nephrotoxic medications  -Adjust medications to patient's creatinine clearance    Hyperlipidemia-continue atorvastatin  Essential hypertension-continue metoprolol  Type 2 diabetes mellitus with nephropathy-follow A1c, start with low-dose sliding scale insulin with hypoglycemia protocol, hold oral hypoglycemics while inpatient  History of lumbar spinal stenosis  Miscellaneous-continue home meds except contraindicated    Disposition:   Current Living situation:  Home  Expected Disposition: Home  Estimated D/C: 2-3 days    Diet Diet NPO   DVT Prophylaxis [] Lovenox, []  Heparin, [] SCDs, [] Ambulation,  [x] Eliquis, [] Xarelto, [] Coumadin   Code Status Prior   Surrogate Decision Maker/ HENNA Magda Sanchez (Child) as per chart     Personally reviewed Lab Studies and Imaging     Discussed management of the case with ER physician/staff who recommended inpatient admission    EKG interpreted personally and results atrial fibrillation with RVR    Imaging that was interpreted personally includes chest x-ray and results unremarkable    Drugs that require monitoring for toxicity include diltiazem and the method of monitoring was heart rate/bradycardia        History from:     patient    History of Present Illness:     Chief Complaint: Chest pain, racing heart, shortness of breath    Krupa Sanchez is a 78 y.o. female with pmh of paroxysmal atrial fibrillation, essential hypertension, type 2 diabetes mellitus with nephropathy, mixed hyperlipidemia who presents with sudden onset symptoms of chest pain, palpitations and shortness of breath.  Review of system otherwise unremarkable.  Patient compliant with medications.  Patient denies any fever or chills, no bowel/bladder/appetite or weight changes.  Patient tachycardic to 150s per minute on arrival, blood pressure 109/64 mmHg.  EKG with atrial fibrillation with 2: 1 AV block versus SVT, patient status post Cardizem 10 mg IV with patient's rhythm converting to normal sinus with sinus tachycardia at around 102/min.  Cardiology was consulted from the ED and recommended to give p.o. diltiazem.  Nephro was also consulted from the ED.  Labs significant for potassium 3.1, BUN 48/creatinine 13, anion gap 19, glucose 243, proBNP 89480, troponin 295, no leukocytosis, chest x-ray unremarkable.  Patient currently being admitted for further management.     Review of Systems:        Pertinent positives and negatives discussed in HPI     Objective:   No  3/26/2024  EXAM: PORTABLE AP CHEST X-RAY,  3/26/2024 INDICATION: chest pain COMPARISON: 7/1/2023 FINDINGS: HEART / MEDIASTINUM: Stable LUNGS/PLEURA: No dense focal consolidation, pulmonary venous congestion, pleural effusion or pneumothorax. BONES / SOFT TISSUES: No acute abnormality. OTHER: None.     No evidence for acute cardiopulmonary disease.         Electronically signed by Bernardo Mcnamara MD on 3/26/2024 at 12:27 PM

## 2024-03-26 NOTE — PROGRESS NOTES
Pt ordered for sliding scale insulin and accuchecks ACHS. This RN was going to check pt's glucose, but daughter reminded pt that she isn't to get fingerstick blood glucose level's drawn r/t the type of PD solution she is on. Pt daughter is going to bring her machine from home that she is able to use. PS sent to Dr. Mcnamara asking for orders so that we are able to use glucose readings from her personal machine. Dr. Mcnamara asking to clarify hospital policy.

## 2024-03-26 NOTE — ED PROVIDER NOTES
EMERGENCY DEPARTMENT ENCOUNTER      CHIEF COMPLAINT:   Chest  Racing heart  Shortness of breath    HPI: Krupa Sanchez is a 78 y.o. female who presents to the Emergency Department complaining of chest pain associated with feeling her heart race and feeling short of breath.  The patient does home intraperitoneal dialysis and developed chest pain, shortness of breath and a racing heart while she was doing her treatment this morning.  She called EMS.  She states that her chest feels tight and she feels short of breath.  The symptoms have been constant since onset.  The tightness is located across the chest.  The patient denies radiation of pain. There are no exacerbating or relieving factors.  The patient is tachycardic on arrival.  She has a history of atrial fibrillation.  She is anticoagulated on Eliquis.  She has been taking her medications as directed.  She denies a known history of coronary artery disease.  She denies any other complaints.    REVIEW OF SYSTEMS:  CONSTITUTIONAL:  Denies fever, chills, weight loss or weakness  EYES:  Denies photophobia or discharge  ENT:  Denies sore throat or ear pain  CARDIOVASCULAR: See HPI  RESPIRATORY: See HPI  GI:  Denies abdominal pain, nausea, vomiting, or diarrhea  MUSCULOSKELETAL:  Denies back pain  SKIN:  No rash  NEUROLOGIC:  Denies headache, focal weakness or sensory changes  All systems negative except as marked.  \"Remaining review of systems reviewed and negative. I have reviewed the nursing triage documentation and agree unless otherwise noted below.\"      PAST MEDICAL HISTORY:   Past Medical History:   Diagnosis Date    14 day event monitor 11/05/2018    Sinus rhythm    Atrial fibrillation with RVR ([facility]) 11/25/2013    Edema     4/12 TTE diastolic dysfxn, EF 55%; 11/10 - TTE diastolic dysfxn, EF 55%; Stress myoview  WNl, EF 70%    Family history of cardiovascular disease     GERD (gastroesophageal reflux disease)     H/O 24 hour EKG monitoring 4/23/12    [facility]    H/O  emergency department.  I discussed the case with the patient's nephrologist, Dr. Soto, who will see her in consult and arrange for her to have peritoneal dialysis tonight.  She recommends replacing the potassium and so 40 mEq of oral potassium was ordered.    Patient was given the following medications:  Medications   aspirin chewable tablet 81 mg (has no administration in time range)   dilTIAZem (CARDIZEM 12 HR) extended release capsule 120 mg (has no administration in time range)   potassium chloride (KLOR-CON M) extended release tablet 40 mEq (has no administration in time range)   dilTIAZem injection 10 mg (10 mg IntraVENous Given 3/26/24 1021)       Diagnosis and Differential Diagnosis:  I suspect that the patient had an episode of A-fib with RVR and has since converted.  Her troponin is elevated.  She also has chronic renal failure and hypokalemia.  I have a low suspicion for STEMI, thoracic aortic dissection, pulmonary embolism, pericardial effusion or pneumothorax.     Disposition Considerations:  I recommended admission to the hospital and the patient was agreeable. I discussed the case with the hospitalist who will admit the patient for further treatment and care. The patient is currently in stable condition awaiting admission.    CRITICAL CARE NOTE:  There was a high probability of clinically significant life-threatening deterioration of the patient's condition requiring my urgent intervention.  Total critical care time is 45 minutes  This includes vital sign monitoring, pulse oximetry monitoring, telemetry monitoring, clinical response to the IV medications, reviewing the nursing notes, consultation time, dictation/documetation time. (This time excludes time spent performing procedures).      I am the Primary Clinician of Record.    Clinical Impression:  1. Atrial fibrillation with RVR ([facility])    2. Elevated troponin    3. Chest tightness    4. Shortness of breath    5. ESRD on hemodialysis ([facility])

## 2024-03-26 NOTE — PLAN OF CARE
Problem: Discharge Planning  Goal: Discharge to home or other facility with appropriate resources  Outcome: Progressing  Flowsheets (Taken 3/26/2024 1779)  Discharge to home or other facility with appropriate resources:   Identify barriers to discharge with patient and caregiver   Arrange for needed discharge resources and transportation as appropriate   Identify discharge learning needs (meds, wound care, etc)   Arrange for interpreters to assist at discharge as needed   Refer to discharge planning if patient needs post-hospital services based on physician order or complex needs related to functional status, cognitive ability or social support system     Problem: Pain  Goal: Verbalizes/displays adequate comfort level or baseline comfort level  Outcome: Progressing     Problem: Safety - Adult  Goal: Free from fall injury  Outcome: Progressing

## 2024-03-26 NOTE — ED TRIAGE NOTES
Pt arrives via EMS from home with c/o chest pain after home dialysis, Hx A fib, does take cardizem, rate 150s upon arrival

## 2024-03-26 NOTE — ED NOTES
Medication History  [facility]    Patient Name: Krupa Sanchez 1945     Medication history has been completed by: Dorie Brewer [facility]    Source(s) of information: patient and insurance claims     Primary Care Physician: Anant Ernandez MD     Pharmacy: [facility]    Allergies as of 03/26/2024 - Reviewed 03/26/2024   Allergen Reaction Noted    Latex  09/30/2012    Tape [adhesive tape]  11/05/2012        Prior to Admission medications    Medication Sig Start Date End Date Taking? Authorizing Provider   CARTIA  MG extended release capsule Take 1 capsule by mouth daily 2/13/24  Yes Nahomi Nuñez MD   metoprolol tartrate (LOPRESSOR) 25 MG tablet Take 1 tablet by mouth 2 times daily take 1 tablet by mouth twice a day 3/12/24   Anant Ernandez MD   apixaban (ELIQUIS) 2.5 MG TABS tablet Take 1 tablet by mouth 2 times daily 1/17/24   Jennifer Pride APRN - CNP   propafenone (RYTHMOL) 150 MG tablet Take 1 tablet by mouth 3 times daily 1/12/24   Jeremias Camarillo MD   Elastic Bandages & Supports (WRIST SPLINT/COCK-UP/RIGHT M) MISC 1 Device by Does not apply route daily 12/5/23   Anant Ernandez MD   clopidogrel (PLAVIX) 75 MG tablet Take 1 tablet by mouth daily 9/26/23   Anant Ernandez MD   JANUVIA 25 MG tablet Take 1 tablet by mouth daily 6/19/23   Nahomi Nuñez MD   glimepiride (AMARYL) 2 MG tablet Take 1 tablet by mouth daily 6/17/23   Nahomi Nuñez MD   Cholecalciferol (VITAMIN D3) 50 MCG (2000 UT) CAPS Take by mouth    Nahomi Nuñez MD   ipratropium (ATROVENT) 0.06 % nasal spray 2 sprays by Each Nostril route 4 times daily 1/23/23   Anant Ernandez MD   colchicine (COLCRYS) 0.6 MG tablet Take 1 tablet by mouth 2 times daily as needed for Pain (gout) 1/23/23   Anant Ernandez MD   allopurinol (ZYLOPRIM) 100 MG tablet take 1 tablet by mouth once daily 6/9/22   Nahomi Nuñez MD   atorvastatin (LIPITOR)

## 2024-03-26 NOTE — PROGRESS NOTES
ESRD ON PD, HERE FOR AFIB WITH RVR  GETTING ADMITTED  GIVE 40MEQ PO KCL FOR THE HYPOKALEMIA OF 3.1  WILL PLAN PD TONIGHT IF SHE ENDS UP IN A REGULAR PT ROOM RATHER THAN AN ER ROOM    THX

## 2024-03-26 NOTE — CONSULTS
Nephrology Service Consultation      [address] [facility], [address]  [place] [address]  Phone: [phone]  Office Hours: 8:30AM - 4:30PM  Monday - Friday        MEDICAL DECISION MAKING and Recommendations   -Afib with RVR  -Hypokalemia  -ESRD on PD  -HTN    Suggest:  -Start KCL 20meq po daily  -Obtain a magnesium level and replete as needed  -Start PD tonight  -Will follow  -BMP tomorrow    Thank you          Patient Active Problem List    Diagnosis Date Noted    Dependence on renal dialysis ([facility]) 01/23/2023    Pulmonary hypertension, unspecified ([facility]) 01/23/2023    Atrial fibrillation with RVR ([facility]) 03/26/2024    ASCVD (arteriosclerotic cardiovascular disease) 08/01/2023    ESRD (end stage renal disease) ([facility]) 07/01/2023    Hyperpotassemia 02/15/2022    JESSI (acute kidney injury) ([facility]) 12/14/2021    Syncope and collapse 05/08/2021    Chronic kidney disease, stage V ([facility]) 03/10/2021    Acquired cyst of kidney 12/01/2020    Malignant neoplasm of upper-outer quadrant of right breast in female, estrogen receptor positive ([facility]) 11/17/2020    Intestinal malabsorption 11/17/2020    Obesity (BMI 30-39.9) 06/20/2019    Excessive daytime sleepiness 06/20/2019    Ex-cigarette smoker 06/20/2019    Acute blood loss anemia 11/08/2018    Carpal tunnel syndrome on left 08/14/2018    Trigger finger, left ring finger 08/14/2018    Type 2 diabetes mellitus with nephropathy ([facility]) 08/10/2016    Hypertension secondary to other renal disorders 12/17/2015    Prolonged Q-T interval on ECG 12/17/2015    Mixed hyperlipidemia 11/13/2015    Iron deficiency anemia 11/13/2015    Gastroesophageal reflux disease without esophagitis 11/13/2015    Edema of both legs 06/15/2015    Urge incontinence     Bilateral leg edema 10/21/2013    Colon polyps 08/22/2013    Osteopenia     Osteoarthritis     ADOLFO (obstructive sleep apnea)     Menopause     Paroxysmal atrial fibrillation ([facility])          Patient:  Krupa Sanchez  MRN:  (CERVIX REMOVED)  1980's    JOINT REPLACEMENT  2009    Total Left Knee    JOINT REPLACEMENT  2010    Total Right Knee    SOFI STEROTACTIC LOC BREAST BIOPSY RIGHT Right 02/18/2021    SOFI STEROTACTIC LOC BREAST BIOPSY RIGHT [facility]    OVARY REMOVAL      UPPER GASTROINTESTINAL ENDOSCOPY N/A 11/08/2018    EGD DIAGNOSTIC ONLY performed by Janae Collins MD at [facility] ENDOSCOPY       Medications:   Prior to Admission medications    Medication Sig Start Date End Date Taking? Authorizing Provider   CARTIA  MG extended release capsule Take 1 capsule by mouth daily 2/13/24  Yes Nahomi Nuñez MD   metoprolol tartrate (LOPRESSOR) 25 MG tablet Take 1 tablet by mouth 2 times daily take 1 tablet by mouth twice a day 3/12/24   Anant Ernandez MD   apixaban (ELIQUIS) 2.5 MG TABS tablet Take 1 tablet by mouth 2 times daily 1/17/24   Jennifer Pride APRN - CNP   propafenone (RYTHMOL) 150 MG tablet Take 1 tablet by mouth 3 times daily 1/12/24   Jeremias Camarillo MD   Elastic Bandages & Supports (WRIST SPLINT/COCK-UP/RIGHT M) MISC 1 Device by Does not apply route daily 12/5/23   Anant Ernandez MD   clopidogrel (PLAVIX) 75 MG tablet Take 1 tablet by mouth daily 9/26/23   Anant Ernandez MD   JANUVIA 25 MG tablet Take 1 tablet by mouth daily  Patient not taking: Reported on 3/26/2024 6/19/23   Nahomi Nuñez MD   glimepiride (AMARYL) 2 MG tablet Take 1 tablet by mouth daily 6/17/23   Nahomi Nuñez MD   Cholecalciferol (VITAMIN D3) 50 MCG (2000 UT) CAPS Take by mouth    Nahomi Nuñez MD   ipratropium (ATROVENT) 0.06 % nasal spray 2 sprays by Each Nostril route 4 times daily 1/23/23   Anant Ernandez MD   colchicine (COLCRYS) 0.6 MG tablet Take 1 tablet by mouth 2 times daily as needed for Pain (gout)  Patient not taking: Reported on 3/26/2024 1/23/23   Anant Ernandez MD   allopurinol (ZYLOPRIM) 100 MG tablet take 1 tablet by mouth once  Medical Necessity Statement: Based on my medical judgement, Mohs surgery is the most appropriate treatment for this cancer compared to other treatments.

## 2024-03-26 NOTE — PROGRESS NOTES
4 Eyes Skin Assessment     NAME:  Krupa Sanchez  YOB: 1945  MEDICAL RECORD NUMBER:  [id number]    The patient is being assessed for  Admission    I agree that at least one RN has performed a thorough Head to Toe Skin Assessment on the patient. ALL assessment sites listed below have been assessed.      Areas assessed by both nurses:    Head, Face, Ears, Shoulders, Back, Chest, Arms, Elbows, Hands, Sacrum. Buttock, Coccyx, Ischium, Legs. Feet and Heels, and Under Medical Devices         Does the Patient have a Wound? No noted wound(s)       Jose Prevention initiated by RN: No  Wound Care Orders initiated by RN: No    Pressure Injury (Stage 3,4, Unstageable, DTI, NWPT, and Complex wounds) if present, place Wound referral order by RN under : No    New Ostomies, if present place, Ostomy referral order under : No     Nurse 1 eSignature: Electronically signed by Amelia Kaur RN on 3/26/24 at 5:42 PM EDT    **SHARE this note so that the co-signing nurse can place an eSignature**    Nurse 2 eSignature: {Esignature:[id number]}

## 2024-03-26 NOTE — ED NOTES
ED TO INPATIENT SBAR HANDOFF    Patient Name: Krupa Sanchez   :  1945  78 y.o.   Preferred Name    Family/Caregiver Present yes   Restraints no   C-SSRS: Risk of Suicide: No Risk  Sitter no   Sepsis Risk Score Sepsis Risk Score: 1.8      Situation  Chief Complaint   Patient presents with    Chest Pain     Sudden onset while at dialysis      Brief Description of Patient's Condition: Pt arrives via EMS from home with c/o CP after finishing her home dialysis, Pt found to be in A fib with RVR, converted after being placed on Cardizem drip.  Drip stopped, PO dose given, Pt continues in sinus, is A&O x4, GCS 15, independent ADLs  Mental Status: oriented  Arrived from: home    Imaging:   XR CHEST PORTABLE   Final Result      No evidence for acute cardiopulmonary disease.               Abnormal labs:   Abnormal Labs Reviewed   CBC WITH AUTO DIFFERENTIAL - Abnormal; Notable for the following components:       Result Value    RBC 3.78 (*)     Hemoglobin 10.9 (*)     Hematocrit 33.9 (*)     RDW 15.8 (*)     Segs Relative 76.6 (*)     Lymphocytes % 13.1 (*)     Monocytes % 7.3 (*)     Immature Neutrophil % 0.5 (*)     All other components within normal limits   COMPREHENSIVE METABOLIC PANEL - Abnormal; Notable for the following components:    Potassium 3.1 (*)     Chloride 94 (*)     Anion Gap 19 (*)     Glucose 243 (*)     BUN 48 (*)     Creatinine 13.0 (*)     Est, Glom Filt Rate 3 (*)     AST 14 (*)     All other components within normal limits   BRAIN NATRIURETIC PEPTIDE - Abnormal; Notable for the following components:    Pro-BNP 20,140 (*)     All other components within normal limits   TROPONIN - Abnormal; Notable for the following components:    Troponin, High Sensitivity 300 (*)     All other components within normal limits   TROPONIN - Abnormal; Notable for the following components:    Troponin, High Sensitivity 295 (*)     All other components within normal limits       Background  History:   Past Medical  patient in the sinus rhythm with occasional supraventricular ectopic beats.  There is a rare short atrial run.    Paroxysmal atrial fibrillation (HCC)     4/12 Holter WL    Prolonged emergence from general anesthesia     Proteinuria     Supraventricular tachycardia     Type 2 diabetes mellitus (HCC) Dx 2000's    Urge incontinence     Wears partial dentures     upper partial       Assessment    Vitals:        Vitals:    03/26/24 1102 03/26/24 1132 03/26/24 1147 03/26/24 1202   BP: 121/60 120/64  123/63   Pulse: (!) 102 (!) 104 99 99   Resp: 17 19 18 18   Temp:       TempSrc:       SpO2: 94% 96% 94% 96%   Weight:         PO Status: Regular  O2 Flow Rate:      Cardiac Rhythm: Sinus  Last documented pain medication administered:   NIH Score: NIH     Active LDA's:   Peripheral IV 03/26/24 Right Forearm (Active)       Pertinent or High Risk Medications/Drips: no   If Yes, please provide details:   Blood Product Administration: no  If Yes, please provide details:     Recommendation    Incomplete orders   Additional Comments:    If any further questions, please call Sending RN at ED    Electronically signed by: Electronically signed by Antelmo Justice RN on 3/26/2024 at 2:17 PM

## 2024-03-26 NOTE — PROGRESS NOTES
Pt connected to the cycler per order. Dressing changed, no redness or drainage noted. Call light left within reach.

## 2024-03-27 LAB
ALBUMIN SERPL-MCNC: 3.1 GM/DL (ref 3.4–5)
ALP BLD-CCNC: 60 IU/L (ref 40–128)
ALT SERPL-CCNC: 11 U/L (ref 10–40)
ANION GAP SERPL CALCULATED.3IONS-SCNC: 15 MMOL/L (ref 7–16)
AST SERPL-CCNC: 13 IU/L (ref 15–37)
BASOPHILS ABSOLUTE: 0 K/CU MM
BASOPHILS RELATIVE PERCENT: 0.5 % (ref 0–1)
BILIRUB SERPL-MCNC: 0.2 MG/DL (ref 0–1)
BUN SERPL-MCNC: 47 MG/DL (ref 6–23)
CALCIUM SERPL-MCNC: 7.9 MG/DL (ref 8.3–10.6)
CHLORIDE BLD-SCNC: 98 MMOL/L (ref 99–110)
CO2: 24 MMOL/L (ref 21–32)
CREAT SERPL-MCNC: 12.7 MG/DL (ref 0.6–1.1)
DIFFERENTIAL TYPE: ABNORMAL
EKG ATRIAL RATE: 102 BPM
EKG ATRIAL RATE: 87 BPM
EKG DIAGNOSIS: NORMAL
EKG DIAGNOSIS: NORMAL
EKG P AXIS: -9 DEGREES
EKG P-R INTERVAL: 240 MS
EKG Q-T INTERVAL: 326 MS
EKG Q-T INTERVAL: 352 MS
EKG QRS DURATION: 88 MS
EKG QRS DURATION: 92 MS
EKG QTC CALCULATION (BAZETT): 458 MS
EKG QTC CALCULATION (BAZETT): 515 MS
EKG R AXIS: -38 DEGREES
EKG R AXIS: -39 DEGREES
EKG T AXIS: 129 DEGREES
EKG T AXIS: 161 DEGREES
EKG VENTRICULAR RATE: 102 BPM
EKG VENTRICULAR RATE: 150 BPM
EOSINOPHILS ABSOLUTE: 0.2 K/CU MM
EOSINOPHILS RELATIVE PERCENT: 2.3 % (ref 0–3)
GFR SERPL CREATININE-BSD FRML MDRD: 3 ML/MIN/1.73M2
GLUCOSE SERPL-MCNC: 179 MG/DL (ref 70–99)
HCT VFR BLD CALC: 31 % (ref 37–47)
HEMOGLOBIN: 9.6 GM/DL (ref 12.5–16)
IMMATURE NEUTROPHIL %: 0.5 % (ref 0–0.43)
LYMPHOCYTES ABSOLUTE: 1.3 K/CU MM
LYMPHOCYTES RELATIVE PERCENT: 15.8 % (ref 24–44)
MAGNESIUM: 1.3 MG/DL (ref 1.8–2.4)
MCH RBC QN AUTO: 28.5 PG (ref 27–31)
MCHC RBC AUTO-ENTMCNC: 31 % (ref 32–36)
MCV RBC AUTO: 92 FL (ref 78–100)
MONOCYTES ABSOLUTE: 0.7 K/CU MM
MONOCYTES RELATIVE PERCENT: 9 % (ref 0–4)
NUCLEATED RBC %: 0 %
PDW BLD-RTO: 15.9 % (ref 11.7–14.9)
PLATELET # BLD: 243 K/CU MM (ref 140–440)
PMV BLD AUTO: 10.3 FL (ref 7.5–11.1)
POTASSIUM SERPL-SCNC: 4 MMOL/L (ref 3.5–5.1)
RBC # BLD: 3.37 M/CU MM (ref 4.2–5.4)
SEGMENTED NEUTROPHILS ABSOLUTE COUNT: 5.8 K/CU MM
SEGMENTED NEUTROPHILS RELATIVE PERCENT: 71.9 % (ref 36–66)
SODIUM BLD-SCNC: 137 MMOL/L (ref 135–145)
TOTAL IMMATURE NEUTOROPHIL: 0.04 K/CU MM
TOTAL NUCLEATED RBC: 0 K/CU MM
TOTAL PROTEIN: 5.3 GM/DL (ref 6.4–8.2)
TROPONIN, HIGH SENSITIVITY: 298 NG/L (ref 0–14)
TROPONIN, HIGH SENSITIVITY: 315 NG/L (ref 0–14)
TROPONIN, HIGH SENSITIVITY: 329 NG/L (ref 0–14)
TROPONIN, HIGH SENSITIVITY: 330 NG/L (ref 0–14)
WBC # BLD: 8.1 K/CU MM (ref 4–10.5)

## 2024-03-27 PROCEDURE — 6370000000 HC RX 637 (ALT 250 FOR IP): Performed by: STUDENT IN AN ORGANIZED HEALTH CARE EDUCATION/TRAINING PROGRAM

## 2024-03-27 PROCEDURE — 85025 COMPLETE CBC W/AUTO DIFF WBC: CPT

## 2024-03-27 PROCEDURE — 93005 ELECTROCARDIOGRAM TRACING: CPT | Performed by: STUDENT IN AN ORGANIZED HEALTH CARE EDUCATION/TRAINING PROGRAM

## 2024-03-27 PROCEDURE — 99222 1ST HOSP IP/OBS MODERATE 55: CPT | Performed by: INTERNAL MEDICINE

## 2024-03-27 PROCEDURE — 6370000000 HC RX 637 (ALT 250 FOR IP): Performed by: INTERNAL MEDICINE

## 2024-03-27 PROCEDURE — 6360000002 HC RX W HCPCS

## 2024-03-27 PROCEDURE — 84484 ASSAY OF TROPONIN QUANT: CPT

## 2024-03-27 PROCEDURE — 36415 COLL VENOUS BLD VENIPUNCTURE: CPT

## 2024-03-27 PROCEDURE — 2140000000 HC CCU INTERMEDIATE R&B

## 2024-03-27 PROCEDURE — 87040 BLOOD CULTURE FOR BACTERIA: CPT

## 2024-03-27 PROCEDURE — 80053 COMPREHEN METABOLIC PANEL: CPT

## 2024-03-27 PROCEDURE — 2580000003 HC RX 258: Performed by: STUDENT IN AN ORGANIZED HEALTH CARE EDUCATION/TRAINING PROGRAM

## 2024-03-27 PROCEDURE — 83735 ASSAY OF MAGNESIUM: CPT

## 2024-03-27 PROCEDURE — 94761 N-INVAS EAR/PLS OXIMETRY MLT: CPT

## 2024-03-27 PROCEDURE — 93010 ELECTROCARDIOGRAM REPORT: CPT | Performed by: INTERNAL MEDICINE

## 2024-03-27 PROCEDURE — 6360000002 HC RX W HCPCS: Performed by: STUDENT IN AN ORGANIZED HEALTH CARE EDUCATION/TRAINING PROGRAM

## 2024-03-27 RX ORDER — MAGNESIUM SULFATE IN WATER 40 MG/ML
2000 INJECTION, SOLUTION INTRAVENOUS ONCE
Status: COMPLETED | OUTPATIENT
Start: 2024-03-27 | End: 2024-03-27

## 2024-03-27 RX ORDER — POTASSIUM CHLORIDE 20 MEQ/1
20 TABLET, EXTENDED RELEASE ORAL
Status: DISCONTINUED | OUTPATIENT
Start: 2024-03-27 | End: 2024-03-28

## 2024-03-27 RX ORDER — LANOLIN ALCOHOL/MO/W.PET/CERES
400 CREAM (GRAM) TOPICAL DAILY
Status: DISCONTINUED | OUTPATIENT
Start: 2024-03-27 | End: 2024-03-29 | Stop reason: HOSPADM

## 2024-03-27 RX ORDER — MAGNESIUM SULFATE 4 G/50ML
4000 INJECTION INTRAVENOUS ONCE
Status: DISCONTINUED | OUTPATIENT
Start: 2024-03-27 | End: 2024-03-27

## 2024-03-27 RX ORDER — METOPROLOL TARTRATE 50 MG/1
50 TABLET, FILM COATED ORAL 2 TIMES DAILY
Status: DISCONTINUED | OUTPATIENT
Start: 2024-03-27 | End: 2024-03-29 | Stop reason: HOSPADM

## 2024-03-27 RX ADMIN — INSULIN LISPRO 1 UNITS: 100 INJECTION, SOLUTION INTRAVENOUS; SUBCUTANEOUS at 17:39

## 2024-03-27 RX ADMIN — PROPAFENONE HYDROCHLORIDE 150 MG: 150 TABLET, FILM COATED ORAL at 10:39

## 2024-03-27 RX ADMIN — IPRATROPIUM BROMIDE 2 SPRAY: 42 SPRAY, METERED NASAL at 17:22

## 2024-03-27 RX ADMIN — METOPROLOL TARTRATE 50 MG: 50 TABLET, FILM COATED ORAL at 21:48

## 2024-03-27 RX ADMIN — SODIUM CHLORIDE, PRESERVATIVE FREE 10 ML: 5 INJECTION INTRAVENOUS at 10:35

## 2024-03-27 RX ADMIN — MAGNESIUM SULFATE HEPTAHYDRATE 2000 MG: 40 INJECTION, SOLUTION INTRAVENOUS at 10:34

## 2024-03-27 RX ADMIN — ALLOPURINOL 100 MG: 100 TABLET ORAL at 10:29

## 2024-03-27 RX ADMIN — APIXABAN 2.5 MG: 2.5 TABLET, FILM COATED ORAL at 21:48

## 2024-03-27 RX ADMIN — SODIUM CHLORIDE, PRESERVATIVE FREE 10 ML: 5 INJECTION INTRAVENOUS at 21:50

## 2024-03-27 RX ADMIN — Medication 2000 UNITS: at 10:28

## 2024-03-27 RX ADMIN — IPRATROPIUM BROMIDE 2 SPRAY: 42 SPRAY, METERED NASAL at 21:50

## 2024-03-27 RX ADMIN — ATORVASTATIN CALCIUM 80 MG: 40 TABLET, FILM COATED ORAL at 21:48

## 2024-03-27 RX ADMIN — METOPROLOL TARTRATE 50 MG: 50 TABLET, FILM COATED ORAL at 10:28

## 2024-03-27 RX ADMIN — MAGNESIUM SULFATE HEPTAHYDRATE 2000 MG: 40 INJECTION, SOLUTION INTRAVENOUS at 03:03

## 2024-03-27 RX ADMIN — POTASSIUM CHLORIDE 20 MEQ: 1500 TABLET, EXTENDED RELEASE ORAL at 10:41

## 2024-03-27 RX ADMIN — CLOPIDOGREL BISULFATE 75 MG: 75 TABLET ORAL at 10:37

## 2024-03-27 RX ADMIN — CALCIUM 500 MG: 500 TABLET ORAL at 10:29

## 2024-03-27 RX ADMIN — IPRATROPIUM BROMIDE 2 SPRAY: 42 SPRAY, METERED NASAL at 13:59

## 2024-03-27 RX ADMIN — Medication 400 MG: at 10:28

## 2024-03-27 RX ADMIN — CALCIUM 500 MG: 500 TABLET ORAL at 21:48

## 2024-03-27 ASSESSMENT — PAIN SCALES - GENERAL
PAINLEVEL_OUTOF10: 0

## 2024-03-27 NOTE — PROGRESS NOTES
V2.0    [facility] Progress Note      Name:  Krupa Sancehz /Age/Sex: 1945  (78 y.o. female)   MRN & CSN:  [id number] & [id number] Encounter Date/Time: 3/27/2024 12:15 PM EDT   Location:  [address] [place] [address]-A PCP: Anant Ernandez MD     Attending:Bernardo Mcnamara MD       Hospital Day: 2    Assessment and Recommendations   Krupa Sanchez is a 78 y.o. female with pmh of aroxysmal atrial fibrillation, essential hypertension, type 2 diabetes mellitus with nephropathy, mixed hyperlipidemia   who presents with Atrial fibrillation with RVR ([facility])      Plan:     Atrial fibrillation with rapid ventricular response:  [id number] Score: 5  -Possibly in the setting of hypokalemia  -Admit to stepdown unit  -Telemetry  -O2 as needed, keep oxygen saturation above 92%  -Patient's status post diltiazem IV push 10 mg in the ED  -Patient ordered for diltiazem 120 mg p.o. in ED  -Will continue patient's home medications with Rythmol as well as metoprolol  -Patient status post aspirin in the ED  -Patient currently on Plavix and Eliquis, will continue  -Cardiology consulted from the ED  -Check TSH  -Replete electrolytes, keep magnesium above 2, potassium above 4, calcium above 8.4  -TTE     Hypokalemia:  -Patient status post KCl 40 MEQ p.o. in ED  -Recheck potassium level and replete as needed to keep potassium level above 4     ESRD:  -Hemodialysis as per nephro  -Strict intake and output record  -Avoid nephrotoxic medications  -Adjust medications to patient's creatinine clearance     Hyperlipidemia-continue atorvastatin  Essential hypertension-continue metoprolol  Type 2 diabetes mellitus with nephropathy-follow A1c, start with low-dose sliding scale insulin with hypoglycemia protocol, hold oral hypoglycemics while inpatient  History of lumbar spinal stenosis  Miscellaneous-continue home meds except contraindicated    Diet Diet NPO   DVT Prophylaxis [] Lovenox, []  Heparin, [] SCDs, [] Ambulation,  [x] Eliquis, [] Xarelto  []  Coumadin   Code Status Full Code   Disposition From: Home  Expected Disposition: Home  Estimated Date of Discharge: 1-2 days  Patient requires continued admission due to Afib   Surrogate Decision Maker/ POA  Self/daughter     Personally reviewed Lab Studies and Imaging     Subjective:     Chief Complaint: Chest pain, racing heart, shortness of breath     Krupa Sanchez is a 78 y.o. female who presents with Atrial fibrillation w RVR      Review of Systems:      Pertinent positives and negatives discussed in HPI    Objective:     Intake/Output Summary (Last 24 hours) at 3/27/2024 1215  Last data filed at 3/27/2024 0738  Gross per 24 hour   Intake 80 ml   Output -303 ml   Net 383 ml      Vitals:   Vitals:    03/26/24 2330 03/27/24 0307 03/27/24 0759 03/27/24 1015   BP: (!) 107/55 122/62 126/65 (!) 101/56   Pulse: 71 77 74 92   Resp: 21 17 16 16   Temp: 97.4 °F (36.3 °C) 97.4 °F (36.3 °C) 97.7 °F (36.5 °C)    TempSrc: Oral Oral Oral    SpO2: 95% 98% 97%    Weight:  95.1 kg (209 lb 11.2 oz)     Height:             Physical Exam:      General: NAD  Eyes: EOMI  ENT: neck supple  Cardiovascular: Regular rate.  Respiratory: Clear to auscultation  Gastrointestinal: Soft, non tender  Genitourinary: no suprapubic tenderness  Musculoskeletal: No edema  Skin: warm, dry  Neuro: Alert.  Psych: Mood appropriate.         Medications:   Medications:    potassium chloride  20 mEq Oral Daily with breakfast    magnesium oxide  400 mg Oral Daily    metoprolol tartrate  50 mg Oral BID    magnesium sulfate  2,000 mg IntraVENous Once    apixaban  2.5 mg Oral BID    allopurinol  100 mg Oral Daily    atorvastatin  80 mg Oral Nightly    calcium elemental  500 mg Oral BID    Vitamin D  2,000 Units Oral Daily    clopidogrel  75 mg Oral Daily    insulin lispro  0-4 Units SubCUTAneous TID     insulin lispro  0-4 Units SubCUTAneous Nightly    propafenone  150 mg Oral TID    ipratropium  2 spray Each Nostril 4x Daily    sodium chloride flush  5-40

## 2024-03-27 NOTE — PROGRESS NOTES
Pt disconnected from cycler post tx. UF -302 ml. Effluent clear, yellow.   Did a manual drain on the machine with no extra off. Also hooked up a manual bag to try to drain by gravity and no fluid removed.   MD notified.

## 2024-03-27 NOTE — PROGRESS NOTES
Nephrology  Dialysis Note        [address] [facility], [address] [place] [address]  Phone: [phone]  Office Hours: 8:30AM - 4:30PM  Monday - Friday          PROCEDURE:  Patient seen during PERITONEAL DIALYSIS      PHYSICIAN:  ESAU      INDICATION:  End-stage renal disease      RX:  See dialysis flowsheet for specifics on access, blood flow rate, dialysate baths, duration of dialysis, anticoagulation and other technical information.      COMMENTS:  -SEEN ON PD  -THE MACHINE KEPT ALARMING ALL NIGHT//HOPING SHE CAN GO HOME TODAY TO GET BACK ON HER HOME MACHINE//WILL HAVE TO DO A MANUAL DRAIN OF THE FLUID IN HER PERITONEUM  -K WNL AT 4 TODAY, GIVE A DOSE OF PO KCL TODAY  -REplete mag    Electronically signed by Lisandro Soto DO on 3/27/2024 at 7:12 AM    [facility]  MD EMILY LOVE DO  [address] [facility],  [address] [place] [id number]  PHONE: [phone]  FAX: [phone]

## 2024-03-27 NOTE — CONSULTS
CARDIOLOGY CONSULT NOTE         Reason for consultation: SVT      Primary care physician: Anant Ernandez MD      Chief Complaints :  Chief Complaint   Patient presents with    Chest Pain     Sudden onset while at dialysis         History of present illness:Krupa is a 78 y.o.year old female who has prior history of atrial fibrillation, hypertension, type 2 diabetes mellitus, end-stage renal disease who is now admitted with palpitations and shortness of breath.  She was noted to have SVT versus atypical flutter with a ventricular rate of 150 bpm.  Patient converted to sinus rhythm after given diltiazem.  Upon my evaluation she denies any chest pain, shortness of breath.  Her palpitations have improved.    Review of Systems:     All systems negative except as marked.        Physical Examination:    Vitals:    03/27/24 0759   BP: 126/65   Pulse: 74   Resp: 16   Temp: 97.7 °F (36.5 °C)   SpO2: 97%        General Appearance:  No distress, conversant    Constitutional:  No acute distress, non-toxic appearance.    HENT:  Normocephalic, Atraumatic,   Eyes:  PERRL, EOMI, Conjunctiva normal, No discharge.   Respiratory:  No respiratory distress, No wheezing  Cardiovascular: S1, S2, no murmurs, gallops. JVD wnl  Abdomen /GI:   Soft, No tenderness   Genitourinary: No costovertebral angle tenderness   Musculoskeletal:  No edema, no tenderness, no deformities.   Integument:  Well hydrated, no rash   Neurologic:  Alert & oriented x 3, no focal deficits noted       Medical decision making and Data review:    Lab Review   Recent Labs     03/27/24  0329   WBC 8.1   HGB 9.6*   HCT 31.0*         Recent Labs     03/27/24  0329      K 4.0   CL 98*   CO2 24   BUN 47*   CREATININE 12.7*     Recent Labs     03/27/24  0329   AST 13*   ALT 11   BILITOT 0.2   ALKPHOS 60     No results for input(s): \"TROPONINT\" in the last 72 hours.    Recent Labs     03/26/24  0955   PROBNP 20,140*     Lab Results   Component Value Date

## 2024-03-27 NOTE — CARE COORDINATION
CM introduced self and explained the role of case management. CM in to see pt to initiate D/C planning. Pt is from home with dtr. Pt lives in a one-level APT with 0-CLOTILDE. PTA pt states that her mobility was independent. Pt has the following DME: walker, cane, and shower chair. PTA pt states that she was independent with bathing, grooming, feeds self, and making meals.    Pt is  active . Pt dtr requests that pt have physical therapy and is asking for ARU.      Patient has PCP and ins. Plan for D/C is home vs SNF vs ARU pending PT/OT evals. Notify CM if any new D/C needs arise.             03/27/24 7109   Service Assessment   Patient Orientation Alert and Oriented   Cognition Alert   History Provided By Patient;Medical Record   Primary Caregiver Self   Support Systems Children   Patient's Healthcare Decision Maker is: Legal Next of Kin   PCP Verified by CM Yes   Last Visit to PCP Within last 3 months   Prior Functional Level Independent in ADLs/IADLs   Current Functional Level Independent in ADLs/IADLs   Can patient return to prior living arrangement Yes   Ability to make needs known: Good   Family able to assist with home care needs: Yes   Financial Resources Medicare   Community Resources None

## 2024-03-28 ENCOUNTER — APPOINTMENT (OUTPATIENT)
Dept: GENERAL RADIOLOGY | Age: 79
End: 2024-03-28
Payer: COMMERCIAL

## 2024-03-28 LAB
ALBUMIN SERPL-MCNC: 3.2 GM/DL (ref 3.4–5)
ALP BLD-CCNC: 65 IU/L (ref 40–128)
ALT SERPL-CCNC: 12 U/L (ref 10–40)
ANION GAP SERPL CALCULATED.3IONS-SCNC: 13 MMOL/L (ref 7–16)
AST SERPL-CCNC: 11 IU/L (ref 15–37)
BACTERIA: ABNORMAL /HPF
BASOPHILS ABSOLUTE: 0 K/CU MM
BASOPHILS RELATIVE PERCENT: 0.4 % (ref 0–1)
BILIRUB SERPL-MCNC: 0.2 MG/DL (ref 0–1)
BILIRUBIN URINE: NEGATIVE MG/DL
BLOOD, URINE: ABNORMAL
BUN SERPL-MCNC: 47 MG/DL (ref 6–23)
CALCIUM SERPL-MCNC: 8.5 MG/DL (ref 8.3–10.6)
CHLORIDE BLD-SCNC: 97 MMOL/L (ref 99–110)
CLARITY: CLEAR
CO2: 26 MMOL/L (ref 21–32)
COLOR: YELLOW
CREAT SERPL-MCNC: 12.9 MG/DL (ref 0.6–1.1)
DIFFERENTIAL TYPE: ABNORMAL
EKG ATRIAL RATE: 73 BPM
EKG DIAGNOSIS: NORMAL
EKG P AXIS: 79 DEGREES
EKG P-R INTERVAL: 266 MS
EKG Q-T INTERVAL: 458 MS
EKG QRS DURATION: 82 MS
EKG QTC CALCULATION (BAZETT): 504 MS
EKG R AXIS: -42 DEGREES
EKG T AXIS: 90 DEGREES
EKG VENTRICULAR RATE: 73 BPM
EOSINOPHILS ABSOLUTE: 0.3 K/CU MM
EOSINOPHILS RELATIVE PERCENT: 3.4 % (ref 0–3)
GFR SERPL CREATININE-BSD FRML MDRD: 3 ML/MIN/1.73M2
GLUCOSE SERPL-MCNC: 170 MG/DL (ref 70–99)
GLUCOSE, URINE: 250 MG/DL
HCT VFR BLD CALC: 31.9 % (ref 37–47)
HEMOGLOBIN: 9.8 GM/DL (ref 12.5–16)
IMMATURE NEUTROPHIL %: 0.4 % (ref 0–0.43)
KETONES, URINE: NEGATIVE MG/DL
LEUKOCYTE ESTERASE, URINE: NEGATIVE
LYMPHOCYTES ABSOLUTE: 1.4 K/CU MM
LYMPHOCYTES RELATIVE PERCENT: 17.1 % (ref 24–44)
MAGNESIUM: 2.2 MG/DL (ref 1.8–2.4)
MCH RBC QN AUTO: 27.9 PG (ref 27–31)
MCHC RBC AUTO-ENTMCNC: 30.7 % (ref 32–36)
MCV RBC AUTO: 90.9 FL (ref 78–100)
MONOCYTES ABSOLUTE: 0.6 K/CU MM
MONOCYTES RELATIVE PERCENT: 7.3 % (ref 0–4)
NITRITE URINE, QUANTITATIVE: NEGATIVE
NON SQUAM EPI CELLS: <1 /HPF
NUCLEATED RBC %: 0 %
PDW BLD-RTO: 15.7 % (ref 11.7–14.9)
PH, URINE: 5 (ref 5–8)
PLATELET # BLD: 277 K/CU MM (ref 140–440)
PMV BLD AUTO: 10.3 FL (ref 7.5–11.1)
POTASSIUM SERPL-SCNC: 4.4 MMOL/L (ref 3.5–5.1)
PROTEIN UA: ABNORMAL MG/DL
RBC # BLD: 3.51 M/CU MM (ref 4.2–5.4)
RBC URINE: <1 /HPF (ref 0–6)
REASON FOR REJECTION: NORMAL
REJECTED TEST: NORMAL
SEGMENTED NEUTROPHILS ABSOLUTE COUNT: 5.9 K/CU MM
SEGMENTED NEUTROPHILS RELATIVE PERCENT: 71.4 % (ref 36–66)
SODIUM BLD-SCNC: 136 MMOL/L (ref 135–145)
SPECIFIC GRAVITY UA: 1.01 (ref 1–1.03)
SQUAMOUS EPITHELIAL: 20 /HPF
TOTAL IMMATURE NEUTOROPHIL: 0.03 K/CU MM
TOTAL NUCLEATED RBC: 0 K/CU MM
TOTAL PROTEIN: 5.6 GM/DL (ref 6.4–8.2)
TRICHOMONAS: ABNORMAL /HPF
TROPONIN, HIGH SENSITIVITY: 292 NG/L (ref 0–14)
TROPONIN, HIGH SENSITIVITY: 307 NG/L (ref 0–14)
UROBILINOGEN, URINE: 0.2 MG/DL (ref 0.2–1)
WBC # BLD: 8.2 K/CU MM (ref 4–10.5)
WBC UA: 2 /HPF (ref 0–5)

## 2024-03-28 PROCEDURE — 81001 URINALYSIS AUTO W/SCOPE: CPT

## 2024-03-28 PROCEDURE — 94761 N-INVAS EAR/PLS OXIMETRY MLT: CPT

## 2024-03-28 PROCEDURE — APPNB15 APP NON BILLABLE TIME 0-15 MINS

## 2024-03-28 PROCEDURE — 6370000000 HC RX 637 (ALT 250 FOR IP): Performed by: STUDENT IN AN ORGANIZED HEALTH CARE EDUCATION/TRAINING PROGRAM

## 2024-03-28 PROCEDURE — 97166 OT EVAL MOD COMPLEX 45 MIN: CPT

## 2024-03-28 PROCEDURE — 84484 ASSAY OF TROPONIN QUANT: CPT

## 2024-03-28 PROCEDURE — 93010 ELECTROCARDIOGRAM REPORT: CPT | Performed by: INTERNAL MEDICINE

## 2024-03-28 PROCEDURE — 74018 RADEX ABDOMEN 1 VIEW: CPT

## 2024-03-28 PROCEDURE — 80053 COMPREHEN METABOLIC PANEL: CPT

## 2024-03-28 PROCEDURE — 97116 GAIT TRAINING THERAPY: CPT

## 2024-03-28 PROCEDURE — 85025 COMPLETE CBC W/AUTO DIFF WBC: CPT

## 2024-03-28 PROCEDURE — 87086 URINE CULTURE/COLONY COUNT: CPT

## 2024-03-28 PROCEDURE — 83735 ASSAY OF MAGNESIUM: CPT

## 2024-03-28 PROCEDURE — 6370000000 HC RX 637 (ALT 250 FOR IP): Performed by: INTERNAL MEDICINE

## 2024-03-28 PROCEDURE — 2140000000 HC CCU INTERMEDIATE R&B

## 2024-03-28 PROCEDURE — 99232 SBSQ HOSP IP/OBS MODERATE 35: CPT | Performed by: INTERNAL MEDICINE

## 2024-03-28 PROCEDURE — 97161 PT EVAL LOW COMPLEX 20 MIN: CPT

## 2024-03-28 PROCEDURE — 97530 THERAPEUTIC ACTIVITIES: CPT

## 2024-03-28 PROCEDURE — 2580000003 HC RX 258: Performed by: STUDENT IN AN ORGANIZED HEALTH CARE EDUCATION/TRAINING PROGRAM

## 2024-03-28 PROCEDURE — 36415 COLL VENOUS BLD VENIPUNCTURE: CPT

## 2024-03-28 RX ORDER — LACTULOSE 10 G/15ML
10 SOLUTION ORAL ONCE
Status: COMPLETED | OUTPATIENT
Start: 2024-03-28 | End: 2024-03-28

## 2024-03-28 RX ADMIN — METOPROLOL TARTRATE 50 MG: 50 TABLET, FILM COATED ORAL at 21:46

## 2024-03-28 RX ADMIN — APIXABAN 2.5 MG: 2.5 TABLET, FILM COATED ORAL at 21:46

## 2024-03-28 RX ADMIN — SODIUM CHLORIDE, PRESERVATIVE FREE 10 ML: 5 INJECTION INTRAVENOUS at 08:47

## 2024-03-28 RX ADMIN — CALCIUM 500 MG: 500 TABLET ORAL at 21:46

## 2024-03-28 RX ADMIN — IPRATROPIUM BROMIDE 2 SPRAY: 42 SPRAY, METERED NASAL at 12:32

## 2024-03-28 RX ADMIN — IPRATROPIUM BROMIDE 2 SPRAY: 42 SPRAY, METERED NASAL at 08:47

## 2024-03-28 RX ADMIN — ATORVASTATIN CALCIUM 80 MG: 40 TABLET, FILM COATED ORAL at 21:45

## 2024-03-28 RX ADMIN — APIXABAN 2.5 MG: 2.5 TABLET, FILM COATED ORAL at 08:45

## 2024-03-28 RX ADMIN — CALCIUM 500 MG: 500 TABLET ORAL at 08:45

## 2024-03-28 RX ADMIN — LACTULOSE 10 G: 20 SOLUTION ORAL at 14:37

## 2024-03-28 RX ADMIN — ALLOPURINOL 100 MG: 100 TABLET ORAL at 08:46

## 2024-03-28 RX ADMIN — Medication 400 MG: at 08:46

## 2024-03-28 RX ADMIN — SODIUM CHLORIDE, PRESERVATIVE FREE 10 ML: 5 INJECTION INTRAVENOUS at 21:45

## 2024-03-28 RX ADMIN — METOPROLOL TARTRATE 50 MG: 50 TABLET, FILM COATED ORAL at 08:46

## 2024-03-28 RX ADMIN — IPRATROPIUM BROMIDE 2 SPRAY: 42 SPRAY, METERED NASAL at 16:17

## 2024-03-28 RX ADMIN — Medication 2000 UNITS: at 08:46

## 2024-03-28 RX ADMIN — CLOPIDOGREL BISULFATE 75 MG: 75 TABLET ORAL at 08:46

## 2024-03-28 ASSESSMENT — PAIN SCALES - GENERAL
PAINLEVEL_OUTOF10: 0

## 2024-03-28 NOTE — PROGRESS NOTES
Occupational Therapy  [facility] ACUTE CARE OCCUPATIONAL THERAPY EVALUATION  Krupa Sanchez, 1945, 3120/3120-A, 3/28/2024    Discharge Recommendation: Home with HHOT    History  [place]:  The primary encounter diagnosis was Atrial fibrillation with RVR (HCC). Diagnoses of Elevated troponin, Chest tightness, Shortness of breath, ESRD on hemodialysis (HCC), and Pulmonary hypertension, unspecified (HCC) were also pertinent to this visit.  Patient  has a past medical history of 14 day event monitor, Atrial fibrillation with RVR (HCC), Edema, Family history of cardiovascular disease, GERD (gastroesophageal reflux disease), H/O 24 hour EKG monitoring, H/O cardiovascular stress test, H/O echocardiogram, History of cardiac catheterization, History of cardiovascular stress test, History of echocardiogram, History of Holter monitoring, History of therapeutic radiation, Hx of 24 hour EKG monitoring, Hyperlipidemia, Hypertension, Iron deficiency anemia, Lumbar radiculopathy, Menopause, ADOLFO (obstructive sleep apnea), Osteoarthritis, Osteopenia, Other activity(E029.9), Paroxysmal atrial fibrillation (HCC), Prolonged emergence from general anesthesia, Proteinuria, Supraventricular tachycardia, Type 2 diabetes mellitus (HCC), Urge incontinence, and Wears partial dentures.  Patient  has a past surgical history that includes Bunionectomy (1990's); Cardiac catheterization (Sees Dr. Meng); Colonoscopy (\"X 2 Last One 2008 \"); Dental surgery; joint replacement (2009); joint replacement (2010); Hysterectomy, total abdominal (1980's); Breast lumpectomy (11/06/2012); Endoscopy, colon, diagnostic (07/18/2016); Upper gastrointestinal endoscopy (N/A, 11/08/2018); Carpal tunnel release (Left, 01/2019); Cataract removal (Bilateral, 03/2020, 5/20); Cataract removal (Left, 05/2020); Ovary removal; Breast biopsy (10/2012); Breast biopsy (1970's); [facility] STEREO BREAST BX W LOC DEVICE 1ST LESION RIGHT (Right, 02/18/2021); Dialysis  Catheter Insertion (N/A, 03/30/2022); and Carpal tunnel release (Right, 01/04/2024).    Subjective:  Patient states:  \"I was surprised with how well I walked\"   Pain:  denies pain .    Communication: RN, CM, co-eval with PT Shelly for safety and activity tolerance  Restrictions: General Precautions, Fall Risk, Maintain O2 >92%     Home Setup/Prior level of function  Social/Functional History  Lives With: Daughter  Type of Home: Apartment  Home Layout: One level  Home Access: Level entry  Bathroom Shower/Tub: Walk-in shower  Bathroom Equipment: Shower chair  Home Equipment: Cane, Walker, rolling  Has the patient had two or more falls in the past year or any fall with injury in the past year?: No  Receives Help From: Family (daughter)  ADL Assistance: Independent  Homemaking Assistance: Independent  Homemaking Responsibilities: Yes  Ambulation Assistance: Independent  Transfer Assistance: Independent  Active : Yes    Examination of body systems (includes body structures/functions, activity/participation limitations):  Observation:  Supine in bed upon arrival, agreeable to therapy   Vision:  WFL  Hearing:  WFL  Cardiopulmonary:  On room air, tele, O2 93-97% throughout session      Body Systems and functions:  ROM R/L:  WFL    Strength R/L:  RUE 5/5,  LUE 5/5,  5/5  Sensation: WFL  Tone: Normal  Coordination: WFL  Perception: WNL    Pt body function inferred from observation of gross motor function, and assessment of mobility, balance, posture, ADL performance, and activity tolerance.    Activities of Daily Living (ADLs):  Feeding: independent  Grooming: independent  UB bathing: supervision  LB bathing: stand-by assist  UB dressing: supervision  LB dressing: donned socks sitting EOB using figure 4 method with stand-by assist for balance  Toileting: stand-by assist    Pt ADL function inferred from observation of gross motor function, and assessment of mobility, balance, posture, safety awareness, cognition,

## 2024-03-28 NOTE — PROGRESS NOTES
V2.0    [facility] [place]      Name:  Krupa Sanchez /Age/Sex: 1945  (78 y.o. female)   MRN & CSN:  [id number] & [id number] Encounter Date/Time: 3/28/2024 12:15 PM EDT   Location:  [address] [place] [address]-A PCP: Anant Ernandez MD     Attending:Bernardo Mcnamara MD       Hospital Day: 3    Assessment and Recommendations   Krupa Sanchez is a 78 y.o. female with pmh of aroxysmal atrial fibrillation, essential hypertension, type 2 diabetes mellitus with nephropathy, mixed hyperlipidemia   who presents with Atrial fibrillation with RVR (HCC)      Plan:     Atrial fibrillation with rapid ventricular response:  [id number] Score: 5  -Possibly in the setting of hypokalemia  -Admit to stepdown unit  -Telemetry  -O2 as needed, keep oxygen saturation above 92%  -Patient's status post diltiazem IV push 10 mg in the ED  -Patient ordered for diltiazem 120 mg p.o. in ED  -Will continue patient's home medications with Rythmol as well as metoprolol  -Patient status post aspirin in the ED  -Patient currently on Plavix and Eliquis, will continue  -Cardiology consulted from the ED  -Check TSH  -Replete electrolytes, keep magnesium above 2, potassium above 4, calcium above 8.4  -TTE     Hypokalemia:  -Patient status post KCl 40 MEQ p.o. in ED  -Recheck potassium level and replete as needed to keep potassium level above 4     ESRD:  -Hemodialysis as per nephro  -Strict intake and output record  -Avoid nephrotoxic medications  -Adjust medications to patient's creatinine clearance     Hyperlipidemia-continue atorvastatin  Essential hypertension-continue metoprolol  Type 2 diabetes mellitus with nephropathy-follow A1c, start with low-dose sliding scale insulin with hypoglycemia protocol, hold oral hypoglycemics while inpatient  History of lumbar spinal stenosis  Miscellaneous-continue home meds except contraindicated    Diet ADULT DIET; Regular; 4 carb choices (60 gm/meal); Low Fat/Low Chol/High Fiber/2 gm Na; Low Sodium (2 gm)   DVT

## 2024-03-28 NOTE — PROGRESS NOTES
[facility] [place] [address]  Phone: [phone]    Fax [phone]                  Jeremias Camarillo MD, [facility]       Vikas Godoy MD, [facility]  Jayne Mcnamara MD, [facility]    MD Tyler Sawyer MD Tariq Rizvi, MD Bilal Alam, MD Dr. Waseem Sajjad MD Melissa Kellis, APRXIN Alcaraz, APRXIN Marte, APRXIN Melendez, APRN  Damian Schaefer PA-C    CARDIOLOGY  NOTE      Name:  Krupa Sanchez /Age/Sex: 1945  (78 y.o. female)   MRN & CSN:  [id number] & [id number] Admission Date/Time: 3/26/2024  9:09 AM   Location:  [address] [place] [address]-A PCP: Anant Ernandez MD       Hospital Day: 3    - Cardiology consult is for: SVT      ASSESSMENT/ PLAN:  Supraventricular tachycardia versus atypical atrial flutter  Paroxysmal atrial fibrillation  Hypokalemia and hypomagnesemia  -Has converted to sinus rhythm following Cardizem drip.  Likely secondary to electrolyte imbalance  -Currently asymptomatic.  -[id number]: 5  -Discontinue propafenone due to underlying CAD and structural heart abnormalities  -Continue Eliquis 2.5 mg twice daily.  Elevated troponin with history of nonobstructive coronary artery disease  -Troponin is positive however flat.  Likely demand ischemia in setting of end-stage renal disease  -Left heart catheterization 2022 reviewed.  50% LAD, mild LCx + RCA  -EKG is nonischemic.  -Echo showing preserved ejection fraction without any wall motion abnormalities.  -No further ischemic workup planned at this time  -Continue Plavix, Lopressor, and Lipitor 80 mg  End-stage renal disease on peritoneal dialysis  -Nephrology following  Type 2 diabetes mellitus  Hypertension  -Blood pressure stable at this time.    Cardiology will sign off, please call with any questions.  Patient to follow-up in clinic       Subjective:  Krupa is a 78 y.o.year old     No acute cardiac complaints this  morning    Objective: Temperature:  Current - Temp: 97.5 °F (36.4 °C); Max - Temp  Av.7 °F (36.5 °C)  Min: 97.4 °F (36.3 °C)  Max: 98.2 °F (36.8 °C)    Respiratory Rate : Resp  Avg: 15.8  Min: 13  Max: 18    Pulse Range: Pulse  Av.1  Min: 62  Max: 74    Blood Presuure Range:  Systolic (24hrs), Av , Min:99 , Max:130   ; Diastolic (24hrs), Av, Min:50, Max:77      Pulse ox Range: SpO2  Av.3 %  Min: 97 %  Max: 98 %    24hr I & O:    Intake/Output Summary (Last 24 hours) at 3/28/2024 1442  Last data filed at 3/28/2024 1356  Gross per 24 hour   Intake 360 ml   Output 801 ml   Net -441 ml         BP (!) 100/58   Pulse 67   Temp 97.5 °F (36.4 °C) (Oral)   Resp 13   Ht 1.727 m (5' 8\")   Wt 98.5 kg (217 lb 1.6 oz)   LMP  (LMP Unknown)   SpO2 98%   BMI 33.01 kg/m²         TELEMETRY: Sinus      has a past medical history of 14 day event monitor, Atrial fibrillation with RVR (HCC), Edema, Family history of cardiovascular disease, GERD (gastroesophageal reflux disease), H/O 24 hour EKG monitoring, H/O cardiovascular stress test, H/O echocardiogram, History of cardiac catheterization, History of cardiovascular stress test, History of echocardiogram, History of Holter monitoring, History of therapeutic radiation, Hx of 24 hour EKG monitoring, Hyperlipidemia, Hypertension, Iron deficiency anemia, Lumbar radiculopathy, Menopause, ADOLFO (obstructive sleep apnea), Osteoarthritis, Osteopenia, Other activity(E029.9), Paroxysmal atrial fibrillation (HCC), Prolonged emergence from general anesthesia, Proteinuria, Supraventricular tachycardia, Type 2 diabetes mellitus (HCC), Urge incontinence, and Wears partial dentures.   has a past surgical history that includes Bunionectomy ('s); Cardiac catheterization (Sees Dr. Meng); Colonoscopy (\"X 2 Last One  \"); Dental surgery; joint replacement (); joint replacement (); Hysterectomy, total abdominal (); Breast lumpectomy (2012); Endoscopy,

## 2024-03-28 NOTE — PLAN OF CARE
Problem: Discharge Planning  Goal: Discharge to home or other facility with appropriate resources  3/28/2024 0942 by Michael Tinajero RN  Outcome: Progressing  3/28/2024 0242 by Marcia Martinez RN  Outcome: Progressing  3/28/2024 0241 by Marcia Martinez RN  Outcome: Progressing     Problem: Pain  Goal: Verbalizes/displays adequate comfort level or baseline comfort level  3/28/2024 0942 by Michael Tinajero RN  Outcome: Progressing  3/28/2024 0242 by Marcia Martinez RN  Outcome: Progressing  3/28/2024 0241 by Marcia Martinez RN  Outcome: Progressing     Problem: Safety - Adult  Goal: Free from fall injury  3/28/2024 0942 by Michael Tinajero RN  Outcome: Progressing  3/28/2024 0242 by Marcia Martinez RN  Outcome: Progressing  3/28/2024 0241 by Marcia Martinez RN  Outcome: Progressing     Problem: Chronic Conditions and Co-morbidities  Goal: Patient's chronic conditions and co-morbidity symptoms are monitored and maintained or improved  3/28/2024 0942 by Michael Tinajero RN  Outcome: Progressing  3/28/2024 0242 by Marcia Martinez RN  Outcome: Progressing

## 2024-03-28 NOTE — PROGRESS NOTES
Pt disconnected from the cycler. Fluid is clear and yellow. Small amount of fibrin noted. Low UF alarm. .

## 2024-03-28 NOTE — CONSULTS
[facility] ACUTE CARE PHYSICAL THERAPY EVALUATION  Krupa Sanchez, 1945, 3120/3120-A, 3/28/2024    History  [place]:  The primary encounter diagnosis was Atrial fibrillation with RVR (HCC). Diagnoses of Elevated troponin, Chest tightness, Shortness of breath, ESRD on hemodialysis (HCC), and Pulmonary hypertension, unspecified (HCC) were also pertinent to this visit.  Patient  has a past medical history of 14 day event monitor, Atrial fibrillation with RVR (HCC), Edema, Family history of cardiovascular disease, GERD (gastroesophageal reflux disease), H/O 24 hour EKG monitoring, H/O cardiovascular stress test, H/O echocardiogram, History of cardiac catheterization, History of cardiovascular stress test, History of echocardiogram, History of Holter monitoring, History of therapeutic radiation, Hx of 24 hour EKG monitoring, Hyperlipidemia, Hypertension, Iron deficiency anemia, Lumbar radiculopathy, Menopause, ADOLFO (obstructive sleep apnea), Osteoarthritis, Osteopenia, Other activity(E029.9), Paroxysmal atrial fibrillation (HCC), Prolonged emergence from general anesthesia, Proteinuria, Supraventricular tachycardia, Type 2 diabetes mellitus (HCC), Urge incontinence, and Wears partial dentures.  Patient  has a past surgical history that includes Bunionectomy (1990's); Cardiac catheterization (Sees Dr. Meng); Colonoscopy (\"X 2 Last One 2008 \"); Dental surgery; joint replacement (2009); joint replacement (2010); Hysterectomy, total abdominal (1980's); Breast lumpectomy (11/06/2012); Endoscopy, colon, diagnostic (07/18/2016); Upper gastrointestinal endoscopy (N/A, 11/08/2018); Carpal tunnel release (Left, 01/2019); Cataract removal (Bilateral, 03/2020, 5/20); Cataract removal (Left, 05/2020); Ovary removal; Breast biopsy (10/2012); Breast biopsy (1970's); SOFI STEREO BREAST BX W LOC DEVICE 1ST LESION RIGHT (Right, 02/18/2021); Dialysis Catheter Insertion (N/A, 03/30/2022); and Carpal tunnel release (Right,  01/04/2024).    Discharge Recommendation: Home with assistance and HHPT    Subjective:    Patient states:  \"I'm doing better than I thought!\"      Pain:  did not state numerical value.      Communication with other providers:  Handoff to Michael LAURA    Restrictions: General Precautions, Fall Risk, Maintain O2 >92%    Home Setup/Prior level of function  Social/Functional History  Lives With: Daughter  Type of Home: Apartment  Home Layout: One level  Home Access: Level entry  Bathroom Shower/Tub: Walk-in shower  Bathroom Equipment: Shower chair  Home Equipment: Cane, Walker, rolling  Has the patient had two or more falls in the past year or any fall with injury in the past year?: No  Receives Help From: Family (daughter)  ADL Assistance: Independent  Homemaking Assistance: Independent  Homemaking Responsibilities: Yes  Ambulation Assistance: Independent  Transfer Assistance: Independent  Active : Yes    Examination of body systems (includes body structures/functions, activity/participation limitations):  Observation:  in semi-fowlers position upon arrival and agreeable to therapy  Vision:  WFL  Hearing:  WFL  Cardiopulmonary:  on room air  O2 saturation decreased to 93% while ambulating, but quickly recovered to 96%  Cognition: WFL, see OT/SLP note for further evaluation.    Musculoskeletal  ROM R/L:  WFL.    Strength R/L:  gross 4/5 in BLE, minimal impairment in function and endurance.       Mobility:  Rolling L/R:  SBA  Supine to sit:  SBA  Transfers: CGA from EOB>standing without an AD  Sitting balance:  good.    Standing balance:  fair+.    Gait: 200' without an AD and CGA progressing to SBA. Patient presenting with decreased gait speed, wide JESUS, and decreased toe clearance. Patient presenting with good stability while performing.     [facility] 6 Clicks Inpatient Mobility:  AM-PAC Inpatient Mobility Raw Score : 20    Safety: patient left in bedside chair with chair alarm set, call light within reach, RN notified,

## 2024-03-28 NOTE — PROGRESS NOTES
Nephrology  Dialysis Note        [address] [facility], [address] [place] [address]  Phone: [phone]  Office Hours: 8:30AM - 4:30PM  Monday - Friday          PROCEDURE:  Patient seen during PD      PHYSICIAN:  ESAU      INDICATION:  End-stage renal disease      RX:  See dialysis flowsheet for specifics on access, blood flow rate, dialysate baths, duration of dialysis, anticoagulation and other technical information.      COMMENTS:  SEEN ON PD  LOW UF ALARM PRESENT  OBTAIN KUB TO ENSURE PROPER PD CATH POSITION    Electronically signed by Lisandro Soto DO on 3/28/2024 at 7:55 AM    [facility]  MD EMILY LOVE DO  [address] [facility],  [address] [place] [address]  PHONE: [phone]  FAX: [phone]

## 2024-03-28 NOTE — CARE COORDINATION
PT recommended home with [facility] PT, discussed information with pt and pt daughter, Pt prefers OP physical therapy. Dr calderon.

## 2024-03-28 NOTE — PLAN OF CARE
Problem: Discharge Planning  Goal: Discharge to home or other facility with appropriate resources  3/28/2024 0242 by Marcia Martinez RN  Outcome: Progressing  3/28/2024 0241 by Marcia Martinez RN  Outcome: Progressing  3/27/2024 1331 by Michael Tinajero RN  Outcome: Progressing     Problem: Pain  Goal: Verbalizes/displays adequate comfort level or baseline comfort level  3/28/2024 0242 by Marcia Martinez RN  Outcome: Progressing  3/28/2024 0241 by Marcia Martinez RN  Outcome: Progressing  3/27/2024 1331 by Michael Tinajero RN  Outcome: Progressing     Problem: Safety - Adult  Goal: Free from fall injury  3/28/2024 0242 by Marcia Martinez RN  Outcome: Progressing  3/28/2024 0241 by Marcia Martinez RN  Outcome: Progressing  3/27/2024 1331 by Michael Tinajero RN  Outcome: Progressing     Problem: Chronic Conditions and Co-morbidities  Goal: Patient's chronic conditions and co-morbidity symptoms are monitored and maintained or improved  Outcome: Progressing

## 2024-03-29 VITALS
TEMPERATURE: 96.8 F | WEIGHT: 217.1 LBS | OXYGEN SATURATION: 96 % | RESPIRATION RATE: 17 BRPM | HEART RATE: 75 BPM | BODY MASS INDEX: 32.9 KG/M2 | DIASTOLIC BLOOD PRESSURE: 67 MMHG | SYSTOLIC BLOOD PRESSURE: 140 MMHG | HEIGHT: 68 IN

## 2024-03-29 LAB
ALBUMIN SERPL-MCNC: 3.2 GM/DL (ref 3.4–5)
ALP BLD-CCNC: 61 IU/L (ref 40–129)
ALT SERPL-CCNC: 11 U/L (ref 10–40)
ANION GAP SERPL CALCULATED.3IONS-SCNC: 13 MMOL/L (ref 7–16)
AST SERPL-CCNC: 11 IU/L (ref 15–37)
BASOPHILS ABSOLUTE: 0 K/CU MM
BASOPHILS RELATIVE PERCENT: 0.3 % (ref 0–1)
BILIRUB SERPL-MCNC: 0.2 MG/DL (ref 0–1)
BUN SERPL-MCNC: 48 MG/DL (ref 6–23)
CALCIUM SERPL-MCNC: 8.7 MG/DL (ref 8.3–10.6)
CHLORIDE BLD-SCNC: 96 MMOL/L (ref 99–110)
CO2: 26 MMOL/L (ref 21–32)
CREAT SERPL-MCNC: 12.4 MG/DL (ref 0.6–1.1)
CULTURE: NORMAL
DIFFERENTIAL TYPE: ABNORMAL
EOSINOPHILS ABSOLUTE: 0.2 K/CU MM
EOSINOPHILS RELATIVE PERCENT: 3.1 % (ref 0–3)
GFR SERPL CREATININE-BSD FRML MDRD: 3 ML/MIN/1.73M2
GLUCOSE SERPL-MCNC: 168 MG/DL (ref 70–99)
HCT VFR BLD CALC: 31 % (ref 37–47)
HEMOGLOBIN: 9.6 GM/DL (ref 12.5–16)
IMMATURE NEUTROPHIL %: 0.5 % (ref 0–0.43)
LYMPHOCYTES ABSOLUTE: 1.3 K/CU MM
LYMPHOCYTES RELATIVE PERCENT: 16.6 % (ref 24–44)
Lab: NORMAL
MAGNESIUM: 2.1 MG/DL (ref 1.8–2.4)
MCH RBC QN AUTO: 28.2 PG (ref 27–31)
MCHC RBC AUTO-ENTMCNC: 31 % (ref 32–36)
MCV RBC AUTO: 90.9 FL (ref 78–100)
MONOCYTES ABSOLUTE: 0.6 K/CU MM
MONOCYTES RELATIVE PERCENT: 7.6 % (ref 0–4)
NUCLEATED RBC %: 0 %
PDW BLD-RTO: 15.5 % (ref 11.7–14.9)
PLATELET # BLD: 274 K/CU MM (ref 140–440)
PMV BLD AUTO: 10.2 FL (ref 7.5–11.1)
POTASSIUM SERPL-SCNC: 4.2 MMOL/L (ref 3.5–5.1)
RBC # BLD: 3.41 M/CU MM (ref 4.2–5.4)
SEGMENTED NEUTROPHILS ABSOLUTE COUNT: 5.6 K/CU MM
SEGMENTED NEUTROPHILS RELATIVE PERCENT: 71.9 % (ref 36–66)
SODIUM BLD-SCNC: 135 MMOL/L (ref 135–145)
SPECIMEN: NORMAL
TOTAL IMMATURE NEUTOROPHIL: 0.04 K/CU MM
TOTAL NUCLEATED RBC: 0 K/CU MM
TOTAL PROTEIN: 5.3 GM/DL (ref 6.4–8.2)
TROPONIN, HIGH SENSITIVITY: 248 NG/L (ref 0–14)
TROPONIN, HIGH SENSITIVITY: 262 NG/L (ref 0–14)
WBC # BLD: 7.8 K/CU MM (ref 4–10.5)

## 2024-03-29 PROCEDURE — 97530 THERAPEUTIC ACTIVITIES: CPT

## 2024-03-29 PROCEDURE — 94761 N-INVAS EAR/PLS OXIMETRY MLT: CPT

## 2024-03-29 PROCEDURE — 85025 COMPLETE CBC W/AUTO DIFF WBC: CPT

## 2024-03-29 PROCEDURE — 80053 COMPREHEN METABOLIC PANEL: CPT

## 2024-03-29 PROCEDURE — 2580000003 HC RX 258: Performed by: STUDENT IN AN ORGANIZED HEALTH CARE EDUCATION/TRAINING PROGRAM

## 2024-03-29 PROCEDURE — 6370000000 HC RX 637 (ALT 250 FOR IP): Performed by: INTERNAL MEDICINE

## 2024-03-29 PROCEDURE — 83735 ASSAY OF MAGNESIUM: CPT

## 2024-03-29 PROCEDURE — 84484 ASSAY OF TROPONIN QUANT: CPT

## 2024-03-29 PROCEDURE — 97116 GAIT TRAINING THERAPY: CPT

## 2024-03-29 PROCEDURE — 6370000000 HC RX 637 (ALT 250 FOR IP): Performed by: STUDENT IN AN ORGANIZED HEALTH CARE EDUCATION/TRAINING PROGRAM

## 2024-03-29 PROCEDURE — 36415 COLL VENOUS BLD VENIPUNCTURE: CPT

## 2024-03-29 RX ORDER — METOPROLOL TARTRATE 50 MG/1
50 TABLET, FILM COATED ORAL 2 TIMES DAILY
Qty: 60 TABLET | Refills: 3 | Status: SHIPPED | OUTPATIENT
Start: 2024-03-29

## 2024-03-29 RX ORDER — LANOLIN ALCOHOL/MO/W.PET/CERES
400 CREAM (GRAM) TOPICAL DAILY
Qty: 30 TABLET | Refills: 0 | Status: SHIPPED | OUTPATIENT
Start: 2024-03-30

## 2024-03-29 RX ADMIN — ALLOPURINOL 100 MG: 100 TABLET ORAL at 09:43

## 2024-03-29 RX ADMIN — INSULIN LISPRO 1 UNITS: 100 INJECTION, SOLUTION INTRAVENOUS; SUBCUTANEOUS at 12:14

## 2024-03-29 RX ADMIN — IPRATROPIUM BROMIDE 2 SPRAY: 42 SPRAY, METERED NASAL at 09:47

## 2024-03-29 RX ADMIN — IPRATROPIUM BROMIDE 2 SPRAY: 42 SPRAY, METERED NASAL at 12:15

## 2024-03-29 RX ADMIN — CALCIUM 500 MG: 500 TABLET ORAL at 09:43

## 2024-03-29 RX ADMIN — APIXABAN 2.5 MG: 2.5 TABLET, FILM COATED ORAL at 09:43

## 2024-03-29 RX ADMIN — METOPROLOL TARTRATE 50 MG: 50 TABLET, FILM COATED ORAL at 09:43

## 2024-03-29 RX ADMIN — Medication 2000 UNITS: at 09:43

## 2024-03-29 RX ADMIN — SODIUM CHLORIDE, PRESERVATIVE FREE 10 ML: 5 INJECTION INTRAVENOUS at 09:44

## 2024-03-29 RX ADMIN — Medication 400 MG: at 09:43

## 2024-03-29 RX ADMIN — CLOPIDOGREL BISULFATE 75 MG: 75 TABLET ORAL at 09:43

## 2024-03-29 NOTE — DISCHARGE SUMMARY
V2.0  Discharge Summary    Name:  Krupa Sanchez /Age/Sex: 1945 (78 y.o. female)   Admit Date: 3/26/2024  Discharge Date: 3/29/24    MRN & CSN:  [id number] & [id number] Encounter Date and Time 3/29/24 12:26 PM EDT    Attending:  Bernardo Mcnamara MD Discharging Provider: Bernardo Mcnamara MD       Hospital Course:     Brief HPI: Krupa Sanchez is a 78 y.o. female with pmh of paroxysmal atrial fibrillation, essential hypertension, type 2 diabetes mellitus with nephropathy, mixed hyperlipidemia who presents with sudden onset symptoms of chest pain, palpitations and shortness of breath.  Review of system otherwise unremarkable.  Patient compliant with medications.  Patient denies any fever or chills, no bowel/bladder/appetite or weight changes.  Patient tachycardic to 150s per minute on arrival, blood pressure 109/64 mmHg.  EKG with atrial fibrillation with 2: 1 AV block versus SVT, patient status post Cardizem 10 mg IV with patient's rhythm converting to normal sinus with sinus tachycardia at around 102/min.  Cardiology was consulted from the ED and recommended to give p.o. diltiazem.  Nephro was also consulted from the ED.  Labs significant for potassium 3.1, BUN 48/creatinine 13, anion gap 19, glucose 243, proBNP 02603, troponin 295, no leukocytosis, chest x-ray unremarkable.  Patient currently being admitted for further management.     Patient admitted for A-fib with RVR likely in the setting of hypomagnesemia and hypokalemia which appears to be likely in the setting of recent viral infection which had resolved prior to admission in the hospital.  Electrolyte abnormalities were repleted and normalized, patient started on p.o. magnesium supplementation.  Patient already on Eliquis.  Cardiology consulted for elevated troponin level and A-fib with RVR.  Tropes are elevated but flat/downtrending, patient having no chest pain and EKG nonischemic.  Likely this is type II in the setting of demand ischemia from ESRD.  No  TRIG 204 05/02/2023 01:25 PM     Hemoglobin A1C:   Lab Results   Component Value Date/Time    LABA1C 7.6 03/26/2024 07:51 PM     TSH:   Lab Results   Component Value Date/Time    TSH 0.86 08/17/2020 11:38 AM     Troponin:   Lab Results   Component Value Date/Time    TROPONINT 0.322 07/02/2023 09:12 AM    TROPONINT 0.335 07/01/2023 04:54 PM    TROPONINT 0.021 05/08/2021 11:04 PM     Lactic Acid: No results for input(s): \"LACTA\" in the last 72 hours.  BNP: No results for input(s): \"PROBNP\" in the last 72 hours.  UA:  Lab Results   Component Value Date/Time    NITRU NEGATIVE 03/28/2024 06:10 AM    NITRU Negative 06/26/2015 04:28 PM    COLORU YELLOW 03/28/2024 06:10 AM    PHUR 5.0 08/03/2015 01:32 PM    PHUR 5.5 06/26/2015 04:28 PM    LABCAST 5-10 Hyaline 06/26/2015 04:28 PM    WBCUA 2 03/28/2024 06:10 AM    RBCUA <1 03/28/2024 06:10 AM    MUCUS RARE 09/18/2018 01:37 PM    TRICHOMONAS NONE SEEN 03/28/2024 06:10 AM    BACTERIA MODERATE 03/28/2024 06:10 AM    CLARITYU CLEAR 03/28/2024 06:10 AM    SPECGRAV 1.015 03/28/2024 06:10 AM    LEUKOCYTESUR NEGATIVE 03/28/2024 06:10 AM    UROBILINOGEN 0.2 03/28/2024 06:10 AM    BILIRUBINUR NEGATIVE 03/28/2024 06:10 AM    BILIRUBINUR + 08/03/2015 01:32 PM    BLOODU TRACE 03/28/2024 06:10 AM    GLUCOSEU - 08/03/2015 01:32 PM    GLUCOSEU Negative 06/26/2015 04:28 PM    KETUA NEGATIVE 03/28/2024 06:10 AM     Urine Cultures: No results found for: \"LABURIN\"  Blood Cultures: No results found for: \"BC\"  No results found for: \"BLOODCULT2\"  Organism: No results found for: \"ORG\"    Time Spent Discharging patient 35 minutes    Electronically signed by Bernardo Mcnamara MD on 3/29/2024 at 12:26 PM

## 2024-03-29 NOTE — PLAN OF CARE
Problem: Discharge Planning  Goal: Discharge to home or other facility with appropriate resources  Outcome: Progressing  Flowsheets (Taken 3/28/2024 2014)  Discharge to home or other facility with appropriate resources:   Identify barriers to discharge with patient and caregiver   Arrange for needed discharge resources and transportation as appropriate   Identify discharge learning needs (meds, wound care, etc)     Problem: Pain  Goal: Verbalizes/displays adequate comfort level or baseline comfort level  Outcome: Progressing  Flowsheets (Taken 3/28/2024 2014)  Verbalizes/displays adequate comfort level or baseline comfort level:   Encourage patient to monitor pain and request assistance   Assess pain using appropriate pain scale   Administer analgesics based on type and severity of pain and evaluate response     Problem: Safety - Adult  Goal: Free from fall injury  Outcome: Progressing  Flowsheets (Taken 3/28/2024 2014)  Free From Fall Injury: Instruct family/caregiver on patient safety     Problem: Chronic Conditions and Co-morbidities  Goal: Patient's chronic conditions and co-morbidity symptoms are monitored and maintained or improved  Outcome: Progressing  Flowsheets (Taken 3/28/2024 2014)  Care Plan - Patient's Chronic Conditions and Co-Morbidity Symptoms are Monitored and Maintained or Improved:   Monitor and assess patient's chronic conditions and comorbid symptoms for stability, deterioration, or improvement   Collaborate with multidisciplinary team to address chronic and comorbid conditions and prevent exacerbation or deterioration

## 2024-03-29 NOTE — PROGRESS NOTES
Physician Progress Note      PATIENT:               LEN DODD  CSN #:                  [id number]  :                       1945  ADMIT DATE:       3/26/2024 9:09 AM  DISCH DATE:  RESPONDING  PROVIDER #:        Bernardo Mcnamara MD          QUERY TEXT:    Patient admitted with Afib RVR, noted to have paroxysmal atrial fibrillation   and is maintained on Eliquis. If possible, please document in progress notes   and discharge summary if you are evaluating and/or treating any of the   following:    The medical record reflects the following:  Risk Factors: Afib RVR, PAF, Age female,  Clinical Indicators: presents with Atrial fibrillation with RVR, [id number]   Score: 5, Pulse 156  Treatment: Patient currently on Plavix and Eliquis, will continue-Cardiology   consulted from the ED, diltiazem IV push 10 mg in the ED-Patient ordered for   diltiazem 120 mg p.o. in ED, increase metoprolol to 50 mg twice a day    Thank you, Leonarda Marc RN [facility] [phone]  Options provided:  -- Secondary hypercoagulable state in a patient with atrial fibrillation  -- Other - I will add my own diagnosis  -- Disagree - Not applicable / Not valid  -- Disagree - Clinically unable to determine / Unknown  -- Refer to Clinical Documentation Reviewer    PROVIDER RESPONSE TEXT:    This patient has secondary hypercoagulable state in a patient with atrial   fibrillation.    Query created by: Leonarda Marc on 3/28/2024 9:00 AM      Electronically signed by:  Bernardo Mcnamara MD 3/28/2024 8:52 PM

## 2024-03-29 NOTE — PLAN OF CARE
Problem: Discharge Planning  Goal: Discharge to home or other facility with appropriate resources  3/29/2024 1240 by Idania Cohen RN  Outcome: Progressing  3/29/2024 0005 by Vilma Kidd RN  Outcome: Progressing  Flowsheets (Taken 3/28/2024 2014)  Discharge to home or other facility with appropriate resources:   Identify barriers to discharge with patient and caregiver   Arrange for needed discharge resources and transportation as appropriate   Identify discharge learning needs (meds, wound care, etc)     Problem: Pain  Goal: Verbalizes/displays adequate comfort level or baseline comfort level  3/29/2024 1240 by Idania Cohen RN  Outcome: Progressing  3/29/2024 0005 by Vilma Kidd RN  Outcome: Progressing  Flowsheets (Taken 3/28/2024 2014)  Verbalizes/displays adequate comfort level or baseline comfort level:   Encourage patient to monitor pain and request assistance   Assess pain using appropriate pain scale   Administer analgesics based on type and severity of pain and evaluate response     Problem: Safety - Adult  Goal: Free from fall injury  3/29/2024 1240 by Idania Cohen RN  Outcome: Progressing  3/29/2024 0005 by Vilma Kidd RN  Outcome: Progressing  Flowsheets (Taken 3/28/2024 2014)  Free From Fall Injury: Instruct family/caregiver on patient safety     Problem: Chronic Conditions and Co-morbidities  Goal: Patient's chronic conditions and co-morbidity symptoms are monitored and maintained or improved  3/29/2024 0005 by Vilma Kidd RN  Outcome: Progressing  Flowsheets (Taken 3/28/2024 2014)  Care Plan - Patient's Chronic Conditions and Co-Morbidity Symptoms are Monitored and Maintained or Improved:   Monitor and assess patient's chronic conditions and comorbid symptoms for stability, deterioration, or improvement   Collaborate with multidisciplinary team to address chronic and comorbid conditions and prevent exacerbation or deterioration

## 2024-03-29 NOTE — PROGRESS NOTES
Physical Therapy  Name: Krupa Sanchez MRN: [id number] :   1945   Date:  3/29/2024   Admission Date: 3/26/2024 Room:  [facility]   Restrictions/Precautions:        General Precautions, Fall Risk, Maintain O2 >92%   Communication with other providers:  Idania LAURA states pt is ok to see for therapy  Subjective:  Patient states:  '' I felt a little short of breath walking but not like when I came in ''   Pain:   Location, Type, Intensity (0/10 to 10/10):  denies  Objective:    Observation:  pt in hook lying in bed     Treatment, including education/measures:  During gait, pt reported feeling slightly SOB. O2 readings erratic due to poor pleth. Upon return to room and seated EOB, pts 02 96%. Pt instructed in deep breathing during physical activities. Rebounded to not feeling SOB quickly after sitting.       Sitting Exercises:  Ankle pumps x 20  Heel raises x 20  LAQ's x 20  Marching x 20   Clams x 20  Hip Abd x 20        Therapeutic Exercise:  Therapeutic exercises were instructed today.  Cues were given for technique, safety, recruitment, and rationale.  Cues were verbal and/or tactile.    Transfers with line management of tele  Rolling: SBA  Supine to sit :SBA  Scooting :SBA  Sit to stand : CGA  Stand to sit :CGA  SPT:CGA    Gait:  Pt amb with no AD  for 400 ft with SBA/CGA  assist  Pt needed VC's for breathing  Gait Comments: with VC's' and TC's for B LE placement, walker placement and sequence throughout ambulation; with VC's and TC's to maintain upright posture in order to avoid COM shifting outside of JESUS; with VC's for PLB throughout ambulation    Safety  Patient left safely in the recliner, with call light/phone in reach with alarm applied. Gait belt was used for transfers and gait.    Assessment / Impression:     Patient's tolerance of treatment:  fair   Adverse Reaction: SOB  Significant change in status and impact:  no  Barriers to improvement:  SOB with activity, activity tolerance     Plan for Next  Session:    Will cont to work towards pt's goals per patient tolerance  Time in:  10:21  Time out:  10:45  Timed treatment minutes:  24  Total treatment time:  24  Previously filed items:  Social/Functional History  Lives With: Daughter  Type of Home: Apartment  Home Layout: One level  Home Access: Level entry  Bathroom Shower/Tub: Walk-in shower  Bathroom Equipment: Shower chair  Home Equipment: Cane, Walker, rolling  Has the patient had two or more falls in the past year or any fall with injury in the past year?: No  Receives Help From: Family (daughter)  ADL Assistance: Independent  Homemaking Assistance: Independent  Homemaking Responsibilities: Yes  Ambulation Assistance: Independent  Transfer Assistance: Independent  Active : Yes  Short Term Goals  Time Frame for Short Term Goals: 1 week or until discharge  Short Term Goal 1: Patient will perform all aspects of bed mobility with mod-I  Short Term Goal 2: Patient will perform functional transfers with LRAD and SUP  Short Term Goal 3: Patient will ambulate >/=300' with LRAD and SUP     Electronically signed by:    Lillian Wolf PTA PTA  3/29/2024, 10:47 AM

## 2024-03-29 NOTE — DISCHARGE INSTRUCTIONS
Please take your Medications regularly and set up appointments to follow up with your Primary Care Physician & Cardiologist soon    If having any new, persistent or worsening symptoms including but not limited to chest pain, shorness of breath, loss of consciousness, palpitations , chest or abdomen pressure, left arm pain or pressure, severe headache, especially with new weakness/numbness or tingling in any part of body, any gait or balance issues, any fever/ chill, cough, sputum,  buring with urine , any bleeding or dark stools etc  ,please return to nearest facility for evaluation    Please get repeat labs including potassium and magnesium levels in a week and share results with your Primary Care Physician    Please go through Printed reading material and feel free to reach out in case of any queries, concerns or issues per contact provided

## 2024-03-29 NOTE — PROGRESS NOTES
Nephrology Progress Note        [facility], [address] [place] [address]  Phone: [phone]  Office Hours: 8:30AM - 4:30PM  Monday - Friday        3/29/2024 8:11 AM  Subjective:   Admit Date: 3/26/2024  PCP: Anant Ernandez MD  Interval History  Doing well  On room air    Diet: ADULT DIET; Regular; 4 carb choices (60 gm/meal); Low Fat/Low Chol/High Fiber/2 gm Na; Low Sodium (2 gm)      Data:   Scheduled Meds:   magnesium oxide  400 mg Oral Daily    metoprolol tartrate  50 mg Oral BID    apixaban  2.5 mg Oral BID    allopurinol  100 mg Oral Daily    atorvastatin  80 mg Oral Nightly    calcium elemental  500 mg Oral BID    Vitamin D  2,000 Units Oral Daily    clopidogrel  75 mg Oral Daily    insulin lispro  0-4 Units SubCUTAneous TID WC    insulin lispro  0-4 Units SubCUTAneous Nightly    ipratropium  2 spray Each Nostril 4x Daily    sodium chloride flush  5-40 mL IntraVENous 2 times per day     Continuous Infusions:   dextrose      sodium chloride       PRN Meds:colchicine, glucose, dextrose bolus **OR** dextrose bolus, glucagon (rDNA), dextrose, sodium chloride flush, sodium chloride, ondansetron **OR** ondansetron, polyethylene glycol, acetaminophen **OR** acetaminophen  I/O last 3 completed shifts:  In: 120 [P.O.:120]  Out: 807 [Urine:200]  No intake/output data recorded.    Intake/Output Summary (Last 24 hours) at 3/29/2024 0811  Last data filed at 3/29/2024 0700  Gross per 24 hour   Intake 120 ml   Output 607 ml   Net -487 ml       CBC:   Recent Labs     03/27/24  0329 03/28/24 0213 03/29/24 0417   WBC 8.1 8.2 7.8   HGB 9.6* 9.8* 9.6*    277 274       BMP:    Recent Labs     03/27/24  0329 03/28/24  0213 03/29/24  0417    136 135   K 4.0 4.4 4.2   CL 98* 97* 96*   CO2 24 26 26   BUN 47* 47* 48*   CREATININE 12.7* 12.9* 12.4*   GLUCOSE 179* 170* 168*   CALCIUM 7.9* 8.5 8.7     Hepatic:   Recent Labs     03/27/24  0329 03/28/24  0213 03/29/24  0417   AST 13* 11* 11*

## 2024-03-31 LAB
CULTURE: NORMAL
CULTURE: NORMAL
Lab: NORMAL
Lab: NORMAL
SPECIMEN: NORMAL
SPECIMEN: NORMAL

## 2024-04-01 ENCOUNTER — OFFICE VISIT (OUTPATIENT)
Dept: CARDIOLOGY CLINIC | Age: 79
End: 2024-04-01
Payer: COMMERCIAL

## 2024-04-01 VITALS
HEART RATE: 73 BPM | HEIGHT: 68 IN | BODY MASS INDEX: 32.28 KG/M2 | SYSTOLIC BLOOD PRESSURE: 106 MMHG | DIASTOLIC BLOOD PRESSURE: 64 MMHG | WEIGHT: 213 LBS

## 2024-04-01 DIAGNOSIS — I48.3 TYPICAL ATRIAL FLUTTER (HCC): ICD-10-CM

## 2024-04-01 DIAGNOSIS — I48.0 PAROXYSMAL ATRIAL FIBRILLATION (HCC): Primary | ICD-10-CM

## 2024-04-01 PROBLEM — I48.92 ATRIAL FLUTTER (HCC): Status: ACTIVE | Noted: 2024-04-01

## 2024-04-01 LAB
CULTURE: NORMAL
CULTURE: NORMAL
Lab: NORMAL
Lab: NORMAL
SPECIMEN: NORMAL
SPECIMEN: NORMAL

## 2024-04-01 PROCEDURE — 1123F ACP DISCUSS/DSCN MKR DOCD: CPT | Performed by: NURSE PRACTITIONER

## 2024-04-01 PROCEDURE — 93000 ELECTROCARDIOGRAM COMPLETE: CPT | Performed by: NURSE PRACTITIONER

## 2024-04-01 PROCEDURE — 99213 OFFICE O/P EST LOW 20 MIN: CPT | Performed by: NURSE PRACTITIONER

## 2024-04-01 ASSESSMENT — ENCOUNTER SYMPTOMS
SHORTNESS OF BREATH: 0
ORTHOPNEA: 0

## 2024-04-01 NOTE — PROGRESS NOTES
4/1/2024  Primary cardiologist: Dr. Camarillo    CC:   Krupa  is an established 78 y.o.  female here for a follow up on hospital for atrial flutter      SUBJECTIVE/OBJECTIVE:  HPI  Krupa is a 78 y.o. female with a history of  atrial fibrillation, nonobstructive CAD hypertension, hyperlipidemia, obstructive sleep apnea with c pap, syncope, diabetes mellitus type 2 and end-stage renal disease on PD.       Krupa reports seen in the hospital for tachycardia concerning for SVT versus atypical atrial flutter with RVR.  Heart rate was around 150 bpm.  EKG showed atrial flutter with 2-1 block versus SVT.  She converted after diltiazem.  At time of admission noted to have electrolyte abnormality with hypokalemia and hypomagnesia him.  Electrolytes were replenished.    Today Krupa reports she is feeling better.  She has had no further episodes of palpitations.    Review of Systems   Constitutional: Positive for malaise/fatigue. Negative for diaphoresis.   Cardiovascular:  Negative for chest pain, claudication, dyspnea on exertion, irregular heartbeat, leg swelling, near-syncope, orthopnea, palpitations and paroxysmal nocturnal dyspnea.   Respiratory:  Negative for shortness of breath.    Neurological:  Negative for dizziness and light-headedness.       Vitals:    04/01/24 1547   BP: 106/64   Pulse: 73   Weight: 96.6 kg (213 lb)   Height: 1.727 m (5' 8\")     Wt Readings from Last 3 Encounters:   04/01/24 96.6 kg (213 lb)   03/27/24 98.5 kg (217 lb 1.6 oz)   02/13/24 99.2 kg (218 lb 9.6 oz)      Body mass index is 32.39 kg/m².     Physical Exam  Vitals reviewed.   Eyes:      Pupils: Pupils are equal, round, and reactive to light.   Neck:      Vascular: No carotid bruit.   Cardiovascular:      Rate and Rhythm: Normal rate and regular rhythm.      Pulses: Normal pulses.   Pulmonary:      Effort: Pulmonary effort is normal.      Breath sounds: Normal breath sounds. No rales.   Chest:      Chest wall: No tenderness.   Musculoskeletal:

## 2024-04-03 ENCOUNTER — OFFICE VISIT (OUTPATIENT)
Dept: INTERNAL MEDICINE CLINIC | Age: 79
End: 2024-04-03
Payer: COMMERCIAL

## 2024-04-03 VITALS
HEIGHT: 68 IN | DIASTOLIC BLOOD PRESSURE: 68 MMHG | BODY MASS INDEX: 32.43 KG/M2 | HEART RATE: 62 BPM | WEIGHT: 214 LBS | SYSTOLIC BLOOD PRESSURE: 122 MMHG | OXYGEN SATURATION: 100 %

## 2024-04-03 DIAGNOSIS — Z09 HOSPITAL DISCHARGE FOLLOW-UP: Primary | ICD-10-CM

## 2024-04-03 DIAGNOSIS — N18.6 ESRD (END STAGE RENAL DISEASE) (HCC): ICD-10-CM

## 2024-04-03 DIAGNOSIS — I48.91 ATRIAL FIBRILLATION WITH RVR (HCC): ICD-10-CM

## 2024-04-03 PROBLEM — D62 ACUTE BLOOD LOSS ANEMIA: Status: RESOLVED | Noted: 2018-11-08 | Resolved: 2024-04-03

## 2024-04-03 PROBLEM — N17.9 AKI (ACUTE KIDNEY INJURY) (HCC): Status: RESOLVED | Noted: 2021-12-14 | Resolved: 2024-04-03

## 2024-04-03 PROCEDURE — 99214 OFFICE O/P EST MOD 30 MIN: CPT | Performed by: INTERNAL MEDICINE

## 2024-04-03 PROCEDURE — 1111F DSCHRG MED/CURRENT MED MERGE: CPT | Performed by: INTERNAL MEDICINE

## 2024-04-03 PROCEDURE — 1123F ACP DISCUSS/DSCN MKR DOCD: CPT | Performed by: INTERNAL MEDICINE

## 2024-04-03 RX ORDER — FOLIC ACID 1 MG/1
1 TABLET ORAL DAILY
COMMUNITY

## 2024-04-03 NOTE — PROGRESS NOTES
Post-Discharge Transitional Care Follow Up      Krupa Sanchez   YOB: 1945    Date of Office Visit:  4/3/2024  Date of Hospital Admission: 3/26/24  Date of Hospital Discharge: 3/29/24  Readmission Risk Score (high >=14%. Medium >=10%):Readmission Risk Score: 23.7      Care management risk score Rising risk (score 2-5) and Complex Care (Scores >=6): No Risk Score On File     Non face to face  following discharge, date last encounter closed (first attempt may have been earlier): *No documented post hospital discharge outreach found in the last 14 days     Call initiated 2 business days of discharge: *No response recorded in the last 14 days     Hospital discharge follow-up  -     MD DISCHARGE MEDS RECONCILED W/ CURRENT OUTPATIENT MED LIST    Atrial fibrillation with RVR (HCC) - in NSR on exam today. Cont current meds. May need ablation    ESRD (end stage renal disease) (HCC) - doing well with PD, no change    Is planning on a trip to [place] for a month      Medical Decision Making: high complexity  Return for As scheduled.    On this date 4/3/2024 I have spent 25 minutes reviewing previous notes, test results and face to face with the patient discussing the diagnosis and importance of compliance with the treatment plan as well as documenting on the day of the visit.       Subjective:   HPI    Inpatient course: Discharge summary reviewed- see chart.    Interval history/Current status:     Pt developed SOB and chest tightness at home while she was doing her PD. The prior night she was feeling fine. She called 911 and was take to the ER. She was found to be in a fib with RVR. She was given diltiazem then had her metoprolol increased, and she was continued on diltiazem. She continues on diltiazem. Echo showed EF 65-70%, mod-severe LVH    She was seen by Dr Mauricio yesterday. There was no change in mgmt then.    She continues to have SOB, but this is her SOB from when she stated PD. She received iron with

## 2024-04-30 ENCOUNTER — HOSPITAL ENCOUNTER (OUTPATIENT)
Dept: WOMENS IMAGING | Age: 79
Discharge: HOME | End: 2024-04-30
Attending: INTERNAL MEDICINE
Payer: COMMERCIAL

## 2024-04-30 VITALS — WEIGHT: 204 LBS | BODY MASS INDEX: 30.92 KG/M2 | HEIGHT: 68 IN

## 2024-04-30 DIAGNOSIS — Z12.31 ENCOUNTER FOR SCREENING MAMMOGRAM FOR MALIGNANT NEOPLASM OF BREAST: ICD-10-CM

## 2024-04-30 DIAGNOSIS — C50.411 MALIGNANT NEOPLASM OF UPPER-OUTER QUADRANT OF RIGHT BREAST IN FEMALE, ESTROGEN RECEPTOR POSITIVE (HCC): ICD-10-CM

## 2024-04-30 DIAGNOSIS — Z17.0 MALIGNANT NEOPLASM OF UPPER-OUTER QUADRANT OF RIGHT BREAST IN FEMALE, ESTROGEN RECEPTOR POSITIVE (HCC): ICD-10-CM

## 2024-04-30 PROCEDURE — 77063 BREAST TOMOSYNTHESIS BI: CPT

## 2024-05-09 ENCOUNTER — OFFICE VISIT (OUTPATIENT)
Dept: CARDIOLOGY CLINIC | Age: 79
End: 2024-05-09
Payer: COMMERCIAL

## 2024-05-09 VITALS
BODY MASS INDEX: 31.28 KG/M2 | HEART RATE: 69 BPM | WEIGHT: 206.4 LBS | SYSTOLIC BLOOD PRESSURE: 108 MMHG | OXYGEN SATURATION: 100 % | HEIGHT: 68 IN | DIASTOLIC BLOOD PRESSURE: 62 MMHG

## 2024-05-09 DIAGNOSIS — I48.0 PAROXYSMAL ATRIAL FIBRILLATION (HCC): Primary | ICD-10-CM

## 2024-05-09 PROCEDURE — 1123F ACP DISCUSS/DSCN MKR DOCD: CPT | Performed by: NURSE PRACTITIONER

## 2024-05-09 PROCEDURE — 99213 OFFICE O/P EST LOW 20 MIN: CPT | Performed by: NURSE PRACTITIONER

## 2024-05-09 ASSESSMENT — ENCOUNTER SYMPTOMS
SHORTNESS OF BREATH: 0
ORTHOPNEA: 0

## 2024-05-09 NOTE — PATIENT INSTRUCTIONS
Please be informed that if you contact our office outside of normal business hours the physician on call cannot help with any scheduling or rescheduling issues, procedure instruction questions or any type of medication issue.    We advise you for any urgent/emergency that you go to the nearest emergency room!    PLEASE CALL OUR OFFICE DURING NORMAL BUSINESS HOURS    Monday - Friday   8 am to 5 pm    [place]: [phone]    [place]: [phone]    [place]:  [phone]    **It is YOUR responsibilty to bring medication bottles and/or updated medication list to EACH APPOINTMENT. This will allow us to better serve you and all your healthcare needs**    Thank you for allowing us to care for you today!   We want to ensure we can follow your treatment plan and we strive to give you the best outcomes and experience possible.   If you ever have a life threatening emergency and call 911 - for an ambulance (EMS)   Our providers can only care for you at:   [facility] or [facility].   Even if you have someone take you or you drive yourself we can only care for you in a [facility] facility. Our providers are not setup at the other healthcare locations!

## 2024-05-09 NOTE — PROGRESS NOTES
daily (Patient not taking: Reported on 4/3/2024)       No current facility-administered medications for this visit.          All pertinent data reviewed and discussed with patient       ASSESSMENT/PLAN:    PAF/atrial flutter  Currently in regular rate with regular rhythm.  Has had intermittent episodes of fluttering which appear to be stable   continue with metoprolol tartrate 50 mg twice daily along with Cartia 120 mg daily  Due to her renal function we will continue low-dose anticoagulation of Eliquis 2.5 mg twice daily.      Tests ordered:  none  Follow-up 3 months or sooner if needed    Signed:  FAITH Noble CNP, 5/9/2024, 1:50 PM    An electronic signature was used to authenticate this note.    Please note this report has been partially produced using speech recognition software and may contain errors related to that system including errors in grammar, punctuation, and spelling, as well as words and phrases that may be inappropriate. If there are any questions or concerns please feel free to contact the dictating provider for clarification.

## 2024-05-13 RX ORDER — DILTIAZEM HYDROCHLORIDE 120 MG/1
120 CAPSULE, EXTENDED RELEASE ORAL DAILY
Qty: 90 CAPSULE | Refills: 3 | Status: SHIPPED | OUTPATIENT
Start: 2024-05-13

## 2024-06-18 ENCOUNTER — TELEPHONE (OUTPATIENT)
Dept: CARDIOLOGY CLINIC | Age: 79
End: 2024-06-18

## 2024-06-18 NOTE — TELEPHONE ENCOUNTER
Patient called for a refill on her Rythmol 150 mg  She stated that she takes 3 times a day appt  8/24 not showing on her med list   Her bottle was from 10/2023

## 2024-06-19 NOTE — TELEPHONE ENCOUNTER
Spoke with patient she is wanting to know if she is to continue on Rythmol 150mg. If so she needs a refill.  Medication is not on her current med list

## 2024-06-24 ENCOUNTER — OFFICE VISIT (OUTPATIENT)
Dept: INTERNAL MEDICINE CLINIC | Age: 79
End: 2024-06-24
Payer: COMMERCIAL

## 2024-06-24 VITALS
HEIGHT: 68 IN | DIASTOLIC BLOOD PRESSURE: 66 MMHG | BODY MASS INDEX: 30.98 KG/M2 | WEIGHT: 204.4 LBS | SYSTOLIC BLOOD PRESSURE: 126 MMHG | OXYGEN SATURATION: 97 % | HEART RATE: 66 BPM

## 2024-06-24 DIAGNOSIS — I48.0 PAROXYSMAL ATRIAL FIBRILLATION (HCC): ICD-10-CM

## 2024-06-24 DIAGNOSIS — E11.21 TYPE 2 DIABETES MELLITUS WITH NEPHROPATHY (HCC): Primary | ICD-10-CM

## 2024-06-24 DIAGNOSIS — N18.6 ESRD (END STAGE RENAL DISEASE) (HCC): ICD-10-CM

## 2024-06-24 DIAGNOSIS — E78.2 MIXED HYPERLIPIDEMIA: ICD-10-CM

## 2024-06-24 PROCEDURE — 99213 OFFICE O/P EST LOW 20 MIN: CPT | Performed by: INTERNAL MEDICINE

## 2024-06-24 PROCEDURE — 1123F ACP DISCUSS/DSCN MKR DOCD: CPT | Performed by: INTERNAL MEDICINE

## 2024-06-24 PROCEDURE — 3051F HG A1C>EQUAL 7.0%<8.0%: CPT | Performed by: INTERNAL MEDICINE

## 2024-06-24 NOTE — PROGRESS NOTES
Krupa Sanchez  1945 06/24/24    SUBJECTIVE:    Pt continues to do PD, and she still makes some urine. She follows with Dr Morris.     Patient denies any chest pain, shortness of breath, myalgias. Patient is tolerating cholesterol medications without difficulty.     Patient compliant with medications for diabetes. Blood sugars have averaged around 150s, with pt checking blood sugar 1 time daily. Patient has had no episodes of significant hypoglycemia.     She has not been using allopurinol recently, and she has had no episodes of gout.     Pt with atrial fibrillation. Patient continues on eliquis, denies any blood in the stool, black stools, palpitations.       OBJECTIVE:    /66 (Site: Left Upper Arm, Position: Sitting, Cuff Size: Medium Adult)   Pulse 66   Ht 1.727 m (5' 7.99\")   Wt 92.7 kg (204 lb 6.4 oz)   LMP  (LMP Unknown)   SpO2 97%   BMI 31.09 kg/m²     Physical Exam  Constitutional:       Appearance: She is well-developed.   Eyes:      General: No scleral icterus.     Conjunctiva/sclera: Conjunctivae normal.   Neck:      Thyroid: No thyromegaly.      Trachea: No tracheal deviation.   Cardiovascular:      Rate and Rhythm: Normal rate and regular rhythm.      Heart sounds: No murmur heard.     No friction rub. No gallop.   Pulmonary:      Effort: No respiratory distress.      Breath sounds: No wheezing or rales.   Abdominal:      General: Bowel sounds are normal. There is no distension.      Palpations: Abdomen is soft. There is no hepatomegaly or mass.      Tenderness: There is no abdominal tenderness. There is no guarding or rebound.   Musculoskeletal:      Cervical back: Neck supple.   Lymphadenopathy:      Cervical: No cervical adenopathy.   Skin:     Nails: There is no clubbing.   Neurological:      Mental Status: She is alert and oriented to person, place, and time.   Psychiatric:         Behavior: Behavior normal.         Judgment: Judgment normal.         ASSESSMENT:    1. Type 2

## 2024-07-02 ENCOUNTER — HOSPITAL ENCOUNTER (INPATIENT)
Age: 79
LOS: 1 days | Discharge: HOME | End: 2024-07-04
Attending: STUDENT IN AN ORGANIZED HEALTH CARE EDUCATION/TRAINING PROGRAM | Admitting: INTERNAL MEDICINE
Payer: COMMERCIAL

## 2024-07-02 ENCOUNTER — APPOINTMENT (OUTPATIENT)
Dept: GENERAL RADIOLOGY | Age: 79
End: 2024-07-02
Payer: COMMERCIAL

## 2024-07-02 DIAGNOSIS — J18.9 PNEUMONIA OF BOTH LUNGS DUE TO INFECTIOUS ORGANISM, UNSPECIFIED PART OF LUNG: Primary | ICD-10-CM

## 2024-07-02 DIAGNOSIS — I48.92 ATRIAL FLUTTER WITH RAPID VENTRICULAR RESPONSE (HCC): ICD-10-CM

## 2024-07-02 LAB
ANION GAP SERPL CALCULATED.3IONS-SCNC: 16 MMOL/L (ref 7–16)
BASOPHILS ABSOLUTE: 0 K/CU MM
BASOPHILS RELATIVE PERCENT: 0.3 % (ref 0–1)
BUN SERPL-MCNC: 43 MG/DL (ref 6–23)
CALCIUM SERPL-MCNC: 10.2 MG/DL (ref 8.3–10.6)
CHLORIDE BLD-SCNC: 95 MMOL/L (ref 99–110)
CO2: 25 MMOL/L (ref 21–32)
CREAT SERPL-MCNC: 10 MG/DL (ref 0.6–1.1)
DIFFERENTIAL TYPE: ABNORMAL
EOSINOPHILS ABSOLUTE: 0.2 K/CU MM
EOSINOPHILS RELATIVE PERCENT: 1.7 % (ref 0–3)
GFR, ESTIMATED: 4 ML/MIN/1.73M2
GLUCOSE SERPL-MCNC: 124 MG/DL (ref 70–99)
HCT VFR BLD CALC: 33 % (ref 37–47)
HEMOGLOBIN: 10.3 GM/DL (ref 12.5–16)
IMMATURE NEUTROPHIL %: 0.3 % (ref 0–0.43)
LACTIC ACID, SEPSIS: 1.9 MMOL/L (ref 0.4–2)
LACTIC ACID, SEPSIS: 2 MMOL/L (ref 0.4–2)
LYMPHOCYTES ABSOLUTE: 1.9 K/CU MM
LYMPHOCYTES RELATIVE PERCENT: 15.5 % (ref 24–44)
MAGNESIUM: 2.5 MG/DL (ref 1.8–2.4)
MCH RBC QN AUTO: 28.5 PG (ref 27–31)
MCHC RBC AUTO-ENTMCNC: 31.2 % (ref 32–36)
MCV RBC AUTO: 91.2 FL (ref 78–100)
MONOCYTES ABSOLUTE: 0.8 K/CU MM
MONOCYTES RELATIVE PERCENT: 6.5 % (ref 0–4)
NEUTROPHILS ABSOLUTE: 9.1 K/CU MM
NEUTROPHILS RELATIVE PERCENT: 75.7 % (ref 36–66)
NUCLEATED RBC %: 0 %
PDW BLD-RTO: 15.9 % (ref 11.7–14.9)
PLATELET # BLD: 315 K/CU MM (ref 140–440)
PMV BLD AUTO: 10 FL (ref 7.5–11.1)
POTASSIUM SERPL-SCNC: 4.5 MMOL/L (ref 3.5–5.1)
PRO-BNP: ABNORMAL PG/ML
RBC # BLD: 3.62 M/CU MM (ref 4.2–5.4)
REASON FOR REJECTION: NORMAL
REJECTED TEST: NORMAL
SODIUM BLD-SCNC: 136 MMOL/L (ref 135–145)
TOTAL IMMATURE NEUTOROPHIL: 0.04 K/CU MM
TOTAL NUCLEATED RBC: 0 K/CU MM
TROPONIN, HIGH SENSITIVITY: 253 NG/L (ref 0–14)
TSH SERPL DL<=0.005 MIU/L-ACNC: 1.9 UIU/ML (ref 0.27–4.2)
WBC # BLD: 12.1 K/CU MM (ref 4–10.5)

## 2024-07-02 PROCEDURE — 83735 ASSAY OF MAGNESIUM: CPT

## 2024-07-02 PROCEDURE — 84484 ASSAY OF TROPONIN QUANT: CPT

## 2024-07-02 PROCEDURE — 96375 TX/PRO/DX INJ NEW DRUG ADDON: CPT

## 2024-07-02 PROCEDURE — 6360000002 HC RX W HCPCS: Performed by: STUDENT IN AN ORGANIZED HEALTH CARE EDUCATION/TRAINING PROGRAM

## 2024-07-02 PROCEDURE — 83880 ASSAY OF NATRIURETIC PEPTIDE: CPT

## 2024-07-02 PROCEDURE — 2500000003 HC RX 250 WO HCPCS: Performed by: STUDENT IN AN ORGANIZED HEALTH CARE EDUCATION/TRAINING PROGRAM

## 2024-07-02 PROCEDURE — 6370000000 HC RX 637 (ALT 250 FOR IP)

## 2024-07-02 PROCEDURE — 99285 EMERGENCY DEPT VISIT HI MDM: CPT

## 2024-07-02 PROCEDURE — 93005 ELECTROCARDIOGRAM TRACING: CPT | Performed by: STUDENT IN AN ORGANIZED HEALTH CARE EDUCATION/TRAINING PROGRAM

## 2024-07-02 PROCEDURE — 2580000003 HC RX 258: Performed by: STUDENT IN AN ORGANIZED HEALTH CARE EDUCATION/TRAINING PROGRAM

## 2024-07-02 PROCEDURE — 83605 ASSAY OF LACTIC ACID: CPT

## 2024-07-02 PROCEDURE — 84145 PROCALCITONIN (PCT): CPT

## 2024-07-02 PROCEDURE — 84443 ASSAY THYROID STIM HORMONE: CPT

## 2024-07-02 PROCEDURE — 85025 COMPLETE CBC W/AUTO DIFF WBC: CPT

## 2024-07-02 PROCEDURE — 96365 THER/PROPH/DIAG IV INF INIT: CPT

## 2024-07-02 PROCEDURE — 6370000000 HC RX 637 (ALT 250 FOR IP): Performed by: STUDENT IN AN ORGANIZED HEALTH CARE EDUCATION/TRAINING PROGRAM

## 2024-07-02 PROCEDURE — 80048 BASIC METABOLIC PNL TOTAL CA: CPT

## 2024-07-02 PROCEDURE — 71045 X-RAY EXAM CHEST 1 VIEW: CPT

## 2024-07-02 PROCEDURE — 87040 BLOOD CULTURE FOR BACTERIA: CPT

## 2024-07-02 RX ORDER — 0.9 % SODIUM CHLORIDE 0.9 %
917 INTRAVENOUS SOLUTION INTRAVENOUS ONCE
Status: COMPLETED | OUTPATIENT
Start: 2024-07-02 | End: 2024-07-03

## 2024-07-02 RX ORDER — METOPROLOL TARTRATE 1 MG/ML
5 INJECTION, SOLUTION INTRAVENOUS ONCE
Status: DISCONTINUED | OUTPATIENT
Start: 2024-07-02 | End: 2024-07-04 | Stop reason: HOSPADM

## 2024-07-02 RX ORDER — ASPIRIN 81 MG/1
TABLET, CHEWABLE ORAL
Status: COMPLETED
Start: 2024-07-02 | End: 2024-07-02

## 2024-07-02 RX ORDER — 0.9 % SODIUM CHLORIDE 0.9 %
1000 INTRAVENOUS SOLUTION INTRAVENOUS ONCE
Status: COMPLETED | OUTPATIENT
Start: 2024-07-02 | End: 2024-07-02

## 2024-07-02 RX ORDER — MAGNESIUM SULFATE IN WATER 40 MG/ML
2000 INJECTION, SOLUTION INTRAVENOUS ONCE
Status: COMPLETED | OUTPATIENT
Start: 2024-07-02 | End: 2024-07-02

## 2024-07-02 RX ORDER — DOXYCYCLINE HYCLATE 100 MG
100 TABLET ORAL ONCE
Status: COMPLETED | OUTPATIENT
Start: 2024-07-02 | End: 2024-07-02

## 2024-07-02 RX ORDER — METOPROLOL TARTRATE 1 MG/ML
5 INJECTION, SOLUTION INTRAVENOUS ONCE
Status: COMPLETED | OUTPATIENT
Start: 2024-07-02 | End: 2024-07-02

## 2024-07-02 RX ORDER — MORPHINE SULFATE 2 MG/ML
2 INJECTION, SOLUTION INTRAMUSCULAR; INTRAVENOUS ONCE
Status: COMPLETED | OUTPATIENT
Start: 2024-07-02 | End: 2024-07-02

## 2024-07-02 RX ADMIN — ASPIRIN: 81 TABLET, CHEWABLE ORAL at 21:00

## 2024-07-02 RX ADMIN — DOXYCYCLINE HYCLATE 100 MG: 100 TABLET, COATED ORAL at 23:36

## 2024-07-02 RX ADMIN — SODIUM CHLORIDE 1000 ML: 9 INJECTION, SOLUTION INTRAVENOUS at 20:50

## 2024-07-02 RX ADMIN — MORPHINE SULFATE 2 MG: 2 INJECTION, SOLUTION INTRAMUSCULAR; INTRAVENOUS at 20:47

## 2024-07-02 RX ADMIN — METOPROLOL TARTRATE 5 MG: 5 INJECTION INTRAVENOUS at 20:47

## 2024-07-02 RX ADMIN — WATER 1000 MG: 1 INJECTION INTRAMUSCULAR; INTRAVENOUS; SUBCUTANEOUS at 23:35

## 2024-07-02 RX ADMIN — SODIUM CHLORIDE 917 ML: 9 INJECTION, SOLUTION INTRAVENOUS at 23:45

## 2024-07-02 RX ADMIN — MAGNESIUM SULFATE HEPTAHYDRATE 2000 MG: 40 INJECTION, SOLUTION INTRAVENOUS at 20:51

## 2024-07-02 ASSESSMENT — PAIN DESCRIPTION - ORIENTATION: ORIENTATION: MID;UPPER

## 2024-07-02 ASSESSMENT — PAIN DESCRIPTION - LOCATION: LOCATION: CHEST

## 2024-07-02 ASSESSMENT — PAIN - FUNCTIONAL ASSESSMENT
PAIN_FUNCTIONAL_ASSESSMENT: 0-10
PAIN_FUNCTIONAL_ASSESSMENT: ACTIVITIES ARE NOT PREVENTED

## 2024-07-02 ASSESSMENT — PAIN SCALES - GENERAL
PAINLEVEL_OUTOF10: 4
PAINLEVEL_OUTOF10: 0

## 2024-07-02 ASSESSMENT — LIFESTYLE VARIABLES
HOW OFTEN DO YOU HAVE A DRINK CONTAINING ALCOHOL: NEVER
HOW MANY STANDARD DRINKS CONTAINING ALCOHOL DO YOU HAVE ON A TYPICAL DAY: PATIENT DOES NOT DRINK

## 2024-07-02 ASSESSMENT — PAIN DESCRIPTION - DESCRIPTORS: DESCRIPTORS: ACHING;SHARP

## 2024-07-03 PROBLEM — I48.92 ATRIAL FLUTTER WITH RAPID VENTRICULAR RESPONSE (HCC): Status: ACTIVE | Noted: 2024-07-03

## 2024-07-03 LAB
ANION GAP SERPL CALCULATED.3IONS-SCNC: 13 MMOL/L (ref 7–16)
BASOPHILS ABSOLUTE: 0 K/CU MM
BASOPHILS RELATIVE PERCENT: 0.4 % (ref 0–1)
BUN SERPL-MCNC: 45 MG/DL (ref 6–23)
CALCIUM SERPL-MCNC: 9.7 MG/DL (ref 8.3–10.6)
CHLORIDE BLD-SCNC: 96 MMOL/L (ref 99–110)
CO2: 25 MMOL/L (ref 21–32)
CREAT SERPL-MCNC: 9.8 MG/DL (ref 0.6–1.1)
DIFFERENTIAL TYPE: ABNORMAL
EKG ATRIAL RATE: 163 BPM
EKG ATRIAL RATE: 89 BPM
EKG DIAGNOSIS: NORMAL
EKG DIAGNOSIS: NORMAL
EKG P AXIS: 75 DEGREES
EKG P-R INTERVAL: 200 MS
EKG Q-T INTERVAL: 320 MS
EKG Q-T INTERVAL: 410 MS
EKG QRS DURATION: 84 MS
EKG QRS DURATION: 96 MS
EKG QTC CALCULATION (BAZETT): 498 MS
EKG QTC CALCULATION (BAZETT): 512 MS
EKG R AXIS: -35 DEGREES
EKG R AXIS: -36 DEGREES
EKG T AXIS: 130 DEGREES
EKG T AXIS: 69 DEGREES
EKG VENTRICULAR RATE: 154 BPM
EKG VENTRICULAR RATE: 89 BPM
EOSINOPHILS ABSOLUTE: 0.3 K/CU MM
EOSINOPHILS RELATIVE PERCENT: 2.3 % (ref 0–3)
GFR, ESTIMATED: 4 ML/MIN/1.73M2
GLUCOSE BLD-MCNC: 153 MG/DL (ref 70–99)
GLUCOSE BLD-MCNC: 214 MG/DL (ref 70–99)
GLUCOSE SERPL-MCNC: 120 MG/DL (ref 70–99)
HCT VFR BLD CALC: 27.6 % (ref 37–47)
HEMOGLOBIN: 8.7 GM/DL (ref 12.5–16)
IMMATURE NEUTROPHIL %: 0.5 % (ref 0–0.43)
LYMPHOCYTES ABSOLUTE: 1.7 K/CU MM
LYMPHOCYTES RELATIVE PERCENT: 15.8 % (ref 24–44)
MCH RBC QN AUTO: 28.6 PG (ref 27–31)
MCHC RBC AUTO-ENTMCNC: 31.5 % (ref 32–36)
MCV RBC AUTO: 90.8 FL (ref 78–100)
MONOCYTES ABSOLUTE: 0.7 K/CU MM
MONOCYTES RELATIVE PERCENT: 6.7 % (ref 0–4)
NEUTROPHILS ABSOLUTE: 8.1 K/CU MM
NEUTROPHILS RELATIVE PERCENT: 74.3 % (ref 36–66)
NUCLEATED RBC %: 0 %
PDW BLD-RTO: 15.9 % (ref 11.7–14.9)
PLATELET # BLD: 287 K/CU MM (ref 140–440)
PMV BLD AUTO: 10 FL (ref 7.5–11.1)
POTASSIUM SERPL-SCNC: 4.4 MMOL/L (ref 3.5–5.1)
PROCALCITONIN SERPL-MCNC: 0.39 NG/ML
RBC # BLD: 3.04 M/CU MM (ref 4.2–5.4)
SODIUM BLD-SCNC: 134 MMOL/L (ref 135–145)
TOTAL IMMATURE NEUTOROPHIL: 0.05 K/CU MM
TOTAL NUCLEATED RBC: 0 K/CU MM
TROPONIN, HIGH SENSITIVITY: 244 NG/L (ref 0–14)
TROPONIN, HIGH SENSITIVITY: 265 NG/L (ref 0–14)
WBC # BLD: 10.9 K/CU MM (ref 4–10.5)

## 2024-07-03 PROCEDURE — 93010 ELECTROCARDIOGRAM REPORT: CPT | Performed by: INTERNAL MEDICINE

## 2024-07-03 PROCEDURE — 3E1M39Z IRRIGATION OF PERITONEAL CAVITY USING DIALYSATE, PERCUTANEOUS APPROACH: ICD-10-PCS | Performed by: INTERNAL MEDICINE

## 2024-07-03 PROCEDURE — 6370000000 HC RX 637 (ALT 250 FOR IP): Performed by: INTERNAL MEDICINE

## 2024-07-03 PROCEDURE — 82962 GLUCOSE BLOOD TEST: CPT

## 2024-07-03 PROCEDURE — 94761 N-INVAS EAR/PLS OXIMETRY MLT: CPT

## 2024-07-03 PROCEDURE — 2580000003 HC RX 258: Performed by: INTERNAL MEDICINE

## 2024-07-03 PROCEDURE — 84484 ASSAY OF TROPONIN QUANT: CPT

## 2024-07-03 PROCEDURE — 80048 BASIC METABOLIC PNL TOTAL CA: CPT

## 2024-07-03 PROCEDURE — 90945 DIALYSIS ONE EVALUATION: CPT

## 2024-07-03 PROCEDURE — 36415 COLL VENOUS BLD VENIPUNCTURE: CPT

## 2024-07-03 PROCEDURE — 99223 1ST HOSP IP/OBS HIGH 75: CPT | Performed by: INTERNAL MEDICINE

## 2024-07-03 PROCEDURE — 76937 US GUIDE VASCULAR ACCESS: CPT

## 2024-07-03 PROCEDURE — 2140000000 HC CCU INTERMEDIATE R&B

## 2024-07-03 PROCEDURE — 6360000002 HC RX W HCPCS: Performed by: INTERNAL MEDICINE

## 2024-07-03 PROCEDURE — 85025 COMPLETE CBC W/AUTO DIFF WBC: CPT

## 2024-07-03 RX ORDER — GLUCAGON 1 MG/ML
1 KIT INJECTION PRN
Status: DISCONTINUED | OUTPATIENT
Start: 2024-07-03 | End: 2024-07-04 | Stop reason: HOSPADM

## 2024-07-03 RX ORDER — DOXYCYCLINE HYCLATE 100 MG
100 TABLET ORAL EVERY 12 HOURS SCHEDULED
Status: DISCONTINUED | OUTPATIENT
Start: 2024-07-03 | End: 2024-07-04 | Stop reason: HOSPADM

## 2024-07-03 RX ORDER — ACETAMINOPHEN 650 MG/1
650 SUPPOSITORY RECTAL EVERY 6 HOURS PRN
Status: DISCONTINUED | OUTPATIENT
Start: 2024-07-03 | End: 2024-07-04 | Stop reason: HOSPADM

## 2024-07-03 RX ORDER — INSULIN LISPRO 100 [IU]/ML
0-4 INJECTION, SOLUTION INTRAVENOUS; SUBCUTANEOUS NIGHTLY
Status: DISCONTINUED | OUTPATIENT
Start: 2024-07-03 | End: 2024-07-04 | Stop reason: HOSPADM

## 2024-07-03 RX ORDER — SODIUM CHLORIDE 0.9 % (FLUSH) 0.9 %
5-40 SYRINGE (ML) INJECTION PRN
Status: DISCONTINUED | OUTPATIENT
Start: 2024-07-03 | End: 2024-07-04 | Stop reason: HOSPADM

## 2024-07-03 RX ORDER — AMIODARONE HYDROCHLORIDE 200 MG/1
200 TABLET ORAL DAILY
Status: DISCONTINUED | OUTPATIENT
Start: 2024-07-03 | End: 2024-07-04 | Stop reason: HOSPADM

## 2024-07-03 RX ORDER — LANOLIN ALCOHOL/MO/W.PET/CERES
400 CREAM (GRAM) TOPICAL DAILY
Status: CANCELLED | OUTPATIENT
Start: 2024-07-03

## 2024-07-03 RX ORDER — ATORVASTATIN CALCIUM 40 MG/1
80 TABLET, FILM COATED ORAL NIGHTLY
Status: DISCONTINUED | OUTPATIENT
Start: 2024-07-03 | End: 2024-07-04 | Stop reason: HOSPADM

## 2024-07-03 RX ORDER — ONDANSETRON 4 MG/1
4 TABLET, ORALLY DISINTEGRATING ORAL EVERY 8 HOURS PRN
Status: DISCONTINUED | OUTPATIENT
Start: 2024-07-03 | End: 2024-07-04 | Stop reason: HOSPADM

## 2024-07-03 RX ORDER — INSULIN LISPRO 100 [IU]/ML
0-4 INJECTION, SOLUTION INTRAVENOUS; SUBCUTANEOUS
Status: DISCONTINUED | OUTPATIENT
Start: 2024-07-03 | End: 2024-07-04 | Stop reason: HOSPADM

## 2024-07-03 RX ORDER — M-VIT,TX,IRON,MINS/CALC/FOLIC 27MG-0.4MG
1 TABLET ORAL DAILY
Status: DISCONTINUED | OUTPATIENT
Start: 2024-07-03 | End: 2024-07-04 | Stop reason: HOSPADM

## 2024-07-03 RX ORDER — SODIUM CHLORIDE 0.9 % (FLUSH) 0.9 %
5-40 SYRINGE (ML) INJECTION EVERY 12 HOURS SCHEDULED
Status: DISCONTINUED | OUTPATIENT
Start: 2024-07-03 | End: 2024-07-04 | Stop reason: HOSPADM

## 2024-07-03 RX ORDER — DILTIAZEM HYDROCHLORIDE 120 MG/1
120 CAPSULE, COATED, EXTENDED RELEASE ORAL DAILY
Status: DISCONTINUED | OUTPATIENT
Start: 2024-07-03 | End: 2024-07-04 | Stop reason: HOSPADM

## 2024-07-03 RX ORDER — POLYETHYLENE GLYCOL 3350 17 G/17G
17 POWDER, FOR SOLUTION ORAL DAILY PRN
Status: DISCONTINUED | OUTPATIENT
Start: 2024-07-03 | End: 2024-07-04 | Stop reason: HOSPADM

## 2024-07-03 RX ORDER — IPRATROPIUM BROMIDE 42 UG/1
2 SPRAY, METERED NASAL 4 TIMES DAILY
Status: DISCONTINUED | OUTPATIENT
Start: 2024-07-03 | End: 2024-07-04 | Stop reason: HOSPADM

## 2024-07-03 RX ORDER — ACETAMINOPHEN 325 MG/1
650 TABLET ORAL EVERY 6 HOURS PRN
Status: DISCONTINUED | OUTPATIENT
Start: 2024-07-03 | End: 2024-07-04 | Stop reason: HOSPADM

## 2024-07-03 RX ORDER — CALCIUM CARBONATE 500(1250)
500 TABLET ORAL 2 TIMES DAILY
Status: DISCONTINUED | OUTPATIENT
Start: 2024-07-03 | End: 2024-07-04 | Stop reason: HOSPADM

## 2024-07-03 RX ORDER — ONDANSETRON 2 MG/ML
4 INJECTION INTRAMUSCULAR; INTRAVENOUS EVERY 6 HOURS PRN
Status: DISCONTINUED | OUTPATIENT
Start: 2024-07-03 | End: 2024-07-04 | Stop reason: HOSPADM

## 2024-07-03 RX ORDER — GLIPIZIDE 5 MG/1
5 TABLET ORAL
Status: DISCONTINUED | OUTPATIENT
Start: 2024-07-03 | End: 2024-07-04 | Stop reason: HOSPADM

## 2024-07-03 RX ORDER — DEXTROSE MONOHYDRATE 100 MG/ML
INJECTION, SOLUTION INTRAVENOUS CONTINUOUS PRN
Status: DISCONTINUED | OUTPATIENT
Start: 2024-07-03 | End: 2024-07-04 | Stop reason: HOSPADM

## 2024-07-03 RX ORDER — VITAMIN B COMPLEX
2000 TABLET ORAL DAILY
Status: DISCONTINUED | OUTPATIENT
Start: 2024-07-03 | End: 2024-07-04 | Stop reason: HOSPADM

## 2024-07-03 RX ORDER — METOPROLOL TARTRATE 50 MG/1
50 TABLET, FILM COATED ORAL 2 TIMES DAILY
Status: DISCONTINUED | OUTPATIENT
Start: 2024-07-03 | End: 2024-07-04 | Stop reason: HOSPADM

## 2024-07-03 RX ORDER — FOLIC ACID 1 MG/1
1 TABLET ORAL DAILY
Status: DISCONTINUED | OUTPATIENT
Start: 2024-07-03 | End: 2024-07-04 | Stop reason: HOSPADM

## 2024-07-03 RX ORDER — CLOPIDOGREL BISULFATE 75 MG/1
75 TABLET ORAL DAILY
Status: DISCONTINUED | OUTPATIENT
Start: 2024-07-03 | End: 2024-07-04 | Stop reason: HOSPADM

## 2024-07-03 RX ORDER — SODIUM CHLORIDE 9 MG/ML
INJECTION, SOLUTION INTRAVENOUS PRN
Status: DISCONTINUED | OUTPATIENT
Start: 2024-07-03 | End: 2024-07-04 | Stop reason: HOSPADM

## 2024-07-03 RX ADMIN — GLIPIZIDE 5 MG: 5 TABLET ORAL at 09:11

## 2024-07-03 RX ADMIN — DOXYCYCLINE HYCLATE 100 MG: 100 TABLET, COATED ORAL at 09:10

## 2024-07-03 RX ADMIN — ATORVASTATIN CALCIUM 80 MG: 40 TABLET, FILM COATED ORAL at 21:40

## 2024-07-03 RX ADMIN — Medication 2000 UNITS: at 09:10

## 2024-07-03 RX ADMIN — APIXABAN 2.5 MG: 2.5 TABLET, FILM COATED ORAL at 09:11

## 2024-07-03 RX ADMIN — METOPROLOL TARTRATE 50 MG: 50 TABLET, FILM COATED ORAL at 21:40

## 2024-07-03 RX ADMIN — CALCIUM 500 MG: 500 TABLET ORAL at 21:40

## 2024-07-03 RX ADMIN — CALCIUM 500 MG: 500 TABLET ORAL at 09:10

## 2024-07-03 RX ADMIN — AMIODARONE HYDROCHLORIDE 200 MG: 200 TABLET ORAL at 09:11

## 2024-07-03 RX ADMIN — FOLIC ACID 1 MG: 1 TABLET ORAL at 09:11

## 2024-07-03 RX ADMIN — CLOPIDOGREL BISULFATE 75 MG: 75 TABLET ORAL at 09:11

## 2024-07-03 RX ADMIN — APIXABAN 2.5 MG: 2.5 TABLET, FILM COATED ORAL at 21:40

## 2024-07-03 RX ADMIN — DILTIAZEM HYDROCHLORIDE 120 MG: 120 CAPSULE, EXTENDED RELEASE ORAL at 09:10

## 2024-07-03 RX ADMIN — METOPROLOL TARTRATE 50 MG: 50 TABLET, FILM COATED ORAL at 09:11

## 2024-07-03 RX ADMIN — DOXYCYCLINE HYCLATE 100 MG: 100 TABLET, COATED ORAL at 21:40

## 2024-07-03 NOTE — ED PROVIDER NOTES
10/2012    Right Breast Biopsy, Cancer    BREAST BIOPSY  's    Right Breast Biopsy, Benign    BREAST LUMPECTOMY  2012    Breast cancer - Right with sentinal node    BUNIONECTOMY  's    Right Foot    CARDIAC CATHETERIZATION  Sees Dr. Meng    8/10/09- The patient has no significant CAD. The elevated troponin is probably secondary to SVT., 10/03- no significant CAD    CARPAL TUNNEL RELEASE Left 2019    CARPAL TUNNEL RELEASE Right 2024    CATARACT REMOVAL Bilateral 2020,     CATARACT REMOVAL Left 2020    COLONOSCOPY  \"X 2 Last One  \"    Polpy Removed During Last Colonoscopy    DENTAL SURGERY      Teeth Extracted In Past    DIALYSIS CATHETER INSERTION N/A 2022    CATHETER INSERTION PERITONEAL DIALYSIS LAPAROSCOPIC performed by Sarah Beth Taylor MD at [facility] OR    ENDOSCOPY, COLON, DIAGNOSTIC  2016    savary dilatation to 17 mm    HYSTERECTOMY, TOTAL ABDOMINAL (CERVIX REMOVED)      JOINT REPLACEMENT      Total Left Knee    JOINT REPLACEMENT      Total Right Knee    SOFI STEROTACTIC LOC BREAST BIOPSY RIGHT Right 2021    SOFI STEROTACTIC LOC BREAST BIOPSY RIGHT [facility]    OVARY REMOVAL      UPPER GASTROINTESTINAL ENDOSCOPY N/A 2018    EGD DIAGNOSTIC ONLY performed by Janae Collins MD at [facility] ENDOSCOPY     Social History     Socioeconomic History    Marital status:      Spouse name: Not on file    Number of children: Not on file    Years of education: Not on file    Highest education level: Not on file   Occupational History    Not on file   Tobacco Use    Smoking status: Former     Current packs/day: 0.00     Average packs/day: 0.5 packs/day for 10.0 years (5.0 ttl pk-yrs)     Types: Cigarettes     Start date: 1977     Quit date: 1987     Years since quittin.5    Smokeless tobacco: Never   Vaping Use    Vaping Use: Never used   Substance and Sexual Activity    Alcohol use: No     Alcohol/week: 0.0 standard drinks of  Patient updated. [CR]   2253 CBC shows WBC 12.1 & lactic acid 2.0 [CR]      ED Course User Index  [CR] Kush Newton MD       ED Medications administered this visit:  (None if blank)  Medications   dilTIAZem 100 mg in sodium chloride 0.9 % 100 mL infusion (ADD-[place]) ( IntraVENous Not Given 7/2/24 2107)   metoprolol (LOPRESSOR) injection 5 mg (5 mg IntraVENous Not Given 7/2/24 2107)   cefTRIAXone (ROCEPHIN) 1,000 mg in sterile water 10 mL IV syringe (has no administration in time range)   doxycycline hyclate (VIBRA-TABS) tablet 100 mg (has no administration in time range)   sodium chloride 0.9 % bolus 917 mL (has no administration in time range)   aspirin 81 MG chewable tablet (  Given 7/2/24 2100)   sodium chloride 0.9 % bolus 1,000 mL (0 mLs IntraVENous Stopped 7/2/24 2245)   magnesium sulfate 2000 mg in 50 mL IVPB premix (0 mg IntraVENous Stopped 7/2/24 2143)   metoprolol (LOPRESSOR) injection 5 mg (5 mg IntraVENous Given 7/2/24 2047)   morphine (PF) injection 2 mg (2 mg IntraVENous Given 7/2/24 2047)       DISCHARGE PRESCRIPTIONS: (None if blank)  New Prescriptions    No medications on file       I have reviewed and interpreted all of the currently available lab results from this visit (if applicable):    Radiographs (if obtained):  Radiologist's Report Reviewed:  No results found.  XR CHEST PORTABLE   Final Result   Early bilateral pneumonia         Electronically signed by Quan Robles          LABS: (none if blank)  Labs Reviewed   CBC WITH AUTO DIFFERENTIAL - Abnormal; Notable for the following components:       Result Value    WBC 12.1 (*)     RBC 3.62 (*)     Hemoglobin 10.3 (*)     Hematocrit 33.0 (*)     MCHC 31.2 (*)     RDW 15.9 (*)     Neutrophils % 75.7 (*)     Lymphocytes % 15.5 (*)     Monocytes % 6.5 (*)     All other components within normal limits   CULTURE, BLOOD 1   CULTURE, BLOOD 1   LACTATE, SEPSIS   SPECIMEN REJECTION   TROPONIN   LACTATE, SEPSIS   PROCALCITONIN   BASIC METABOLIC

## 2024-07-03 NOTE — ED TRIAGE NOTES
Pt presents for chest pain via EMS. Pt has hx of A Fib, HR on arrival 155    Pt received 324mg ASA and 1 nitro PTA via EMS

## 2024-07-03 NOTE — H&P
History and Physical      Name:  Krupa Sanchez /Age/Sex: 1945  (79 y.o. female)   MRN & CSN:  [id number] & [id number] Encounter Date/Time: 7/3/2024 2:25 AM EDT   Location:  ED16/ED-16 PCP: Anatn Ernandez MD       Hospital Day: 2    Assessment and Plan:     #.  Atrial flutter with rapid ventricular response  -Heart rate 152-155 on arrival  -Patient received IV metoprolol, then started on Cardizem drip.  -Patient converted and heart rate currently 80-90.  -Resume home medications-Cardizem, metoprolol  -Consult cardiology.    #.  Chest pain  -Chronically elevated troponin-253, 244 today  -EKG with no acute ST-T wave changes.   -Consult cardiology.   -Cath-2022-50% mid LAD stenosis-recommended medical therapy nuclear stress test-2023-fixed perfusion defect of inferior wall of left ventricle with normal wall motion noted suggestive of diaphragmatic artifact.    #.  Pneumonia  -Chest x-ray is read as early bilateral pneumonia  -Patient has mild leukocytosis, intermittent cough  -Continue Rocephin, doxycycline  -Procalcitonin 0.389 (was 0.521, 0.530) #.  ESRD on peritoneal dialysis  -Consult nephrology-Dr. Soto    #.  Chronic atrial fibrillation  -Patient is on Eliquis, metoprolol, Cardizem-resume    #.  Hyperlipidemia-on atorvastatin    #.  Diabetes mellitus type 2  -On glimepiride continue  -Continue insulin sliding scale with hypoglycemia protocol    #. History of GI bleed-2018    #.  Remote history of right breast cancer s/p lumpectomy-2020  -S/p radiation and chemotherapy    #.  History of right TKA-2010    #.  History of C. difficile-2014    #.  ADOLFO    #.  Obesity with BMI 31.0 to    Disposition:   Current Living situation: Home    Diet Cardiac   DVT Prophylaxis [x] Eliquis,   Code Status Full   Surrogate Decision Maker/ POA      History from:   EMR, patient.    History of Present Illness:     Chief Complaint: Atrial flutter with rapid ventricular response (HCC)  Krupa Sanchez is

## 2024-07-03 NOTE — PLAN OF CARE
Problem: Discharge Planning  Goal: Discharge to home or other facility with appropriate resources  Outcome: Progressing     Problem: Safety - Adult  Goal: Free from fall injury  Outcome: Progressing     Problem: ABCDS Injury Assessment  Goal: Absence of physical injury  Outcome: Progressing  Flowsheets (Taken 7/3/2024 0440)  Absence of Physical Injury: Implement safety measures based on patient assessment     Problem: Cardiovascular - Adult  Goal: Maintains optimal cardiac output and hemodynamic stability  Outcome: Progressing  Flowsheets (Taken 7/3/2024 0440)  Maintains optimal cardiac output and hemodynamic stability:   Monitor urine output and notify Licensed Independent Practitioner for values outside of normal range   Monitor blood pressure and heart rate   Assess for signs of decreased cardiac output  Goal: Absence of cardiac dysrhythmias or at baseline  Outcome: Progressing  Flowsheets (Taken 7/3/2024 0440)  Absence of cardiac dysrhythmias or at baseline: Monitor cardiac rate and rhythm     Problem: Metabolic/Fluid and Electrolytes - Adult  Goal: Electrolytes maintained within normal limits  Outcome: Progressing  Flowsheets (Taken 7/3/2024 0440)  Electrolytes maintained within normal limits:   Monitor labs and assess patient for signs and symptoms of electrolyte imbalances   Administer electrolyte replacement as ordered  Goal: Hemodynamic stability and optimal renal function maintained  Outcome: Progressing  Flowsheets (Taken 7/3/2024 0440)  Hemodynamic stability and optimal renal function maintained:   Monitor intake, output and patient weight   Monitor labs and assess for signs and symptoms of volume excess or deficit

## 2024-07-03 NOTE — CONSULTS
Supraventricular tachycardia (HCC), Type 2 diabetes mellitus (HCC), Urge incontinence, and Wears partial dentures.  Past surgical history:   has a past surgical history that includes Bunionectomy (1990's); Cardiac catheterization (Sees Dr. Meng); Colonoscopy (\"X 2 Last One 2008 \"); Dental surgery; joint replacement (2009); joint replacement (2010); Hysterectomy, total abdominal (1980's); Breast lumpectomy (11/06/2012); Endoscopy, colon, diagnostic (07/18/2016); Upper gastrointestinal endoscopy (N/A, 11/08/2018); Carpal tunnel release (Left, 01/2019); Cataract removal (Bilateral, 03/2020, 5/20); Cataract removal (Left, 05/2020); Ovary removal; Breast biopsy (10/2012); Breast biopsy (1970's); [facility] STEREO BREAST BX W LOC DEVICE 1ST LESION RIGHT (Right, 02/18/2021); Dialysis Catheter Insertion (N/A, 03/30/2022); and Carpal tunnel release (Right, 01/04/2024).  Social History:   reports that she quit smoking about 37 years ago. Her smoking use included cigarettes. She started smoking about 47 years ago. She has a 5.0 pack-year smoking history. She has never used smokeless tobacco. She reports that she does not drink alcohol and does not use drugs.  Family history:   no family history of CAD, STROKE of DM at early age    Allergies   Allergen Reactions    Latex      \"Blisters\"    Tape [Adhesive Tape]      \"Turn Red\"       sodium chloride flush 0.9 % injection 5-40 mL, 2 times per day  sodium chloride flush 0.9 % injection 5-40 mL, PRN  0.9 % sodium chloride infusion, PRN  ondansetron (ZOFRAN-ODT) disintegrating tablet 4 mg, Q8H PRN   Or  ondansetron (ZOFRAN) injection 4 mg, Q6H PRN  polyethylene glycol (GLYCOLAX) packet 17 g, Daily PRN  acetaminophen (TYLENOL) tablet 650 mg, Q6H PRN   Or  acetaminophen (TYLENOL) suppository 650 mg, Q6H PRN  apixaban (ELIQUIS) tablet 2.5 mg, BID  atorvastatin (LIPITOR) tablet 80 mg, Nightly  calcium elemental (OSCAL) tablet 500 mg, BID  Vitamin D (CHOLECALCIFEROL) tablet 2,000 Units,  No masses, No gross visceromegaly   :  No costovertebral angle tenderness   Musculoskeletal:   no tenderness, no deformities. Back- no tenderness  Integument:  Well hydrated, no rash   Lymphatic:  No lymphadenopathy noted   Neurologic:  Alert & oriented x 3, CN 2-12 normal, normal motor function, normal sensory function, no focal deficits noted           Medical decision making and Data review:    Lab Review   Recent Labs     07/03/24  0418   WBC 10.9*   HGB 8.7*   HCT 27.6*         Recent Labs     07/03/24  0418   *   K 4.4   CL 96*   CO2 25   BUN 45*   CREATININE 9.8*     No results for input(s): \"AST\", \"ALT\", \"BILIDIR\", \"BILITOT\", \"ALKPHOS\" in the last 72 hours.    Invalid input(s): \"ALB\"  No results for input(s): \"TROPONINT\" in the last 72 hours.    Recent Labs     07/02/24  2242   PROBNP 21,558*     Lab Results   Component Value Date    INR 1.24 05/08/2021    PROTIME 15.0 (H) 05/08/2021       EKG: (reviewed and interpreted by myself)    ECHO:(reviewed and interpreted by myself)    Chest Xray:(reviewed and interpreted by myself)  XR CHEST PORTABLE    Result Date: 7/2/2024  EXAMINATION: XR CHEST PORTABLE, 7/2/2024 8:57 PM HISTORY: Chest pain COMPARISON:  No comparisons available.  Technique: Single view. Findings: Small basilar infiltrates. No pneumothorax. Heart is normal size. Mediastinal and hilar contours are within normal limits. Bony thorax no acute abnormality.     Early bilateral pneumonia Electronically signed by Quan Robles      All labs, medications and tests reviewed by myself including data  from outside source , patient and available family .  Continue all other medications of all above medical condition listed as is.     Impression:  Principal Problem:    Atrial flutter with rapid ventricular response (HCC)  Resolved Problems:    * No resolved hospital problems. *      Assessment: 79 y.o.year old with PMH of  has a past medical history of 14 day event monitor, Atrial fibrillation with

## 2024-07-03 NOTE — PROGRESS NOTES
PD TREATMENT INITIATED  CONNECTED TO CYCLER PER ORDER    CATHETER CARE GIVEN  EXIT SITE CLEAN, DRY AND INTACT  DRESSING CHANGE COMPLETED    PATIENT VOICED NO NEW NEEDS  CALL LIGHT WITHIN REACH  PRIMARY NURSE NOTIFIED OF TREATMENT INITIATION    PEYTON SUE  07/03/2024  3059

## 2024-07-03 NOTE — ED NOTES
Attempted to draw blood cultures without success x 2. RN notified.  
Repeat EKG obtained as patient converted to NSR, provided EKG to Dr. Simmons. Provider also made aware of bp dropping to 98/73 from 214/204 after Lopressor administration  
Report given to Babatunde Arambula     
Report received from Lillian LAURA.   Care Assumed.     
Troponin 244 called from lab. Dr Clemons notified. Primary RN Babatunde notified.   
activity(E029.9)     48 hr holter, the 48 hr holter monitor reveals the patient in the sinus rhythm with occasional supraventricular ectopic beats.  There is a rare short atrial run.    Paroxysmal atrial fibrillation (HCC)     4/12 Holter WL    Prolonged emergence from general anesthesia     Proteinuria     Supraventricular tachycardia (HCC)     Type 2 diabetes mellitus (HCC) Dx 2000's    Urge incontinence     Wears partial dentures     upper partial       Assessment    Vitals: MEWS Score: 4  Level of Consciousness: Alert (0)   Vitals:    07/02/24 2202 07/02/24 2218 07/02/24 2243 07/02/24 2244   BP: 108/71 122/62     Pulse: 92 91 88 88   Resp: 17 19 16 17   Temp:       TempSrc:       SpO2: 95% 95% 94% 96%   Weight:       Height:         PO Status: Water / Ice Chips  O2 Flow Rate: O2 Device: None (Room air)    Cardiac Rhythm:   Last documented pain medication administered: 81 mg of chewable aspirin via EMS en route.  NIH Score: NIH     Active LDA's:   Peripheral IV 07/02/24 Right;Dorsal Hand (Active)   Site Assessment Clean, dry & intact 07/02/24 2043   Line Status Normal saline locked;Sluggish blood return 07/02/24 2043   Line Care Connections checked and tightened 07/02/24 2043   Phlebitis Assessment No symptoms 07/02/24 2043   Infiltration Assessment 0 07/02/24 2043   Dressing Status New dressing applied 07/02/24 2043   Dressing Type Transparent 07/02/24 2043   Dressing Intervention New 07/02/24 2043       Pertinent or High Risk Medications/Drips: no   If Yes, please provide details:   Blood Product Administration: no  If Yes, please provide details:     Recommendation    Incomplete orders   Additional Comments:    If any further questions, please call Sending RN at [phone]    Electronically signed by: Electronically signed by Abiola Clinton RN on 7/3/2024 at 3:03 AM

## 2024-07-03 NOTE — PROGRESS NOTES
4 Eyes Skin Assessment     NAME:  Krupa Sanchez  YOB: 1945  MEDICAL RECORD NUMBER:  [id number]    The patient is being assessed for  Admission    I agree that at least one RN has performed a thorough Head to Toe Skin Assessment on the patient. ALL assessment sites listed below have been assessed.      Areas assessed by both nurses:    Head, Face, Ears, Shoulders, Back, Chest, Arms, Elbows, Hands, Sacrum. Buttock, Coccyx, Ischium, Legs. Feet and Heels, and Under Medical Devices         Does the Patient have a Wound? No noted wound(s)       Jose Prevention initiated by RN: No  Wound Care Orders initiated by RN: No    Pressure Injury (Stage 3,4, Unstageable, DTI, NWPT, and Complex wounds) if present, place Wound referral order by RN under : No    New Ostomies, if present place, Ostomy referral order under : No     Nurse 1 eSignature: Electronically signed by Karen Larson RN on 7/3/24 at 4:36 AM EDT    **SHARE this note so that the co-signing nurse can place an eSignature**    Nurse 2 eSignature: Electronically signed by Shruthi Willson RN on 7/3/24 at 5:12 AM EDT

## 2024-07-03 NOTE — ED PROVIDER NOTES
Emergency Department Encounter  Location: [facility] EMERGENCY DEPARTMENT    Patient: Krupa Sanchez  MRN: [id number]  : 1945  Date of evaluation: 2024  ED Provider: Damian Clemons, DO    :11p.m.  Krupa Sanchez was checked out to me by Lamar. Please see his/her initial documentation for details of the patient's initial ED presentation, physical exam and completed studies.    In brief, Krupa Sanchez is a 79 y.o. female that presented to the emergency department chest pain palpitations.  On arrival had what appeared to be a flutter RVR.  Received metoprolol and fluids as well as magnesium and appears so spontaneously converted.  She is also been complaining of a cough.  Chest x-ray showing pneumonia.    I have reviewed and interpreted all of the currently available lab results and diagnostics from this visit:  Results for orders placed or performed during the hospital encounter of 24   CBC with Auto Differential   Result Value Ref Range    WBC 12.1 (H) 4.0 - 10.5 K/CU MM    RBC 3.62 (L) 4.2 - 5.4 M/CU MM    Hemoglobin 10.3 (L) 12.5 - 16.0 GM/DL    Hematocrit 33.0 (L) 37 - 47 %    MCV 91.2 78 - 100 FL    MCH 28.5 27 - 31 PG    MCHC 31.2 (L) 32.0 - 36.0 %    RDW 15.9 (H) 11.7 - 14.9 %    Platelets 315 140 - 440 K/CU MM    MPV 10.0 7.5 - 11.1 FL    Differential Type AUTOMATED DIFFERENTIAL     Neutrophils % 75.7 (H) 36 - 66 %    Lymphocytes % 15.5 (L) 24 - 44 %    Monocytes % 6.5 (H) 0 - 4 %    Eosinophils % 1.7 0 - 3 %    Basophils % 0.3 0 - 1 %    Neutrophils Absolute 9.1 K/CU MM    Lymphocytes Absolute 1.9 K/CU MM    Monocytes Absolute 0.8 K/CU MM    Eosinophils Absolute 0.2 K/CU MM    Basophils Absolute 0.0 K/CU MM    Nucleated RBC % 0.0 %    Total Nucleated RBC 0.0 K/CU MM    Total Immature Neutrophil 0.04 K/CU MM    Immature Neutrophil % 0.3 0 - 0.43 %   Lactate, Sepsis   Result Value Ref Range    Lactic Acid, Sepsis 2.0 0.4 - 2.0 mMOL/L   SPECIMEN

## 2024-07-03 NOTE — CONSULTS
Nephrology Service Consultation      [address], [address]  [place] [address]  Phone: [phone]  Office Hours: 8:30AM - 4:30PM  Monday - Friday        MEDICAL DECISION MAKING and Recommendations     -Afib with RVR: converted back to sinus rhythm  -Anemia of ESRD  -ESRD on PD  -CKD-MBD  -Elevated troponins  -Chronic CHF    Suggest;  -PD tonight if remains inpt  -Continue phos binders  -Will give retacrit for anemia support    Thank you          Patient Active Problem List    Diagnosis Date Noted    Dependence on renal dialysis ([facility]) 01/23/2023    Pulmonary hypertension, unspecified ([facility]) 01/23/2023    Atrial flutter with rapid ventricular response ([facility]) 07/03/2024    Atrial flutter ([facility]) 04/01/2024    Atrial fibrillation with RVR ([facility]) 03/26/2024    ASCVD (arteriosclerotic cardiovascular disease) 08/01/2023    ESRD (end stage renal disease) ([facility]) 07/01/2023    Hyperpotassemia 02/15/2022    Syncope and collapse 05/08/2021    Chronic kidney disease, stage V ([facility]) 03/10/2021    Acquired cyst of kidney 12/01/2020    Malignant neoplasm of upper-outer quadrant of right breast in female, estrogen receptor positive ([facility]) 11/17/2020    Intestinal malabsorption 11/17/2020    Obesity (BMI 30-39.9) 06/20/2019    Excessive daytime sleepiness 06/20/2019    Ex-cigarette smoker 06/20/2019    Carpal tunnel syndrome on left 08/14/2018    Trigger finger, left ring finger 08/14/2018    Type 2 diabetes mellitus with nephropathy ([facility]) 08/10/2016    Hypertension secondary to other renal disorders 12/17/2015    Prolonged Q-T interval on ECG 12/17/2015    Mixed hyperlipidemia 11/13/2015    Iron deficiency anemia 11/13/2015    Gastroesophageal reflux disease without esophagitis 11/13/2015    Edema of both legs 06/15/2015    Urge incontinence     Bilateral leg edema 10/21/2013    Colon polyps 08/22/2013    Osteopenia     Osteoarthritis     ADOLFO (obstructive sleep apnea)     Menopause     Paroxysmal atrial fibrillation  tablet by mouth 2 times daily as needed for Pain (gout) 1/23/23   Anant Ernandez MD   atorvastatin (LIPITOR) 80 MG tablet Take 1 tab by mouth once daily 3/7/22   Albin Morris MD   Multiple Vitamins-Minerals (MULTIVITAMIN ADULT PO) Take by mouth    ProviderNahomi MD   Lancets MISC 1 each by In Vitro route 2 times daily 12/1/17   Anant Ernandez MD   Glucose Blood (BLOOD GLUCOSE TEST STRIPS) STRP 1 each by In Vitro route 2 times daily 12/1/17   Anant Ernandez MD   Blood Glucose Monitoring Suppl AV 1 kit by Does not apply route daily 12/1/17   Anant Ernandez MD   oxybutynin (DITROPAN) 5 MG tablet Take 1 tablet by mouth 2 times daily. 1/7/13 1/21/14  Anant Ernandez MD   Calcium Carbonate (CALTRATE 600 PO) Take  by mouth 2 times daily.    Provider, MD Nahomi        Allergies:  Latex and Tape [adhesive tape]    Social History:   TOBACCO:   reports that she quit smoking about 37 years ago. Her smoking use included cigarettes. She started smoking about 47 years ago. She has a 5.0 pack-year smoking history. She has never used smokeless tobacco.  ETOH:   reports no history of alcohol use.  OCCUPATION:      Family History:       Problem Relation Age of Onset    Diabetes Mother     Early Death Mother 63    Heart Disease Father     Diabetes Father     Lung Cancer Father     Stroke Sister     Diabetes Sister     Diabetes Sister         pre diabetic    Diabetes Brother     Heart Disease Brother     Diabetes Brother     Prostate Cancer Brother     Heart Disease Brother     No Known Problems Brother     Lung Cancer Brother     Thyroid Disease Daughter     Ovarian Cancer Neg Hx     Breast Cancer Neg Hx      Physical Exam:    Vitals: BP (!) 144/70   Pulse 80   Temp 97.9 °F (36.6 °C) (Oral)   Resp 19   Ht 1.727 m (5' 8\")   Wt 92.5 kg (204 lb)   LMP  (LMP Unknown)   SpO2 96%   BMI 31.02 kg/m²   General appearance: in no acute distress, appears stated age  Skin: Skin

## 2024-07-03 NOTE — CARE COORDINATION
.CM met with pt for d/c planning.  Introduced self, updated white board and explained role of CM.  Pt lives with her daughter and is independent with ADL's.  Pt drives, has a PCP, has insurance, and is able to afford her medication. Pt has a walker, cane and a shower chair.  [facility] offered and pt declined.  Pt denies any d/c needs at this time.   D/c plan is home with daughter, no needs.  Notify CM if any d/c needs arise. TE     07/03/24 1526   Service Assessment   Patient Orientation Alert and Oriented   Cognition Alert   History Provided By Patient   Primary Caregiver Self   Support Systems Children   Patient's Healthcare Decision Maker is: Named in Scanned ACP Document  (SELF)   PCP Verified by CM Yes   Last Visit to PCP Within last 3 months   Prior Functional Level Independent in ADLs/IADLs   Current Functional Level Independent in ADLs/IADLs   Can patient return to prior living arrangement Yes   Ability to make needs known: Good   Family able to assist with home care needs: Yes   Would you like for me to discuss the discharge plan with any other family members/significant others, and if so, who? No   Financial Resources Medicare   Community Resources None   Social/Functional History   Lives With Daughter   Type of Home Apartment   Home Layout One level   Bathroom Shower/Tub Walk-in shower   Bathroom Toilet Standard   Bathroom Equipment Shower chair   Bathroom Accessibility Accessible   Home Equipment Cane;Walker - Rolling   Receives Help From Family   ADL Assistance Independent   Homemaking Assistance Independent   Ambulation Assistance Independent   Transfer Assistance Independent   Active  Yes   Mode of Transportation Car   Occupation Retired   Discharge Planning   Type of Residence Apartment   Living Arrangements Children   Current Services Prior To Admission None   Potential Assistance Needed N/A   DME Ordered? No   Potential Assistance Purchasing Medications No   Type of Home Care Services None

## 2024-07-04 VITALS
DIASTOLIC BLOOD PRESSURE: 59 MMHG | TEMPERATURE: 98.6 F | OXYGEN SATURATION: 98 % | WEIGHT: 205.7 LBS | HEIGHT: 68 IN | HEART RATE: 81 BPM | BODY MASS INDEX: 31.17 KG/M2 | SYSTOLIC BLOOD PRESSURE: 113 MMHG | RESPIRATION RATE: 18 BRPM

## 2024-07-04 LAB
ANION GAP SERPL CALCULATED.3IONS-SCNC: 17 MMOL/L (ref 7–16)
BASOPHILS ABSOLUTE: 0 K/CU MM
BASOPHILS RELATIVE PERCENT: 0.4 % (ref 0–1)
BUN SERPL-MCNC: 47 MG/DL (ref 6–23)
CALCIUM SERPL-MCNC: 9.4 MG/DL (ref 8.3–10.6)
CHLORIDE BLD-SCNC: 96 MMOL/L (ref 99–110)
CO2: 21 MMOL/L (ref 21–32)
CREAT SERPL-MCNC: 10 MG/DL (ref 0.6–1.1)
DIFFERENTIAL TYPE: ABNORMAL
EOSINOPHILS ABSOLUTE: 0.3 K/CU MM
EOSINOPHILS RELATIVE PERCENT: 3.1 % (ref 0–3)
GFR, ESTIMATED: 4 ML/MIN/1.73M2
GLUCOSE BLD-MCNC: 112 MG/DL (ref 70–99)
GLUCOSE BLD-MCNC: 139 MG/DL (ref 70–99)
GLUCOSE BLD-MCNC: 177 MG/DL (ref 70–99)
GLUCOSE SERPL-MCNC: 204 MG/DL (ref 70–99)
HCT VFR BLD CALC: 31.5 % (ref 37–47)
HEMOGLOBIN: 8.9 GM/DL (ref 12.5–16)
IMMATURE NEUTROPHIL %: 0.4 % (ref 0–0.43)
LYMPHOCYTES ABSOLUTE: 1 K/CU MM
LYMPHOCYTES RELATIVE PERCENT: 11.9 % (ref 24–44)
MCH RBC QN AUTO: 28.4 PG (ref 27–31)
MCHC RBC AUTO-ENTMCNC: 28.3 % (ref 32–36)
MCV RBC AUTO: 100.6 FL (ref 78–100)
MONOCYTES ABSOLUTE: 0.5 K/CU MM
MONOCYTES RELATIVE PERCENT: 5.9 % (ref 0–4)
NEUTROPHILS ABSOLUTE: 6.4 K/CU MM
NEUTROPHILS RELATIVE PERCENT: 78.3 % (ref 36–66)
NUCLEATED RBC %: 0 %
PDW BLD-RTO: 16.1 % (ref 11.7–14.9)
PLATELET # BLD: 237 K/CU MM (ref 140–440)
PMV BLD AUTO: 9.5 FL (ref 7.5–11.1)
POTASSIUM SERPL-SCNC: 4.4 MMOL/L (ref 3.5–5.1)
RBC # BLD: 3.13 M/CU MM (ref 4.2–5.4)
SODIUM BLD-SCNC: 134 MMOL/L (ref 135–145)
TOTAL IMMATURE NEUTOROPHIL: 0.03 K/CU MM
TOTAL NUCLEATED RBC: 0 K/CU MM
WBC # BLD: 8.2 K/CU MM (ref 4–10.5)

## 2024-07-04 PROCEDURE — 82962 GLUCOSE BLOOD TEST: CPT

## 2024-07-04 PROCEDURE — 6370000000 HC RX 637 (ALT 250 FOR IP): Performed by: INTERNAL MEDICINE

## 2024-07-04 PROCEDURE — 99233 SBSQ HOSP IP/OBS HIGH 50: CPT | Performed by: INTERNAL MEDICINE

## 2024-07-04 PROCEDURE — 85025 COMPLETE CBC W/AUTO DIFF WBC: CPT

## 2024-07-04 PROCEDURE — 94761 N-INVAS EAR/PLS OXIMETRY MLT: CPT

## 2024-07-04 PROCEDURE — 36415 COLL VENOUS BLD VENIPUNCTURE: CPT

## 2024-07-04 PROCEDURE — 6360000002 HC RX W HCPCS: Performed by: INTERNAL MEDICINE

## 2024-07-04 PROCEDURE — 80048 BASIC METABOLIC PNL TOTAL CA: CPT

## 2024-07-04 PROCEDURE — 2580000003 HC RX 258: Performed by: INTERNAL MEDICINE

## 2024-07-04 RX ORDER — AMIODARONE HYDROCHLORIDE 200 MG/1
200 TABLET ORAL DAILY
Qty: 30 TABLET | Refills: 0 | Status: SHIPPED | OUTPATIENT
Start: 2024-07-05

## 2024-07-04 RX ORDER — CEFDINIR 300 MG/1
300 CAPSULE ORAL
Qty: 3 CAPSULE | Refills: 0 | Status: SHIPPED | OUTPATIENT
Start: 2024-07-04 | End: 2024-07-10

## 2024-07-04 RX ORDER — DOXYCYCLINE HYCLATE 100 MG
100 TABLET ORAL EVERY 12 HOURS SCHEDULED
Qty: 11 TABLET | Refills: 0 | Status: SHIPPED | OUTPATIENT
Start: 2024-07-04 | End: 2024-07-10

## 2024-07-04 RX ADMIN — MULTIPLE VITAMINS W/ MINERALS TAB 1 TABLET: TAB at 10:21

## 2024-07-04 RX ADMIN — Medication 2000 UNITS: at 10:22

## 2024-07-04 RX ADMIN — WATER 1000 MG: 1 INJECTION INTRAMUSCULAR; INTRAVENOUS; SUBCUTANEOUS at 00:17

## 2024-07-04 RX ADMIN — GLIPIZIDE 5 MG: 5 TABLET ORAL at 05:41

## 2024-07-04 RX ADMIN — SODIUM CHLORIDE, PRESERVATIVE FREE 10 ML: 5 INJECTION INTRAVENOUS at 10:22

## 2024-07-04 RX ADMIN — DOXYCYCLINE HYCLATE 100 MG: 100 TABLET, COATED ORAL at 10:21

## 2024-07-04 RX ADMIN — APIXABAN 2.5 MG: 2.5 TABLET, FILM COATED ORAL at 10:22

## 2024-07-04 RX ADMIN — IPRATROPIUM BROMIDE 2 SPRAY: 42 SPRAY, METERED NASAL at 10:25

## 2024-07-04 RX ADMIN — AMIODARONE HYDROCHLORIDE 200 MG: 200 TABLET ORAL at 10:22

## 2024-07-04 RX ADMIN — IPRATROPIUM BROMIDE 2 SPRAY: 42 SPRAY, METERED NASAL at 00:18

## 2024-07-04 RX ADMIN — CLOPIDOGREL BISULFATE 75 MG: 75 TABLET ORAL at 10:22

## 2024-07-04 RX ADMIN — CALCIUM 500 MG: 500 TABLET ORAL at 10:21

## 2024-07-04 RX ADMIN — FOLIC ACID 1 MG: 1 TABLET ORAL at 10:21

## 2024-07-04 NOTE — PROGRESS NOTES
Nephrology Progress Note        [address], [address]  [place] [address]  Phone: [phone]  Office Hours: 8:30AM - 4:30PM  Monday - Friday 7/4/2024 8:39 AM  Subjective:   Admit Date: 7/2/2024  PCP: Anant Ernandez MD  Interval History:   Doing well   On room air  3L UF with PD overnight    Diet: ADULT DIET; Regular; Low Fat/Low Chol/High Fiber/2 gm Na      Data:   Scheduled Meds:   sodium chloride flush  5-40 mL IntraVENous 2 times per day    apixaban  2.5 mg Oral BID    atorvastatin  80 mg Oral Nightly    calcium elemental  500 mg Oral BID    Vitamin D  2,000 Units Oral Daily    clopidogrel  75 mg Oral Daily    dilTIAZem  120 mg Oral Daily    folic acid  1 mg Oral Daily    glipiZIDE  5 mg Oral QAM AC    ipratropium  2 spray Each Nostril 4x Daily    metoprolol tartrate  50 mg Oral BID    therapeutic multivitamin-minerals  1 tablet Oral Daily    insulin lispro  0-4 Units SubCUTAneous TID WC    insulin lispro  0-4 Units SubCUTAneous Nightly    cefTRIAXone (ROCEPHIN) IV  1,000 mg IntraVENous Q24H    doxycycline  100 mg Oral 2 times per day    amiodarone  200 mg Oral Daily    metoprolol  5 mg IntraVENous Once     Continuous Infusions:   sodium chloride      dextrose      [Held by provider] dilTIAZem       PRN Meds:sodium chloride flush, sodium chloride, ondansetron **OR** ondansetron, polyethylene glycol, acetaminophen **OR** acetaminophen, glucose, dextrose bolus **OR** dextrose bolus, glucagon (rDNA), dextrose  I/O last 3 completed shifts:  In: 200 [P.O.:200]  Out: -   I/O this shift:  In: 0   Out: 573     Intake/Output Summary (Last 24 hours) at 7/4/2024 0839  Last data filed at 7/4/2024 0716  Gross per 24 hour   Intake 200 ml   Output 573 ml   Net -373 ml       CBC:   Recent Labs     07/02/24 2049 07/03/24  0418   WBC 12.1* 10.9*   HGB 10.3* 8.7*    287       BMP:    Recent Labs     07/02/24 2242 07/03/24  0418    134*   K 4.5 4.4   CL 95* 96*   CO2 25 25   BUN

## 2024-07-04 NOTE — CONSULTS
Consult completed. Nexiva 20g 1.75\" peripheral IV initiated into left forearm x 1 attempt using ultrasound guided technique. Brisk blood return, flushes without resistance. Patient tolerated well. Primary nurse notified.     Consult the Vascular Access Team for questions, concerns, or change in patient's condition.

## 2024-07-04 NOTE — CARE COORDINATION
D/c plan remains the same, home w/daughter, denies any d/c needs.  Notify CM if any d/c needs arise.  TE

## 2024-07-04 NOTE — PROGRESS NOTES
Cardiology Progress Note     Admit Date:  7/2/2024    Consult reason/ Seen today for :       Subjective and  Overnight Events : No more review issues overnight in sinus rhythm      Chief complain on admission : 79 y.o.year old who is admitted for  Chief Complaint   Patient presents with    Chest Pain     Received 324mg ASA and one nitro PTA      Assessment / Plan:  No further cardiac workup needed at this time    Chronically elevated troponin level no further workup needed at this  History of coronary artery disease cardiac cath in 2022 showed moderate LAD disease stress test in 2023 did not show any ischemia continue conservative management  HTN: stable, continue present medications   Atrial Fibrillation: On anticoagulation continue Cardizem and metoprolol 50 mg twice daily start amiodarone to prevent recurrent A-fib  Renal failure volume overload as per nephrology and primary team echo shows preserved EF  DVT prophylaxis if no contraindication  6.   Dyslipidemia: continue statins   We will sign off call with questions  Discussed with primary team, hospitalist service, bedside nursing staff and family  Past medical history:    has a past medical history of 14 day event monitor, Atrial fibrillation with RVR (HCC), Breast cancer (HCC), Edema, Family history of cardiovascular disease, GERD (gastroesophageal reflux disease), H/O 24 hour EKG monitoring, H/O cardiovascular stress test, H/O echocardiogram, History of cardiac catheterization, History of cardiovascular stress test, History of echocardiogram, History of Holter monitoring, History of therapeutic radiation, Hx of 24 hour EKG monitoring, Hyperlipidemia, Hypertension, Iron deficiency anemia, Lumbar radiculopathy, Menopause, ADOLFO (obstructive sleep apnea), Osteoarthritis, Osteopenia, Other activity(E029.9), Paroxysmal atrial fibrillation (HCC), Prolonged emergence from general  by provider] dilTIAZem       sodium chloride flush, sodium chloride, ondansetron **OR** ondansetron, polyethylene glycol, acetaminophen **OR** acetaminophen, glucose, dextrose bolus **OR** dextrose bolus, glucagon (rDNA), dextrose    Lab Data:  CBC:   Recent Labs     07/02/24 2049 07/03/24 0418   WBC 12.1* 10.9*   HGB 10.3* 8.7*   HCT 33.0* 27.6*   MCV 91.2 90.8    287     BMP:   Recent Labs     07/02/24 2242 07/03/24  0418    134*   K 4.5 4.4   CL 95* 96*   CO2 25 25   BUN 43* 45*   CREATININE 10.0* 9.8*     PT/INR: No results for input(s): \"PROTIME\", \"INR\" in the last 72 hours.  BNP:    Recent Labs     07/02/24 2242   PROBNP 21,558*     TROPONIN: No results for input(s): \"TROPONINT\" in the last 72 hours.     ECHO : (interpreted by myself)  echocardiogram     Assessment:  79 y.o.year old who is admitted for  Chief Complaint   Patient presents with    Chest Pain     Received 324mg ASA and one nitro PTA    , active issues as noted below:  Impression:  Principal Problem:    Atrial flutter with rapid ventricular response (HCC)  Resolved Problems:    * No resolved hospital problems. *            All labs, medications and tests reviewed by myself , continue all other medications of all above medical condition listed as is except for changes mentioned above.    Thank you very much for consult , please call with questions.    Sayed Arik Matamoros MD, MD 7/4/2024 10:15 AM     The above note is prepared with the intention to serve as communication with trained medical care practitioners only. Some of the information is provided by secondary sources as well as from our patient and/or family member(s) recollection, thus inaccuracies may occur. In addition, other professionals integrate data into the electronic medical record, and the Heart Team MD and NPs are not responsible for these. Any errors will be corrected upon verification with documented reports.

## 2024-07-04 NOTE — PLAN OF CARE
Problem: Discharge Planning  Goal: Discharge to home or other facility with appropriate resources  Outcome: Progressing     Problem: Safety - Adult  Goal: Free from fall injury  Outcome: Progressing     Problem: ABCDS Injury Assessment  Goal: Absence of physical injury  Outcome: Progressing     Problem: Cardiovascular - Adult  Goal: Maintains optimal cardiac output and hemodynamic stability  Outcome: Progressing  Goal: Absence of cardiac dysrhythmias or at baseline  Outcome: Progressing     Problem: Metabolic/Fluid and Electrolytes - Adult  Goal: Electrolytes maintained within normal limits  Outcome: Progressing  Goal: Hemodynamic stability and optimal renal function maintained  Outcome: Progressing

## 2024-07-04 NOTE — PROGRESS NOTES
PD TREATMENT COMPLETED  DISCONNECTED FROM CYCLER  MINI CAP APPLIED    3481ML NET FLUID REMOVAL  EFFLUENT WAS CLEAR AND FREE OF FIBRIN  DRESSING CLEAN, DRY AND INTACT    PATIENT VOICED NO NEW NEEDS  CALL LIGHT WITHIN REACH      PEYTON HAMILTON OCDKIRA  07/04/2024  1628

## 2024-07-05 ENCOUNTER — APPOINTMENT (OUTPATIENT)
Dept: GENERAL RADIOLOGY | Age: 79
End: 2024-07-05
Payer: COMMERCIAL

## 2024-07-05 ENCOUNTER — HOSPITAL ENCOUNTER (EMERGENCY)
Age: 79
Discharge: HOME | End: 2024-07-05
Attending: EMERGENCY MEDICINE
Payer: COMMERCIAL

## 2024-07-05 VITALS
OXYGEN SATURATION: 96 % | RESPIRATION RATE: 16 BRPM | HEIGHT: 68 IN | WEIGHT: 206 LBS | HEART RATE: 81 BPM | DIASTOLIC BLOOD PRESSURE: 64 MMHG | BODY MASS INDEX: 31.22 KG/M2 | TEMPERATURE: 98.4 F | SYSTOLIC BLOOD PRESSURE: 124 MMHG

## 2024-07-05 DIAGNOSIS — T80.90XA COMPLICATION OF PERITONEAL DIALYSIS, INITIAL ENCOUNTER: Primary | ICD-10-CM

## 2024-07-05 LAB
ALBUMIN SERPL-MCNC: 3.5 GM/DL (ref 3.4–5)
ALP BLD-CCNC: 72 IU/L (ref 40–128)
ALT SERPL-CCNC: 12 U/L (ref 10–40)
ANION GAP SERPL CALCULATED.3IONS-SCNC: 15 MMOL/L (ref 7–16)
AST SERPL-CCNC: 11 IU/L (ref 15–37)
BILIRUB SERPL-MCNC: 0.2 MG/DL (ref 0–1)
BUN SERPL-MCNC: 59 MG/DL (ref 6–23)
CALCIUM SERPL-MCNC: 9.4 MG/DL (ref 8.3–10.6)
CHLORIDE BLD-SCNC: 98 MMOL/L (ref 99–110)
CO2: 26 MMOL/L (ref 21–32)
CREAT SERPL-MCNC: 11 MG/DL (ref 0.6–1.1)
GFR, ESTIMATED: 3 ML/MIN/1.73M2
GLUCOSE SERPL-MCNC: 140 MG/DL (ref 70–99)
POTASSIUM SERPL-SCNC: 3.8 MMOL/L (ref 3.5–5.1)
SODIUM BLD-SCNC: 139 MMOL/L (ref 135–145)
TOTAL PROTEIN: 7 GM/DL (ref 6.4–8.2)

## 2024-07-05 PROCEDURE — 6370000000 HC RX 637 (ALT 250 FOR IP): Performed by: INTERNAL MEDICINE

## 2024-07-05 PROCEDURE — 80053 COMPREHEN METABOLIC PANEL: CPT

## 2024-07-05 PROCEDURE — 99284 EMERGENCY DEPT VISIT MOD MDM: CPT

## 2024-07-05 PROCEDURE — 74018 RADEX ABDOMEN 1 VIEW: CPT

## 2024-07-05 PROCEDURE — 6360000002 HC RX W HCPCS: Performed by: INTERNAL MEDICINE

## 2024-07-05 RX ORDER — LACTULOSE 10 G/15ML
20 SOLUTION ORAL ONCE
Status: COMPLETED | OUTPATIENT
Start: 2024-07-05 | End: 2024-07-05

## 2024-07-05 RX ADMIN — LACTULOSE 20 G: 20 SOLUTION ORAL at 08:18

## 2024-07-05 RX ADMIN — ALTEPLASE 6 MG: 2.2 INJECTION, POWDER, LYOPHILIZED, FOR SOLUTION INTRAVENOUS at 08:33

## 2024-07-05 ASSESSMENT — PAIN SCALES - GENERAL: PAINLEVEL_OUTOF10: 6

## 2024-07-05 ASSESSMENT — PAIN DESCRIPTION - LOCATION: LOCATION: ABDOMEN

## 2024-07-05 NOTE — PROGRESS NOTES
TPA drained from PD catheter  PD catheter flushed with 1.5% Dianeal. Dianeal flowed in and drained easily.  Effluent clear, light yellow with no fibrin

## 2024-07-05 NOTE — ED NOTES
Pt has parataneal dialysis machine at home. Pt stated it broke and was told to come to the ED to be evaluated. Pt states she has 8/10 abdominal pain at this time.

## 2024-07-05 NOTE — ED PROVIDER NOTES
[facility] EMERGENCY DEPARTMENT  EMERGENCY DEPARTMENT ENCOUNTER      Pt Name: Krupa Sanchez  MRN: [id number]  Birthdate 1945  Date of evaluation: 7/5/2024  Provider: Anna Linares MD    CHIEF COMPLAINT       Chief Complaint   Patient presents with    Vascular Access Problem     Patient does peritoneal dialysis and her machine broke and was told to come to the ED         HISTORY OF PRESENT ILLNESS      Krupa Sanchez is a 79 y.o. female who presents to the emergency department  for   Chief Complaint   Patient presents with    Vascular Access Problem     Patient does peritoneal dialysis and her machine broke and was told to come to the ED       79-year-old female presents with peritoneal dialysis issues.  She has a history of ESRD on peritoneal dialysis.  She was recently admitted to the hospital for various issues including paroxysmal A-fib fib flutter as well as pneumonia.  She was discharged home 1 day ago.  She reports she was using her cycler overnight about 45 minutes into her cycle she got an error message of slow draining.  She reports calling the company and they attempted to troubleshoot it but it was not effective.  She was told to come to the emergency department.  She complains of some mild abdominal pain.  No other remarkable symptoms.  She denies any abnormalities in the fluid from the dialysis.  No fevers.          Nursing Notes, Triage Notes & Vital Signs were reviewed.      REVIEW OF SYSTEMS    (2-9 systems for level 4, 10 or more for level 5)     Review of Systems    Except as noted above the remainder of the review of systems was reviewed and negative.       PAST MEDICAL HISTORY     Past Medical History:   Diagnosis Date    14 day event monitor 11/05/2018    Sinus rhythm    Atrial fibrillation with RVR (HCC) 11/25/2013    Breast cancer (HCC)     Edema     4/12 TTE diastolic dysfxn, EF 55%; 11/10 - TTE diastolic dysfxn, EF 55%; Stress myoview  WNl,

## 2024-07-05 NOTE — PROGRESS NOTES
Obtain abdomen xray to evaluate pd cath location    Will ask pd nurse to flush pd cath after tpa dwell     Give a dose of lactulose to help with BM which is important for proper PD functioning

## 2024-07-05 NOTE — ED PROVIDER NOTES
[facility] EMERGENCY DEPARTMENT  TRANSFER OF CARE NOTE  EMERGENCY DEPARTMENT ENCOUNTER          Pt Name: Krupa Sanchez  MRN: [id number]  Birthdate 1945  Date of encounter: 7/5/2024  Physician: Bety Marx DO, FACEP      Transfer of Care Information:   Physician Signing out:  Anna Linares  Receiving Physician: Bety Marx  Sign out time: 0659      Brief history:    0659 79, discharged a day ago  Peritoneal dialysis  Not working  ESTRELLITA, Dr. Soto, nephrologist  Swap machine, vs admit, talk with nephro for dispo [CB]       Items pending that need to be checked:  Dr. Soto is working on plan to either swap out the dialysis machine for peritoneal dialysis or hospitalize for dialysis if unable to arrange this      Tentative Impression of patient:  ESRD on peritoneal dialysis with machine that is not working    Expected disposition of patient:  Pending results,  depends on nephro  .        Additional Assessment and results:   I have personally performed a face to face diagnostic evaluation on this patient. The patient's initial evaluation and plan have been discussed with the prior physician who initially evaluated the patient. Nursing Notes, Past Medical Hx, Past Surgical Hx, Social Hx, Allergies, vital signs and Family Hx were all reviewed.      Vitals:    07/05/24 0254   BP: 124/64   Pulse: 81   Resp: 16   Temp: 98.4 °F (36.9 °C)   SpO2: 96%     Physical Exam      Labs Reviewed   COMPREHENSIVE METABOLIC PANEL         Medications - No data to display      XR ABDOMEN (KUB) (SINGLE AP VIEW)    (Results Pending)         ED Course as of 07/05/24 1005   Fri Jul 05, 2024   0659 79, discharged a day ago  Peritoneal dialysis  Not working  ESTRELLITA, Dr. Soto, nephrologist  Swap machine, vs admit, talk with nephro for dispo [CB]   0804 Nephro ordered KUB and dialysis nurse is coming to try tpa on PD catheter. [CB]   0859 TPA in PD catheter by nurse.  Waiting 30 minutes

## 2024-07-07 LAB
CULTURE: NORMAL
CULTURE: NORMAL
Lab: NORMAL
Lab: NORMAL
SPECIMEN: NORMAL
SPECIMEN: NORMAL

## 2024-07-08 ENCOUNTER — APPOINTMENT (OUTPATIENT)
Dept: GENERAL RADIOLOGY | Age: 79
End: 2024-07-08
Payer: COMMERCIAL

## 2024-07-08 ENCOUNTER — HOSPITAL ENCOUNTER (EMERGENCY)
Age: 79
Discharge: HOME | End: 2024-07-09
Payer: COMMERCIAL

## 2024-07-08 ENCOUNTER — TELEPHONE (OUTPATIENT)
Dept: CARDIOLOGY CLINIC | Age: 79
End: 2024-07-08

## 2024-07-08 VITALS
TEMPERATURE: 98 F | RESPIRATION RATE: 20 BRPM | WEIGHT: 207 LBS | SYSTOLIC BLOOD PRESSURE: 133 MMHG | DIASTOLIC BLOOD PRESSURE: 75 MMHG | BODY MASS INDEX: 30.66 KG/M2 | HEIGHT: 69 IN | OXYGEN SATURATION: 98 % | HEART RATE: 83 BPM

## 2024-07-08 DIAGNOSIS — R07.9 CHEST PAIN, UNSPECIFIED TYPE: Primary | ICD-10-CM

## 2024-07-08 DIAGNOSIS — R00.2 PALPITATIONS: ICD-10-CM

## 2024-07-08 LAB
ALBUMIN SERPL-MCNC: 3.6 GM/DL (ref 3.4–5)
ALP BLD-CCNC: 85 IU/L (ref 40–129)
ALT SERPL-CCNC: 18 U/L (ref 10–40)
ANION GAP SERPL CALCULATED.3IONS-SCNC: 17 MMOL/L (ref 7–16)
AST SERPL-CCNC: 15 IU/L (ref 15–37)
BASOPHILS ABSOLUTE: 0 K/CU MM
BASOPHILS RELATIVE PERCENT: 0.4 % (ref 0–1)
BILIRUB SERPL-MCNC: 0.2 MG/DL (ref 0–1)
BUN SERPL-MCNC: 42 MG/DL (ref 6–23)
CALCIUM SERPL-MCNC: 9.8 MG/DL (ref 8.3–10.6)
CHLORIDE BLD-SCNC: 97 MMOL/L (ref 99–110)
CO2: 24 MMOL/L (ref 21–32)
CREAT SERPL-MCNC: 10.2 MG/DL (ref 0.6–1.1)
DIFFERENTIAL TYPE: ABNORMAL
EOSINOPHILS ABSOLUTE: 0.3 K/CU MM
EOSINOPHILS RELATIVE PERCENT: 2.9 % (ref 0–3)
GFR, ESTIMATED: 4 ML/MIN/1.73M2
GLUCOSE SERPL-MCNC: 115 MG/DL (ref 70–99)
HCT VFR BLD CALC: 31.5 % (ref 37–47)
HEMOGLOBIN: 9.8 GM/DL (ref 12.5–16)
IMMATURE NEUTROPHIL %: 0.4 % (ref 0–0.43)
LIPASE: 51 IU/L (ref 13–60)
LYMPHOCYTES ABSOLUTE: 1.3 K/CU MM
LYMPHOCYTES RELATIVE PERCENT: 13.2 % (ref 24–44)
MCH RBC QN AUTO: 28.7 PG (ref 27–31)
MCHC RBC AUTO-ENTMCNC: 31.1 % (ref 32–36)
MCV RBC AUTO: 92.4 FL (ref 78–100)
MONOCYTES ABSOLUTE: 0.6 K/CU MM
MONOCYTES RELATIVE PERCENT: 5.8 % (ref 0–4)
NEUTROPHILS ABSOLUTE: 7.8 K/CU MM
NEUTROPHILS RELATIVE PERCENT: 77.3 % (ref 36–66)
NUCLEATED RBC %: 0 %
PDW BLD-RTO: 15.9 % (ref 11.7–14.9)
PLATELET # BLD: 332 K/CU MM (ref 140–440)
PMV BLD AUTO: 10 FL (ref 7.5–11.1)
POTASSIUM SERPL-SCNC: 3.4 MMOL/L (ref 3.5–5.1)
PRO-BNP: ABNORMAL PG/ML
RBC # BLD: 3.41 M/CU MM (ref 4.2–5.4)
SODIUM BLD-SCNC: 138 MMOL/L (ref 135–145)
TOTAL IMMATURE NEUTOROPHIL: 0.04 K/CU MM
TOTAL NUCLEATED RBC: 0 K/CU MM
TOTAL PROTEIN: 7.3 GM/DL (ref 6.4–8.2)
TROPONIN, HIGH SENSITIVITY: 238 NG/L (ref 0–14)
TROPONIN, HIGH SENSITIVITY: 258 NG/L (ref 0–14)
WBC # BLD: 10.1 K/CU MM (ref 4–10.5)

## 2024-07-08 PROCEDURE — 93005 ELECTROCARDIOGRAM TRACING: CPT | Performed by: STUDENT IN AN ORGANIZED HEALTH CARE EDUCATION/TRAINING PROGRAM

## 2024-07-08 PROCEDURE — 99285 EMERGENCY DEPT VISIT HI MDM: CPT

## 2024-07-08 PROCEDURE — 80053 COMPREHEN METABOLIC PANEL: CPT

## 2024-07-08 PROCEDURE — 85025 COMPLETE CBC W/AUTO DIFF WBC: CPT

## 2024-07-08 PROCEDURE — 71045 X-RAY EXAM CHEST 1 VIEW: CPT

## 2024-07-08 PROCEDURE — 83880 ASSAY OF NATRIURETIC PEPTIDE: CPT

## 2024-07-08 PROCEDURE — 84484 ASSAY OF TROPONIN QUANT: CPT

## 2024-07-08 PROCEDURE — 83690 ASSAY OF LIPASE: CPT

## 2024-07-08 ASSESSMENT — PAIN - FUNCTIONAL ASSESSMENT
PAIN_FUNCTIONAL_ASSESSMENT: 0-10
PAIN_FUNCTIONAL_ASSESSMENT: NONE - DENIES PAIN

## 2024-07-08 ASSESSMENT — PAIN SCALES - GENERAL: PAINLEVEL_OUTOF10: 0

## 2024-07-08 NOTE — TELEPHONE ENCOUNTER
Patient called her bp 84/60    Feeling SOB , she was just dc from   [facility] pneumonia

## 2024-07-08 NOTE — TELEPHONE ENCOUNTER
Patient reported B/O 84/60, . When I called back she stated SOB, no more palpitations. Currently 97/59,

## 2024-07-08 NOTE — ED PROVIDER NOTES
(obstructive sleep apnea)     sleep study 10/3 CPAP 6cm     Osteoarthritis     both knees    Osteopenia     9/12 DEXA T-score -1.5    Other activity(E029.9)     48 hr holter, the 48 hr holter monitor reveals the patient in the sinus rhythm with occasional supraventricular ectopic beats.  There is a rare short atrial run.    Paroxysmal atrial fibrillation (HCC)     4/12 Holter WL    Prolonged emergence from general anesthesia     Proteinuria     Supraventricular tachycardia (HCC)     Type 2 diabetes mellitus (HCC) Dx 2000's    Urge incontinence     Wears partial dentures     upper partial       Discussion with Other Profesionals : None    Social Determinants : None    Records Reviewed : Inpatient Notes from most recent admission on 7/2/2024 for chest pain/PNA     Patient's EMR reviewed for information on past medical history, current medications, and any pertinent inpatient/outpatient encounters.      ED Course/Reassessment/Disposition:  Patient seen and examined.  She was mildly tachycardic upon arrival but this quickly resolved.  She remained pain free/palpitation free throughout ED course. EKG, labs, CXR obtained.  She has stable troponins--consistent with her CKD.  BNP 22,000.  CXR shows mild pulmonary venous congestion.  She does not appear clinically overloaded.  She does do peritoneal dialysis and has not yet done today.  She does have a history of paroxysmal A Fib/Flutter--which may account for the palpitations she had this afternoon.  She had a recent admission on 7/2 for similar symptoms.  She voiced readiness for discharge home upon discussion of results.  FU with PCP/Nephrology/Cardiology.  Return as needed.    Disposition Considerations (tests considered but not done, Shared Decision Making, Patient Expectation of Test or Treatment):   Appropriate for outpatient management      Stable at discharge.    I am the Primary Clinician of Record.  Supervising physician was Dr. Newton.  Patient was seen

## 2024-07-11 LAB
EKG ATRIAL RATE: 103 BPM
EKG DIAGNOSIS: NORMAL
EKG P AXIS: 90 DEGREES
EKG P-R INTERVAL: 192 MS
EKG Q-T INTERVAL: 374 MS
EKG QRS DURATION: 96 MS
EKG QTC CALCULATION (BAZETT): 489 MS
EKG R AXIS: -35 DEGREES
EKG T AXIS: 98 DEGREES
EKG VENTRICULAR RATE: 103 BPM

## 2024-07-11 PROCEDURE — 93010 ELECTROCARDIOGRAM REPORT: CPT | Performed by: INTERNAL MEDICINE

## 2024-07-22 RX ORDER — CLOPIDOGREL BISULFATE 75 MG/1
75 TABLET ORAL DAILY
Qty: 90 TABLET | Refills: 3 | Status: SHIPPED | OUTPATIENT
Start: 2024-07-22

## 2024-07-23 ENCOUNTER — TELEPHONE (OUTPATIENT)
Dept: INTERNAL MEDICINE CLINIC | Age: 79
End: 2024-07-23

## 2024-07-29 ENCOUNTER — TELEPHONE (OUTPATIENT)
Dept: CARDIOLOGY CLINIC | Age: 79
End: 2024-07-29

## 2024-08-02 ENCOUNTER — OFFICE VISIT (OUTPATIENT)
Dept: CARDIOLOGY CLINIC | Age: 79
End: 2024-08-02

## 2024-08-02 VITALS
DIASTOLIC BLOOD PRESSURE: 60 MMHG | SYSTOLIC BLOOD PRESSURE: 134 MMHG | WEIGHT: 206 LBS | HEIGHT: 68 IN | BODY MASS INDEX: 31.22 KG/M2 | HEART RATE: 76 BPM

## 2024-08-02 DIAGNOSIS — I48.0 PAROXYSMAL ATRIAL FIBRILLATION (HCC): Primary | ICD-10-CM

## 2024-08-02 RX ORDER — AMIODARONE HYDROCHLORIDE 200 MG/1
100 TABLET ORAL DAILY
Qty: 30 TABLET | Refills: 0
Start: 2024-08-02 | End: 2024-08-02 | Stop reason: SDUPTHER

## 2024-08-02 RX ORDER — AMIODARONE HYDROCHLORIDE 200 MG/1
100 TABLET ORAL DAILY
Qty: 30 TABLET | Refills: 0 | Status: SHIPPED | OUTPATIENT
Start: 2024-08-02

## 2024-08-02 NOTE — PROGRESS NOTES
Krupa  is a  Established patient  ,79 y.o.   female here for evaluation of the following chief complaint(s):    Here for fu       SUBJECTIVE/OBJECTIVE:  HPI : h/o  Htn, hyperlipidimea, dm, paf now here for follow-up   Vitals:    08/02/24 1207   BP: 134/60   Site: Left Upper Arm   Position: Sitting   Cuff Size: Large Adult   Pulse: 76   Weight: 93.4 kg (206 lb)   Height: 1.727 m (5' 8\")             /60 (Site: Left Upper Arm, Position: Sitting, Cuff Size: Large Adult)   Pulse 76   Ht 1.727 m (5' 8\")   Wt 93.4 kg (206 lb)   LMP  (LMP Unknown)   BMI 31.32 kg/m²       12/10/2020    11:34 AM   Patient-Reported Vitals   Patient-Reported Weight 194   Patient-Reported Systolic 123 mmHg   Patient-Reported Diastolic 70 mmHg   Patient-Reported Pulse 67     Wt Readings from Last 3 Encounters:   08/02/24 93.4 kg (206 lb)   07/08/24 93.9 kg (207 lb)   07/05/24 93.4 kg (206 lb)     Body mass index is 31.32 kg/m².    Physical Exam     Neck: JVD  no    Lungs : clear    Cardio : Si and S2 audilble      Ext: edema      All pertinent data reviewed  y    Meds : reviewed   y      Tests ordered    y    ASSESSMENT/PLAN:    - HFpEF we will check an echo today to see if she has HFpEF   --------------------------------------------------------    - Atrial fibrillation, pt is  compliant with meds. Patient does not have symptoms from atrial fibrillation  Has aflutter was on Tambocor which will be held and continue with amiodarone  Also on Eliquis which will be continued    Patient was recently in the hospital with A-fib with RVR was switched to amnio in the hospital with plans of consultation with EP follow-up    Will also the decrease of amiodarone to 200-100    Will also need pulmonary function tests    [id number] Score for Atrial Fibrillation Stroke Risk   Risk   Factors  Component Value   C CHF No 0   H HTN Yes 1   A2 Age >= 75 Yes,  (79 y.o.) 2   D DM Yes 1   S2 Prior Stroke/TIA No 0   V Vascular Disease No 0   A Age 65-74

## 2024-08-05 DIAGNOSIS — E78.2 MIXED HYPERLIPIDEMIA: ICD-10-CM

## 2024-08-05 RX ORDER — ATORVASTATIN CALCIUM 80 MG/1
TABLET, FILM COATED ORAL
Qty: 90 TABLET | Refills: 3 | Status: CANCELLED | OUTPATIENT
Start: 2024-08-05

## 2024-08-05 RX ORDER — ATORVASTATIN CALCIUM 80 MG/1
TABLET, FILM COATED ORAL
Qty: 90 TABLET | Refills: 3 | Status: SHIPPED | OUTPATIENT
Start: 2024-08-05

## 2024-08-05 NOTE — TELEPHONE ENCOUNTER
Last appointment: 6/24/2024   Next Appointment: 9/24/2024     Allergies:  Allergies   Allergen Reactions    Latex      \"Blisters\"    Tape [Adhesive Tape]      \"Turn Red\"         Recent Vitals:  Wt Readings from Last 3 Encounters:   08/02/24 93.4 kg (206 lb)   07/08/24 93.9 kg (207 lb)   07/05/24 93.4 kg (206 lb)     Ht Readings from Last 3 Encounters:   08/02/24 1.727 m (5' 8\")   07/08/24 1.74 m (5' 8.5\")   07/05/24 1.727 m (5' 8\")     BP Readings from Last 3 Encounters:   08/02/24 134/60   07/08/24 133/75   07/05/24 124/64     BMI Readings from Last 3 Encounters:   08/02/24 31.32 kg/m²   07/08/24 31.02 kg/m²   07/05/24 31.32 kg/m²       Recent Labs__________________________________________________________________________    Renal:   Creatinine   Date Value Ref Range Status   07/08/2024 10.2 (H) 0.6 - 1.1 MG/DL Final     BUN   Date Value Ref Range Status   07/08/2024 42 (H) 6 - 23 MG/DL Final     Sodium   Date Value Ref Range Status   07/08/2024 138 135 - 145 MMOL/L Final     Potassium   Date Value Ref Range Status   07/08/2024 3.4 (L) 3.5 - 5.1 MMOL/L Final     Chloride   Date Value Ref Range Status   07/08/2024 97 (L) 99 - 110 mMol/L Final     CO2   Date Value Ref Range Status   07/08/2024 24 21 - 32 MMOL/L Final       BMP:    Sodium   Date Value Ref Range Status   07/08/2024 138 135 - 145 MMOL/L Final     Potassium   Date Value Ref Range Status   07/08/2024 3.4 (L) 3.5 - 5.1 MMOL/L Final     Chloride   Date Value Ref Range Status   07/08/2024 97 (L) 99 - 110 mMol/L Final     CO2   Date Value Ref Range Status   07/08/2024 24 21 - 32 MMOL/L Final     BUN   Date Value Ref Range Status   07/08/2024 42 (H) 6 - 23 MG/DL Final     Creatinine   Date Value Ref Range Status   07/08/2024 10.2 (H) 0.6 - 1.1 MG/DL Final     Glucose   Date Value Ref Range Status   07/08/2024 115 (H) 70 - 99 MG/DL Final     Calcium   Date Value Ref Range Status   07/08/2024 9.8 8.3 - 10.6 MG/DL Final     Est, Glom Filt Rate   Date Value Ref

## 2024-08-22 ENCOUNTER — HOSPITAL ENCOUNTER (OUTPATIENT)
Age: 79
Setting detail: SPECIMEN
Discharge: HOME | End: 2024-08-22
Payer: COMMERCIAL

## 2024-08-22 LAB
MAGNESIUM: 1.5 MG/DL (ref 1.8–2.4)
POTASSIUM SERPL-SCNC: 4.3 MMOL/L (ref 3.5–5.1)

## 2024-08-22 PROCEDURE — 83735 ASSAY OF MAGNESIUM: CPT

## 2024-08-22 PROCEDURE — 84132 ASSAY OF SERUM POTASSIUM: CPT

## 2024-09-24 ENCOUNTER — OFFICE VISIT (OUTPATIENT)
Dept: INTERNAL MEDICINE CLINIC | Age: 79
End: 2024-09-24
Payer: COMMERCIAL

## 2024-09-24 VITALS
DIASTOLIC BLOOD PRESSURE: 64 MMHG | WEIGHT: 202.4 LBS | OXYGEN SATURATION: 96 % | SYSTOLIC BLOOD PRESSURE: 132 MMHG | BODY MASS INDEX: 30.77 KG/M2 | HEART RATE: 74 BPM

## 2024-09-24 DIAGNOSIS — E11.21 TYPE 2 DIABETES MELLITUS WITH NEPHROPATHY (HCC): Primary | ICD-10-CM

## 2024-09-24 DIAGNOSIS — I10 ESSENTIAL HYPERTENSION: ICD-10-CM

## 2024-09-24 DIAGNOSIS — N18.6 ESRD (END STAGE RENAL DISEASE) (HCC): ICD-10-CM

## 2024-09-24 DIAGNOSIS — E78.2 MIXED HYPERLIPIDEMIA: ICD-10-CM

## 2024-09-24 PROCEDURE — 99214 OFFICE O/P EST MOD 30 MIN: CPT | Performed by: INTERNAL MEDICINE

## 2024-09-24 PROCEDURE — 1123F ACP DISCUSS/DSCN MKR DOCD: CPT | Performed by: INTERNAL MEDICINE

## 2024-09-24 PROCEDURE — 90653 IIV ADJUVANT VACCINE IM: CPT | Performed by: INTERNAL MEDICINE

## 2024-09-24 PROCEDURE — 3078F DIAST BP <80 MM HG: CPT | Performed by: INTERNAL MEDICINE

## 2024-09-24 PROCEDURE — G0008 ADMIN INFLUENZA VIRUS VAC: HCPCS | Performed by: INTERNAL MEDICINE

## 2024-09-24 PROCEDURE — 3075F SYST BP GE 130 - 139MM HG: CPT | Performed by: INTERNAL MEDICINE

## 2024-09-24 PROCEDURE — 3051F HG A1C>EQUAL 7.0%<8.0%: CPT | Performed by: INTERNAL MEDICINE

## 2024-09-24 RX ORDER — ALLOPURINOL 100 MG/1
100 TABLET ORAL DAILY
COMMUNITY
Start: 2024-08-18

## 2024-09-24 RX ORDER — POTASSIUM CHLORIDE 750 MG/1
10 TABLET, EXTENDED RELEASE ORAL 2 TIMES DAILY
COMMUNITY
Start: 2024-09-15

## 2024-09-24 SDOH — ECONOMIC STABILITY: INCOME INSECURITY: HOW HARD IS IT FOR YOU TO PAY FOR THE VERY BASICS LIKE FOOD, HOUSING, MEDICAL CARE, AND HEATING?: NOT HARD AT ALL

## 2024-09-24 SDOH — ECONOMIC STABILITY: FOOD INSECURITY: WITHIN THE PAST 12 MONTHS, THE FOOD YOU BOUGHT JUST DIDN'T LAST AND YOU DIDN'T HAVE MONEY TO GET MORE.: NEVER TRUE

## 2024-09-24 SDOH — ECONOMIC STABILITY: FOOD INSECURITY: WITHIN THE PAST 12 MONTHS, YOU WORRIED THAT YOUR FOOD WOULD RUN OUT BEFORE YOU GOT MONEY TO BUY MORE.: NEVER TRUE

## 2024-10-02 RX ORDER — AMIODARONE HYDROCHLORIDE 200 MG/1
100 TABLET ORAL DAILY
Qty: 15 TABLET | Refills: 5 | Status: SHIPPED | OUTPATIENT
Start: 2024-10-02

## 2024-11-04 ENCOUNTER — OFFICE VISIT (OUTPATIENT)
Dept: CARDIOLOGY CLINIC | Age: 79
End: 2024-11-04

## 2024-11-04 VITALS
SYSTOLIC BLOOD PRESSURE: 136 MMHG | WEIGHT: 203.8 LBS | DIASTOLIC BLOOD PRESSURE: 74 MMHG | HEIGHT: 68 IN | BODY MASS INDEX: 30.89 KG/M2 | HEART RATE: 79 BPM

## 2024-11-04 DIAGNOSIS — I15.1 HYPERTENSION SECONDARY TO OTHER RENAL DISORDERS: ICD-10-CM

## 2024-11-04 DIAGNOSIS — I48.0 PAROXYSMAL ATRIAL FIBRILLATION (HCC): Primary | ICD-10-CM

## 2024-11-04 ASSESSMENT — ENCOUNTER SYMPTOMS
SHORTNESS OF BREATH: 1
ORTHOPNEA: 0

## 2024-11-04 NOTE — PROGRESS NOTES
11/4/2024  Primary cardiologist: Dr. Camarillo    CC:   Krupa  is an established 79 y.o.  female here for a follow up on paf      SUBJECTIVE/OBJECTIVE:  HPI  Krupa is a 79 y.o. female with a history of paroxysmal atrial fibrillation, hypertension, hyperlipidemia, diabetes mellitus CKD on PD.     Krupa reports states overall she is feeling pretty good.  She is doing peritoneal dialysis at home.  She notes ongoing shortness of breath.  No orthopnea or PND.    Review of Systems   Constitutional: Negative for diaphoresis and malaise/fatigue.   Cardiovascular:  Negative for chest pain, claudication, dyspnea on exertion, irregular heartbeat, leg swelling, near-syncope, orthopnea, palpitations and paroxysmal nocturnal dyspnea.   Respiratory:  Positive for shortness of breath.    Neurological:  Negative for dizziness and light-headedness.       Vitals:    11/04/24 1440   BP: 136/74   Site: Left Upper Arm   Position: Sitting   Cuff Size: Medium Adult   Pulse: 79   Weight: 92.4 kg (203 lb 12.8 oz)   Height: 1.727 m (5' 7.99\")     Wt Readings from Last 3 Encounters:   11/04/24 92.4 kg (203 lb 12.8 oz)   09/24/24 91.8 kg (202 lb 6.4 oz)   08/02/24 93.4 kg (206 lb)      Body mass index is 30.99 kg/m².     Physical Exam  Vitals reviewed.   Eyes:      Pupils: Pupils are equal, round, and reactive to light.   Neck:      Vascular: No carotid bruit.   Cardiovascular:      Rate and Rhythm: Normal rate.      Pulses: Normal pulses.   Pulmonary:      Effort: Pulmonary effort is normal.      Breath sounds: Rales present.   Chest:      Chest wall: No tenderness.   Musculoskeletal:      Cervical back: No tenderness.      Right lower leg: No edema.      Left lower leg: No edema.   Skin:     General: Skin is warm and dry.      Capillary Refill: Capillary refill takes less than 2 seconds.   Neurological:      Mental Status: She is alert and oriented to person, place, and time.                Current Outpatient Medications   Medication Sig Dispense

## 2024-11-04 NOTE — PATIENT INSTRUCTIONS
Please be informed that if you contact our office outside of normal business hours the physician on call cannot help with any scheduling or rescheduling issues, procedure instruction questions or any type of medication issue.    We advise you for any urgent/emergency that you go to the nearest emergency room!    PLEASE CALL OUR OFFICE DURING NORMAL BUSINESS HOURS    Monday - Friday   8 am to 5 pm    [place]: [phone]    [place]: [phone]    [place]:  [phone]    **It is YOUR responsibilty to bring medication bottles and/or updated medication list to EACH APPOINTMENT. This will allow us to better serve you and all your healthcare needs**    Thank you for allowing us to care for you today!   We want to ensure we can follow your treatment plan and we strive to give you the best outcomes and experience possible.   If you ever have a life threatening emergency and call 911 - for an ambulance (EMS)   Our providers can only care for you at:   [facility] or [facility].   Even if you have someone take you or you drive yourself we can only care for you in a [facility] facility. Our providers are not setup at the other healthcare locations!

## 2024-11-08 RX ORDER — DILTIAZEM HYDROCHLORIDE 120 MG/1
120 CAPSULE, COATED, EXTENDED RELEASE ORAL DAILY
Qty: 90 CAPSULE | Refills: 3 | Status: SHIPPED | OUTPATIENT
Start: 2024-11-08

## 2024-11-13 ENCOUNTER — OFFICE VISIT (OUTPATIENT)
Dept: INTERNAL MEDICINE CLINIC | Age: 79
End: 2024-11-13

## 2024-11-13 VITALS
HEART RATE: 80 BPM | RESPIRATION RATE: 18 BRPM | SYSTOLIC BLOOD PRESSURE: 148 MMHG | OXYGEN SATURATION: 97 % | DIASTOLIC BLOOD PRESSURE: 72 MMHG

## 2024-11-13 DIAGNOSIS — H81.12 BENIGN PAROXYSMAL POSITIONAL VERTIGO OF LEFT EAR: Primary | ICD-10-CM

## 2024-11-13 NOTE — PROGRESS NOTES
Krupa Sanchez  1945 11/13/24    SUBJECTIVE:    Monday pt became dizzy when she first raised up oin bed. She laid back down with improvement in her symptoms, but with moving again the symptoms recurred. She had nausea without vomiting.     She denies N/W, change in vision, HA.     OBJECTIVE:    BP (!) 148/72   Pulse 80   Resp 18   LMP  (LMP Unknown)   SpO2 97%     Physical Exam  Constitutional:       Appearance: She is well-developed.   Eyes:      General: No scleral icterus.     Conjunctiva/sclera: Conjunctivae normal.   Cardiovascular:      Rate and Rhythm: Normal rate and regular rhythm.      Heart sounds: Normal heart sounds. No murmur heard.  Pulmonary:      Effort: Pulmonary effort is normal. No respiratory distress.      Breath sounds: Normal breath sounds. No wheezing.   Neurological:      Cranial Nerves: Cranial nerves 2-12 are intact.      Sensory: Sensation is intact.      Motor: Motor function is intact.      Comments: (+) choco hallpike to the left         ASSESSMENT:    1. Benign paroxysmal positional vertigo of left ear        PLAN:    Krupa was seen today for itch, dizziness and ear problem.    Diagnoses and all orders for this visit:    Benign paroxysmal positional vertigo of left ear-symptoms most consistent with benign paroxysmal positional vertigo.  Symptoms much improved today but still slightly present.  I will therefore refer to physical therapy.  -     [facility] - [facility]

## 2024-11-18 ENCOUNTER — OFFICE VISIT (OUTPATIENT)
Dept: PULMONOLOGY | Age: 79
End: 2024-11-18
Payer: COMMERCIAL

## 2024-11-18 VITALS
SYSTOLIC BLOOD PRESSURE: 126 MMHG | BODY MASS INDEX: 30.07 KG/M2 | WEIGHT: 203 LBS | OXYGEN SATURATION: 95 % | HEART RATE: 93 BPM | DIASTOLIC BLOOD PRESSURE: 58 MMHG | HEIGHT: 69 IN

## 2024-11-18 DIAGNOSIS — G47.33 OSA (OBSTRUCTIVE SLEEP APNEA): Primary | ICD-10-CM

## 2024-11-18 DIAGNOSIS — Z87.891 EX-CIGARETTE SMOKER: ICD-10-CM

## 2024-11-18 DIAGNOSIS — G47.19 EXCESSIVE DAYTIME SLEEPINESS: ICD-10-CM

## 2024-11-18 DIAGNOSIS — E66.9 OBESITY (BMI 30-39.9): ICD-10-CM

## 2024-11-18 PROCEDURE — 1159F MED LIST DOCD IN RCRD: CPT | Performed by: INTERNAL MEDICINE

## 2024-11-18 PROCEDURE — 1123F ACP DISCUSS/DSCN MKR DOCD: CPT | Performed by: INTERNAL MEDICINE

## 2024-11-18 PROCEDURE — 99214 OFFICE O/P EST MOD 30 MIN: CPT | Performed by: INTERNAL MEDICINE

## 2024-11-18 ASSESSMENT — ENCOUNTER SYMPTOMS
EYE ITCHING: 0
ABDOMINAL DISTENTION: 0
BACK PAIN: 0
EYE DISCHARGE: 0
SHORTNESS OF BREATH: 0
ABDOMINAL PAIN: 0
COUGH: 0

## 2024-11-18 NOTE — PROGRESS NOTES
Krupa Sanchez  1945  Referring Provider: Anant Ernandez, [facility] - General     Subjective:     Chief Complaint   Patient presents with    Sleep Apnea       HPI  Krupa is a 79 y.o. female  has come back as a follow up. She has mild ADOLFO with EDS. She is on a CPAP of 8 cm h20 which she has used 16/30 days for more than 4 hours (53%). She has 10 lb weight loss. She has a nasal pillows mask.She is less sleepy and tired during the day time.Her 2 week download data showed a residual AHI is 2.4 and leak is 15.0 L/min    Current Outpatient Medications   Medication Sig Dispense Refill    dilTIAZem (CARTIA XT) 120 MG extended release capsule Take 1 capsule by mouth daily 90 capsule 3    apixaban (ELIQUIS) 2.5 MG TABS tablet Take 1 tablet by mouth 2 times daily for 14 days Lot # [id number]  Exp : 1/26  2 boxes = 28 pills 28 tablet 0    amiodarone (CORDARONE) 200 MG tablet Take 0.5 tablets by mouth daily 15 tablet 5    potassium chloride (KLOR-CON M) 10 MEQ extended release tablet Take 1 tablet by mouth 2 times daily      allopurinol (ZYLOPRIM) 100 MG tablet Take 1 tablet by mouth daily      atorvastatin (LIPITOR) 80 MG tablet Take 1 tab by mouth once daily 90 tablet 3    clopidogrel (PLAVIX) 75 MG tablet Take 1 tablet by mouth daily 90 tablet 3    apixaban (ELIQUIS) 2.5 MG TABS tablet Take 1 tablet by mouth 2 times daily 60 tablet 5    folic acid (FOLVITE) 1 MG tablet Take 1 tablet by mouth daily Unsure of strength      glucose 4 g chewable tablet Take 4 tablets by mouth as needed for Low blood sugar 60 tablet 3    metoprolol tartrate (LOPRESSOR) 50 MG tablet Take 1 tablet by mouth 2 times daily 60 tablet 3    magnesium oxide (MAG-OX) 400 (240 Mg) MG tablet Take 1 tablet by mouth daily 30 tablet 0    Elastic Bandages & Supports (WRIST SPLINT/COCK-UP/RIGHT M) MISC 1 Device by Does not apply route daily 1 each 0    glimepiride (AMARYL) 2 MG tablet Take 1 tablet by mouth daily      Cholecalciferol (VITAMIN D3) 50 MCG

## 2024-11-26 ENCOUNTER — HOSPITAL ENCOUNTER (OUTPATIENT)
Dept: PHYSICAL THERAPY | Age: 79
Setting detail: THERAPIES SERIES
Discharge: HOME | End: 2024-11-26
Payer: COMMERCIAL

## 2024-11-26 PROCEDURE — 97161 PT EVAL LOW COMPLEX 20 MIN: CPT

## 2024-11-26 NOTE — PLAN OF CARE
Outpatient Physical Therapy           [place]           [x] Phone: [phone]   Fax: [phone]  [place]           [] Phone: [phone]   Fax: [phone]     To: Anant Ernandez* Anant Ernandez MD   From: Danica Simms, PT, DPT     Patient: Krupa Sanchez       : 1945  Diagnosis: Benign paroxysmal positional vertigo of left ear [H81.12] Diagnosis: BPPV vertigo  Treatment Diagnosis: dizziness  Date: 2024    Physical Therapy Certification/Re-Certification Form  Dear Dr. Ernandez,   The following patient has been evaluated for physical therapy services and for therapy to continue, insurance requires physician review of the treatment plan initially and every 90 days. Please review the attached evaluation and/or summary of the patient's plan of care, and verify that you agree therapy should continue by signing the attached document and sending it back to our office.    Assessment:       Patient is a 79-year-old female who reports to physical therapy with a referral for vertigo secondary to BPPV. She reports she had an initial attack about one month ago, which she reports room was spinning when she got out of bed quickly. She reports she has not experienced any dizziness since the initial attack. She denies any dizziness with bed mobility or positional changes at this time. Convergence and VOR testing were withing functional limits at this time, with no recreation of symptoms. Vichy-Hallpike and Horizontal Roll tests were negative bilaterally for nystagmus, with patient denying any recreation of symptoms. DGI score of 23/24 is WFL as well. Patient was educated to reach out to PCP if symptoms re--occur for re-evaluation.    Plan of Care/Treatment to date:  [] Therapeutic Exercise  [] Modalities:  [] Therapeutic Activity     [] Ultrasound  [] Electrical Stimulation  [] Gait Training      [] Cervical Traction [] Lumbar Traction  [] Neuromuscular Re-education    [] Cold/hotpack []

## 2024-11-26 NOTE — PROGRESS NOTES
Education     GOALS     Patient Goal(s): n/a  Short Term Goals Completed by Pt will report no dizzy over the last 2 weeks. Goal Status     New, Met                                                                  TREATMENT PLAN   Other Activities  Comment: DGI : 23/24   Requires PT Follow-Up: No  No Skilled PT: At baseline function (pt reports she does not have any dizziness anymore)    Pt. actively involved in establishing Plan of Care and Goals: Yes  Patient/ Caregiver education and instruction: Goals, PT Role, Plan of Care, Evaluative findings, Insurance, Anatomy of condition pt educated on anatomy of condition, and encouraged to call back if dizziness does return/reach back out to PCP           Treatment may include any combination of the following: Current Treatment Recommendations: Vestibular rehab     Frequency / Duration:  Patient to be seen 1 for 1 weeks      Eval Complexity: Overall Evaluation : Low  Decision Making: Low Complexity  History: Personal Factors and/or Comorbidities Impacting POC: Low  Examination of body system(s) including body structures and functions, activity limitations, and/or participation restrictions: Low  Clinical Presentation: Low  Clinical Decision Making : Low Complexity    PT Treatment Completed:  N/A - Evaluation Only    Therapy Time  Individual Time In:       1530  Individual Time Out:  1615  Minutes:  45min           Therapist Signature: Danica Simms, PT DPT   Date: 11/26/2024     I certify that the above Therapy Services are being furnished while the patient is under my care. I agree with the treatment plan and certify that this therapy is necessary.      Physician's Signature:  ___________________________   Date:_______                                                                   Anant Ernandez*        Physician Comments: _______________________________________________    Please sign and return to [facility].  Please fax to the location

## 2024-11-26 NOTE — FLOWSHEET NOTE
Outpatient Physical Therapy  [place]           [x] Phone: [phone]   Fax: [phone]  [place]           [] Phone: [phone]   Fax: [phone]        Physical Therapy Daily Treatment Note  Date:  2024    Patient Name:  Krupa Sanchez    :  1945  MRN: [id number]  Restrictions/Precautions: No data recorded      Diagnosis:   Benign paroxysmal positional vertigo of left ear [H81.12] Diagnosis: BPPV vertigo  Date of Injury/Surgery:   Treatment Diagnosis:   decreased balance   Insurance/Certification information: [facility]  Referring Physician:  Anant Ernandez* Anant Ernandez MD   PCP: Anant Ernandez MD  Next Doctor Visit:  12/3/24  Plan of care signed (Y/N): sent day of eval    Outcome Measure: DHQI  Visit# / total visits:   BOMN  Pain level: 0/10   Goals:     Patient goals: n/a  Short term goals  Time Frame for Short term goals: Pt will report no dizzy over the last 2 weeks.            Summary of Evaluation:   Patient is a 79-year-old female who reports to physical therapy with a referral for vertigo secondary to BPPV. She reports she had an initial attack about one month ago, which she reports room was spinning when she got out of bed quickly. She reports she has not experienced any dizziness since the initial attack. She denies any dizziness with bed mobility or positional changes at this time. Convergence and VOR testing were withing functional limits at this time, with no recreation of symptoms. Isaiah-Hallpike and Horizontal Roll tests were negative bilaterally for nystagmus, with patient denying any recreation of symptoms. DGI score of 23/24 is WFL as well. Patient was educated to reach out to PCP if symptoms re--occur for re-evaluation.        Subjective:  See eval         Any changes in Ambulatory Summary Sheet?  None        Objective:  See eval           Exercises: (No more than 4 columns)   Exercise/Equipment Date Date Date           WARM UP

## 2024-12-02 RX ORDER — GLIMEPIRIDE 2 MG/1
2 TABLET ORAL DAILY
Qty: 90 TABLET | Refills: 1 | Status: SHIPPED | OUTPATIENT
Start: 2024-12-02

## 2024-12-02 RX ORDER — GLIMEPIRIDE 2 MG/1
2 TABLET ORAL DAILY
Qty: 30 TABLET | Refills: 3 | Status: SHIPPED | OUTPATIENT
Start: 2024-12-02 | End: 2024-12-02

## 2024-12-03 ENCOUNTER — OFFICE VISIT (OUTPATIENT)
Dept: INTERNAL MEDICINE CLINIC | Age: 79
End: 2024-12-03

## 2024-12-03 VITALS
DIASTOLIC BLOOD PRESSURE: 66 MMHG | HEART RATE: 89 BPM | SYSTOLIC BLOOD PRESSURE: 130 MMHG | RESPIRATION RATE: 18 BRPM | OXYGEN SATURATION: 97 %

## 2024-12-03 DIAGNOSIS — E11.21 TYPE 2 DIABETES MELLITUS WITH NEPHROPATHY (HCC): Primary | ICD-10-CM

## 2024-12-03 DIAGNOSIS — E78.2 MIXED HYPERLIPIDEMIA: ICD-10-CM

## 2024-12-03 DIAGNOSIS — M10.072 ACUTE IDIOPATHIC GOUT INVOLVING TOE OF LEFT FOOT: ICD-10-CM

## 2024-12-03 DIAGNOSIS — I10 ESSENTIAL HYPERTENSION: ICD-10-CM

## 2024-12-03 DIAGNOSIS — I48.0 PAROXYSMAL ATRIAL FIBRILLATION (HCC): ICD-10-CM

## 2025-03-04 ENCOUNTER — OFFICE VISIT (OUTPATIENT)
Dept: INTERNAL MEDICINE CLINIC | Age: 80
End: 2025-03-04
Payer: COMMERCIAL

## 2025-03-04 VITALS
DIASTOLIC BLOOD PRESSURE: 60 MMHG | SYSTOLIC BLOOD PRESSURE: 108 MMHG | HEART RATE: 91 BPM | OXYGEN SATURATION: 94 % | BODY MASS INDEX: 29.97 KG/M2 | RESPIRATION RATE: 18 BRPM | WEIGHT: 200 LBS

## 2025-03-04 DIAGNOSIS — R06.02 SOB (SHORTNESS OF BREATH): Primary | ICD-10-CM

## 2025-03-04 DIAGNOSIS — E11.21 TYPE 2 DIABETES MELLITUS WITH NEPHROPATHY (HCC): ICD-10-CM

## 2025-03-04 DIAGNOSIS — I10 ESSENTIAL HYPERTENSION: ICD-10-CM

## 2025-03-04 DIAGNOSIS — N18.6 ESRD (END STAGE RENAL DISEASE) (HCC): ICD-10-CM

## 2025-03-04 DIAGNOSIS — I48.0 PAROXYSMAL ATRIAL FIBRILLATION (HCC): ICD-10-CM

## 2025-03-04 DIAGNOSIS — E78.2 MIXED HYPERLIPIDEMIA: ICD-10-CM

## 2025-03-04 PROCEDURE — 99214 OFFICE O/P EST MOD 30 MIN: CPT | Performed by: INTERNAL MEDICINE

## 2025-03-04 PROCEDURE — 3074F SYST BP LT 130 MM HG: CPT | Performed by: INTERNAL MEDICINE

## 2025-03-04 PROCEDURE — 1159F MED LIST DOCD IN RCRD: CPT | Performed by: INTERNAL MEDICINE

## 2025-03-04 PROCEDURE — 3078F DIAST BP <80 MM HG: CPT | Performed by: INTERNAL MEDICINE

## 2025-03-04 PROCEDURE — G2211 COMPLEX E/M VISIT ADD ON: HCPCS | Performed by: INTERNAL MEDICINE

## 2025-03-04 PROCEDURE — 1123F ACP DISCUSS/DSCN MKR DOCD: CPT | Performed by: INTERNAL MEDICINE

## 2025-03-04 RX ORDER — PREDNISONE 10 MG/1
TABLET ORAL
Qty: 20 TABLET | Refills: 0 | Status: SHIPPED | OUTPATIENT
Start: 2025-03-04

## 2025-03-04 SDOH — ECONOMIC STABILITY: FOOD INSECURITY: WITHIN THE PAST 12 MONTHS, YOU WORRIED THAT YOUR FOOD WOULD RUN OUT BEFORE YOU GOT MONEY TO BUY MORE.: NEVER TRUE

## 2025-03-04 SDOH — ECONOMIC STABILITY: FOOD INSECURITY: WITHIN THE PAST 12 MONTHS, THE FOOD YOU BOUGHT JUST DIDN'T LAST AND YOU DIDN'T HAVE MONEY TO GET MORE.: NEVER TRUE

## 2025-03-04 ASSESSMENT — PATIENT HEALTH QUESTIONNAIRE - PHQ9
1. LITTLE INTEREST OR PLEASURE IN DOING THINGS: NOT AT ALL
SUM OF ALL RESPONSES TO PHQ QUESTIONS 1-9: 0
2. FEELING DOWN, DEPRESSED OR HOPELESS: NOT AT ALL
SUM OF ALL RESPONSES TO PHQ QUESTIONS 1-9: 0

## 2025-03-04 NOTE — PROGRESS NOTES
Cervical: No cervical adenopathy.   Skin:     Nails: There is no clubbing.   Neurological:      Mental Status: She is alert and oriented to person, place, and time.   Psychiatric:         Behavior: Behavior normal.         Judgment: Judgment normal.         ASSESSMENT:    1. SOB (shortness of breath)    2. Type 2 diabetes mellitus with nephropathy (HCC)    3. Mixed hyperlipidemia    4. Essential hypertension    5. Paroxysmal atrial fibrillation (HCC)    6. ESRD (end stage renal disease) ([facility])        PLAN:    Krupa was seen today for 3 month follow-up, cough and sinus problem.    Diagnoses and all orders for this visit:    SOB (shortness of breath)-unclear etiology of the patient's continued shortness of breath.  In the past I thought she had been volume overloaded but this does not appear to be the case today.  I will check a chest x-ray and labs including a BNP.  I will also start her on a prednisone taper.  We will follow-up in 1 week  -     XR CHEST STANDARD (2 VW); Future  -     Brain Natriuretic Peptide; Future  -     predniSONE (DELTASONE) 10 MG tablet; Take 4 tablets daily for 2 days, then 3 tablets daily for 2 days, then two tablets daily for 2 days, then one tablet daily for 2 days    Type 2 diabetes mellitus with nephropathy (HCC)-check hemoglobin A1c  -     Hemoglobin A1C; Future    Mixed hyperlipidemia-check labs, continue atorvastatin  -     CBC; Future  -     Comprehensive Metabolic Panel; Future  -     Lipid Panel; Future    Essential hypertension-blood pressure at goal, no change  -     CBC; Future  -     Comprehensive Metabolic Panel; Future  -     Lipid Panel; Future  -     Hemoglobin A1C; Future    Paroxysmal atrial fibrillation (HCC)    ESRD (end stage renal disease) ([facility])

## 2025-03-06 ENCOUNTER — TELEPHONE (OUTPATIENT)
Dept: INTERNAL MEDICINE CLINIC | Age: 80
End: 2025-03-06

## 2025-03-06 DIAGNOSIS — N18.6 ESRD (END STAGE RENAL DISEASE) (HCC): Primary | ICD-10-CM

## 2025-03-07 ENCOUNTER — HOSPITAL ENCOUNTER (OUTPATIENT)
Age: 80
Discharge: HOME | End: 2025-03-07
Payer: COMMERCIAL

## 2025-03-07 ENCOUNTER — HOSPITAL ENCOUNTER (OUTPATIENT)
Dept: GENERAL RADIOLOGY | Age: 80
Discharge: HOME | End: 2025-03-07
Payer: COMMERCIAL

## 2025-03-07 DIAGNOSIS — J18.9 PNEUMONIA DUE TO INFECTIOUS ORGANISM, UNSPECIFIED LATERALITY, UNSPECIFIED PART OF LUNG: Primary | ICD-10-CM

## 2025-03-07 DIAGNOSIS — R06.02 SOB (SHORTNESS OF BREATH): ICD-10-CM

## 2025-03-07 DIAGNOSIS — I10 ESSENTIAL HYPERTENSION: ICD-10-CM

## 2025-03-07 DIAGNOSIS — E11.21 TYPE 2 DIABETES MELLITUS WITH NEPHROPATHY (HCC): ICD-10-CM

## 2025-03-07 DIAGNOSIS — E78.2 MIXED HYPERLIPIDEMIA: ICD-10-CM

## 2025-03-07 LAB
ALBUMIN SERPL-MCNC: 3.5 G/DL (ref 3.4–5)
ALBUMIN/GLOB SERPL: 0.9 {RATIO} (ref 1.1–2.2)
ALP SERPL-CCNC: 64 U/L (ref 40–129)
ALT SERPL-CCNC: 17 U/L (ref 10–40)
ANION GAP SERPL CALCULATED.3IONS-SCNC: 18 MMOL/L (ref 9–17)
AST SERPL-CCNC: 14 U/L (ref 15–37)
BILIRUB SERPL-MCNC: 0.3 MG/DL (ref 0–1)
BNP SERPL-MCNC: ABNORMAL PG/ML (ref 0–450)
BUN SERPL-MCNC: 63 MG/DL (ref 7–20)
CALCIUM SERPL-MCNC: 10.4 MG/DL (ref 8.3–10.6)
CHLORIDE SERPL-SCNC: 92 MMOL/L (ref 99–110)
CHOLEST SERPL-MCNC: 172 MG/DL (ref 125–199)
CO2 SERPL-SCNC: 27 MMOL/L (ref 21–32)
CREAT SERPL-MCNC: 11 MG/DL (ref 0.6–1.2)
ERYTHROCYTE [DISTWIDTH] IN BLOOD BY AUTOMATED COUNT: 15.6 % (ref 11.7–14.9)
EST. AVERAGE GLUCOSE BLD GHB EST-MCNC: 168 MG/DL
GFR, ESTIMATED: 3 ML/MIN/1.73M2
GLUCOSE SERPL-MCNC: 169 MG/DL (ref 74–99)
HBA1C MFR BLD: 7.5 % (ref 4.2–6.3)
HCT VFR BLD AUTO: 30.1 % (ref 37–47)
HDLC SERPL-MCNC: 40 MG/DL
HGB BLD-MCNC: 9.4 G/DL (ref 12.5–16)
LDLC SERPL CALC-MCNC: 101 MG/DL
MCH RBC QN AUTO: 27.2 PG (ref 27–31)
MCHC RBC AUTO-ENTMCNC: 31.2 G/DL (ref 32–36)
MCV RBC AUTO: 87.2 FL (ref 78–100)
PLATELET # BLD AUTO: 438 K/UL (ref 140–440)
PMV BLD AUTO: 10.1 FL (ref 7.5–11.1)
POTASSIUM SERPL-SCNC: 3.5 MMOL/L (ref 3.5–5.1)
PROT SERPL-MCNC: 7.5 G/DL (ref 6.4–8.2)
RBC # BLD AUTO: 3.45 M/UL (ref 4.2–5.4)
SODIUM SERPL-SCNC: 136 MMOL/L (ref 136–145)
TRIGL SERPL-MCNC: 159 MG/DL
WBC OTHER # BLD: 17.9 K/UL (ref 4–10.5)

## 2025-03-07 PROCEDURE — 80053 COMPREHEN METABOLIC PANEL: CPT

## 2025-03-07 PROCEDURE — 71046 X-RAY EXAM CHEST 2 VIEWS: CPT

## 2025-03-07 PROCEDURE — 85027 COMPLETE CBC AUTOMATED: CPT

## 2025-03-07 PROCEDURE — 83880 ASSAY OF NATRIURETIC PEPTIDE: CPT

## 2025-03-07 PROCEDURE — 80061 LIPID PANEL: CPT

## 2025-03-07 PROCEDURE — 83036 HEMOGLOBIN GLYCOSYLATED A1C: CPT

## 2025-03-11 ENCOUNTER — OFFICE VISIT (OUTPATIENT)
Dept: INTERNAL MEDICINE CLINIC | Age: 80
End: 2025-03-11
Payer: COMMERCIAL

## 2025-03-11 VITALS
BODY MASS INDEX: 30.27 KG/M2 | HEART RATE: 88 BPM | WEIGHT: 202 LBS | OXYGEN SATURATION: 95 % | SYSTOLIC BLOOD PRESSURE: 124 MMHG | RESPIRATION RATE: 18 BRPM | DIASTOLIC BLOOD PRESSURE: 66 MMHG

## 2025-03-11 DIAGNOSIS — R06.02 SOB (SHORTNESS OF BREATH): Primary | ICD-10-CM

## 2025-03-11 DIAGNOSIS — N18.6 ESRD (END STAGE RENAL DISEASE) (HCC): ICD-10-CM

## 2025-03-11 PROCEDURE — 1159F MED LIST DOCD IN RCRD: CPT | Performed by: INTERNAL MEDICINE

## 2025-03-11 PROCEDURE — 99213 OFFICE O/P EST LOW 20 MIN: CPT | Performed by: INTERNAL MEDICINE

## 2025-03-11 PROCEDURE — G2211 COMPLEX E/M VISIT ADD ON: HCPCS | Performed by: INTERNAL MEDICINE

## 2025-03-11 PROCEDURE — 1123F ACP DISCUSS/DSCN MKR DOCD: CPT | Performed by: INTERNAL MEDICINE

## 2025-03-11 NOTE — PROGRESS NOTES
Krupa Sanchez  1945 03/11/25    SUBJECTIVE:      Pt will see Dr Morris Thursday.    SOB is still problematic, but she did have some improvement with the prednisone. CT showed interstitial opacity, pt is scheduled to have CT 3/18. She denies edema, CP. She has rare palpitations.       OBJECTIVE:    /66   Pulse 88   Resp 18   Wt 91.6 kg (202 lb)   LMP  (LMP Unknown)   SpO2 95%   BMI 30.27 kg/m²     Physical Exam    ASSESSMENT:    1. SOB (shortness of breath)    2. ESRD (end stage renal disease) (HCC)        PLAN:    Krupa was seen today for 1 week follow up .    Diagnoses and all orders for this visit:    SOB (shortness of breath)-etiology is still uncertain me.  BNP was markedly elevated.  Patient was unable to get the CT scan but has it scheduled next week.  I am still concerned that she may be volume overloaded, but potentially there is a different process occurring in her lungs that the CT will reveal.  If she is fluid overloaded this may be challenging to treat given her end-stage renal disease and peritoneal dialysis, but I will defer this to Dr. Morris.  I will see the patient next week.    ESRD (end stage renal disease) ([facility])

## 2025-03-13 ENCOUNTER — HOSPITAL ENCOUNTER (OUTPATIENT)
Age: 80
Setting detail: SPECIMEN
Discharge: HOME | End: 2025-03-13
Payer: COMMERCIAL

## 2025-03-13 LAB
ANION GAP SERPL CALCULATED.3IONS-SCNC: 20 MMOL/L (ref 9–17)
BUN SERPL-MCNC: 86 MG/DL (ref 7–20)
CALCIUM SERPL-MCNC: 9.5 MG/DL (ref 8.3–10.6)
CHLORIDE SERPL-SCNC: 92 MMOL/L (ref 99–110)
CO2 SERPL-SCNC: 23 MMOL/L (ref 21–32)
CREAT SERPL-MCNC: 10 MG/DL (ref 0.6–1.2)
GFR, ESTIMATED: 4 ML/MIN/1.73M2
GLUCOSE SERPL-MCNC: 276 MG/DL (ref 74–99)
POTASSIUM SERPL-SCNC: 3.6 MMOL/L (ref 3.5–5.1)
SODIUM SERPL-SCNC: 135 MMOL/L (ref 136–145)

## 2025-03-13 PROCEDURE — 80048 BASIC METABOLIC PNL TOTAL CA: CPT

## 2025-03-18 ENCOUNTER — HOSPITAL ENCOUNTER (OUTPATIENT)
Dept: CT IMAGING | Age: 80
Discharge: HOME | End: 2025-03-18
Attending: INTERNAL MEDICINE
Payer: COMMERCIAL

## 2025-03-18 DIAGNOSIS — J18.9 PNEUMONIA DUE TO INFECTIOUS ORGANISM, UNSPECIFIED LATERALITY, UNSPECIFIED PART OF LUNG: ICD-10-CM

## 2025-03-18 PROCEDURE — 71250 CT THORAX DX C-: CPT

## 2025-03-19 ENCOUNTER — RESULTS FOLLOW-UP (OUTPATIENT)
Dept: CT IMAGING | Age: 80
End: 2025-03-19

## 2025-03-19 ENCOUNTER — OFFICE VISIT (OUTPATIENT)
Dept: INTERNAL MEDICINE CLINIC | Age: 80
End: 2025-03-19
Payer: COMMERCIAL

## 2025-03-19 VITALS
HEART RATE: 104 BPM | OXYGEN SATURATION: 97 % | BODY MASS INDEX: 30.15 KG/M2 | WEIGHT: 201.2 LBS | DIASTOLIC BLOOD PRESSURE: 76 MMHG | SYSTOLIC BLOOD PRESSURE: 142 MMHG

## 2025-03-19 DIAGNOSIS — R06.02 SHORTNESS OF BREATH: Primary | ICD-10-CM

## 2025-03-19 PROCEDURE — 1123F ACP DISCUSS/DSCN MKR DOCD: CPT | Performed by: INTERNAL MEDICINE

## 2025-03-19 PROCEDURE — G2211 COMPLEX E/M VISIT ADD ON: HCPCS | Performed by: INTERNAL MEDICINE

## 2025-03-19 PROCEDURE — 99214 OFFICE O/P EST MOD 30 MIN: CPT | Performed by: INTERNAL MEDICINE

## 2025-03-19 PROCEDURE — 1159F MED LIST DOCD IN RCRD: CPT | Performed by: INTERNAL MEDICINE

## 2025-03-19 NOTE — PROGRESS NOTES
Krupa Sanchez  1945 03/19/25    SUBJECTIVE:    Pt has been feeling \"much better.\" She still has mild cough, but much less severe. He has not seen Dr Morris yet. She still feels weak from her hospitalization. Last EF was 59% 1/25, but echo showed severe LVH, diastolic dysfxn.    OBJECTIVE:    BP (!) 142/76 (BP Site: Right Upper Arm, Patient Position: Sitting, BP Cuff Size: Large Adult)   Pulse (!) 104   Wt 91.3 kg (201 lb 3.2 oz)   LMP  (LMP Unknown)   SpO2 97%   BMI 30.15 kg/m²     Physical Exam  Constitutional:       Appearance: She is well-developed.   Eyes:      General: No scleral icterus.     Conjunctiva/sclera: Conjunctivae normal.   Cardiovascular:      Rate and Rhythm: Normal rate and regular rhythm.      Heart sounds: Normal heart sounds. No murmur heard.  Pulmonary:      Effort: Pulmonary effort is normal. No respiratory distress.      Breath sounds: Normal breath sounds. No wheezing.         ASSESSMENT:    1. Shortness of breath        PLAN:    Krupa was seen today for shortness of breath.    Diagnoses and all orders for this visit:    Shortness of breath-I still do not have a good explanation for the patient's shortness of breath.  Her BNP was very elevated but on exam she did not seem to be fluid overloaded.  Deconditioning could be playing a role.  Cardiac cath in August 2022 showed 50% mid LAD and stress test in July 2023 was essentially negative.  I will check PFTs

## 2025-03-21 ENCOUNTER — OFFICE VISIT (OUTPATIENT)
Dept: ONCOLOGY | Age: 80
End: 2025-03-21
Payer: COMMERCIAL

## 2025-03-21 ENCOUNTER — HOSPITAL ENCOUNTER (OUTPATIENT)
Dept: INFUSION THERAPY | Age: 80
Discharge: HOME | End: 2025-03-21
Payer: COMMERCIAL

## 2025-03-21 VITALS
DIASTOLIC BLOOD PRESSURE: 62 MMHG | TEMPERATURE: 98.1 F | HEIGHT: 69 IN | WEIGHT: 200 LBS | OXYGEN SATURATION: 96 % | HEART RATE: 104 BPM | BODY MASS INDEX: 29.62 KG/M2 | SYSTOLIC BLOOD PRESSURE: 121 MMHG

## 2025-03-21 DIAGNOSIS — Z17.0 MALIGNANT NEOPLASM OF UPPER-OUTER QUADRANT OF RIGHT BREAST IN FEMALE, ESTROGEN RECEPTOR POSITIVE: Primary | ICD-10-CM

## 2025-03-21 DIAGNOSIS — Z12.31 ENCOUNTER FOR SCREENING MAMMOGRAM FOR MALIGNANT NEOPLASM OF BREAST: ICD-10-CM

## 2025-03-21 DIAGNOSIS — C50.411 MALIGNANT NEOPLASM OF UPPER-OUTER QUADRANT OF RIGHT BREAST IN FEMALE, ESTROGEN RECEPTOR POSITIVE: Primary | ICD-10-CM

## 2025-03-21 PROCEDURE — 99213 OFFICE O/P EST LOW 20 MIN: CPT | Performed by: INTERNAL MEDICINE

## 2025-03-21 PROCEDURE — 1123F ACP DISCUSS/DSCN MKR DOCD: CPT | Performed by: INTERNAL MEDICINE

## 2025-03-21 PROCEDURE — 99212 OFFICE O/P EST SF 10 MIN: CPT

## 2025-03-21 PROCEDURE — 1126F AMNT PAIN NOTED NONE PRSNT: CPT | Performed by: INTERNAL MEDICINE

## 2025-03-21 PROCEDURE — 1159F MED LIST DOCD IN RCRD: CPT | Performed by: INTERNAL MEDICINE

## 2025-03-21 NOTE — PROGRESS NOTES
Patient Name: Krupa Sanchez  Patient : 1945  Patient MRN: [id number]     Primary Oncologist: Prateek Pierre MD  Referring Provider: Anant Ernandez MD     Date of Service: 3/21/2025      Chief Complaint:   Chief Complaint   Patient presents with    Follow-up     Patient Active Problem List:     Osteoarthritis     ADOLFO (obstructive sleep apnea)     Menopause     Paroxysmal atrial fibrillation (HCC)     Osteopenia     Colon polyps     Bilateral leg edema     Urge incontinence     Breast cancer (HCC)     Edema of both legs     Mixed hyperlipidemia     Iron deficiency anemia     Gastroesophageal reflux disease without esophagitis     Essential hypertension     Abnormal EKG     Type 2 diabetes mellitus with nephropathy (HCC)     Chronic fatigue     Dizzy spells     Carpal tunnel syndrome on left     Trigger finger, left ring finger     Chest pain     Acute blood loss anemia     Obesity (BMI 30-39.9)     Excessive daytime sleepiness     Ex-cigarette smoker     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 Sanchez is a 78 -year-old very pleasant woman with a past medical history significant for hypertension, diabetes, hyperlipidemia, history of SVT and atrial fibrillation, initially referred to me on 10/2012 for evaluation of right breast cancer.      She stated that she felt a lump in the right breast and had mammogram on 2012.  The patient was found to have a suspicious lesion in the right breast.  She subsequently had a sonogram-guided biopsy of the right breast.  Pathology report is consistent with invasive ductal carcinoma, ER/TX positive, HER-2/nitish negative.  The patient was evaluated by a general surgeon, Dr. Taylor, and had a right lumpectomy on 2012.       OncotypeDx was sent and she is low risk for recurrence (Recurrence score 11). She then underwent for

## 2025-03-21 NOTE — PROGRESS NOTES
MA Rooming Questions  Patient: Krupa Sanchez  MRN: [id number]    Date: 3/21/2025        1. Do you have any new issues?   no         2. Do you need any refills on medications?    no    3. Have you had any imaging done since your last visit?   yes - CT 3/18    4. Have you been hospitalized or seen in the emergency room since your last visit here?   yes - ER 1/8 & 1/30    5. Did the patient have a depression screening completed today? No    No data recorded     PHQ-9 Given to (if applicable):               PHQ-9 Score (if applicable):                     [] Positive     []  Negative              Does question #9 need addressed (if applicable)                     [] Yes    []  No               Paulette Reyes MA

## 2025-04-09 ENCOUNTER — HOSPITAL ENCOUNTER (OUTPATIENT)
Dept: PULMONOLOGY | Age: 80
Discharge: HOME | End: 2025-04-09
Attending: INTERNAL MEDICINE
Payer: COMMERCIAL

## 2025-04-09 DIAGNOSIS — R06.02 SHORTNESS OF BREATH: ICD-10-CM

## 2025-04-09 LAB
DLCO %PRED: NORMAL %
DLCO PRED: NORMAL
DLCO/VA %PRED: NORMAL
DLCO/VA PRED: NORMAL
DLCO/VA: NORMAL
DLCO: NORMAL
EXPIRATORY TIME-POST: NORMAL
EXPIRATORY TIME: NORMAL
FEF 25-75 %CHNG: NORMAL
FEF 25-75 POST %PRED: 99 %
FEF 25-75% %PRED-PRE: 90 L/SEC
FEF 25-75% PRED: NORMAL
FEF 25-75-POST: NORMAL
FEF 25-75-PRE: NORMAL
FEV1 %PRED-POST: 74 %
FEV1 %PRED-PRE: 73 %
FEV1 PRED: NORMAL
FEV1-POST: NORMAL
FEV1-PRE: NORMAL
FEV1/FVC %PRED-POST: 101 %
FEV1/FVC %PRED-PRE: 106 %
FEV1/FVC PRED: NORMAL
FEV1/FVC-POST: NORMAL
FEV1/FVC-PRE: NORMAL
FVC %PRED-POST: 72 L
FVC %PRED-PRE: 67 %
FVC PRED: NORMAL
FVC-POST: NORMAL
FVC-PRE: NORMAL
GAW %PRED: NORMAL
GAW PRED: NORMAL
GAW: NORMAL
IC PRE %PRED: NORMAL
IC PRED: NORMAL
IC: NORMAL
MEP: NORMAL
MIP: NORMAL
MVV %PRED-PRE: NORMAL
MVV PRED: NORMAL
MVV-PRE: NORMAL
PEF %PRED-POST: NORMAL
PEF %PRED-PRE: NORMAL
PEF PRED: NORMAL
PEF%CHNG: NORMAL
PEF-POST: NORMAL
PEF-PRE: NORMAL
RAW %PRED: NORMAL
RAW PRED: NORMAL
RAW: NORMAL
RV PRE %PRED: NORMAL
RV PRED: NORMAL
RV: NORMAL
SVC %PRED: 58 %
SVC PRED: NORMAL
SVC: NORMAL
TLC PRE %PRED: 65 %
TLC PRED: NORMAL
TLC: NORMAL
VA %PRED: NORMAL
VA PRED: NORMAL
VA: NORMAL
VTG %PRED: NORMAL
VTG PRED: NORMAL
VTG: NORMAL

## 2025-04-09 PROCEDURE — 94729 DIFFUSING CAPACITY: CPT

## 2025-04-09 PROCEDURE — 94060 EVALUATION OF WHEEZING: CPT

## 2025-04-09 PROCEDURE — 94726 PLETHYSMOGRAPHY LUNG VOLUMES: CPT

## 2025-04-09 ASSESSMENT — PULMONARY FUNCTION TESTS
FEV1/FVC_PERCENT_PREDICTED_PRE: 106
FEV1/FVC_PERCENT_PREDICTED_POST: 101
FVC_PERCENT_PREDICTED_POST: 72
FEV1_PERCENT_PREDICTED_POST: 74
FEV1_PERCENT_PREDICTED_PRE: 73
FVC_PERCENT_PREDICTED_PRE: 67

## 2025-04-16 RX ORDER — METOPROLOL TARTRATE 50 MG
50 TABLET ORAL 2 TIMES DAILY
Qty: 60 TABLET | Refills: 3 | Status: SHIPPED | OUTPATIENT
Start: 2025-04-16

## 2025-04-17 ENCOUNTER — TELEMEDICINE (OUTPATIENT)
Dept: INTERNAL MEDICINE CLINIC | Age: 80
End: 2025-04-17
Payer: COMMERCIAL

## 2025-04-17 DIAGNOSIS — J44.9 CHRONIC OBSTRUCTIVE PULMONARY DISEASE, UNSPECIFIED COPD TYPE (HCC): ICD-10-CM

## 2025-04-17 DIAGNOSIS — Z00.00 MEDICARE ANNUAL WELLNESS VISIT, SUBSEQUENT: Primary | ICD-10-CM

## 2025-04-17 PROCEDURE — G0439 PPPS, SUBSEQ VISIT: HCPCS | Performed by: INTERNAL MEDICINE

## 2025-04-17 PROCEDURE — 1123F ACP DISCUSS/DSCN MKR DOCD: CPT | Performed by: INTERNAL MEDICINE

## 2025-04-17 PROCEDURE — 1159F MED LIST DOCD IN RCRD: CPT | Performed by: INTERNAL MEDICINE

## 2025-04-17 RX ORDER — METOPROLOL TARTRATE 50 MG
50 TABLET ORAL 2 TIMES DAILY
Qty: 180 TABLET | OUTPATIENT
Start: 2025-04-17

## 2025-04-17 RX ORDER — UMECLIDINIUM BROMIDE AND VILANTEROL TRIFENATATE 62.5; 25 UG/1; UG/1
1 POWDER RESPIRATORY (INHALATION) DAILY
Qty: 1 EACH | Refills: 5 | Status: SHIPPED | OUTPATIENT
Start: 2025-04-17

## 2025-04-17 ASSESSMENT — PATIENT HEALTH QUESTIONNAIRE - PHQ9
2. FEELING DOWN, DEPRESSED OR HOPELESS: NOT AT ALL
SUM OF ALL RESPONSES TO PHQ QUESTIONS 1-9: 0
1. LITTLE INTEREST OR PLEASURE IN DOING THINGS: NOT AT ALL
SUM OF ALL RESPONSES TO PHQ QUESTIONS 1-9: 0

## 2025-04-17 NOTE — PROGRESS NOTES
Medicare Annual Wellness Visit    Krupa Sanchez is here for Medicare AWV    Assessment & Plan        No follow-ups on file.     Subjective     Patient's complete Health Risk Assessment and screening values have been reviewed and are found in Flowsheets. The following problems were reviewed today and where indicated follow up appointments were made and/or referrals ordered.    Positive Risk Factor Screenings with Interventions:                 Dentist Screen:  Have you seen the dentist within the past year?: (!) No    Intervention:  Advised to schedule with their dentist              Pt denies any significant difficulties with memory.     She finds that she is walking \"wide legged\" when she first gets up to help her balance. At times she feels unsteady.    She walks for exercise twice weekly.    Mood is doing well - she denies depression or anxiety.    She has a living will        Objective    Patient-Reported Vitals  No data recorded               Allergies   Allergen Reactions    Latex      \"Blisters\"    Tape [Adhesive Tape]      \"Turn Red\"     Prior to Visit Medications    Medication Sig Taking? Authorizing Provider   metoprolol tartrate (LOPRESSOR) 50 MG tablet Take 1 tablet by mouth 2 times daily Yes Anant Ernandez MD   apixaban (ELIQUIS) 2.5 MG TABS tablet Take 1 tablet by mouth 2 times daily Yes Jeremias Camarillo MD   predniSONE (DELTASONE) 10 MG tablet Take 4 tablets daily for 2 days, then 3 tablets daily for 2 days, then two tablets daily for 2 days, then one tablet daily for 2 days Yes Anant Ernandez MD   glimepiride (AMARYL) 2 MG tablet TAKE 1 TABLET BY MOUTH DAILY Yes Anant Ernandez MD   dilTIAZem (CARTIA XT) 120 MG extended release capsule Take 1 capsule by mouth daily Yes Anant Ernandez MD   amiodarone (CORDARONE) 200 MG tablet Take 0.5 tablets by mouth daily Yes Jeremias Camarillo MD   potassium chloride (KLOR-CON M) 10 MEQ extended release tablet Take 1 tablet by mouth 2

## 2025-05-02 ENCOUNTER — OFFICE VISIT (OUTPATIENT)
Dept: INTERNAL MEDICINE CLINIC | Age: 80
End: 2025-05-02
Payer: COMMERCIAL

## 2025-05-02 VITALS
HEART RATE: 106 BPM | WEIGHT: 200 LBS | DIASTOLIC BLOOD PRESSURE: 62 MMHG | RESPIRATION RATE: 16 BRPM | BODY MASS INDEX: 29.62 KG/M2 | SYSTOLIC BLOOD PRESSURE: 118 MMHG | OXYGEN SATURATION: 96 % | HEIGHT: 69 IN

## 2025-05-02 DIAGNOSIS — R06.02 SHORTNESS OF BREATH: Primary | ICD-10-CM

## 2025-05-02 DIAGNOSIS — J98.4 RESTRICTIVE LUNG DISEASE: ICD-10-CM

## 2025-05-02 PROCEDURE — 1159F MED LIST DOCD IN RCRD: CPT | Performed by: INTERNAL MEDICINE

## 2025-05-02 PROCEDURE — 1123F ACP DISCUSS/DSCN MKR DOCD: CPT | Performed by: INTERNAL MEDICINE

## 2025-05-02 PROCEDURE — G2211 COMPLEX E/M VISIT ADD ON: HCPCS | Performed by: INTERNAL MEDICINE

## 2025-05-02 PROCEDURE — 99214 OFFICE O/P EST MOD 30 MIN: CPT | Performed by: INTERNAL MEDICINE

## 2025-05-02 NOTE — PROGRESS NOTES
Krupa Sanchez  1945 05/02/25    SUBJECTIVE:      Over [place] was in [place]. She was admitted for a month, then went to rehab. She was in fluid overload and a fib.  She went to rehab, then she subsequently developed a sinus infection for which she was given abx.  She continues to have SOB and occasional. CXR showed interstial changes. CT showed mild atelectasis and moderate cardiomegaly, moderate ascites (she is on peritoneal dialysis).     Pt was started on anoro after PFTs showed moderate restriction. She has not noticed much benefit with the med.  She still has a mild cough productive of a small amount of pale green mucous. Cardiac cath in August 2022 showed 50% mid LAD and stress test in July 2023 was essentially negative.     Pt is on amiodarone for a fib.     OBJECTIVE:    /62   Pulse (!) 106   Resp 16   Ht 1.74 m (5' 8.5\")   Wt 90.7 kg (200 lb)   LMP  (LMP Unknown)   SpO2 96%   BMI 29.97 kg/m²     Physical Exam    ASSESSMENT:    1. Shortness of breath    2. Restrictive lung disease        PLAN:    Krupa was seen today for follow-up, shortness of breath, results and discuss medications.    Diagnoses and all orders for this visit:    Shortness of breath-I do not have a good explanation for the patient's shortness of breath.  This does not seem to be cardiac in etiology though she did have an elevated BNP in the recent past.  However, she has not seem to be fluid overloaded.  PFTs showed moderate restriction.  CT scan was not too revealing, but I am wondering if she has an underlying restrictive lung disease causing her symptoms.  Amiodarone can cause pulmonary fibrosis, but we did not see this on the CT scan.  The PFTs did not show obstructive disease but I had empirically tried Anoro to see if she would have benefit with this, but she did not.  I will ask Dr. Solorzano of pulmonology to see the patient in consultation  -     External Referral To Pulmonology    Restrictive lung disease  -

## 2025-05-08 ENCOUNTER — HOSPITAL ENCOUNTER (OUTPATIENT)
Age: 80
Setting detail: SPECIMEN
Discharge: HOME | End: 2025-05-08
Payer: COMMERCIAL

## 2025-05-08 LAB
FERRITIN SERPL-MCNC: 1088 NG/ML (ref 15–150)
PTH-INTACT SERPL-MCNC: 358 PG/ML (ref 14–72)

## 2025-05-08 PROCEDURE — 83970 ASSAY OF PARATHORMONE: CPT

## 2025-05-08 PROCEDURE — 82728 ASSAY OF FERRITIN: CPT

## 2025-05-10 LAB
CHOLESTEROL, TOTAL: 225 MG/DL
CHOLESTEROL/HDL RATIO: 4.1
HDLC SERPL-MCNC: 55 MG/DL (ref 35–70)
LDL CHOLESTEROL: 136
NONHDLC SERPL-MCNC: NORMAL MG/DL
TRIGL SERPL-MCNC: 169 MG/DL
VLDLC SERPL CALC-MCNC: 34 MG/DL

## 2025-05-12 ENCOUNTER — TELEPHONE (OUTPATIENT)
Dept: CARDIOLOGY CLINIC | Age: 80
End: 2025-05-12

## 2025-05-12 ENCOUNTER — OFFICE VISIT (OUTPATIENT)
Dept: CARDIOLOGY CLINIC | Age: 80
End: 2025-05-12
Payer: COMMERCIAL

## 2025-05-12 VITALS
HEART RATE: 84 BPM | HEIGHT: 68 IN | BODY MASS INDEX: 31.22 KG/M2 | WEIGHT: 206 LBS | SYSTOLIC BLOOD PRESSURE: 134 MMHG | DIASTOLIC BLOOD PRESSURE: 70 MMHG

## 2025-05-12 DIAGNOSIS — I15.1 HYPERTENSION SECONDARY TO OTHER RENAL DISORDERS: ICD-10-CM

## 2025-05-12 DIAGNOSIS — I48.0 PAROXYSMAL ATRIAL FIBRILLATION (HCC): Primary | ICD-10-CM

## 2025-05-12 PROCEDURE — 99214 OFFICE O/P EST MOD 30 MIN: CPT | Performed by: NURSE PRACTITIONER

## 2025-05-12 PROCEDURE — 1123F ACP DISCUSS/DSCN MKR DOCD: CPT | Performed by: NURSE PRACTITIONER

## 2025-05-12 ASSESSMENT — ENCOUNTER SYMPTOMS
ORTHOPNEA: 0
SHORTNESS OF BREATH: 1

## 2025-05-12 NOTE — PATIENT INSTRUCTIONS
Thank you for allowing us to care for you today!   We want to ensure we can follow your treatment plan and we strive to give you the best outcomes and experience possible.   If you ever have a life threatening emergency and call 911 - for an ambulance (EMS)  REMEMBER  Our providers can only care for you at:   [facility] or [facility]   Even if you have someone take you or you drive yourself we can only care for you in a [facility] facility. Our providers are not setup at the other healthcare locations!    PLEASE CALL OUR OFFICE DURING NORMAL BUSINESS HOURS  Monday through Friday 8 am to 5 pm  AFTER HOURS the physician on-call cannot help with scheduling, rescheduling, procedure instruction questions or any type of medication need or issue.  [facility] P:[phone] - [facility] P:[phone] - [facility] P:[phone]      If you receive a survey:  We would appreciate you taking the time to share your experience concerning your provider visit in the office.    These surveys are confidential!  We are eager to improve and are counting on you to share your feedback so we can ensure you get the best care possible.

## 2025-05-12 NOTE — PROGRESS NOTES
5/12/2025  Primary cardiologist: Dr. Camarillo    CC:   Krupa  is an established 79 y.o.  female here for a follow up on atrial fibrillation      SUBJECTIVE/OBJECTIVE:  Krupa is a pleasant 79 y.o. female with a history of paroxysmal atrial fibrillation, hypertension, hyperlipidemia, diabetes mellitus, ESRD on PD,  breast cancer and ORQUIDEA    Krupa reports she continues to be short of breath. Walking 100 feet she is short of breath.  She states her appetite has increased and she finds herself eating more. She denies palpitations.    Review of Systems   Constitutional: Positive for weight gain. Negative for diaphoresis and malaise/fatigue.   Cardiovascular:  Negative for chest pain, claudication, dyspnea on exertion, irregular heartbeat, leg swelling, near-syncope, orthopnea, palpitations and paroxysmal nocturnal dyspnea.   Respiratory:  Positive for shortness of breath.    Neurological:  Negative for dizziness and light-headedness.       Vitals:    05/12/25 1354   BP: 134/70   BP Site: Left Upper Arm   Patient Position: Sitting   BP Cuff Size: Medium Adult   Pulse: 84   Weight: 93.4 kg (206 lb)   Height: 1.727 m (5' 8\")       Wt Readings from Last 3 Encounters:   05/12/25 93.4 kg (206 lb)   05/02/25 90.7 kg (200 lb)   03/21/25 90.7 kg (200 lb)      Body mass index is 31.32 kg/m².     Physical Exam  Vitals reviewed.   Eyes:      Pupils: Pupils are equal, round, and reactive to light.   Neck:      Vascular: No carotid bruit.   Cardiovascular:      Rate and Rhythm: Normal rate.      Pulses: Normal pulses.   Pulmonary:      Effort: Pulmonary effort is normal.      Breath sounds: No rales.   Chest:      Chest wall: No tenderness.   Musculoskeletal:      Right lower leg: No edema.      Left lower leg: No edema.   Skin:     General: Skin is warm and dry.      Capillary Refill: Capillary refill takes less than 2 seconds.   Neurological:      Mental Status: She is alert and oriented to person, place, and time.                Current

## 2025-05-12 NOTE — TELEPHONE ENCOUNTER
Called [facility], unable to leave message. Will try again at a later time to verify medication for patient.

## 2025-05-12 NOTE — PROGRESS NOTES
CLINICAL STAFF DOCUMENTATION    Jennifer Pride, INEZ     Krupa Sanchez  1945  1019    Have you had any Chest Pain recently? - No        Have you had any Shortness of Breath -  Pt states always have had SOB     Have you had any dizziness - No      Have you had any palpitations recently? - No    Do you have any edema - swelling in bilateral ankles/feet       Is the patient on any of the following medications - Pt refused to go through med list   If Yes DO EKG - Needs done every 3 months    When did you have your last labs drawn 3/7/2025  What doctor ordered Arturo  Do we have the labs in their chart Yes      If we do not have these labs, you are retrieve these labs for the provider!    Do you need any prescriptions refilled? - no    Do you have a surgery or procedure scheduled in the near future - No      Do use tobacco products? - No  Do you drink alcohol? - No  Do you use any illicit drugs? - No  Caffeine? - coffee / tea         Check medication list thoroughly!!! AND RECONCILE OUTSIDE MEDICATIONS  If dose has changed change the entire order not just the MG  BE SURE TO ASK PATIENT IF THEY NEED MEDICATION REFILLS  Verify Pharmacy and update if incorrect    Add to every patient's \"wrap up\" the following dot phrase AFTERVISITCARDIOHEARTHOUSE and ensure we explain this to our patients

## 2025-05-13 ENCOUNTER — TELEPHONE (OUTPATIENT)
Dept: CARDIOLOGY CLINIC | Age: 80
End: 2025-05-13

## 2025-05-22 ENCOUNTER — HOSPITAL ENCOUNTER (OUTPATIENT)
Dept: WOMENS IMAGING | Age: 80
Discharge: HOME | End: 2025-05-22
Attending: INTERNAL MEDICINE
Payer: COMMERCIAL

## 2025-05-22 DIAGNOSIS — C50.411 MALIGNANT NEOPLASM OF UPPER-OUTER QUADRANT OF RIGHT BREAST IN FEMALE, ESTROGEN RECEPTOR POSITIVE (HCC): ICD-10-CM

## 2025-05-22 DIAGNOSIS — Z12.31 ENCOUNTER FOR SCREENING MAMMOGRAM FOR MALIGNANT NEOPLASM OF BREAST: ICD-10-CM

## 2025-05-22 DIAGNOSIS — Z17.0 MALIGNANT NEOPLASM OF UPPER-OUTER QUADRANT OF RIGHT BREAST IN FEMALE, ESTROGEN RECEPTOR POSITIVE (HCC): ICD-10-CM

## 2025-05-22 PROCEDURE — 77063 BREAST TOMOSYNTHESIS BI: CPT

## 2025-05-28 DIAGNOSIS — R92.8 ABNORMAL MAMMOGRAM: Primary | ICD-10-CM

## 2025-05-28 NOTE — PROGRESS NOTES
Patient had bilateral screening mammogram done on 5/22/25 recommending additional views to be done on left breast.  Per Dr. Pierre - order placed for left diagnostic mammogram per protocol.  Patient scheduled at [facility] on 6/2/25.

## 2025-06-02 ENCOUNTER — APPOINTMENT (OUTPATIENT)
Dept: GENERAL RADIOLOGY | Age: 80
End: 2025-06-02
Payer: COMMERCIAL

## 2025-06-02 ENCOUNTER — HOSPITAL ENCOUNTER (OUTPATIENT)
Dept: WOMENS IMAGING | Age: 80
Discharge: HOME | End: 2025-06-02

## 2025-06-02 ENCOUNTER — TELEPHONE (OUTPATIENT)
Dept: CARDIOLOGY CLINIC | Age: 80
End: 2025-06-02

## 2025-06-02 ENCOUNTER — HOSPITAL ENCOUNTER (EMERGENCY)
Age: 80
Discharge: HOME | End: 2025-06-02
Attending: STUDENT IN AN ORGANIZED HEALTH CARE EDUCATION/TRAINING PROGRAM
Payer: COMMERCIAL

## 2025-06-02 VITALS
TEMPERATURE: 98.1 F | WEIGHT: 204 LBS | HEART RATE: 87 BPM | BODY MASS INDEX: 30.92 KG/M2 | DIASTOLIC BLOOD PRESSURE: 76 MMHG | OXYGEN SATURATION: 99 % | RESPIRATION RATE: 17 BRPM | SYSTOLIC BLOOD PRESSURE: 132 MMHG | HEIGHT: 68 IN

## 2025-06-02 DIAGNOSIS — R00.2 PALPITATIONS: ICD-10-CM

## 2025-06-02 DIAGNOSIS — R92.8 ABNORMAL MAMMOGRAM: ICD-10-CM

## 2025-06-02 DIAGNOSIS — E87.5 HYPERKALEMIA: Primary | ICD-10-CM

## 2025-06-02 LAB
ALBUMIN SERPL-MCNC: 3.6 G/DL (ref 3.4–5)
ALBUMIN/GLOB SERPL: 1.1 {RATIO} (ref 1.1–2.2)
ALP SERPL-CCNC: 99 U/L (ref 40–129)
ALT SERPL-CCNC: 19 U/L (ref 10–40)
ANION GAP SERPL CALCULATED.3IONS-SCNC: 17 MMOL/L (ref 9–17)
AST SERPL-CCNC: 20 U/L (ref 15–37)
BASOPHILS # BLD: 0.05 K/UL
BASOPHILS NFR BLD: 1 % (ref 0–1)
BILIRUB SERPL-MCNC: <0.2 MG/DL (ref 0–1)
BNP SERPL-MCNC: ABNORMAL PG/ML (ref 0–450)
BUN SERPL-MCNC: 50 MG/DL (ref 7–20)
CALCIUM SERPL-MCNC: 10.6 MG/DL (ref 8.3–10.6)
CHLORIDE SERPL-SCNC: 93 MMOL/L (ref 99–110)
CO2 SERPL-SCNC: 23 MMOL/L (ref 21–32)
CREAT SERPL-MCNC: 10.9 MG/DL (ref 0.6–1.2)
EKG ATRIAL RATE: 87 BPM
EKG DIAGNOSIS: NORMAL
EKG DIAGNOSIS: NORMAL
EKG P AXIS: 72 DEGREES
EKG P-R INTERVAL: 214 MS
EKG Q-T INTERVAL: 342 MS
EKG Q-T INTERVAL: 390 MS
EKG QRS DURATION: 112 MS
EKG QRS DURATION: 98 MS
EKG QTC CALCULATION (BAZETT): 469 MS
EKG QTC CALCULATION (BAZETT): 499 MS
EKG R AXIS: -38 DEGREES
EKG R AXIS: -38 DEGREES
EKG T AXIS: 107 DEGREES
EKG T AXIS: 55 DEGREES
EKG VENTRICULAR RATE: 128 BPM
EKG VENTRICULAR RATE: 87 BPM
EOSINOPHIL # BLD: 0.18 K/UL
EOSINOPHILS RELATIVE PERCENT: 2 % (ref 0–3)
ERYTHROCYTE [DISTWIDTH] IN BLOOD BY AUTOMATED COUNT: 14.9 % (ref 11.7–14.9)
GFR, ESTIMATED: 3 ML/MIN/1.73M2
GLUCOSE BLD-MCNC: 129 MG/DL
GLUCOSE BLD-MCNC: 129 MG/DL (ref 74–99)
GLUCOSE BLD-MCNC: 86 MG/DL
GLUCOSE BLD-MCNC: 86 MG/DL (ref 74–99)
GLUCOSE SERPL-MCNC: 180 MG/DL (ref 74–99)
HCT VFR BLD AUTO: 33.4 % (ref 37–47)
HGB BLD-MCNC: 10.5 G/DL (ref 12.5–16)
IMM GRANULOCYTES # BLD AUTO: 0.03 K/UL
IMM GRANULOCYTES NFR BLD: 0 %
LYMPHOCYTES NFR BLD: 1.06 K/UL
LYMPHOCYTES RELATIVE PERCENT: 11 % (ref 24–44)
MCH RBC QN AUTO: 27.6 PG (ref 27–31)
MCHC RBC AUTO-ENTMCNC: 31.4 G/DL (ref 32–36)
MCV RBC AUTO: 87.7 FL (ref 78–100)
MONOCYTES NFR BLD: 0.59 K/UL
MONOCYTES NFR BLD: 6 % (ref 0–5)
NEUTROPHILS NFR BLD: 80 % (ref 36–66)
NEUTS SEG NFR BLD: 7.46 K/UL
PLATELET # BLD AUTO: 386 K/UL (ref 140–440)
PMV BLD AUTO: 10.6 FL (ref 7.5–11.1)
POTASSIUM SERPL-SCNC: 5.7 MMOL/L (ref 3.5–5.1)
POTASSIUM SERPL-SCNC: 5.8 MMOL/L (ref 3.5–5.1)
PROT SERPL-MCNC: 6.9 G/DL (ref 6.4–8.2)
RBC # BLD AUTO: 3.81 M/UL (ref 4.2–5.4)
SODIUM SERPL-SCNC: 133 MMOL/L (ref 136–145)
TROPONIN I SERPL HS-MCNC: 228 NG/L (ref 0–14)
TROPONIN I SERPL HS-MCNC: 237 NG/L (ref 0–14)
TSH SERPL DL<=0.05 MIU/L-ACNC: 2.07 UIU/ML (ref 0.27–4.2)
WBC OTHER # BLD: 9.4 K/UL (ref 4–10.5)

## 2025-06-02 PROCEDURE — 96360 HYDRATION IV INFUSION INIT: CPT

## 2025-06-02 PROCEDURE — 93010 ELECTROCARDIOGRAM REPORT: CPT | Performed by: INTERNAL MEDICINE

## 2025-06-02 PROCEDURE — 80053 COMPREHEN METABOLIC PANEL: CPT

## 2025-06-02 PROCEDURE — 83880 ASSAY OF NATRIURETIC PEPTIDE: CPT

## 2025-06-02 PROCEDURE — 84443 ASSAY THYROID STIM HORMONE: CPT

## 2025-06-02 PROCEDURE — 71045 X-RAY EXAM CHEST 1 VIEW: CPT

## 2025-06-02 PROCEDURE — 84132 ASSAY OF SERUM POTASSIUM: CPT

## 2025-06-02 PROCEDURE — 93005 ELECTROCARDIOGRAM TRACING: CPT | Performed by: STUDENT IN AN ORGANIZED HEALTH CARE EDUCATION/TRAINING PROGRAM

## 2025-06-02 PROCEDURE — 2580000003 HC RX 258: Performed by: STUDENT IN AN ORGANIZED HEALTH CARE EDUCATION/TRAINING PROGRAM

## 2025-06-02 PROCEDURE — 6370000000 HC RX 637 (ALT 250 FOR IP): Performed by: STUDENT IN AN ORGANIZED HEALTH CARE EDUCATION/TRAINING PROGRAM

## 2025-06-02 PROCEDURE — 99285 EMERGENCY DEPT VISIT HI MDM: CPT

## 2025-06-02 PROCEDURE — 84484 ASSAY OF TROPONIN QUANT: CPT

## 2025-06-02 PROCEDURE — 96374 THER/PROPH/DIAG INJ IV PUSH: CPT

## 2025-06-02 PROCEDURE — 85025 COMPLETE CBC W/AUTO DIFF WBC: CPT

## 2025-06-02 PROCEDURE — 82962 GLUCOSE BLOOD TEST: CPT

## 2025-06-02 RX ORDER — DEXTROSE MONOHYDRATE 100 MG/ML
INJECTION, SOLUTION INTRAVENOUS CONTINUOUS PRN
Status: DISCONTINUED | OUTPATIENT
Start: 2025-06-02 | End: 2025-06-02 | Stop reason: HOSPADM

## 2025-06-02 RX ORDER — GLUCAGON 1 MG/ML
1 KIT INJECTION PRN
Status: DISCONTINUED | OUTPATIENT
Start: 2025-06-02 | End: 2025-06-02 | Stop reason: HOSPADM

## 2025-06-02 RX ORDER — GLIMEPIRIDE 2 MG/1
2 TABLET ORAL DAILY
Qty: 90 TABLET | Refills: 1 | Status: SHIPPED | OUTPATIENT
Start: 2025-06-02

## 2025-06-02 RX ORDER — KETOROLAC TROMETHAMINE 15 MG/ML
15 INJECTION, SOLUTION INTRAMUSCULAR; INTRAVENOUS ONCE
Status: DISCONTINUED | OUTPATIENT
Start: 2025-06-02 | End: 2025-06-02

## 2025-06-02 RX ORDER — SODIUM CHLORIDE, SODIUM LACTATE, POTASSIUM CHLORIDE, AND CALCIUM CHLORIDE .6; .31; .03; .02 G/100ML; G/100ML; G/100ML; G/100ML
1000 INJECTION, SOLUTION INTRAVENOUS ONCE
Status: DISCONTINUED | OUTPATIENT
Start: 2025-06-02 | End: 2025-06-02

## 2025-06-02 RX ADMIN — SODIUM ZIRCONIUM CYCLOSILICATE 10 G: 5 POWDER, FOR SUSPENSION ORAL at 18:36

## 2025-06-02 RX ADMIN — SODIUM CHLORIDE, SODIUM LACTATE, POTASSIUM CHLORIDE, AND CALCIUM CHLORIDE 1000 ML: .6; .31; .03; .02 INJECTION, SOLUTION INTRAVENOUS at 15:43

## 2025-06-02 RX ADMIN — INSULIN HUMAN 10 UNITS: 100 INJECTION, SOLUTION PARENTERAL at 19:16

## 2025-06-02 RX ADMIN — DEXTROSE 250 ML: 10 SOLUTION INTRAVENOUS at 18:44

## 2025-06-02 ASSESSMENT — PAIN SCALES - GENERAL: PAINLEVEL_OUTOF10: 0

## 2025-06-02 ASSESSMENT — PAIN - FUNCTIONAL ASSESSMENT
PAIN_FUNCTIONAL_ASSESSMENT: NONE - DENIES PAIN
PAIN_FUNCTIONAL_ASSESSMENT: 0-10

## 2025-06-02 NOTE — ED PROVIDER NOTES
Emergency Department Encounter    Patient: Krupa Sanchez  MRN: [id number]  : 1945  Date of Evaluation: 2025  ED Provider:  Obed Griffin MD    Triage Chief Complaint:   Tachycardia and Palpitations    [place]:  Krupa Sanchez is a 80 y.o. female with history significant for atrial fibrillation, breast cancer, GERD, hyperlipidemia, hypertension, ADOLFO, osteoarthritis, type 2 diabetes, prior episodes of SVT that presents for palpitations and tachycardia.  She endorses a sudden onset of symptoms this morning when she was in a appointment for routine mammogram, with the initiation of palpitations, described as an elevated heart rate and irregular beat, something that has been persistent since, constant, with no modifying factors, and associated with very mild chest discomfort described as tightness, located to the midsternal area, nonradiating, not worsened by exertion, and also associated with a brief episode of lightheadedness.  Denies shortness of breath.  Endorses chronic cough present for the last 5 months, mildly productive of sputum, unchanged in the last week.  Denies other respiratory symptoms.  No syncope or presyncope.  No nausea or emesis, no other GI symptoms.  No lower extremity edema, rashes or wounds.  No fevers or chills.  No urinary abnormalities.  The last time that she had similar symptoms was about 5 months ago.  She attempted to see her cardiologist at the office, but was unable to be seen on an appointment as outpatient.    ROS - see HPI, below listed is current ROS at time of my eval:  Systems reviewed and negative except as above.     Past Medical History:   Diagnosis Date    14 day event monitor 2018    Sinus rhythm    Atrial fibrillation with RVR (HCC) 2013    Breast cancer (HCC)     Right    Edema      TTE diastolic dysfxn, EF 55%; 11/10 - TTE diastolic dysfxn, EF 55%; Stress myoview  WNl, EF 70%    Family history of cardiovascular disease     GERD

## 2025-06-02 NOTE — ED TRIAGE NOTES
Patient arrived to ED via EMS. EMS states patient was getting a mammogram done when she started to have palpitations and felt like her heart rate increased. When EMS arrived patient was at 140 bpm. Patient states she has hx of AFIB. Patient states she does peritoneal dialysis.

## 2025-06-02 NOTE — DISCHARGE INSTRUCTIONS
If at some point you have severe shortness of breath, severe nausea or vomiting unable to keep anything down, feels severely weak unable to stand up, feels confused or are lethargic unable to do your normal daily activities, or have any other concerning symptoms, please come back promptly to the emergency department.

## 2025-06-02 NOTE — TELEPHONE ENCOUNTER
called and said pt was in our parking lot in a fib.  Pt called 911 to come and help her. The ambulance pulled in at 2:05p in the parking lot.  Lauren has been made aware and she is alerting Violet.

## 2025-06-02 NOTE — DISCHARGE INSTR - COC
/ NO:73073}       Date of Last BM: ***  No intake or output data in the 24 hours ending 25 1431  No intake/output data recorded.    Safety Concerns:     { KHOI Safety Concerns:693747652}    Impairments/Disabilities:      { KHOI Impairments/Disabilities:009934628}    Nutrition Therapy:  Current Nutrition Therapy:   { KHOI Diet List:530483702}    Routes of Feeding: {[facility] DME Other Feedings:478260629}  Liquids: {Slp liquid thickness:00750}  Daily Fluid Restriction: {[facility] DME Yes amt example:790527587}  Last Modified Barium Swallow with Video (Video Swallowing Test): {Done Not Done Date:967401684}    Treatments at the Time of Hospital Discharge:   Respiratory Treatments: ***  Oxygen Therapy:  {Therapy; copd oxygen:62987}  Ventilator:    { CC Vent List:736309333}    Rehab Therapies: {THERAPEUTIC INTERVENTION:3093672234}  Weight Bearing Status/Restrictions: {[facility] Weight Bearin}  Other Medical Equipment (for information only, NOT a DME order):  {EQUIPMENT:245767380}  Other Treatments: ***    Patient's personal belongings (please select all that are sent with patient):  {[facility] DME Belongings:416953193}    RN SIGNATURE:  {Esignature:945422683}    CASE MANAGEMENT/SOCIAL WORK SECTION    Inpatient Status Date: ***    Readmission Risk Assessment Score:  [facility] RISK OF UNPLANNED READMISSION 2.0             0 Total Score        Discharging to Facility/ Agency   Name:   Address:  Phone:  Fax:    Dialysis Facility (if applicable)   Name:  Address:  Dialysis Schedule:  Phone:  Fax:    / signature: {Esignature:954236903}    PHYSICIAN SECTION    Prognosis: {Prognosis:0655462648}    Condition at Discharge: { Patient Condition:020219380}    Rehab Potential (if transferring to Rehab): {Prognosis:9732785189}    Recommended Labs or Other Treatments After Discharge: ***    Physician Certification: I certify the above information and transfer of Krupa Sanchez  is necessary for the continuing

## 2025-06-03 ENCOUNTER — TELEPHONE (OUTPATIENT)
Dept: CARDIOLOGY CLINIC | Age: 80
End: 2025-06-03

## 2025-06-05 ENCOUNTER — HOSPITAL ENCOUNTER (OUTPATIENT)
Age: 80
Setting detail: SPECIMEN
Discharge: HOME | End: 2025-06-05
Payer: COMMERCIAL

## 2025-06-05 LAB
ANION GAP SERPL CALCULATED.3IONS-SCNC: 17 MMOL/L (ref 9–17)
BUN SERPL-MCNC: 56 MG/DL (ref 7–20)
CALCIUM SERPL-MCNC: 9.9 MG/DL (ref 8.3–10.6)
CHLORIDE SERPL-SCNC: 95 MMOL/L (ref 99–110)
CO2 SERPL-SCNC: 24 MMOL/L (ref 21–32)
CREAT SERPL-MCNC: 10.6 MG/DL (ref 0.6–1.2)
GFR, ESTIMATED: 3 ML/MIN/1.73M2
GLUCOSE SERPL-MCNC: 151 MG/DL (ref 74–99)
POTASSIUM SERPL-SCNC: 4.9 MMOL/L (ref 3.5–5.1)
SODIUM SERPL-SCNC: 136 MMOL/L (ref 136–145)

## 2025-06-05 PROCEDURE — 80048 BASIC METABOLIC PNL TOTAL CA: CPT

## 2025-06-10 ENCOUNTER — OFFICE VISIT (OUTPATIENT)
Dept: CARDIOLOGY CLINIC | Age: 80
End: 2025-06-10
Payer: COMMERCIAL

## 2025-06-10 VITALS
BODY MASS INDEX: 31.28 KG/M2 | SYSTOLIC BLOOD PRESSURE: 134 MMHG | DIASTOLIC BLOOD PRESSURE: 76 MMHG | HEART RATE: 87 BPM | WEIGHT: 206.4 LBS | HEIGHT: 68 IN

## 2025-06-10 DIAGNOSIS — I15.1 HYPERTENSION SECONDARY TO OTHER RENAL DISORDERS: ICD-10-CM

## 2025-06-10 DIAGNOSIS — I48.0 PAROXYSMAL ATRIAL FIBRILLATION (HCC): ICD-10-CM

## 2025-06-10 DIAGNOSIS — R06.02 SHORTNESS OF BREATH: Primary | ICD-10-CM

## 2025-06-10 DIAGNOSIS — I25.10 ASCVD (ARTERIOSCLEROTIC CARDIOVASCULAR DISEASE): ICD-10-CM

## 2025-06-10 DIAGNOSIS — I27.20 PULMONARY HYPERTENSION, UNSPECIFIED (HCC): ICD-10-CM

## 2025-06-10 DIAGNOSIS — I10 ESSENTIAL HYPERTENSION: ICD-10-CM

## 2025-06-10 PROCEDURE — 1159F MED LIST DOCD IN RCRD: CPT | Performed by: NURSE PRACTITIONER

## 2025-06-10 PROCEDURE — 3078F DIAST BP <80 MM HG: CPT | Performed by: NURSE PRACTITIONER

## 2025-06-10 PROCEDURE — 1123F ACP DISCUSS/DSCN MKR DOCD: CPT | Performed by: NURSE PRACTITIONER

## 2025-06-10 PROCEDURE — 1160F RVW MEDS BY RX/DR IN RCRD: CPT | Performed by: NURSE PRACTITIONER

## 2025-06-10 PROCEDURE — 99214 OFFICE O/P EST MOD 30 MIN: CPT | Performed by: NURSE PRACTITIONER

## 2025-06-10 PROCEDURE — 3075F SYST BP GE 130 - 139MM HG: CPT | Performed by: NURSE PRACTITIONER

## 2025-06-10 ASSESSMENT — ENCOUNTER SYMPTOMS
ORTHOPNEA: 0
SHORTNESS OF BREATH: 1

## 2025-06-10 NOTE — PROGRESS NOTES
CLINICAL STAFF DOCUMENTATION    Gisselle Alcaraz, CNP     Krupa Sanchez  1945  1019    Have you had any Chest Pain recently? - No  IHave you had any Shortness of Breath - Yes  When did it begin? - Years   If Yes - When on exertion  Have you had any dizziness - No  Have you had any palpitations recently? - No  Do you have any edema - swelling in No      Do you have a surgery or procedure scheduled in the near future - No    Do use tobacco products? - No  Do you drink alcohol? - No  Do you use any illicit drugs? - No  Caffeine? - Yes  How much caffeine? .1  cups       Check medication list thoroughly!!! AND RECONCILE OUTSIDE MEDICATIONS  If dose has changed change the entire order not just the MG  BE SURE TO ASK PATIENT IF THEY NEED MEDICATION REFILLS  Verify Pharmacy and update if incorrect    Add to every patient's \"wrap up\" the following dot phrase AFTERVISITCARDIOHEARTHOUSE and ensure we explain this to our patients   
dialysis with recent adjustment in dialysate fluid.  - Communication with Dr. Morris, nephrologist, to discuss any necessary adjustments to fluid management or PD dialysis regimen.    3. Medication management:  - Advised to contact insurance provider to ascertain the status of deductible.  - Information regarding the Eliquis assistance program to be provided.  - Complete necessary paperwork for Eliquis assistance program and return for signature.    Follow-up  - Reevaluation of the echocardiogram in one month.      Signed:  FAITH Larson CNP, 6/10/2025, 3:26 PM    An electronic signature was used to authenticate this note.    Please note this report has been partially produced using speech recognition software and may contain errors related to that system including errors in grammar, punctuation, and spelling, as well as words and phrases that may be inappropriate. If there are any questions or concerns please feel free to contact the dictating provider for clarification.

## 2025-06-12 DIAGNOSIS — R92.8 ABNORMAL MAMMOGRAM: Primary | ICD-10-CM

## 2025-06-12 NOTE — PROGRESS NOTES
Patient scheduled for a left dx mammogram tomorrow, 6/13/25 at [facility].  Per Dr. Pierre - order placed per protocol.

## 2025-06-13 ENCOUNTER — HOSPITAL ENCOUNTER (OUTPATIENT)
Dept: WOMENS IMAGING | Age: 80
Discharge: HOME | End: 2025-06-13
Payer: COMMERCIAL

## 2025-06-13 ENCOUNTER — HOSPITAL ENCOUNTER (OUTPATIENT)
Dept: ULTRASOUND IMAGING | Age: 80
Discharge: HOME | End: 2025-06-13
Payer: COMMERCIAL

## 2025-06-13 DIAGNOSIS — R92.8 ABNORMAL MAMMOGRAM: ICD-10-CM

## 2025-06-13 PROCEDURE — G0279 TOMOSYNTHESIS, MAMMO: HCPCS

## 2025-06-13 PROCEDURE — 76642 ULTRASOUND BREAST LIMITED: CPT

## 2025-06-19 ENCOUNTER — HOSPITAL ENCOUNTER (OUTPATIENT)
Age: 80
Setting detail: SPECIMEN
Discharge: HOME | End: 2025-06-19
Payer: COMMERCIAL

## 2025-06-19 LAB — POTASSIUM SERPL-SCNC: 4.2 MMOL/L (ref 3.5–5.1)

## 2025-06-19 PROCEDURE — 84132 ASSAY OF SERUM POTASSIUM: CPT

## 2025-07-01 ENCOUNTER — RESULTS FOLLOW-UP (OUTPATIENT)
Dept: CARDIOLOGY CLINIC | Age: 80
End: 2025-07-01

## 2025-07-02 ENCOUNTER — OFFICE VISIT (OUTPATIENT)
Dept: INTERNAL MEDICINE CLINIC | Age: 80
End: 2025-07-02
Payer: COMMERCIAL

## 2025-07-02 VITALS
BODY MASS INDEX: 30.77 KG/M2 | SYSTOLIC BLOOD PRESSURE: 118 MMHG | DIASTOLIC BLOOD PRESSURE: 56 MMHG | HEART RATE: 94 BPM | OXYGEN SATURATION: 94 % | WEIGHT: 202.4 LBS

## 2025-07-02 DIAGNOSIS — R06.02 SHORTNESS OF BREATH: Primary | ICD-10-CM

## 2025-07-02 PROCEDURE — 1159F MED LIST DOCD IN RCRD: CPT | Performed by: INTERNAL MEDICINE

## 2025-07-02 PROCEDURE — 1123F ACP DISCUSS/DSCN MKR DOCD: CPT | Performed by: INTERNAL MEDICINE

## 2025-07-02 PROCEDURE — G2211 COMPLEX E/M VISIT ADD ON: HCPCS | Performed by: INTERNAL MEDICINE

## 2025-07-02 PROCEDURE — 99213 OFFICE O/P EST LOW 20 MIN: CPT | Performed by: INTERNAL MEDICINE

## 2025-07-02 NOTE — PROGRESS NOTES
Krupa Sanchez  1945 07/02/25    SUBJECTIVE:    Pt was seen by Dr Solorzano for the SOB. The anoro has not provided benefit. She will be started on a new CPAP. She coughs a couple times a day productive of clear mucous. She does have mild presyncope when she gets up suddenly. She denies any pain.     Patient compliant with medications for diabetes. Blood sugars have averaged around 110-140, with pt checking blood sugar 1 time daily. Patient has had no episodes of significant hypoglycemia.     OBJECTIVE:    BP (!) 118/56 (BP Site: Left Upper Arm, Patient Position: Sitting, BP Cuff Size: Large Adult)   Pulse 94   Wt 91.8 kg (202 lb 6.4 oz)   LMP  (LMP Unknown)   SpO2 94%   BMI 30.77 kg/m²     Physical Exam  Constitutional:       Appearance: She is well-developed.   Eyes:      General: No scleral icterus.     Conjunctiva/sclera: Conjunctivae normal.   Cardiovascular:      Rate and Rhythm: Normal rate and regular rhythm.      Heart sounds: Normal heart sounds. No murmur heard.  Pulmonary:      Effort: Pulmonary effort is normal. No respiratory distress.      Breath sounds: Examination of the right-lower field reveals rales. Examination of the left-lower field reveals rales. Rales present. No wheezing.       No edema    ASSESSMENT:    1. Shortness of breath        PLAN:    Krupa was seen today for follow-up.    Diagnoses and all orders for this visit:    Shortness of breath-I remain perplexed regarding the patient's shortness of breath.  She will start pulmonary rehab per pulmonology, but he did not think that she had significant lung disease and may be fluid overloaded.  However, exam does not suggest fluid overload though BNP was elevated.  She does have follow-up with cardiology and I have recommended that she discuss this further with them.

## 2025-07-12 ENCOUNTER — OFFICE VISIT (OUTPATIENT)
Dept: CARDIOLOGY CLINIC | Age: 80
End: 2025-07-12
Payer: COMMERCIAL

## 2025-07-12 VITALS
HEART RATE: 69 BPM | WEIGHT: 201.4 LBS | HEIGHT: 68 IN | DIASTOLIC BLOOD PRESSURE: 70 MMHG | OXYGEN SATURATION: 96 % | BODY MASS INDEX: 30.52 KG/M2 | SYSTOLIC BLOOD PRESSURE: 126 MMHG

## 2025-07-12 DIAGNOSIS — R06.02 SHORTNESS OF BREATH: Primary | ICD-10-CM

## 2025-07-12 PROCEDURE — 1123F ACP DISCUSS/DSCN MKR DOCD: CPT | Performed by: INTERNAL MEDICINE

## 2025-07-12 PROCEDURE — 99214 OFFICE O/P EST MOD 30 MIN: CPT | Performed by: INTERNAL MEDICINE

## 2025-07-12 PROCEDURE — 1159F MED LIST DOCD IN RCRD: CPT | Performed by: INTERNAL MEDICINE

## 2025-07-12 NOTE — PROGRESS NOTES
Krupa  is a  Established patient  ,80 y.o.   female here for evaluation of the following chief complaint(s):    Here for fu       SUBJECTIVE/OBJECTIVE:  HPI : h/o  Htn, hyperlipidimea, dm, paf now here for follow-up   Vitals:    07/12/25 0953   BP: 126/70   BP Site: Left Upper Arm   Patient Position: Sitting   BP Cuff Size: Medium Adult   Pulse: 69   SpO2: 96%   Weight: 91.4 kg (201 lb 6.4 oz)   Height: 1.727 m (5' 8\")             /70 (BP Site: Left Upper Arm, Patient Position: Sitting, BP Cuff Size: Medium Adult)   Pulse 69   Ht 1.727 m (5' 8\")   Wt 91.4 kg (201 lb 6.4 oz)   LMP  (LMP Unknown)   SpO2 96%   BMI 30.62 kg/m²       12/10/2020    11:34 AM   Patient-Reported Vitals   Patient-Reported Weight 194   Patient-Reported Systolic 123 mmHg   Patient-Reported Diastolic 70 mmHg   Patient-Reported Pulse 67     Wt Readings from Last 3 Encounters:   07/12/25 91.4 kg (201 lb 6.4 oz)   07/02/25 91.8 kg (202 lb 6.4 oz)   06/26/25 93.4 kg (206 lb)     Body mass index is 30.62 kg/m².    Physical Exam     Neck: JVD  no    Lungs : clear    Cardio : Si and S2 audilble      Ext: edema      All pertinent data reviewed  y    Meds : reviewed   y      Tests ordered    y    ASSESSMENT/PLAN:    - HFpEF we will check an echo today to see if she has HFpEF  Echo done on 6/26/2025 was unremarkable no new changes were noted  BNP level is elevated probably from CKD elevated because of that   --------------------------------------------------------    - Atrial fibrillation, pt is  compliant with meds. Patient does not have symptoms from atrial fibrillation  Has aflutter was on Tambocor which will be held and continue with amiodarone  Also on Eliquis which will be continued    Be monitored by PCP and EP    Off amiodarone    [id number] Score for Atrial Fibrillation Stroke Risk   Risk   Factors  Component Value   C CHF No 0   H HTN Yes 1   A2 Age >= 75 Yes,  (80 y.o.) 2   D DM Yes 1   S2 Prior Stroke/TIA No 0   V Vascular Disease

## 2025-07-12 NOTE — TELEPHONE ENCOUNTER
Patient scheduled 8/6/2025 for Lexiscan.     Patient voiced understanding for no caffeine 12 hrs prior to exam.    F/U 8/13/2025

## 2025-07-12 NOTE — PATIENT INSTRUCTIONS
**It is YOUR responsibilty to bring medication bottles and/or updated medication list to EACH APPOINTMENT. This will allow us to better serve you and all your healthcare needs**  Thank you for allowing us to care for you today!   We want to ensure we can follow your treatment plan and we strive to give you the best outcomes and experience possible.   If you ever have a life threatening emergency and call 911 - for an ambulance (EMS)   Our providers can only care for you at:   [facility] or [facility].   Even if you have someone take you or you drive yourself we can only care for you in a [facility] facility. Our providers are not setup at the other healthcare locations!       Thank you for allowing us to care for you today!   We want to ensure we can follow your treatment plan and we strive to give you the best outcomes and experience possible.   If you ever have a life threatening emergency and call 911 - for an ambulance (EMS)  REMEMBER  Our providers can only care for you at:   [facility] or [facility]   Even if you have someone take you or you drive yourself we can only care for you in a [facility] facility. Our providers are not setup at the other healthcare locations!    PLEASE CALL OUR OFFICE DURING NORMAL BUSINESS HOURS  Monday through Friday 8 am to 5 pm  AFTER HOURS the physician on-call cannot help with scheduling, rescheduling, procedure instruction questions or any type of medication need or issue.  [facility] P:[phone] - [facility] P:[phone] - [facility] P:[phone]      If you receive a survey:  We would appreciate you taking the time to share your experience concerning your provider visit in the office.    These surveys are confidential!  We are eager to improve and are counting on you to share your feedback so we can ensure you get the best care possible.

## 2025-07-25 ENCOUNTER — TELEPHONE (OUTPATIENT)
Dept: CARDIOLOGY CLINIC | Age: 80
End: 2025-07-25

## 2025-07-25 DIAGNOSIS — I25.10 CORONARY ARTERY DISEASE: ICD-10-CM

## 2025-07-25 DIAGNOSIS — R94.31 ABNORMAL ELECTROCARDIOGRAPHY: Primary | ICD-10-CM

## 2025-07-25 DIAGNOSIS — R06.02 SHORTNESS OF BREATH: ICD-10-CM

## 2025-07-25 NOTE — TELEPHONE ENCOUNTER
Pt is here today for her scheduled NucMed.stress test. She requested that her results be sent to  & . Routed to triage.

## 2025-08-13 ENCOUNTER — OFFICE VISIT (OUTPATIENT)
Dept: CARDIOLOGY CLINIC | Age: 80
End: 2025-08-13
Payer: COMMERCIAL

## 2025-08-13 VITALS
WEIGHT: 201 LBS | SYSTOLIC BLOOD PRESSURE: 126 MMHG | BODY MASS INDEX: 29.77 KG/M2 | HEART RATE: 86 BPM | OXYGEN SATURATION: 98 % | HEIGHT: 69 IN | DIASTOLIC BLOOD PRESSURE: 62 MMHG

## 2025-08-13 DIAGNOSIS — I25.10 ASCVD (ARTERIOSCLEROTIC CARDIOVASCULAR DISEASE): Primary | ICD-10-CM

## 2025-08-13 PROCEDURE — 99214 OFFICE O/P EST MOD 30 MIN: CPT | Performed by: INTERNAL MEDICINE

## 2025-08-13 PROCEDURE — 1159F MED LIST DOCD IN RCRD: CPT | Performed by: INTERNAL MEDICINE

## 2025-08-13 PROCEDURE — 1123F ACP DISCUSS/DSCN MKR DOCD: CPT | Performed by: INTERNAL MEDICINE

## 2025-08-18 RX ORDER — METOPROLOL TARTRATE 50 MG
50 TABLET ORAL 2 TIMES DAILY
Qty: 60 TABLET | Refills: 3 | Status: SHIPPED | OUTPATIENT
Start: 2025-08-18

## 2025-09-03 ENCOUNTER — OFFICE VISIT (OUTPATIENT)
Dept: INTERNAL MEDICINE CLINIC | Age: 80
End: 2025-09-03
Payer: COMMERCIAL

## 2025-09-03 VITALS
OXYGEN SATURATION: 97 % | HEART RATE: 98 BPM | SYSTOLIC BLOOD PRESSURE: 120 MMHG | DIASTOLIC BLOOD PRESSURE: 60 MMHG | RESPIRATION RATE: 18 BRPM

## 2025-09-03 DIAGNOSIS — E11.21 TYPE 2 DIABETES MELLITUS WITH NEPHROPATHY (HCC): ICD-10-CM

## 2025-09-03 DIAGNOSIS — T14.8XXA HEMATOMA: Primary | ICD-10-CM

## 2025-09-03 PROCEDURE — 1123F ACP DISCUSS/DSCN MKR DOCD: CPT | Performed by: INTERNAL MEDICINE

## 2025-09-03 PROCEDURE — G2211 COMPLEX E/M VISIT ADD ON: HCPCS | Performed by: INTERNAL MEDICINE

## 2025-09-03 PROCEDURE — 3051F HG A1C>EQUAL 7.0%<8.0%: CPT | Performed by: INTERNAL MEDICINE

## 2025-09-03 PROCEDURE — 1159F MED LIST DOCD IN RCRD: CPT | Performed by: INTERNAL MEDICINE

## 2025-09-03 PROCEDURE — 99213 OFFICE O/P EST LOW 20 MIN: CPT | Performed by: INTERNAL MEDICINE

## (undated) DEVICE — RESERVOIR,SUCTION,100CC,SILICONE: Brand: MEDLINE

## (undated) DEVICE — SUTURE VCRL SZ 3-0 L27IN ABSRB UD L26MM CT-2 1/2 CIR J232H

## (undated) DEVICE — PACK SURG LAP CHOLE

## (undated) DEVICE — GLOVE SURG SZ 65 THK91MIL LTX FREE SYN POLYISOPRENE

## (undated) DEVICE — TROCAR: Brand: KII® SLEEVE

## (undated) DEVICE — CATHETER PERITONEAL DLYS 35X53 MMX62 CM FLEX-NECK ARC

## (undated) DEVICE — SUTURE COAT VCRL SZ 4-0 L18IN ABSRB UD L19MM PS-2 1/2 CIR J496G

## (undated) DEVICE — THE ULTRASET PRODUCTS ARE SINGLE USE DEVICES THAT ARE INTENDED FOR THE DRAINAGE AND INFUSION OF PERITONEAL DIALYSIS SOLUTION.: Brand: ULTRASET CAPD DISPOSABLE DISCONNECT Y-SET

## (undated) DEVICE — GOWN,ECLIPSE,POLYRNF,BRTHSLV,L,30/CS: Brand: MEDLINE

## (undated) DEVICE — DRESSING GRMCDL 6 12FR D1N CNTR HOLE 4MM ANTMCRBL PRTCTVE DI

## (undated) DEVICE — STYLET CATH ADOL AD 62 CM COILED FLEXNECK CATH IMPLANTATION

## (undated) DEVICE — GOWN,SIRUS,POLYRNF,BRTHSLV,XLN/XL,20/CS: Brand: MEDLINE

## (undated) DEVICE — APPLICATOR MEDICATED 26 CC SOLUTION HI LT ORNG CHLORAPREP

## (undated) DEVICE — GLOVE SURG SZ 6 THK91MIL LTX FREE SYN POLYISOPRENE ANTI

## (undated) DEVICE — SUTURE PERMAHAND SZ 2-0 L17X18IN NONABSORBABLE BLK SILK SA65H

## (undated) DEVICE — SUTURE PDS II SZ 3-0 L27IN ABSRB VLT L26MM SH 1/2 CIR Z316H

## (undated) DEVICE — ADHESIVE SKIN CLSR 0.7ML TOP DERMBND ADV

## (undated) DEVICE — TOWEL,OR,DSP,ST,BLUE,STD,6/PK,12PK/CS: Brand: MEDLINE

## (undated) DEVICE — THIS SET CONSISTS OF A FEMALE LOCKING CONNECTOR/ON-OFF CLAMP ASSEMBLY, TUBING AND DOUBLE SEALING MALE LUER LOCK CONNECTOR. THIS SET IS TO BE USED WITH THE BAXTER LOCKING TITANIUM ADAPTER FOR PERITONEAL DIALYSIS CATHETER IN DISCONNECT APPLICATIONS AND IN CYCLER APPLICATIONS WHERE ASEPTIC CONNECTIONS AND DISCONNECTIONS ARE PERFORMED AT THE TRANSFER SET/CYCLER SET JUNCTURE.: Brand: MINICAP

## (undated) DEVICE — SUTURE VCRL SZ 3-0 L27IN ABSRB UD L26MM SH 1/2 CIR J416H

## (undated) DEVICE — ELECTRODE ES AD CRDLSS PT RET REM POLYHESIVE

## (undated) DEVICE — TROCAR ENDOSCP FALLER S STL

## (undated) DEVICE — TROCAR: Brand: KII FIOS FIRST ENTRY

## (undated) DEVICE — CONNECTOR CATH TWO PART AD FOR PERITONEAL DLYS FLX NK

## (undated) DEVICE — SYRINGE, LUER LOCK, 30ML: Brand: MEDLINE

## (undated) DEVICE — Z DISCONTINUED (USE MFG CAT MVABO)  TUBING GAS SAMPLING STD 6.5 FT FEMALE CONN SMRT CAPNOLINE

## (undated) DEVICE — NEEDLE HYPO 20GA L1.5IN YEL POLYPR HUB S STL REG BVL STR

## (undated) DEVICE — GLOVE ORANGE PI 7 1/2   MSG9075